# Patient Record
Sex: MALE | Race: WHITE | NOT HISPANIC OR LATINO | Employment: UNEMPLOYED | URBAN - METROPOLITAN AREA
[De-identification: names, ages, dates, MRNs, and addresses within clinical notes are randomized per-mention and may not be internally consistent; named-entity substitution may affect disease eponyms.]

---

## 2020-08-05 ENCOUNTER — ANESTHESIA EVENT (OUTPATIENT)
Dept: SURGERY | Facility: HOSPITAL | Age: 45
DRG: 453 | End: 2020-08-05
Payer: MEDICAID

## 2020-08-05 ENCOUNTER — HOSPITAL ENCOUNTER (EMERGENCY)
Facility: HOSPITAL | Age: 45
Discharge: ANOTHER HOSPITAL | End: 2020-08-05
Attending: EMERGENCY MEDICINE
Payer: MEDICAID

## 2020-08-05 ENCOUNTER — HOSPITAL ENCOUNTER (INPATIENT)
Facility: HOSPITAL | Age: 45
LOS: 16 days | Discharge: NURSING, REHAB OR LONG-TERM CARE | DRG: 453 | End: 2020-08-21
Attending: EMERGENCY MEDICINE | Admitting: NEUROLOGICAL SURGERY
Payer: MEDICAID

## 2020-08-05 VITALS
TEMPERATURE: 98 F | DIASTOLIC BLOOD PRESSURE: 79 MMHG | RESPIRATION RATE: 19 BRPM | WEIGHT: 190 LBS | HEART RATE: 84 BPM | OXYGEN SATURATION: 98 % | SYSTOLIC BLOOD PRESSURE: 117 MMHG

## 2020-08-05 DIAGNOSIS — S32.010A TRAUMATIC COMPRESSION FRACTURE OF L1 LUMBAR VERTEBRA, CLOSED, INITIAL ENCOUNTER: ICD-10-CM
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
## 2020-08-05 DIAGNOSIS — M43.27 FUSION OF SPINE, LUMBOSACRAL REGION: ICD-10-CM

## 2020-08-05 DIAGNOSIS — G89.11 ACUTE LOW BACK PAIN DUE TO TRAUMA: ICD-10-CM

## 2020-08-05 DIAGNOSIS — S32.010A: ICD-10-CM

## 2020-08-05 DIAGNOSIS — S32.008A CLOSED FRACTURE OF LUMBAR VERTEBRA WITH SPINAL CORD INJURY, INITIAL ENCOUNTER: Primary | ICD-10-CM

## 2020-08-05 DIAGNOSIS — S34.109A CLOSED FRACTURE OF LUMBAR VERTEBRA WITH SPINAL CORD INJURY, INITIAL ENCOUNTER: Primary | ICD-10-CM

## 2020-08-05 DIAGNOSIS — S32.009A CLOSED FRACTURE OF TRANSVERSE PROCESS OF LUMBAR VERTEBRA, INITIAL ENCOUNTER: ICD-10-CM

## 2020-08-05 DIAGNOSIS — M48.062 SPINAL STENOSIS OF LUMBAR REGION WITH NEUROGENIC CLAUDICATION: ICD-10-CM

## 2020-08-05 DIAGNOSIS — R29.898 BILATERAL LEG WEAKNESS: ICD-10-CM

## 2020-08-05 DIAGNOSIS — M48.061 SPINAL STENOSIS OF LUMBAR REGION, UNSPECIFIED WHETHER NEUROGENIC CLAUDICATION PRESENT: ICD-10-CM
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
## 2020-08-05 DIAGNOSIS — R33.8 ACUTE URINARY RETENTION: ICD-10-CM

## 2020-08-05 DIAGNOSIS — K56.7 ILEUS: ICD-10-CM

## 2020-08-05 DIAGNOSIS — M54.50 ACUTE LOW BACK PAIN DUE TO TRAUMA: ICD-10-CM

## 2020-08-05 DIAGNOSIS — S32.010A CLOSED WEDGE COMPRESSION FRACTURE OF FIRST LUMBAR VERTEBRA, INITIAL ENCOUNTER: ICD-10-CM

## 2020-08-05 DIAGNOSIS — R33.9 URINARY RETENTION: ICD-10-CM

## 2020-08-05 LAB
ABO + RH BLD: NORMAL
ALBUMIN SERPL BCP-MCNC: 4.1 G/DL (ref 3.5–5.2)
ALP SERPL-CCNC: 59 U/L (ref 55–135)
ALT SERPL W/O P-5'-P-CCNC: 15 U/L (ref 10–44)
ANION GAP SERPL CALC-SCNC: 8 MMOL/L (ref 8–16)
APTT BLDCRRT: 23.5 SEC (ref 21–32)
AST SERPL-CCNC: 27 U/L (ref 10–40)
BILIRUB SERPL-MCNC: 0.4 MG/DL (ref 0.1–1)
BLD GP AB SCN CELLS X3 SERPL QL: NORMAL
BUN SERPL-MCNC: 15 MG/DL (ref 6–20)
CALCIUM SERPL-MCNC: 8.9 MG/DL (ref 8.7–10.5)
CHLORIDE SERPL-SCNC: 106 MMOL/L (ref 95–110)
CO2 SERPL-SCNC: 26 MMOL/L (ref 23–29)
CREAT SERPL-MCNC: 0.7 MG/DL (ref 0.5–1.4)
CREAT SERPL-MCNC: 0.9 MG/DL (ref 0.5–1.4)
DELSYS: NORMAL
ERYTHROCYTE [DISTWIDTH] IN BLOOD BY AUTOMATED COUNT: 11.8 % (ref 11.5–14.5)
EST. GFR  (AFRICAN AMERICAN): >60 ML/MIN/1.73 M^2
EST. GFR  (NON AFRICAN AMERICAN): >60 ML/MIN/1.73 M^2
GLUCOSE SERPL-MCNC: 143 MG/DL (ref 70–110)
HCT VFR BLD AUTO: 39.5 % (ref 40–54)
HGB BLD-MCNC: 13.7 G/DL (ref 14–18)
INR PPP: 0.9 (ref 0.8–1.2)
LIPASE SERPL-CCNC: 32 U/L (ref 4–60)
MCH RBC QN AUTO: 31.7 PG (ref 27–31)
MCHC RBC AUTO-ENTMCNC: 34.7 G/DL (ref 32–36)
MCV RBC AUTO: 91 FL (ref 82–98)
PLATELET # BLD AUTO: 260 K/UL (ref 150–350)
PMV BLD AUTO: 9.1 FL (ref 9.2–12.9)
POTASSIUM SERPL-SCNC: 3.6 MMOL/L (ref 3.5–5.1)
PROT SERPL-MCNC: 7.2 G/DL (ref 6–8.4)
PROTHROMBIN TIME: 10.4 SEC (ref 9–12.5)
RBC # BLD AUTO: 4.32 M/UL (ref 4.6–6.2)
SAMPLE: NORMAL
SARS-COV-2 RDRP RESP QL NAA+PROBE: NEGATIVE
SODIUM SERPL-SCNC: 140 MMOL/L (ref 136–145)
WBC # BLD AUTO: 6.17 K/UL (ref 3.9–12.7)

## 2020-08-05 PROCEDURE — 86850 RBC ANTIBODY SCREEN: CPT

## 2020-08-05 PROCEDURE — 11000001 HC ACUTE MED/SURG PRIVATE ROOM

## 2020-08-05 PROCEDURE — U0002 COVID-19 LAB TEST NON-CDC: HCPCS

## 2020-08-05 PROCEDURE — 83690 ASSAY OF LIPASE: CPT

## 2020-08-05 PROCEDURE — 99285 EMERGENCY DEPT VISIT HI MDM: CPT | Mod: ,,, | Performed by: EMERGENCY MEDICINE

## 2020-08-05 PROCEDURE — 99285 PR EMERGENCY DEPT VISIT,LEVEL V: ICD-10-PCS | Mod: ,,, | Performed by: EMERGENCY MEDICINE

## 2020-08-05 PROCEDURE — 96374 THER/PROPH/DIAG INJ IV PUSH: CPT | Mod: 59

## 2020-08-05 PROCEDURE — 25500020 PHARM REV CODE 255: Performed by: EMERGENCY MEDICINE

## 2020-08-05 PROCEDURE — 99234 PR OBSERV/HOSP SAME DATE,LEVL III: ICD-10-PCS | Mod: ,,, | Performed by: PHYSICIAN ASSISTANT

## 2020-08-05 PROCEDURE — 99900035 HC TECH TIME PER 15 MIN (STAT)

## 2020-08-05 PROCEDURE — 63600175 PHARM REV CODE 636 W HCPCS: Performed by: EMERGENCY MEDICINE

## 2020-08-05 PROCEDURE — 80053 COMPREHEN METABOLIC PANEL: CPT

## 2020-08-05 PROCEDURE — 99285 EMERGENCY DEPT VISIT HI MDM: CPT | Mod: 25,27

## 2020-08-05 PROCEDURE — 99285 EMERGENCY DEPT VISIT HI MDM: CPT | Mod: 25

## 2020-08-05 PROCEDURE — 96375 TX/PRO/DX INJ NEW DRUG ADDON: CPT | Mod: 59

## 2020-08-05 PROCEDURE — 86920 COMPATIBILITY TEST SPIN: CPT

## 2020-08-05 PROCEDURE — 96376 TX/PRO/DX INJ SAME DRUG ADON: CPT | Mod: 59

## 2020-08-05 PROCEDURE — 82565 ASSAY OF CREATININE: CPT | Mod: 91

## 2020-08-05 PROCEDURE — 25000003 PHARM REV CODE 250: Performed by: PHYSICIAN ASSISTANT

## 2020-08-05 PROCEDURE — 85610 PROTHROMBIN TIME: CPT

## 2020-08-05 PROCEDURE — 63600175 PHARM REV CODE 636 W HCPCS: Performed by: PHYSICIAN ASSISTANT

## 2020-08-05 PROCEDURE — 25000003 PHARM REV CODE 250: Performed by: EMERGENCY MEDICINE

## 2020-08-05 PROCEDURE — 99234 HOSP IP/OBS SM DT SF/LOW 45: CPT | Mod: ,,, | Performed by: PHYSICIAN ASSISTANT

## 2020-08-05 PROCEDURE — 85027 COMPLETE CBC AUTOMATED: CPT

## 2020-08-05 PROCEDURE — 96374 THER/PROPH/DIAG INJ IV PUSH: CPT

## 2020-08-05 PROCEDURE — 85730 THROMBOPLASTIN TIME PARTIAL: CPT

## 2020-08-05 PROCEDURE — 96375 TX/PRO/DX INJ NEW DRUG ADDON: CPT

## 2020-08-05 RX ORDER — IBUPROFEN 200 MG
24 TABLET ORAL
Status: DISCONTINUED | OUTPATIENT
Start: 2020-08-05 | End: 2020-08-11

## 2020-08-05 RX ORDER — AMOXICILLIN 250 MG
2 CAPSULE ORAL DAILY
Status: DISCONTINUED | OUTPATIENT
Start: 2020-08-05 | End: 2020-08-08

## 2020-08-05 RX ORDER — FENTANYL CITRATE 50 UG/ML
25 INJECTION, SOLUTION INTRAMUSCULAR; INTRAVENOUS
Status: DISCONTINUED | OUTPATIENT
Start: 2020-08-05 | End: 2020-08-07

## 2020-08-05 RX ORDER — SODIUM CHLORIDE 9 MG/ML
INJECTION, SOLUTION INTRAVENOUS CONTINUOUS
Status: DISCONTINUED | OUTPATIENT
Start: 2020-08-05 | End: 2020-08-05

## 2020-08-05 RX ORDER — SODIUM CHLORIDE 9 MG/ML
INJECTION, SOLUTION INTRAVENOUS CONTINUOUS
Status: DISCONTINUED | OUTPATIENT
Start: 2020-08-06 | End: 2020-08-20

## 2020-08-05 RX ORDER — GLUCAGON 1 MG
1 KIT INJECTION
Status: DISCONTINUED | OUTPATIENT
Start: 2020-08-05 | End: 2020-08-21 | Stop reason: HOSPADM

## 2020-08-05 RX ORDER — IBUPROFEN 200 MG
16 TABLET ORAL
Status: DISCONTINUED | OUTPATIENT
Start: 2020-08-05 | End: 2020-08-11

## 2020-08-05 RX ORDER — DIAZEPAM 5 MG/1
5 TABLET ORAL EVERY 6 HOURS PRN
Status: DISCONTINUED | OUTPATIENT
Start: 2020-08-05 | End: 2020-08-09

## 2020-08-05 RX ORDER — FENTANYL CITRATE 50 UG/ML
100 INJECTION, SOLUTION INTRAMUSCULAR; INTRAVENOUS
Status: COMPLETED | OUTPATIENT
Start: 2020-08-05 | End: 2020-08-05

## 2020-08-05 RX ORDER — HYDROMORPHONE HYDROCHLORIDE 1 MG/ML
1 INJECTION, SOLUTION INTRAMUSCULAR; INTRAVENOUS; SUBCUTANEOUS
Status: COMPLETED | OUTPATIENT
Start: 2020-08-05 | End: 2020-08-05

## 2020-08-05 RX ORDER — POLYETHYLENE GLYCOL 3350 17 G/17G
17 POWDER, FOR SOLUTION ORAL DAILY
Status: DISCONTINUED | OUTPATIENT
Start: 2020-08-05 | End: 2020-08-08

## 2020-08-05 RX ORDER — ONDANSETRON 2 MG/ML
4 INJECTION INTRAMUSCULAR; INTRAVENOUS EVERY 6 HOURS PRN
Status: DISCONTINUED | OUTPATIENT
Start: 2020-08-05 | End: 2020-08-07

## 2020-08-05 RX ORDER — SODIUM CHLORIDE 9 MG/ML
1000 INJECTION, SOLUTION INTRAVENOUS
Status: COMPLETED | OUTPATIENT
Start: 2020-08-05 | End: 2020-08-05

## 2020-08-05 RX ORDER — HYDROMORPHONE HYDROCHLORIDE 1 MG/ML
1 INJECTION, SOLUTION INTRAMUSCULAR; INTRAVENOUS; SUBCUTANEOUS
Status: DISCONTINUED | OUTPATIENT
Start: 2020-08-05 | End: 2020-08-05

## 2020-08-05 RX ORDER — MORPHINE SULFATE 2 MG/ML
6 INJECTION, SOLUTION INTRAMUSCULAR; INTRAVENOUS
Status: COMPLETED | OUTPATIENT
Start: 2020-08-05 | End: 2020-08-05

## 2020-08-05 RX ORDER — OXYCODONE AND ACETAMINOPHEN 5; 325 MG/1; MG/1
2 TABLET ORAL EVERY 4 HOURS PRN
Status: DISCONTINUED | OUTPATIENT
Start: 2020-08-05 | End: 2020-08-07

## 2020-08-05 RX ORDER — ACETAMINOPHEN 325 MG/1
650 TABLET ORAL EVERY 4 HOURS PRN
Status: DISCONTINUED | OUTPATIENT
Start: 2020-08-05 | End: 2020-08-09

## 2020-08-05 RX ADMIN — SODIUM CHLORIDE: 0.9 INJECTION, SOLUTION INTRAVENOUS at 03:08

## 2020-08-05 RX ADMIN — MORPHINE SULFATE 6 MG: 2 INJECTION, SOLUTION INTRAMUSCULAR; INTRAVENOUS at 11:08

## 2020-08-05 RX ADMIN — OXYCODONE HYDROCHLORIDE AND ACETAMINOPHEN 2 TABLET: 5; 325 TABLET ORAL at 09:08

## 2020-08-05 RX ADMIN — HYDROMORPHONE HYDROCHLORIDE 1 MG: 1 INJECTION, SOLUTION INTRAMUSCULAR; INTRAVENOUS; SUBCUTANEOUS at 01:08

## 2020-08-05 RX ADMIN — IOHEXOL 100 ML: 350 INJECTION, SOLUTION INTRAVENOUS at 10:08

## 2020-08-05 RX ADMIN — FENTANYL CITRATE 25 MCG: 50 INJECTION INTRAMUSCULAR; INTRAVENOUS at 07:08

## 2020-08-05 RX ADMIN — DIAZEPAM 5 MG: 5 TABLET ORAL at 03:08

## 2020-08-05 RX ADMIN — FENTANYL CITRATE 100 MCG: 50 INJECTION INTRAMUSCULAR; INTRAVENOUS at 11:08

## 2020-08-05 RX ADMIN — SODIUM CHLORIDE 1000 ML: 0.9 INJECTION, SOLUTION INTRAVENOUS at 10:08

## 2020-08-05 RX ADMIN — OXYCODONE HYDROCHLORIDE AND ACETAMINOPHEN 2 TABLET: 5; 325 TABLET ORAL at 03:08

## 2020-08-05 RX ADMIN — FENTANYL CITRATE 100 MCG: 50 INJECTION INTRAMUSCULAR; INTRAVENOUS at 10:08

## 2020-08-05 RX ADMIN — FENTANYL CITRATE 100 MCG: 50 INJECTION INTRAMUSCULAR; INTRAVENOUS at 01:08

## 2020-08-05 RX ADMIN — FENTANYL CITRATE 25 MCG: 50 INJECTION INTRAMUSCULAR; INTRAVENOUS at 06:08

## 2020-08-05 NOTE — ED NOTES
Physician at bedside. Pt. Removed from spine board. c-collar remain in place. Pt. Is crying in pain.

## 2020-08-05 NOTE — ED TRIAGE NOTES
Pt arrived from [place] after falling off roof about 10-12 ft high today. Helping cousin work on house. Fell off roof and fell directly on tailbone. Rates pain 10/10. Pt reports having severe back pain and sharp spasms to bilateral legs. Urinary retention-catheter in place before transfer. Abd tenderness.

## 2020-08-05 NOTE — ASSESSMENT & PLAN NOTE
46 y/o male with no significant PMH with L1 burst fracture with retropulsion resulting in severe canal stenosis s/p fall from 10ft building     -admit to neurosurgery  -close neurological monitoring, notify neurosurgery with any acute changes  -STAT MRI Lumbar spine pending  -NPO  -Preop labs (coags, type and screen)  -Bedrest/Log roll  -Pain control  -Maintain Aspen collar in acute setting  -Patient will likely need surgical stabilization, will await MRI results and update with plan    Discussed with Dr. Vizcarra

## 2020-08-05 NOTE — ED NOTES
Pt spoke with family on cell phone. Pt has 2 bags of belongings at bedside; pt states he wants his belongings to go with him to [facility].

## 2020-08-05 NOTE — ED NOTES
"Pt states " that last medicine didn't work as well as the first one." Dilaudid is not as effective as Fentanyl for pain per paitent.   "

## 2020-08-05 NOTE — ED TRIAGE NOTES
Pt presents to ED via EMS following fall approximately 10 feet off of a roof. Pt states he landed on his back and is stating he has severe back pain. Pt in c-collar.

## 2020-08-05 NOTE — ASSESSMENT & PLAN NOTE
44 y/o male with no significant PMH with L1 burst fracture with retropulsion resulting in severe canal stenosis s/p fall from 10ft building     -admit to neurosurgery  -close neurological monitoring, notify neurosurgery with any acute changes  -MRI Lsp: L1-L3 EDH. Consistent severe canal stenosis and foraminal narrowing at L1 and adjacent.   -NPO  -Preop labs (coags, type and screen)  -Bedrest/Log roll  -Pain control; given Fent 75mcg bolus and started po neurontin and robaxin. Ok with small sip of water.   -OR today for L1 corpectomy with cage insertion (L approach), T12-L1 interbody fusion and posterior instrumentation. Neuromonitoring.     D/w Dr. Snider.

## 2020-08-05 NOTE — ANESTHESIA PREPROCEDURE EVALUATION
[facility]  Anesthesia Pre-Operative Evaluation         Patient Name: Oscar Bills  YOB: 1975  MRN: [id number]    SUBJECTIVE:     Pre-operative evaluation for Procedure(s) (LRB):  CORPECTOMY, SPINE, LUMBAR (Left)  FUSION, SPINE, LUMBAR, TLIF, POSTERIOR APPROACH, USING PEDICLE SCREW (N/A)     08/05/2020    Oscar Bills is a 45 y.o. male w/ no significant PMH who was transferred from  after falling from 10ft roof at work. CT CAP significant for L1 burst fracture with retropulsion into the spinal canal resulting in severe spinal stenosis. Patient states he could feel he had to urinate but could not start a stream, kraus placed and he reports he felt the kraus inserted and felt his bladder release once the kraus was placed. He reports he felt the OSMIN and per report from ED physician, patient had full rectal tone. Denies saddle anesthesia. Endorses any movement of lower extremities causes excruciating pain in the sacram and lower back. numbness in left calf. Denies any prior medical conditions or daily medication.       Pt endorses asthma for which he takes albuterol rescue inhaler. Endorses an asthma attack last week. Has been hospitalized as a child for asthma. Never intubated.       Patient now presents for the above procedure(s).      LDA: None documented.       Peripheral IV - Single Lumen 08/05/20 18 G Left Forearm (Active)   Site Assessment Clean;Dry;Intact;No redness;No swelling 08/05/20 1012   Line Status Saline locked 08/05/20 1012   Dressing Status Clean;Dry;Intact 08/05/20 1012   Number of days: 0            Peripheral IV - Single Lumen 08/05/20 1056 18 G Left Upper Arm (Active)   Site Assessment Clean;Dry;Intact 08/05/20 1056   Line Status Blood return noted;Flushed 08/05/20 1056   Dressing Status Clean;Dry;Intact 08/05/20 1056   Dressing Intervention First dressing 08/05/20 1056   Number of days: 0            Urethral Catheter 08/05/20 1133 Straight-tip 14 Fr. (Active)  "  Site Assessment Clean;Intact;Dry 08/05/20 1517   Collection Container Urimeter 08/05/20 1517   Securement Method secured to top of thigh w/ adhesive device 08/05/20 1517   Reason for Continuing Urinary Catheterization Urinary retention 08/05/20 1517   CAUTI Prevention Bundle StatLock in place w 1" slack;Intact seal between catheter & drainage tubing;Drainage bag/urimeter off the floor;No dependent loops or kinks;Green sheeting clip in use;Drainage bag/urimeter not overfilled (<2/3 full);Drainage bag/urimeter below bladder 08/05/20 1517   Output (mL) 350 mL 08/05/20 1517   Number of days: 0       Prev airway: None documented.    Drips: None documented.   [START ON 8/6/2020] sodium chloride 0.9% 75 mL/hr at 08/05/20 1630       Patient Active Problem List   Diagnosis    Traumatic compression fracture of L1 lumbar vertebra    Spinal stenosis of lumbar region    Fall from building    Acute urinary retention       Review of patient's allergies indicates:  No Known Allergies    Current Inpatient Medications:   polyethylene glycol  17 g Oral Daily    senna-docusate 8.6-50 mg  2 tablet Oral Daily       No current facility-administered medications on file prior to encounter.      No current outpatient medications on file prior to encounter.       History reviewed. No pertinent surgical history.    Social History     Socioeconomic History    Marital status: Single     Spouse name: Not on file    Number of children: Not on file    Years of education: Not on file    Highest education level: Not on file   Occupational History    Not on file   Social Needs    Financial resource strain: Not on file    Food insecurity     Worry: Not on file     Inability: Not on file    Transportation needs     Medical: Not on file     Non-medical: Not on file   Tobacco Use    Smoking status: Not on file   Substance and Sexual Activity    Alcohol use: Yes    Drug use: Not on file    Sexual activity: Not on file   Lifestyle    " Physical activity     Days per week: Not on file     Minutes per session: Not on file    Stress: Not on file   Relationships    Social connections     Talks on phone: Not on file     Gets together: Not on file     Attends [employer]ism service: Not on file     Active member of club or organization: Not on file     Attends meetings of clubs or organizations: Not on file     Relationship status: Not on file   Other Topics Concern    Not on file   Social History Narrative    Not on file       OBJECTIVE:     Vital Signs Range (Last 24H):  Temp:  [36.4 °C (97.6 °F)-36.7 °C (98 °F)]   Pulse:  [77-94]   Resp:  [16-25]   BP: (117-140)/()   SpO2:  [96 %-99 %]       Significant Labs:  Lab Results   Component Value Date    WBC 6.17 08/05/2020    HGB 13.7 (L) 08/05/2020    HCT 39.5 (L) 08/05/2020     08/05/2020    ALT 15 08/05/2020    AST 27 08/05/2020     08/05/2020    K 3.6 08/05/2020     08/05/2020    CREATININE 0.9 08/05/2020    BUN 15 08/05/2020    CO2 26 08/05/2020    INR 0.9 08/05/2020       Diagnostic Studies: No relevant studies.    EKG:   No results found for this or any previous visit.    2D ECHO:  TTE:  No results found for this or any previous visit.    LOBITO:  No results found for this or any previous visit.    ASSESSMENT/PLAN:                                                                                                                  08/05/2020  Oscar Bills is a 45 y.o., male.    Anesthesia Evaluation    I have reviewed the Patient Summary Reports.    I have reviewed the Nursing Notes. I have reviewed the NPO Status.   I have reviewed the Medications.     Review of Systems  Cardiovascular:  Cardiovascular Normal     Pulmonary:   Asthma    Renal/:  Renal/ Normal     Hepatic/GI:  Hepatic/GI Normal    Endocrine:  Endocrine Normal        Physical Exam  General:  Well nourished    Airway/Jaw/Neck:  Airway Findings: Mouth Opening: Normal Tongue: Normal  General Airway Assessment: Adult,  Good  Mallampati: II  TM Distance: Normal, at least 6 cm  Jaw/Neck Findings:  Neck ROM: Normal ROM       Chest/Lungs:  Chest/Lungs Findings: Clear to auscultation     Heart/Vascular:  Heart Findings: Rate: Normal  Rhythm: Regular Rhythm        Mental Status:  Mental Status Findings:  Alert and Oriented, Cooperative         Anesthesia Plan  Type of Anesthesia, risks & benefits discussed:  Anesthesia Type:  general  Patient's Preference:   Intra-op Monitoring Plan: standard ASA monitors and arterial line  Intra-op Monitoring Plan Comments:   Post Op Pain Control Plan: multimodal analgesia, per primary service following discharge from PACU and IV/PO Opioids PRN  Post Op Pain Control Plan Comments:   Induction:   IV  Beta Blocker:  Patient is not currently on a Beta-Blocker (No further documentation required).       Informed Consent: Patient understands risks and agrees with Anesthesia plan.  Questions answered. Anesthesia consent signed with patient.  ASA Score: 2     Day of Surgery Review of History & Physical:    H&P update referred to the surgeon.         Ready For Surgery From Anesthesia Perspective.

## 2020-08-05 NOTE — H&P
[facility]  Neuorsurgery  History and Physical     Patient Name: Oscar Bills  MRN: [id number]  Admission Date: 8/5/2020  Attending Physician: Dhaval Vizcarra MD  Primary Care Physician: No primary care provider on file.    Patient information was obtained from patient and ER records.     Subjective:     Chief Complaint/Reason for Admission: L1 burst fracture s/p fall      HPI:  Patient is a 46 y/o male with no significant PMH who was transferred from  after falling from 10ft roof at work. Patient denies LOC, complained of excrutiating low back/sacral pain exacerbated with any movement of legs. CT CAP significant for L1 burst fracture with retropulsion into the spinal canal resulting in severe spinal stenosis. Patient states he could feel he had to urinate but could not start a stream, kraus placed and he reports he felt the kraus inserted and felt his bladder release once the kraus was placed. He reports he felt the OSMIN and per report from ED physician, patient had full rectal tone. The patient declined repeat rectal exam. Denies saddle anesthesia. Endorses any movement of lower extremities causes excruciating pain in the sacram and lower back. Denies radicular pain into the legs. Reports slight numbness in left calf. Denies any prior medical conditions or daily medication.     (Not in a hospital admission)      Review of patient's allergies indicates:  No Known Allergies    History reviewed. No pertinent past medical history.  History reviewed. No pertinent surgical history.  Family History     None        Tobacco Use    Smoking status: Not on file   Substance and Sexual Activity    Alcohol use: Yes    Drug use: Not on file    Sexual activity: Not on file     Review of Systems   Constitutional: Positive for diaphoresis. Negative for appetite change, chills and fever.   HENT: Negative for facial swelling and sore throat.    Eyes: Negative for visual disturbance.   Respiratory: Negative for cough  and shortness of breath.    Cardiovascular: Negative for chest pain and palpitations.   Gastrointestinal: Positive for abdominal pain. Negative for abdominal distention, constipation, diarrhea, nausea and vomiting.   Genitourinary: Positive for difficulty urinating. Negative for testicular pain.   Musculoskeletal: Positive for back pain (low back into sacrum). Negative for neck pain and neck stiffness.   Skin: Negative for wound.   Neurological: Positive for weakness and numbness. Negative for seizures, syncope, facial asymmetry, speech difficulty, light-headedness and headaches.   Psychiatric/Behavioral: Negative for confusion. The patient is not nervous/anxious.      Objective:     Weight: 90.7 kg (200 lb)  Body mass index is 28.7 kg/m².  Vital Signs (Most Recent):  Temp: 97.7 °F (36.5 °C) (08/05/20 1249)  Pulse: 80 (08/05/20 1305)  Resp: 16 (08/05/20 1349)  BP: 120/75 (08/05/20 1304)  SpO2: 98 % (08/05/20 1305) Vital Signs (24h Range):  Temp:  [97.6 °F (36.4 °C)-98 °F (36.7 °C)] 97.7 °F (36.5 °C)  Pulse:  [77-88] 80  Resp:  [16-25] 16  SpO2:  [98 %-99 %] 98 %  BP: (117-140)/() 120/75                          Urethral Catheter 08/05/20 1133 Straight-tip 14 Fr. (Active)       Neurosurgery Physical Exam  General: well developed, well nourished, in pain, diaphoretic.   Head: normocephalic, atraumatic  Neurologic: Alert and oriented. Thought content appropriate.  GCS: Motor: 6/Verbal: 5/Eyes: 4 GCS Total: 15  Mental Status: Awake, Alert, Oriented x 4  Language: No aphasia  Speech: No dysarthria  Cranial nerves: face symmetric, tongue midline, CN II-XII grossly intact.   Eyes: pupils equal, round, reactive to light with accommodation, EOMI.   Pulmonary: normal respirations, no signs of respiratory distress  Abdomen: soft, non-distended, mildly tender to palpation in RLQ and LLQ  Skin: Skin is warm, dry and intact.  Sensory: decreased sensation to left lateral calve compared to right. Patient declined repeat OSMIN,  per ED physician rectal tone intact and patient reported sensation, perineal sensation intact    Motor Strength: Lower extremity exam limited 2/2 pain. No abnormal movements seen.     Strength  Deltoids Triceps Biceps Wrist Extension Wrist Flexion Hand    Upper: R 5/5 5/5 5/5 5/5 5/5 5/5    L 5/5 5/5 5/5 5/5 5/5 5/5     Iliopsoas Quadriceps Knee  Flexion Tibialis  anterior Gastro- cnemius EHL   Lower: R 4/5 3/5 5/5 4/5 4/5 4/5    L 4/5 3/5 5/5 3/5 3/5 3/5     Reflexes:   DTR: 2+ symmetrically throughout.  Grove's: Negative.  Babinski's: Negative.  Clonus: Negative.     Cerebellar:   Finger-to-nose: intact bilaterally   Pronator drift: absent bilaterally     Cervical:   ROM: full active and passive ROM  Midline TTP: Negative.     Thoracic:  Midline TTP: Negative.     Lumbar: TTP across the lumbar spine into paraspinal muscles bilaterally   SI joint b/l TTP        Significant Labs:  Recent Labs   Lab 08/05/20  1021   *      K 3.6      CO2 26   BUN 15   CREATININE 0.9   CALCIUM 8.9     Recent Labs   Lab 08/05/20  1021   WBC 6.17   HGB 13.7*   HCT 39.5*        Recent Labs   Lab 08/05/20  1345   INR 0.9     Microbiology Results (last 7 days)     ** No results found for the last 168 hours. **        All pertinent labs from the last 24 hours have been reviewed.    Significant Diagnostics:  I independently reviewed the imaging.     CT: Ct Head Without Contrast    Result Date: 8/5/2020  No acute intracranial abnormality. Electronically signed by: Luigi Rivera MD Date:    08/05/2020 Time:    10:43  Narrative & Impression     EXAMINATION:  CT CHEST ABDOMEN PELVIS WITH CONTRAST (XPD)     CLINICAL HISTORY:  Chest-abdomen-pelvis trauma, blunt;     TECHNIQUE:  Low dose axial images, sagittal and coronal reformations were obtained from the thoracic inlet to the pubic symphysis following the IV administration of 100 mL of Omnipaque 350 .  Oral contrast was not given. Venous phase images obtained  through the chest, abdomen, and pelvis.  Additional delayed phase images obtained through the abdomen and pelvis alone.     COMPARISON:  Chest radiograph, 08/05/2020.     FINDINGS:  Chest:     Soft tissues at the base of the neck are unremarkable.     Heart size is normal.  No pericardial effusion.  No significant coronary artery calcifications.  Thoracic aorta is normal in course and caliber without significant calcific atherosclerosis.  No evidence of dissection.  No mediastinal hematoma.     Lungs demonstrate minor dependent subsegmental atelectasis.  No focal consolidation, pleural effusion, or pneumothorax.  Small micro nodules in the right upper lobe measure up to 5 mm in size (axial series 4, image 182).     No bulky mediastinal, axillary, or hilar lymphadenopathy.     Abdomen:     Evaluation is limited by extensive streak artifact due to the patient's arms overlying the field of view.     Liver is normal in size and contour.  No focal hepatic mass.  Subcentimeter hypodensity in the inferior aspect of the right hepatic lobe, most likely a cyst.  Gallbladder is mildly distended and contains small calcified gallstones.  No pericholecystic inflammatory changes identified.  No intrahepatic biliary ductal dilatation.     Spleen, adrenals, and pancreas are unremarkable.     The kidneys are symmetric.  No hydronephrosis. No asymmetric perinephric fat stranding.  Small hypodensity in the mid right kidney is too small to definitively characterize, but likely represents a cyst.  Incidentally noted retroaortic accessory left renal vein.     No small bowel obstruction.  No inflammatory changes identified involving the gastrointestinal tract.     No pneumoperitoneum or organized fluid collection.  No hemoperitoneum.     No bulky lymphadenopathy.     Abdominal aorta is normal in caliber with mild calcific atherosclerosis.     Portal, splenic, and superior mesenteric veins are patent.     Pelvis:     Urinary bladder is  unremarkable.  No evidence of urinary bladder injury.  Prostate is at the upper limits of normal in size but otherwise unremarkable.  Rectum appears within normal limits.  No free fluid in the pelvis.  No pelvic lymphadenopathy.     Bones and soft tissues:     Acute compression deformity of the L1 vertebral body with approximately 50% height loss.  Significant retropulsion of fracture fragments extending approximately 1.2 cm into the central canal at the level of T12-L1.  Severe central canal stenosis and presumed cord compression at this level.  Associated nondisplaced fractures involving the right transverse process, right inferior articular process, and spinous process of the L1 vertebral body.  Remote appearing unilateral pars defect on the left at L5 without significant spondylolisthesis.  No additional acute fracture identified.  Extraperitoneal soft tissues are unremarkable.     Impression:     Acute compression deformity involving the L1 vertebral body with significant retropulsion of fracture fragments into the central canal resulting in severe central canal stenosis and presumed cord injury at this level.  Associated nondisplaced fractures involving the right transverse process, right inferior articular process, and spinous process at L1.  Recommend spinal surgery evaluation and consider lumbar spine MRI to further characterize.     No acute solid organ injury identified in the chest, abdomen, or pelvis.     Cholelithiasis.     Few pulmonary micro nodules in the right upper lobe measuring up to 5 mm in size.  For multiple solid nodules all <6 mm, Fleischner Society 2017 guidelines recommend no routine follow up for a low risk patient, or follow up with non-contrast chest CT at 12 months after discovery in a high risk patient.     Incidental findings discussed in the body of the report.     Critical finding of L1 fracture with presumed cord compression called to Dr. Serna in the ER at 11:15 on  08/05/2020.     This report was flagged in Epic as abnormal.        Electronically signed by: Praneeth Swan MD  Date:                                            08/05/2020  Time:                                           11:19     Narrative & Impression     EXAMINATION:  CT CERVICAL SPINE WITHOUT CONTRAST     CLINICAL HISTORY:  Polytrauma, critical, head/C-spine injury suspected;     TECHNIQUE:  Low dose axial images, sagittal and coronal reformations were performed though the cervical spine.  Contrast was not administered.     COMPARISON:  None.     FINDINGS:     Normal curvature and alignment.  Vertebral body heights are well maintained.  Mild multilevel degenerative changes throughout the cervical spine.  No severe central canal stenosis or neural foraminal narrowing.  No acute fracture identified.  Prevertebral soft tissues are normal.  Lung apices are clear.     Impression:     No acute cervical fracture.        Electronically signed by: Praneeth Swan MD  Date:                                            08/05/2020  Time:                                           10:50     Narrative & Impression     EXAMINATION:  XR PELVIS ROUTINE AP     CLINICAL HISTORY:  trauma, fall from roof;     TECHNIQUE:  AP view of the pelvis was performed.     COMPARISON:  None.     FINDINGS:  No acute fracture or dislocation.  Pubic symphysis and sacroiliac joints are symmetric.  No unexpected radiopaque foreign body.     Impression:     No acute bony abnormality in the pelvis.        Electronically signed by: Praneeth Swan MD  Date:                                            08/05/2020  Time:                                           10:46     Narrative & Impression     EXAMINATION:  XR CHEST AP PORTABLE     CLINICAL HISTORY:  trauma, fall from roof;     TECHNIQUE:  Single frontal view of the chest was performed.     COMPARISON:  None     FINDINGS:  Cardiomediastinal silhouette and pulmonary vascularity are within normal range.   There is no evidence of significant focal consolidation, large effusion or pneumothorax.  There is tortuosity of the thoracic aorta.  Bones show no acute abnormalities.     Impression:     No acute radiographic findings on single view of the chest.        Electronically signed by: Luigi Rivera MD  Date:                                            08/05/2020  Time:                                           10:41         Assessment and Plan:     Traumatic compression fracture of L1 lumbar vertebra  44 y/o male with no significant PMH with L1 burst fracture with retropulsion resulting in severe canal stenosis s/p fall from 10ft building     -admit to neurosurgery  -close neurological monitoring, notify neurosurgery with any acute changes  -STAT MRI Lumbar spine pending  -NPO  -Preop labs (coags, type and screen)  -Bedrest/Log roll  -Pain control  -Patient will likely need surgical stabilization, will await MRI results and update with plan    Discussed with Dr. Zackery Jimenez, PA-C  Neurosurgery  [facility]

## 2020-08-05 NOTE — SUBJECTIVE & OBJECTIVE
(Not in a hospital admission)      Review of patient's allergies indicates:  No Known Allergies    History reviewed. No pertinent past medical history.  History reviewed. No pertinent surgical history.  Family History     None        Tobacco Use    Smoking status: Not on file   Substance and Sexual Activity    Alcohol use: Yes    Drug use: Not on file    Sexual activity: Not on file     Review of Systems   Constitutional: Positive for diaphoresis. Negative for appetite change, chills and fever.   HENT: Negative for facial swelling and sore throat.    Eyes: Negative for visual disturbance.   Respiratory: Negative for cough and shortness of breath.    Cardiovascular: Negative for chest pain and palpitations.   Gastrointestinal: Positive for abdominal pain. Negative for abdominal distention, constipation, diarrhea, nausea and vomiting.   Genitourinary: Positive for difficulty urinating. Negative for testicular pain.   Musculoskeletal: Positive for back pain (low back into sacrum). Negative for neck pain and neck stiffness.   Skin: Negative for wound.   Neurological: Positive for weakness and numbness. Negative for seizures, syncope, facial asymmetry, speech difficulty, light-headedness and headaches.   Psychiatric/Behavioral: Negative for confusion. The patient is not nervous/anxious.      Objective:     Weight: 90.7 kg (200 lb)  Body mass index is 28.7 kg/m².  Vital Signs (Most Recent):  Temp: 97.7 °F (36.5 °C) (08/05/20 1249)  Pulse: 80 (08/05/20 1305)  Resp: 16 (08/05/20 1349)  BP: 120/75 (08/05/20 1304)  SpO2: 98 % (08/05/20 1305) Vital Signs (24h Range):  Temp:  [97.6 °F (36.4 °C)-98 °F (36.7 °C)] 97.7 °F (36.5 °C)  Pulse:  [77-88] 80  Resp:  [16-25] 16  SpO2:  [98 %-99 %] 98 %  BP: (117-140)/() 120/75                          Urethral Catheter 08/05/20 1133 Straight-tip 14 Fr. (Active)       Neurosurgery Physical Exam  General: well developed, well nourished, in pain, diaphoretic.   Head: normocephalic,  atraumatic  Neurologic: Alert and oriented. Thought content appropriate.  GCS: Motor: 6/Verbal: 5/Eyes: 4 GCS Total: 15  Mental Status: Awake, Alert, Oriented x 4  Language: No aphasia  Speech: No dysarthria  Cranial nerves: face symmetric, tongue midline, CN II-XII grossly intact.   Eyes: pupils equal, round, reactive to light with accommodation, EOMI.   Pulmonary: normal respirations, no signs of respiratory distress  Abdomen: soft, non-distended, mildly tender to palpation in RLQ and LLQ  Skin: Skin is warm, dry and intact.  Sensory: decreased sensation to left lateral calve compared to right. Patient declined repeat OSMIN, per ED physician rectal tone intact and patient reported sensation, perineal sensation intact    Motor Strength: Lower extremity exam limited 2/2 pain. No abnormal movements seen.     Strength  Deltoids Triceps Biceps Wrist Extension Wrist Flexion Hand    Upper: R 5/5 5/5 5/5 5/5 5/5 5/5    L 5/5 5/5 5/5 5/5 5/5 5/5     Iliopsoas Quadriceps Knee  Flexion Tibialis  anterior Gastro- cnemius EHL   Lower: R 4/5 3/5 5/5 4/5 4/5 4/5    L 4/5 3/5 5/5 3/5 3/5 3/5     Reflexes:   DTR: 2+ symmetrically throughout.  Grove's: Negative.  Babinski's: Negative.  Clonus: Negative.     Cerebellar:   Finger-to-nose: intact bilaterally   Pronator drift: absent bilaterally     Cervical:   ROM: Aspen collar in place, well-fitted  Midline TTP: Negative.     Thoracic:  Midline TTP: Negative.     Lumbar: TTP across the lumbar spine into paraspinal muscles bilaterally   SI joint b/l TTP        Significant Labs:  Recent Labs   Lab 08/05/20  1021   *      K 3.6      CO2 26   BUN 15   CREATININE 0.9   CALCIUM 8.9     Recent Labs   Lab 08/05/20  1021   WBC 6.17   HGB 13.7*   HCT 39.5*        Recent Labs   Lab 08/05/20  1345   INR 0.9     Microbiology Results (last 7 days)     ** No results found for the last 168 hours. **        All pertinent labs from the last 24 hours have been  reviewed.    Significant Diagnostics:  I independently reviewed the imaging.     CT: Ct Head Without Contrast    Result Date: 8/5/2020  No acute intracranial abnormality. Electronically signed by: Luigi Rivera MD Date:    08/05/2020 Time:    10:43  Narrative & Impression     EXAMINATION:  CT CHEST ABDOMEN PELVIS WITH CONTRAST (XPD)     CLINICAL HISTORY:  Chest-abdomen-pelvis trauma, blunt;     TECHNIQUE:  Low dose axial images, sagittal and coronal reformations were obtained from the thoracic inlet to the pubic symphysis following the IV administration of 100 mL of Omnipaque 350 .  Oral contrast was not given. Venous phase images obtained through the chest, abdomen, and pelvis.  Additional delayed phase images obtained through the abdomen and pelvis alone.     COMPARISON:  Chest radiograph, 08/05/2020.     FINDINGS:  Chest:     Soft tissues at the base of the neck are unremarkable.     Heart size is normal.  No pericardial effusion.  No significant coronary artery calcifications.  Thoracic aorta is normal in course and caliber without significant calcific atherosclerosis.  No evidence of dissection.  No mediastinal hematoma.     Lungs demonstrate minor dependent subsegmental atelectasis.  No focal consolidation, pleural effusion, or pneumothorax.  Small micro nodules in the right upper lobe measure up to 5 mm in size (axial series 4, image 182).     No bulky mediastinal, axillary, or hilar lymphadenopathy.     Abdomen:     Evaluation is limited by extensive streak artifact due to the patient's arms overlying the field of view.     Liver is normal in size and contour.  No focal hepatic mass.  Subcentimeter hypodensity in the inferior aspect of the right hepatic lobe, most likely a cyst.  Gallbladder is mildly distended and contains small calcified gallstones.  No pericholecystic inflammatory changes identified.  No intrahepatic biliary ductal dilatation.     Spleen, adrenals, and pancreas are unremarkable.     The  kidneys are symmetric.  No hydronephrosis. No asymmetric perinephric fat stranding.  Small hypodensity in the mid right kidney is too small to definitively characterize, but likely represents a cyst.  Incidentally noted retroaortic accessory left renal vein.     No small bowel obstruction.  No inflammatory changes identified involving the gastrointestinal tract.     No pneumoperitoneum or organized fluid collection.  No hemoperitoneum.     No bulky lymphadenopathy.     Abdominal aorta is normal in caliber with mild calcific atherosclerosis.     Portal, splenic, and superior mesenteric veins are patent.     Pelvis:     Urinary bladder is unremarkable.  No evidence of urinary bladder injury.  Prostate is at the upper limits of normal in size but otherwise unremarkable.  Rectum appears within normal limits.  No free fluid in the pelvis.  No pelvic lymphadenopathy.     Bones and soft tissues:     Acute compression deformity of the L1 vertebral body with approximately 50% height loss.  Significant retropulsion of fracture fragments extending approximately 1.2 cm into the central canal at the level of T12-L1.  Severe central canal stenosis and presumed cord compression at this level.  Associated nondisplaced fractures involving the right transverse process, right inferior articular process, and spinous process of the L1 vertebral body.  Remote appearing unilateral pars defect on the left at L5 without significant spondylolisthesis.  No additional acute fracture identified.  Extraperitoneal soft tissues are unremarkable.     Impression:     Acute compression deformity involving the L1 vertebral body with significant retropulsion of fracture fragments into the central canal resulting in severe central canal stenosis and presumed cord injury at this level.  Associated nondisplaced fractures involving the right transverse process, right inferior articular process, and spinous process at L1.  Recommend spinal surgery  evaluation and consider lumbar spine MRI to further characterize.     No acute solid organ injury identified in the chest, abdomen, or pelvis.     Cholelithiasis.     Few pulmonary micro nodules in the right upper lobe measuring up to 5 mm in size.  For multiple solid nodules all <6 mm, Fleischner Society 2017 guidelines recommend no routine follow up for a low risk patient, or follow up with non-contrast chest CT at 12 months after discovery in a high risk patient.     Incidental findings discussed in the body of the report.     Critical finding of L1 fracture with presumed cord compression called to Dr. Serna in the ER at 11:15 on 08/05/2020.     This report was flagged in Epic as abnormal.        Electronically signed by: Praneeth Swan MD  Date:                                            08/05/2020  Time:                                           11:19     Narrative & Impression     EXAMINATION:  CT CERVICAL SPINE WITHOUT CONTRAST     CLINICAL HISTORY:  Polytrauma, critical, head/C-spine injury suspected;     TECHNIQUE:  Low dose axial images, sagittal and coronal reformations were performed though the cervical spine.  Contrast was not administered.     COMPARISON:  None.     FINDINGS:     Normal curvature and alignment.  Vertebral body heights are well maintained.  Mild multilevel degenerative changes throughout the cervical spine.  No severe central canal stenosis or neural foraminal narrowing.  No acute fracture identified.  Prevertebral soft tissues are normal.  Lung apices are clear.     Impression:     No acute cervical fracture.        Electronically signed by: Praneeth Swan MD  Date:                                            08/05/2020  Time:                                           10:50     Narrative & Impression     EXAMINATION:  XR PELVIS ROUTINE AP     CLINICAL HISTORY:  trauma, fall from roof;     TECHNIQUE:  AP view of the pelvis was performed.     COMPARISON:  None.     FINDINGS:  No acute  fracture or dislocation.  Pubic symphysis and sacroiliac joints are symmetric.  No unexpected radiopaque foreign body.     Impression:     No acute bony abnormality in the pelvis.        Electronically signed by: Praneeth Swan MD  Date:                                            08/05/2020  Time:                                           10:46     Narrative & Impression     EXAMINATION:  XR CHEST AP PORTABLE     CLINICAL HISTORY:  trauma, fall from roof;     TECHNIQUE:  Single frontal view of the chest was performed.     COMPARISON:  None     FINDINGS:  Cardiomediastinal silhouette and pulmonary vascularity are within normal range.  There is no evidence of significant focal consolidation, large effusion or pneumothorax.  There is tortuosity of the thoracic aorta.  Bones show no acute abnormalities.     Impression:     No acute radiographic findings on single view of the chest.        Electronically signed by: Luigi Rivera MD  Date:                                            08/05/2020  Time:                                           10:41

## 2020-08-05 NOTE — ED NOTES
Bed: [facility]  Expected date:   Expected time:   Means of arrival:   Comments:

## 2020-08-05 NOTE — HPI
Patient is a 44 y/o male with no significant PMH who was transferred from  after falling from 10ft roof at work. Patient denies LOC, complained of excrutiating low back/sacral pain exacerbated with any movement of legs. CT CAP significant for L1 burst fracture with retropulsion into the spinal canal resulting in severe spinal stenosis. Patient states he could feel he had to urinate but could not start a stream, kraus placed and he reports he felt the kraus inserted and felt his bladder release once the kraus was placed. He reports he felt the OSMIN and per report from ED physician, patient had full rectal tone. The patient declined repeat rectal exam. Denies saddle anesthesia. Endorses any movement of lower extremities causes excruciating pain in the sacram and lower back. Denies radicular pain into the legs. Reports slight numbness in left calf. Denies any prior medical conditions or daily medication.

## 2020-08-05 NOTE — HOSPITAL COURSE
8/5: admitted from ED, L1 burst fracture on OSH CT CAP. STAT MRI L-spine pending.  8/6: NAEON. AFVSS. Pt complains of severe pain ON inadequately treated with PRN fentanyl. Plan for OR today for L1 corpectomy with cage, T12-L2 interbody fusion and posterior fusion.   8/7: POD 1 s/p L1 corpectomy with cage insertion and lateral fusion. S/p 2u pRBC. NAEON. AFVSS.   8/8: POD 2. NAEON. AFVSS. H&H stable. Continues to complain of back pain. On levo, though MAPS when off in 80s.   8/9: POD 3. AFVSS. Required NG placement for postop ileus. Hgb stable. Still c/o back pain. MAPs adequate without support. Planning for OR Tuesday.   8/15: POD 1 from T12-L2 PSIF, NAEON, AFVSS, Exam stable  8/16: NAEON AFVSS, Exam stable, plan to TTF once sedation/pain medication drips off  8/17: NAEON. AFVSS. Exam stable. TTF once off pain mgmt drips.   8/18: NAEON. AFVSS. Exam intact. Needs to be titrated off gtt for TTF.   8/19: NAEON. AFVSS. Neuro intact. TTF once off ketamine. BM yesterday.

## 2020-08-05 NOTE — ED NOTES
Pt attempting to urinate; pt unable urinate. MD notified. Pt bladder scanned (251ml noted on scan) and urthral catheter placed per MD order.

## 2020-08-05 NOTE — PLAN OF CARE
15:45-    Pt seen and examined at bedside.     General: lying supine, in moderate distress  Mental Status: Alert  CN: grossly intact   Motor:  Lower Extremity:                                      HF        KE        KF        DF        PF        EHL           R       2/5        2/5        2/5        5/5        5/5        5/5           L       2/5        2/5        2/5        5/5        5/5        5/5    Rectal tone: Present and normal   Sensory: intact to penis, perineum, and anus; c/o paraesthesias in b/l buttocks     Victor in place with normal appearing urine.     Per Itzel Jimenez, pts DF/PF/EHL is improved compared with earlier. He does complain of poorly controlled pain, though he does mention that fentanyl helped more than Dilaudid. Pt was advised that he is scheduled for surgery tomorrow at 2:00pm with Dr. Snider. Consent will be obtained later today or tomorrow. Pt expressed concerns about pain control and thirst tonight for surgery tomorrow. Diet now ordered with NPO after midnight and analgesic preference noted for fentanyl.     Camilo Levy MD  Neurosurgery  [facility]

## 2020-08-05 NOTE — NURSING TRANSFER
Nursing Transfer Note      8/5/2020     Transfer from ED to NPU    Transfer via stretcher    Transfer with belongings    Transported by transport     Medicines sent: no    Chart send with patient: yes    Notified:Charge/MD    Patient reassessed at: 8/5 @ 18:00    Upon arrival to floor: PT reassessed,oriented to the room,vitals

## 2020-08-05 NOTE — ED PROVIDER NOTES
Encounter Date: 8/5/2020       History     Chief Complaint   Patient presents with    Fall     pt. was working on a roof and fell approx. 10 feet to the ground. pt. fell onto buttocks. c/o back pain and coccyx pain. no head injury or loc. c-collar and spine board in place.      Oscar Bills is a 45 y.o. male who  has no past medical history on file.    The patient presents to the ED due to fall.  Patient presents via EMS from his job site.  He states he fell backward off of a 10 foot roof and landed on his back.  He endorses severe pain to his entire back, and states he cannot move without excruciating pain.  He denies any head impact or loss of consciousness.  He reports a history of asthma but takes no medications on a regular basis.  Denies alcohol or drug use.  Denies allergies.        Review of patient's allergies indicates:  No Known Allergies  No past medical history on file.  No past surgical history on file.  No family history on file.  Social History     Tobacco Use    Smoking status: Not on file   Substance Use Topics    Alcohol use: Not on file    Drug use: Not on file     Review of Systems   Unable to perform ROS: Acuity of condition       Physical Exam     Initial Vitals [08/05/20 1014]   BP Pulse Resp Temp SpO2   (!) 140/101 78 (!) 22 97.6 °F (36.4 °C) 99 %      MAP       --         Physical Exam    Nursing note and vitals reviewed.  Constitutional: He appears well-developed and well-nourished. He is diaphoretic. He appears distressed. Cervical collar and backboard in place.   Significantly distressed, diaphoretic.  In C-collar on spine board.   HENT:   Head: Normocephalic and atraumatic. Head is without abrasion and without contusion.   Mouth/Throat: Oropharynx is clear and moist.   Oropharynx clear, airway intact.   Eyes: Conjunctivae, EOM and lids are normal. Pupils are equal, round, and reactive to light. Right eye exhibits normal extraocular motion. Left eye exhibits normal extraocular  motion.   Neck: No tracheal deviation present.   Cardiovascular: Normal rate, regular rhythm, normal heart sounds and intact distal pulses.   Pulmonary/Chest: Effort normal and breath sounds normal. No stridor. No respiratory distress.   Abdominal: Soft. He exhibits no distension and no mass. There is no abdominal tenderness.   Musculoskeletal: Normal range of motion. No edema.      Cervical back: He exhibits tenderness and bony tenderness.      Thoracic back: He exhibits tenderness and bony tenderness.      Lumbar back: He exhibits tenderness and bony tenderness.        Back:       Comments: Diffuse tenderness down entire back.  No obvious step-off or deformity.  No bruising or swelling.   Neurological: He is alert and oriented to person, place, and time. No cranial nerve deficit or sensory deficit. He exhibits abnormal muscle tone. GCS eye subscore is 4. GCS verbal subscore is 5. GCS motor subscore is 6.   Moves both upper extremities symmetrically.  Weak dorsiflexion bilaterally.  Intact rectal tone.   Skin: Skin is warm. Capillary refill takes less than 2 seconds. No rash noted.   Clothing covered in grass and organic debris.   Psychiatric: He has a normal mood and affect. His behavior is normal. Thought content normal.         ED Course   Procedures  Labs Reviewed   CBC WITHOUT DIFFERENTIAL - Abnormal; Notable for the following components:       Result Value    RBC 4.32 (*)     Hemoglobin 13.7 (*)     Hematocrit 39.5 (*)     Mean Corpuscular Hemoglobin 31.7 (*)     MPV 9.1 (*)     All other components within normal limits   COMPREHENSIVE METABOLIC PANEL - Abnormal; Notable for the following components:    Glucose 143 (*)     All other components within normal limits   LIPASE   ISTAT CREATININE          Imaging Results           CT Chest Abdomen Pelvis With Contrast (Final result)  Result time 08/05/20 11:19:47    Final result by Praneeth Swan MD (08/05/20 11:19:47)                 Impression:      Acute  compression deformity involving the L1 vertebral body with significant retropulsion of fracture fragments into the central canal resulting in severe central canal stenosis and presumed cord injury at this level.  Associated nondisplaced fractures involving the right transverse process, right inferior articular process, and spinous process at L1.  Recommend spinal surgery evaluation and consider lumbar spine MRI to further characterize.    No acute solid organ injury identified in the chest, abdomen, or pelvis.    Cholelithiasis.    Few pulmonary micro nodules in the right upper lobe measuring up to 5 mm in size.  For multiple solid nodules all <6 mm, Fleischner Society 2017 guidelines recommend no routine follow up for a low risk patient, or follow up with non-contrast chest CT at 12 months after discovery in a high risk patient.    Incidental findings discussed in the body of the report.    Critical finding of L1 fracture with presumed cord compression called to Dr. Serna in the ER at 11:15 on 08/05/2020.    This report was flagged in Epic as abnormal.      Electronically signed by: Praneeth Swan MD  Date:    08/05/2020  Time:    11:19             Narrative:    EXAMINATION:  CT CHEST ABDOMEN PELVIS WITH CONTRAST (XPD)    CLINICAL HISTORY:  Chest-abdomen-pelvis trauma, blunt;    TECHNIQUE:  Low dose axial images, sagittal and coronal reformations were obtained from the thoracic inlet to the pubic symphysis following the IV administration of 100 mL of Omnipaque 350 .  Oral contrast was not given. Venous phase images obtained through the chest, abdomen, and pelvis.  Additional delayed phase images obtained through the abdomen and pelvis alone.    COMPARISON:  Chest radiograph, 08/05/2020.    FINDINGS:  Chest:    Soft tissues at the base of the neck are unremarkable.    Heart size is normal.  No pericardial effusion.  No significant coronary artery calcifications.  Thoracic aorta is normal in course and caliber  without significant calcific atherosclerosis.  No evidence of dissection.  No mediastinal hematoma.    Lungs demonstrate minor dependent subsegmental atelectasis.  No focal consolidation, pleural effusion, or pneumothorax.  Small micro nodules in the right upper lobe measure up to 5 mm in size (axial series 4, image 182).    No bulky mediastinal, axillary, or hilar lymphadenopathy.    Abdomen:    Evaluation is limited by extensive streak artifact due to the patient's arms overlying the field of view.    Liver is normal in size and contour.  No focal hepatic mass.  Subcentimeter hypodensity in the inferior aspect of the right hepatic lobe, most likely a cyst.  Gallbladder is mildly distended and contains small calcified gallstones.  No pericholecystic inflammatory changes identified.  No intrahepatic biliary ductal dilatation.    Spleen, adrenals, and pancreas are unremarkable.    The kidneys are symmetric.  No hydronephrosis. No asymmetric perinephric fat stranding.  Small hypodensity in the mid right kidney is too small to definitively characterize, but likely represents a cyst.  Incidentally noted retroaortic accessory left renal vein.    No small bowel obstruction.  No inflammatory changes identified involving the gastrointestinal tract.    No pneumoperitoneum or organized fluid collection.  No hemoperitoneum.    No bulky lymphadenopathy.    Abdominal aorta is normal in caliber with mild calcific atherosclerosis.    Portal, splenic, and superior mesenteric veins are patent.    Pelvis:    Urinary bladder is unremarkable.  No evidence of urinary bladder injury.  Prostate is at the upper limits of normal in size but otherwise unremarkable.  Rectum appears within normal limits.  No free fluid in the pelvis.  No pelvic lymphadenopathy.    Bones and soft tissues:    Acute compression deformity of the L1 vertebral body with approximately 50% height loss.  Significant retropulsion of fracture fragments extending  approximately 1.2 cm into the central canal at the level of T12-L1.  Severe central canal stenosis and presumed cord compression at this level.  Associated nondisplaced fractures involving the right transverse process, right inferior articular process, and spinous process of the L1 vertebral body.  Remote appearing unilateral pars defect on the left at L5 without significant spondylolisthesis.  No additional acute fracture identified.  Extraperitoneal soft tissues are unremarkable.                               CT Cervical Spine Without Contrast (Final result)  Result time 08/05/20 10:50:12    Final result by Praneeth Swan MD (08/05/20 10:50:12)                 Impression:      No acute cervical fracture.      Electronically signed by: Praneeth Swan MD  Date:    08/05/2020  Time:    10:50             Narrative:    EXAMINATION:  CT CERVICAL SPINE WITHOUT CONTRAST    CLINICAL HISTORY:  Polytrauma, critical, head/C-spine injury suspected;    TECHNIQUE:  Low dose axial images, sagittal and coronal reformations were performed though the cervical spine.  Contrast was not administered.    COMPARISON:  None.    FINDINGS:    Normal curvature and alignment.  Vertebral body heights are well maintained.  Mild multilevel degenerative changes throughout the cervical spine.  No severe central canal stenosis or neural foraminal narrowing.  No acute fracture identified.  Prevertebral soft tissues are normal.  Lung apices are clear.                               CT Head Without Contrast (Final result)  Result time 08/05/20 10:43:45    Final result by Luigi Rivera MD (08/05/20 10:43:45)                 Impression:      No acute intracranial abnormality.      Electronically signed by: Luigi Rivera MD  Date:    08/05/2020  Time:    10:43             Narrative:    EXAMINATION:  CT HEAD WITHOUT CONTRAST    CLINICAL HISTORY:  Polytrauma, critical, head/C-spine injury suspected;    TECHNIQUE:  Low dose axial CT images obtained  throughout the head without intravenous contrast. Sagittal and coronal reconstructions were performed.    COMPARISON:  None.    FINDINGS:  Intracranial compartment:    Ventricles and sulci are normal in size for age without evidence of hydrocephalus. No extra-axial blood or fluid collections.    The brain parenchyma appears normal. No parenchymal mass, hemorrhage, edema or major vascular distribution infarct.    Skull/extracranial contents (limited evaluation): No fracture. There is mucosal membrane thickening in the paranasal sinuses which is mild.  Mastoid air cells are clear.                               X-Ray Pelvis Routine AP (Final result)  Result time 08/05/20 10:46:29    Final result by Praneeth Swan MD (08/05/20 10:46:29)                 Impression:      No acute bony abnormality in the pelvis.      Electronically signed by: Praneeth Swan MD  Date:    08/05/2020  Time:    10:46             Narrative:    EXAMINATION:  XR PELVIS ROUTINE AP    CLINICAL HISTORY:  trauma, fall from roof;    TECHNIQUE:  AP view of the pelvis was performed.    COMPARISON:  None.    FINDINGS:  No acute fracture or dislocation.  Pubic symphysis and sacroiliac joints are symmetric.  No unexpected radiopaque foreign body.                               X-Ray Chest AP Portable (Final result)  Result time 08/05/20 10:41:13    Final result by Luigi Rivera MD (08/05/20 10:41:13)                 Impression:      No acute radiographic findings on single view of the chest.      Electronically signed by: Luigi Rivera MD  Date:    08/05/2020  Time:    10:41             Narrative:    EXAMINATION:  XR CHEST AP PORTABLE    CLINICAL HISTORY:  trauma, fall from roof;    TECHNIQUE:  Single frontal view of the chest was performed.    COMPARISON:  None    FINDINGS:  Cardiomediastinal silhouette and pulmonary vascularity are within normal range.  There is no evidence of significant focal consolidation, large effusion or pneumothorax.  There  is tortuosity of the thoracic aorta.  Bones show no acute abnormalities.                                 Medical Decision Making:   History:   Old Medical Records: I decided to obtain old medical records.  Old Records Summarized: other records.  Initial Assessment:   45-year-old male presents to ED via EMS after fall from roof.  Reports he landed on his back.  No loss of consciousness.  Exam with diffuse back tenderness.  Given concerning mechanism of injury as well as severe pain on exam, will obtain CT to rule out acute traumatic injury.    Differential Diagnosis:   Differential Diagnosis includes, but is not limited to:  Polytrauma, fall/syncope, traumatic SAH/intracranial bleed, skull/c-spine/facial fracture, concussion, neck injury, chest trauma, intraabdominal bleed, solid organ injury, pelvic fracture, long bone fracture/dislocation, nerve injury/palsy, vascular injury, hemarthrosis, septic joint, osteoarthritis, compartment syndrome, rhabdomyolysis, soft tissue contusion, muscle strain, ligament tear/sprain, foreign body, laceration, abrasion.    Clinical Tests:   Lab Tests: Ordered and Reviewed  Radiological Study: Ordered and Reviewed  ED Management:  After initial evaluation, stat chest and pelvis x-rays were obtained.  No acute process evident on chest and pelvis films.  I-STAT creatinine was obtained which resulted WNL prior to CT chest/abdomen/pelvis with contrast per trauma protocol.    CT reveals acute compression deformity of L1 with bony retropulsion and canal stenosis.     Patient with urinary retention, Victor placed in ER.  Persistent BLE weakness, concerning for cord compression/spinal cord injury.    Discussed with referral center for neurosurgical consultation/evaluation, patient was accepted for ED to ED transfer to [facility] for further management.    On re-evaluation, the patient's status has remained critical.  At this time, I believe the patient should be transferred to [facility]  for further evaluation and management of lumbar compression fracture with neurologic deficits.  NSGY service was contacted via [facility], and the case was discussed. The consulting physician/team agrees with plan and will evaluate upon patient's arrival.    The patient and family were updated with test results, overall impression, and further plan of care. All questions were answered. The patient expressed understanding and agrees with the current plan.                Attending Attestation:         Attending Critical Care:   Critical Care Times:   Direct Patient Care (initial evaluation, reassessments, and time considering the case)................................................................25 minutes.   Additional History from reviewing old medical records or taking additional history from the family, EMS, PCP, etc.......................5 minutes.   Ordering, Reviewing, and Interpreting Diagnostic Studies...............................................................................................................15 minutes.   Documentation..................................................................................................................................................................................15 minutes.   Consultation with other Physicians. .................................................................................................................................................10 minutes.   Consultation with the patient's family directly relating to the patient's condition, care, and DNR status (when patient unable)......5 minutes.   ==============================================================  · Total Critical Care Time - exclusive of procedural time: 75 minutes.  ==============================================================  Critical care was necessary to treat or prevent imminent or life-threatening deterioration of the following conditions: trauma.    Critical care was time spent personally by me on the following activities: obtaining history from patient or relative, examination of patient, review of old charts, ordering and performing treatments and interventions, discussion with consultants and re-evaluation of patient's conition.   Critical Care Condition: critical                             Clinical Impression:       ICD-10-CM ICD-9-CM   1. Closed fracture of lumbar vertebra with spinal cord injury, initial encounter  S34.109A 806.4    S32.008A    2. Fall from roof, initial encounter  W13.2XXA E882   3. Acute low back pain due to trauma  M54.5 724.2    G89.11 338.11   4. Closed wedge compression fracture of first lumbar vertebra, initial encounter  S32.010A 805.4   5. Closed fracture of transverse process of lumbar vertebra, initial encounter  S32.009A 805.4   6. Urinary retention  R33.9 788.20   7. Bilateral leg weakness  R29.898 729.89             ED Disposition Condition    Transfer to Another Facility Stable                          Lowell Serna MD  08/05/20 [id number]

## 2020-08-06 ENCOUNTER — ANESTHESIA (OUTPATIENT)
Dept: SURGERY | Facility: HOSPITAL | Age: 45
DRG: 453 | End: 2020-08-06
Payer: MEDICAID

## 2020-08-06 LAB
ANION GAP SERPL CALC-SCNC: 10 MMOL/L (ref 8–16)
APTT BLDCRRT: 25.7 SEC (ref 21–32)
BASOPHILS # BLD AUTO: 0.01 K/UL (ref 0–0.2)
BASOPHILS NFR BLD: 0.1 % (ref 0–1.9)
BLD PROD TYP BPU: NORMAL
BLD PROD TYP BPU: NORMAL
BLOOD UNIT EXPIRATION DATE: NORMAL
BLOOD UNIT EXPIRATION DATE: NORMAL
BLOOD UNIT TYPE CODE: 600
BLOOD UNIT TYPE CODE: 600
BLOOD UNIT TYPE: NORMAL
BLOOD UNIT TYPE: NORMAL
BUN SERPL-MCNC: 10 MG/DL (ref 6–20)
CALCIUM SERPL-MCNC: 8.2 MG/DL (ref 8.7–10.5)
CHLORIDE SERPL-SCNC: 106 MMOL/L (ref 95–110)
CO2 SERPL-SCNC: 23 MMOL/L (ref 23–29)
CODING SYSTEM: NORMAL
CODING SYSTEM: NORMAL
CREAT SERPL-MCNC: 0.7 MG/DL (ref 0.5–1.4)
DIFFERENTIAL METHOD: ABNORMAL
DISPENSE STATUS: NORMAL
DISPENSE STATUS: NORMAL
EOSINOPHIL # BLD AUTO: 0 K/UL (ref 0–0.5)
EOSINOPHIL NFR BLD: 0.4 % (ref 0–8)
ERYTHROCYTE [DISTWIDTH] IN BLOOD BY AUTOMATED COUNT: 12.5 % (ref 11.5–14.5)
EST. GFR  (AFRICAN AMERICAN): >60 ML/MIN/1.73 M^2
EST. GFR  (NON AFRICAN AMERICAN): >60 ML/MIN/1.73 M^2
GLUCOSE SERPL-MCNC: 127 MG/DL (ref 70–110)
HCT VFR BLD AUTO: 38.2 % (ref 40–54)
HGB BLD-MCNC: 12.4 G/DL (ref 14–18)
IMM GRANULOCYTES # BLD AUTO: 0.05 K/UL (ref 0–0.04)
IMM GRANULOCYTES NFR BLD AUTO: 0.5 % (ref 0–0.5)
INR PPP: 0.9 (ref 0.8–1.2)
LYMPHOCYTES # BLD AUTO: 1.1 K/UL (ref 1–4.8)
LYMPHOCYTES NFR BLD: 10.7 % (ref 18–48)
MCH RBC QN AUTO: 31.2 PG (ref 27–31)
MCHC RBC AUTO-ENTMCNC: 32.5 G/DL (ref 32–36)
MCV RBC AUTO: 96 FL (ref 82–98)
MONOCYTES # BLD AUTO: 0.6 K/UL (ref 0.3–1)
MONOCYTES NFR BLD: 6.4 % (ref 4–15)
NEUTROPHILS # BLD AUTO: 8 K/UL (ref 1.8–7.7)
NEUTROPHILS NFR BLD: 81.9 % (ref 38–73)
NRBC BLD-RTO: 0 /100 WBC
PLATELET # BLD AUTO: 219 K/UL (ref 150–350)
PMV BLD AUTO: 9.5 FL (ref 9.2–12.9)
POTASSIUM SERPL-SCNC: 3.7 MMOL/L (ref 3.5–5.1)
PROTHROMBIN TIME: 10.2 SEC (ref 9–12.5)
RBC # BLD AUTO: 3.97 M/UL (ref 4.6–6.2)
SODIUM SERPL-SCNC: 139 MMOL/L (ref 136–145)
TRANS ERYTHROCYTES VOL PATIENT: NORMAL ML
TRANS ERYTHROCYTES VOL PATIENT: NORMAL ML
WBC # BLD AUTO: 9.78 K/UL (ref 3.9–12.7)

## 2020-08-06 PROCEDURE — 22585 PR ARTHRODESIS ANT INTERBODY MIN DISCECTOMY,EA ADDL: ICD-10-PCS | Mod: ,,, | Performed by: NEUROLOGICAL SURGERY

## 2020-08-06 PROCEDURE — D9220A PRA ANESTHESIA: ICD-10-PCS | Mod: ANES,,, | Performed by: ANESTHESIOLOGY

## 2020-08-06 PROCEDURE — C1713 ANCHOR/SCREW BN/BN,TIS/BN: HCPCS | Performed by: NEUROLOGICAL SURGERY

## 2020-08-06 PROCEDURE — 37000009 HC ANESTHESIA EA ADD 15 MINS: Performed by: NEUROLOGICAL SURGERY

## 2020-08-06 PROCEDURE — 99233 SBSQ HOSP IP/OBS HIGH 50: CPT | Mod: ,,, | Performed by: NEUROLOGICAL SURGERY

## 2020-08-06 PROCEDURE — 80048 BASIC METABOLIC PNL TOTAL CA: CPT

## 2020-08-06 PROCEDURE — 25000003 PHARM REV CODE 250: Performed by: PHYSICIAN ASSISTANT

## 2020-08-06 PROCEDURE — 20936 SP BONE AGRFT LOCAL ADD-ON: CPT | Mod: ,,, | Performed by: NEUROLOGICAL SURGERY

## 2020-08-06 PROCEDURE — 36620 INSERTION CATHETER ARTERY: CPT | Mod: 59,,, | Performed by: ANESTHESIOLOGY

## 2020-08-06 PROCEDURE — 25000003 PHARM REV CODE 250: Performed by: NEUROLOGICAL SURGERY

## 2020-08-06 PROCEDURE — 85025 COMPLETE CBC W/AUTO DIFF WBC: CPT

## 2020-08-06 PROCEDURE — D9220A PRA ANESTHESIA: Mod: CRNA,,, | Performed by: NURSE ANESTHETIST, CERTIFIED REGISTERED

## 2020-08-06 PROCEDURE — 36620 PR INSERT CATH,ART,PERCUT,SHORTTERM: ICD-10-PCS | Mod: 59,,, | Performed by: ANESTHESIOLOGY

## 2020-08-06 PROCEDURE — 27800903 OPTIME MED/SURG SUP & DEVICES OTHER IMPLANTS: Performed by: NEUROLOGICAL SURGERY

## 2020-08-06 PROCEDURE — P9021 RED BLOOD CELLS UNIT: HCPCS

## 2020-08-06 PROCEDURE — 22845 INSERT SPINE FIXATION DEVICE: CPT | Mod: 59,,, | Performed by: NEUROLOGICAL SURGERY

## 2020-08-06 PROCEDURE — 36000710: Performed by: NEUROLOGICAL SURGERY

## 2020-08-06 PROCEDURE — 22585 ARTHRD ANT NTRBD MIN DSC EA: CPT | Mod: ,,, | Performed by: NEUROLOGICAL SURGERY

## 2020-08-06 PROCEDURE — 36415 COLL VENOUS BLD VENIPUNCTURE: CPT

## 2020-08-06 PROCEDURE — 11000001 HC ACUTE MED/SURG PRIVATE ROOM

## 2020-08-06 PROCEDURE — 22854 PR INS BIOMECH DEV, VERT CORPECTOMY W/INTRBODY ARTHRODESIS EA INTERSPC: ICD-10-PCS | Mod: ,,, | Performed by: NEUROLOGICAL SURGERY

## 2020-08-06 PROCEDURE — 25000003 PHARM REV CODE 250: Performed by: STUDENT IN AN ORGANIZED HEALTH CARE EDUCATION/TRAINING PROGRAM

## 2020-08-06 PROCEDURE — 63090 REMOVE VERT BODY DCMPRN LMBR: CPT | Mod: ,,, | Performed by: NEUROLOGICAL SURGERY

## 2020-08-06 PROCEDURE — 63600175 PHARM REV CODE 636 W HCPCS: Performed by: NURSE ANESTHETIST, CERTIFIED REGISTERED

## 2020-08-06 PROCEDURE — 20930 PR ALLOGRAFT FOR SPINE SURGERY ONLY MORSELIZED: ICD-10-PCS | Mod: ,,, | Performed by: NEUROLOGICAL SURGERY

## 2020-08-06 PROCEDURE — 22845 PR ANTERIOR INSTRUMENTATION 2-3 VERTEBRAL SEGMENTS: ICD-10-PCS | Mod: 59,,, | Performed by: NEUROLOGICAL SURGERY

## 2020-08-06 PROCEDURE — 85610 PROTHROMBIN TIME: CPT

## 2020-08-06 PROCEDURE — 25000003 PHARM REV CODE 250: Performed by: NURSE ANESTHETIST, CERTIFIED REGISTERED

## 2020-08-06 PROCEDURE — 63090 PR REMV VERT BODY,LOWER,RETROPER,ONE: ICD-10-PCS | Mod: ,,, | Performed by: NEUROLOGICAL SURGERY

## 2020-08-06 PROCEDURE — 22556 PR ARTHRODESIS ANT INTERBODY MIN DISCECTOMY,THORACIC: ICD-10-PCS | Mod: 51,,, | Performed by: NEUROLOGICAL SURGERY

## 2020-08-06 PROCEDURE — 63600175 PHARM REV CODE 636 W HCPCS: Performed by: NEUROLOGICAL SURGERY

## 2020-08-06 PROCEDURE — 20936 PR AUTOGRAFT SPINE SURGERY LOCAL FROM SAME INCISION: ICD-10-PCS | Mod: ,,, | Performed by: NEUROLOGICAL SURGERY

## 2020-08-06 PROCEDURE — P9045 ALBUMIN (HUMAN), 5%, 250 ML: HCPCS | Mod: JG | Performed by: NURSE ANESTHETIST, CERTIFIED REGISTERED

## 2020-08-06 PROCEDURE — C1751 CATH, INF, PER/CENT/MIDLINE: HCPCS | Performed by: ANESTHESIOLOGY

## 2020-08-06 PROCEDURE — D9220A PRA ANESTHESIA: Mod: ANES,,, | Performed by: ANESTHESIOLOGY

## 2020-08-06 PROCEDURE — 22556 ARTHRD ANT NTRBD MIN DSC THC: CPT | Mod: 51,,, | Performed by: NEUROLOGICAL SURGERY

## 2020-08-06 PROCEDURE — C1729 CATH, DRAINAGE: HCPCS | Performed by: NEUROLOGICAL SURGERY

## 2020-08-06 PROCEDURE — 20930 SP BONE ALGRFT MORSEL ADD-ON: CPT | Mod: ,,, | Performed by: NEUROLOGICAL SURGERY

## 2020-08-06 PROCEDURE — 27100021 HC MULTIPORT INFUSION MANIFOLD: Performed by: ANESTHESIOLOGY

## 2020-08-06 PROCEDURE — 37000008 HC ANESTHESIA 1ST 15 MINUTES: Performed by: NEUROLOGICAL SURGERY

## 2020-08-06 PROCEDURE — 85730 THROMBOPLASTIN TIME PARTIAL: CPT

## 2020-08-06 PROCEDURE — 36000711: Performed by: NEUROLOGICAL SURGERY

## 2020-08-06 PROCEDURE — 94761 N-INVAS EAR/PLS OXIMETRY MLT: CPT

## 2020-08-06 PROCEDURE — 27201423 OPTIME MED/SURG SUP & DEVICES STERILE SUPPLY: Performed by: NEUROLOGICAL SURGERY

## 2020-08-06 PROCEDURE — 27100025 HC TUBING, SET FLUID WARMER: Performed by: ANESTHESIOLOGY

## 2020-08-06 PROCEDURE — 63600175 PHARM REV CODE 636 W HCPCS: Performed by: STUDENT IN AN ORGANIZED HEALTH CARE EDUCATION/TRAINING PROGRAM

## 2020-08-06 PROCEDURE — 63600175 PHARM REV CODE 636 W HCPCS: Performed by: PHYSICIAN ASSISTANT

## 2020-08-06 PROCEDURE — D9220A PRA ANESTHESIA: ICD-10-PCS | Mod: CRNA,,, | Performed by: NURSE ANESTHETIST, CERTIFIED REGISTERED

## 2020-08-06 PROCEDURE — 99233 PR SUBSEQUENT HOSPITAL CARE,LEVL III: ICD-10-PCS | Mod: ,,, | Performed by: NEUROLOGICAL SURGERY

## 2020-08-06 PROCEDURE — 22854 INSJ BIOMECHANICAL DEVICE: CPT | Mod: ,,, | Performed by: NEUROLOGICAL SURGERY

## 2020-08-06 DEVICE — CHIPS CANCELLOUS 30CC: Type: IMPLANTABLE DEVICE | Site: BACK | Status: FUNCTIONAL

## 2020-08-06 DEVICE — KIT BONE GRFT BMP SM: Type: IMPLANTABLE DEVICE | Site: BACK | Status: FUNCTIONAL

## 2020-08-06 DEVICE — PUTTY DBX 10CC: Type: IMPLANTABLE DEVICE | Site: BACK | Status: FUNCTIONAL

## 2020-08-06 RX ORDER — ONDANSETRON 2 MG/ML
INJECTION INTRAMUSCULAR; INTRAVENOUS
Status: DISCONTINUED | OUTPATIENT
Start: 2020-08-06 | End: 2020-08-07

## 2020-08-06 RX ORDER — VANCOMYCIN HYDROCHLORIDE 1 G/20ML
INJECTION, POWDER, LYOPHILIZED, FOR SOLUTION INTRAVENOUS
Status: DISCONTINUED | OUTPATIENT
Start: 2020-08-06 | End: 2020-08-07 | Stop reason: HOSPADM

## 2020-08-06 RX ORDER — PROPOFOL 10 MG/ML
VIAL (ML) INTRAVENOUS CONTINUOUS PRN
Status: DISCONTINUED | OUTPATIENT
Start: 2020-08-06 | End: 2020-08-07

## 2020-08-06 RX ORDER — CEFAZOLIN SODIUM 1 G/3ML
INJECTION, POWDER, FOR SOLUTION INTRAMUSCULAR; INTRAVENOUS
Status: DISCONTINUED | OUTPATIENT
Start: 2020-08-06 | End: 2020-08-07

## 2020-08-06 RX ORDER — PROPOFOL 10 MG/ML
VIAL (ML) INTRAVENOUS
Status: DISCONTINUED | OUTPATIENT
Start: 2020-08-06 | End: 2020-08-07

## 2020-08-06 RX ORDER — DEXAMETHASONE SODIUM PHOSPHATE 4 MG/ML
INJECTION, SOLUTION INTRA-ARTICULAR; INTRALESIONAL; INTRAMUSCULAR; INTRAVENOUS; SOFT TISSUE
Status: DISCONTINUED | OUTPATIENT
Start: 2020-08-06 | End: 2020-08-07

## 2020-08-06 RX ORDER — SUCCINYLCHOLINE CHLORIDE 20 MG/ML
INJECTION INTRAMUSCULAR; INTRAVENOUS
Status: DISCONTINUED | OUTPATIENT
Start: 2020-08-06 | End: 2020-08-07

## 2020-08-06 RX ORDER — PHENYLEPHRINE HYDROCHLORIDE 10 MG/ML
INJECTION INTRAVENOUS
Status: DISCONTINUED | OUTPATIENT
Start: 2020-08-06 | End: 2020-08-07

## 2020-08-06 RX ORDER — FENTANYL CITRATE 50 UG/ML
INJECTION, SOLUTION INTRAMUSCULAR; INTRAVENOUS
Status: DISCONTINUED | OUTPATIENT
Start: 2020-08-06 | End: 2020-08-07

## 2020-08-06 RX ORDER — KETAMINE HCL IN 0.9 % NACL 50 MG/5 ML
SYRINGE (ML) INTRAVENOUS
Status: DISCONTINUED | OUTPATIENT
Start: 2020-08-06 | End: 2020-08-07

## 2020-08-06 RX ORDER — ALBUMIN HUMAN 50 G/1000ML
SOLUTION INTRAVENOUS CONTINUOUS PRN
Status: DISCONTINUED | OUTPATIENT
Start: 2020-08-06 | End: 2020-08-07

## 2020-08-06 RX ORDER — LIDOCAINE HYDROCHLORIDE AND EPINEPHRINE 10; 10 MG/ML; UG/ML
INJECTION, SOLUTION INFILTRATION; PERINEURAL
Status: DISCONTINUED | OUTPATIENT
Start: 2020-08-06 | End: 2020-08-07 | Stop reason: HOSPADM

## 2020-08-06 RX ORDER — METHOCARBAMOL 500 MG/1
500 TABLET, FILM COATED ORAL 4 TIMES DAILY
Status: COMPLETED | OUTPATIENT
Start: 2020-08-06 | End: 2020-08-06

## 2020-08-06 RX ORDER — BACITRACIN 50000 [IU]/1
INJECTION, POWDER, FOR SOLUTION INTRAMUSCULAR
Status: DISCONTINUED | OUTPATIENT
Start: 2020-08-06 | End: 2020-08-07 | Stop reason: HOSPADM

## 2020-08-06 RX ORDER — MIDAZOLAM HYDROCHLORIDE 1 MG/ML
INJECTION, SOLUTION INTRAMUSCULAR; INTRAVENOUS
Status: DISCONTINUED | OUTPATIENT
Start: 2020-08-06 | End: 2020-08-07

## 2020-08-06 RX ORDER — SODIUM CHLORIDE 0.9 % (FLUSH) 0.9 %
10 SYRINGE (ML) INJECTION
Status: CANCELLED | OUTPATIENT
Start: 2020-08-06

## 2020-08-06 RX ORDER — FENTANYL CITRATE 50 UG/ML
75 INJECTION, SOLUTION INTRAMUSCULAR; INTRAVENOUS ONCE
Status: COMPLETED | OUTPATIENT
Start: 2020-08-06 | End: 2020-08-06

## 2020-08-06 RX ORDER — LIDOCAINE HYDROCHLORIDE 20 MG/ML
INJECTION INTRAVENOUS
Status: DISCONTINUED | OUTPATIENT
Start: 2020-08-06 | End: 2020-08-07

## 2020-08-06 RX ORDER — ROCURONIUM BROMIDE 10 MG/ML
INJECTION, SOLUTION INTRAVENOUS
Status: DISCONTINUED | OUTPATIENT
Start: 2020-08-06 | End: 2020-08-07

## 2020-08-06 RX ORDER — FENTANYL CITRATE 50 UG/ML
25 INJECTION, SOLUTION INTRAMUSCULAR; INTRAVENOUS EVERY 5 MIN PRN
Status: CANCELLED | OUTPATIENT
Start: 2020-08-06

## 2020-08-06 RX ORDER — GABAPENTIN 300 MG/1
300 CAPSULE ORAL 3 TIMES DAILY
Status: DISCONTINUED | OUTPATIENT
Start: 2020-08-06 | End: 2020-08-07

## 2020-08-06 RX ORDER — ONDANSETRON 2 MG/ML
4 INJECTION INTRAMUSCULAR; INTRAVENOUS DAILY PRN
Status: CANCELLED | OUTPATIENT
Start: 2020-08-06

## 2020-08-06 RX ADMIN — FENTANYL CITRATE 25 MCG: 50 INJECTION INTRAMUSCULAR; INTRAVENOUS at 03:08

## 2020-08-06 RX ADMIN — MIDAZOLAM HYDROCHLORIDE 2 MG: 1 INJECTION, SOLUTION INTRAMUSCULAR; INTRAVENOUS at 07:08

## 2020-08-06 RX ADMIN — PHENYLEPHRINE HYDROCHLORIDE 100 MCG: 10 INJECTION INTRAVENOUS at 09:08

## 2020-08-06 RX ADMIN — PHENYLEPHRINE HYDROCHLORIDE 100 MCG: 10 INJECTION INTRAVENOUS at 11:08

## 2020-08-06 RX ADMIN — METHOCARBAMOL TABLETS 500 MG: 500 TABLET, COATED ORAL at 08:08

## 2020-08-06 RX ADMIN — DEXAMETHASONE SODIUM PHOSPHATE 8 MG: 4 INJECTION, SOLUTION INTRAMUSCULAR; INTRAVENOUS at 08:08

## 2020-08-06 RX ADMIN — SODIUM CHLORIDE 0.3 MCG/KG/MIN: 9 INJECTION, SOLUTION INTRAVENOUS at 08:08

## 2020-08-06 RX ADMIN — PHENYLEPHRINE HYDROCHLORIDE 100 MCG: 10 INJECTION INTRAVENOUS at 08:08

## 2020-08-06 RX ADMIN — FENTANYL CITRATE 25 MCG: 50 INJECTION INTRAMUSCULAR; INTRAVENOUS at 02:08

## 2020-08-06 RX ADMIN — SODIUM CHLORIDE, SODIUM GLUCONATE, SODIUM ACETATE, POTASSIUM CHLORIDE, MAGNESIUM CHLORIDE, SODIUM PHOSPHATE, DIBASIC, AND POTASSIUM PHOSPHATE: .53; .5; .37; .037; .03; .012; .00082 INJECTION, SOLUTION INTRAVENOUS at 09:08

## 2020-08-06 RX ADMIN — CALCIUM CHLORIDE 500 MG: 100 INJECTION, SOLUTION INTRAVENOUS at 11:08

## 2020-08-06 RX ADMIN — PHENYLEPHRINE HYDROCHLORIDE 200 MCG: 10 INJECTION INTRAVENOUS at 11:08

## 2020-08-06 RX ADMIN — PHENYLEPHRINE HYDROCHLORIDE 100 MCG: 10 INJECTION INTRAVENOUS at 10:08

## 2020-08-06 RX ADMIN — Medication 10 MG: at 11:08

## 2020-08-06 RX ADMIN — METHOCARBAMOL TABLETS 500 MG: 500 TABLET, COATED ORAL at 12:08

## 2020-08-06 RX ADMIN — FENTANYL CITRATE 25 MCG: 50 INJECTION INTRAMUSCULAR; INTRAVENOUS at 05:08

## 2020-08-06 RX ADMIN — Medication 30 MG: at 08:08

## 2020-08-06 RX ADMIN — CEFAZOLIN 2 G: 330 INJECTION, POWDER, FOR SOLUTION INTRAMUSCULAR; INTRAVENOUS at 08:08

## 2020-08-06 RX ADMIN — CALCIUM CHLORIDE 500 MG: 100 INJECTION, SOLUTION INTRAVENOUS at 10:08

## 2020-08-06 RX ADMIN — SODIUM CHLORIDE: 0.9 INJECTION, SOLUTION INTRAVENOUS at 12:08

## 2020-08-06 RX ADMIN — SODIUM CHLORIDE, SODIUM GLUCONATE, SODIUM ACETATE, POTASSIUM CHLORIDE, MAGNESIUM CHLORIDE, SODIUM PHOSPHATE, DIBASIC, AND POTASSIUM PHOSPHATE: .53; .5; .37; .037; .03; .012; .00082 INJECTION, SOLUTION INTRAVENOUS at 08:08

## 2020-08-06 RX ADMIN — LIDOCAINE HYDROCHLORIDE 100 MG: 20 INJECTION, SOLUTION INTRAVENOUS at 08:08

## 2020-08-06 RX ADMIN — OXYCODONE HYDROCHLORIDE AND ACETAMINOPHEN 2 TABLET: 5; 325 TABLET ORAL at 05:08

## 2020-08-06 RX ADMIN — PROPOFOL: 10 INJECTION, EMULSION INTRAVENOUS at 09:08

## 2020-08-06 RX ADMIN — REMIFENTANIL HYDROCHLORIDE 0.2 MCG/KG/MIN: 1 INJECTION, POWDER, LYOPHILIZED, FOR SOLUTION INTRAVENOUS at 08:08

## 2020-08-06 RX ADMIN — FENTANYL CITRATE 25 MCG: 50 INJECTION INTRAMUSCULAR; INTRAVENOUS at 12:08

## 2020-08-06 RX ADMIN — PROPOFOL 200 MG: 10 INJECTION, EMULSION INTRAVENOUS at 08:08

## 2020-08-06 RX ADMIN — OXYCODONE HYDROCHLORIDE AND ACETAMINOPHEN 2 TABLET: 5; 325 TABLET ORAL at 12:08

## 2020-08-06 RX ADMIN — ALBUMIN (HUMAN): 12.5 SOLUTION INTRAVENOUS at 10:08

## 2020-08-06 RX ADMIN — FENTANYL CITRATE 75 MCG: 50 INJECTION INTRAMUSCULAR; INTRAVENOUS at 08:08

## 2020-08-06 RX ADMIN — DIAZEPAM 5 MG: 5 TABLET ORAL at 12:08

## 2020-08-06 RX ADMIN — FENTANYL CITRATE 25 MCG: 50 INJECTION INTRAMUSCULAR; INTRAVENOUS at 07:08

## 2020-08-06 RX ADMIN — PROPOFOL 150 MCG/KG/MIN: 10 INJECTION, EMULSION INTRAVENOUS at 08:08

## 2020-08-06 RX ADMIN — SUCCINYLCHOLINE CHLORIDE 160 MG: 20 INJECTION, SOLUTION INTRAMUSCULAR; INTRAVENOUS at 08:08

## 2020-08-06 RX ADMIN — Medication 10 MG: at 10:08

## 2020-08-06 RX ADMIN — SODIUM CHLORIDE, SODIUM GLUCONATE, SODIUM ACETATE, POTASSIUM CHLORIDE, MAGNESIUM CHLORIDE, SODIUM PHOSPHATE, DIBASIC, AND POTASSIUM PHOSPHATE: .53; .5; .37; .037; .03; .012; .00082 INJECTION, SOLUTION INTRAVENOUS at 11:08

## 2020-08-06 RX ADMIN — FENTANYL CITRATE 50 MCG: 50 INJECTION, SOLUTION INTRAMUSCULAR; INTRAVENOUS at 07:08

## 2020-08-06 RX ADMIN — GABAPENTIN 300 MG: 100 CAPSULE ORAL at 03:08

## 2020-08-06 RX ADMIN — ROCURONIUM BROMIDE 5 MG: 10 INJECTION, SOLUTION INTRAVENOUS at 08:08

## 2020-08-06 RX ADMIN — GABAPENTIN 300 MG: 100 CAPSULE ORAL at 08:08

## 2020-08-06 NOTE — PLAN OF CARE
Problem: Adult Inpatient Plan of Care  Goal: Plan of Care Review  Outcome: Ongoing, Progressing     Plan of care reviewed with pt. Pt voiced understanding. Pt AAOx4. Pt c/o excruciating back pain and tingling in sacrum area during the shift. PRN medication given qh to q2h. SEE MAR. Pt on bed rest. No apparent distress noted. Fall precautions maintained. Bed in lowest position, and locked. Call light within reach and advised to call for assistance. Side rails x 2 and slip resistant socks on at this time. TM.

## 2020-08-06 NOTE — PLAN OF CARE
Cm met with patient to obtain discharge planning assessment.  Patient admitted with spinal stenosis of lumbar area due to a fall from a roof.  Patient to go to surgery today.  Planned discharge is home - Plan (A) or Rehab - Plan (B).    PCP:  No primary care provider on file.     Payor:  Payor: MEDICAID / Plan: [place] / Product Type: Managed Medicaid /      Ph[facility]:    [facility] [place] [place] [facility] [address]  [address]  [place] [address]  Phone: [phone] Fax: [phone]       08/06/20 1234   Discharge Assessment   Assessment Type Discharge Planning Assessment   Confirmed/corrected address and phone number on facesheet? Yes   Assessment information obtained from? Patient   Expected Length of Stay (days) 5   Communicated expected length of stay with patient/caregiver yes   Prior to hospitilization cognitive status: Alert/Oriented   Prior to hospitalization functional status: Independent   Current cognitive status: Alert/Oriented   Current Functional Status: Independent   Facility Arrived From: [facility]   Lives With alone   Able to Return to Prior Arrangements   (tbd)   Is patient able to care for self after discharge? Unable to determine at this time (comments)   Who are your caregiver(s) and their phone number(s)? Shanta Marcus - relative [phone] or Ara Bills - sister [phone]   Patient's perception of discharge disposition home or selfcare   Readmission Within the Last 30 Days no previous admission in last 30 days   Patient currently being followed by outpatient case management? No   Patient currently receives any other outside agency services? No   Equipment Currently Used at Home none   Do you have any problems affording any of your prescribed medications? No   Is the patient taking medications as prescribed? yes   Does the patient have transportation home? Yes   Transportation Anticipated family or friend will provide   Does the patient receive services at  the [facility]? No   Discharge Plan A Home   Discharge Plan B Rehab   DME Needed Upon Discharge  other (see comments)  (tbd)   Patient/Family in Agreement with Plan yes

## 2020-08-06 NOTE — PROGRESS NOTES
[facility]  Neurosurgery  Progress Note    Subjective:     History of Present Illness: Patient is a 46 y/o male with no significant PMH who was transferred from  after falling from 10ft roof at work. Patient denies LOC, complained of excrutiating low back/sacral pain exacerbated with any movement of legs. CT CAP significant for L1 burst fracture with retropulsion into the spinal canal resulting in severe spinal stenosis. Patient states he could feel he had to urinate but could not start a stream, kraus placed and he reports he felt the kraus inserted and felt his bladder release once the kraus was placed. He reports he felt the OSMIN and per report from ED physician, patient had full rectal tone. The patient declined repeat rectal exam. Denies saddle anesthesia. Endorses any movement of lower extremities causes excruciating pain in the sacram and lower back. Denies radicular pain into the legs. Reports slight numbness in left calf. Denies any prior medical conditions or daily medication.     Post-Op Info:  Procedure(s) (LRB):  CORPECTOMY, SPINE, LUMBAR (Left)  FUSION, SPINE, LUMBAR, TLIF, POSTERIOR APPROACH, USING PEDICLE SCREW (N/A)         Interval History: 8/6: SAIMA. HUMBLE. Pt complains of severe pain ON inadequately treated with PRN fentanyl. Plan for OR today for L1 corpectomy with cage, T12-L2 interbody fusion and posterior fusion.     Medications:  Continuous Infusions:   sodium chloride 0.9% 75 mL/hr at 08/06/20 0003     Scheduled Meds:   fentaNYL  75 mcg Intravenous Once    gabapentin  300 mg Oral TID    methocarbamoL  500 mg Oral QID    polyethylene glycol  17 g Oral Daily    senna-docusate 8.6-50 mg  2 tablet Oral Daily     PRN Meds:acetaminophen, dextrose 50%, dextrose 50%, diazePAM, fentaNYL, glucagon (human recombinant), glucose, glucose, glucose, ondansetron, oxyCODONE-acetaminophen     Review of Systems  Objective:     Weight: 90.7 kg (200 lb)  Body mass index is 28.7  "kg/m².  Vital Signs (Most Recent):  Temp: 98.3 °F (36.8 °C) (08/06/20 0700)  Pulse: 85 (08/06/20 0700)  Resp: 16 (08/06/20 0700)  BP: 123/79 (08/06/20 0700)  SpO2: 96 % (08/06/20 0700) Vital Signs (24h Range):  Temp:  [97.6 °F (36.4 °C)-99.2 °F (37.3 °C)] 98.3 °F (36.8 °C)  Pulse:  [77-94] 85  Resp:  [16-25] 16  SpO2:  [93 %-99 %] 96 %  BP: (117-140)/() 123/79                          Urethral Catheter 08/05/20 1133 Straight-tip 14 Fr. (Active)   Site Assessment Clean;Intact 08/05/20 2140   Collection Container Urimeter 08/05/20 2140   Securement Method secured to top of thigh w/ adhesive device 08/05/20 2140   Catheter Care Performed yes 08/05/20 2140   Reason for Continuing Urinary Catheterization Urinary retention 08/05/20 2140   CAUTI Prevention Bundle StatLock in place w 1" slack 08/05/20 2140   Output (mL) 500 mL 08/06/20 0600       Neurosurgery Physical Exam     GCS 15  AAO x 3  CN II-XII grossly intact  Motor:   RUE-4/5, pain limited   LUE-4/5, pain limited  BLE- nonparticipatory 2/2 pain, wd to stim   SILT, c/o increasing parasthesias in buttocks, denies saddle anesthesia     Urinary krasu in place       Significant Labs:  Recent Labs   Lab 08/05/20  1021 08/06/20 0519   * 127*    139   K 3.6 3.7    106   CO2 26 23   BUN 15 10   CREATININE 0.9 0.7   CALCIUM 8.9 8.2*     Recent Labs   Lab 08/05/20  1021 08/06/20 0519   WBC 6.17 9.78   HGB 13.7* 12.4*   HCT 39.5* 38.2*    219     Recent Labs   Lab 08/05/20  1345 08/06/20  0159 08/06/20 0519   INR 0.9  --  0.9   APTT 23.5 25.7  --      Microbiology Results (last 7 days)     ** No results found for the last 168 hours. **        All pertinent labs from the last 24 hours have been reviewed.    Significant Diagnostics:  I have reviewed all pertinent imaging results/findings within the past 24 hours.   Ct Head Without Contrast    Result Date: 8/5/2020  No acute intracranial abnormality. Electronically signed by: Luigi Rivera MD " Date:    08/05/2020 Time:    10:43    Ct Cervical Spine Without Contrast    Result Date: 8/5/2020  No acute cervical fracture. Electronically signed by: Praneeth Swan MD Date:    08/05/2020 Time:    10:50    Mri Lumbar Spine Without Contrast    Result Date: 8/5/2020  1. Severe compression fracture deformity of L1 with retropulsion of the superior endplate of L1 causing severe canal stenosis.  Nondisplaced fracture at the right transverse process and spinous process of L1, better visualized on prior CT. 2. Anterior epidural hematoma   extending from L1-L3, resulting in moderate to severe spinal canal stenosis at the level of L1-L2 and L2-L3, as above. This report was flagged in Epic as abnormal. COMMUNICATION This critical result was discovered/received at 16:15.  The critical information above was relayed directly by radiology resident, Dr. Emanuel telephone to GINNA Nelson on 08/05/2020 at 16:25. Electronically signed by resident: Ai Figueroa Date:    08/05/2020 Time:    15:11 Electronically signed by: Carolyn Cunningham MD Date:    08/05/2020 Time:    16:40    X-ray Chest Ap Portable    Result Date: 8/5/2020  No acute radiographic findings on single view of the chest. Electronically signed by: Luigi Rivera MD Date:    08/05/2020 Time:    10:41    X-ray Pelvis Routine Ap    Result Date: 8/5/2020  No acute bony abnormality in the pelvis. Electronically signed by: Praneeth Swan MD Date:    08/05/2020 Time:    10:46    Ct Chest Abdomen Pelvis With Contrast    Result Date: 8/5/2020  Acute compression deformity involving the L1 vertebral body with significant retropulsion of fracture fragments into the central canal resulting in severe central canal stenosis and presumed cord injury at this level.  Associated nondisplaced fractures involving the right transverse process, right inferior articular process, and spinous process at L1.  Recommend spinal surgery evaluation and consider lumbar spine MRI  to further characterize. No acute solid organ injury identified in the chest, abdomen, or pelvis. Cholelithiasis. Few pulmonary micro nodules in the right upper lobe measuring up to 5 mm in size.  For multiple solid nodules all <6 mm, Fleischner Society 2017 guidelines recommend no routine follow up for a low risk patient, or follow up with non-contrast chest CT at 12 months after discovery in a high risk patient. Incidental findings discussed in the body of the report. Critical finding of L1 fracture with presumed cord compression called to Dr. Serna in the ER at 11:15 on 08/05/2020. This report was flagged in Epic as abnormal. Electronically signed by: Praneeth Swan MD Date:    08/05/2020 Time:    11:19      Assessment/Plan:     Traumatic compression fracture of L1 lumbar vertebra  46 y/o male with no significant PMH with L1 burst fracture with retropulsion resulting in severe canal stenosis s/p fall from 10ft building     -admit to neurosurgery  -close neurological monitoring, notify neurosurgery with any acute changes  -MRI Lsp: L1-L3 EDH. Consistent severe canal stenosis and foraminal narrowing at L1 and adjacent.   -NPO  -Preop labs (coags, type and screen)  -Bedrest/Log roll  -Pain control; given Fent 75mcg bolus and started po neurontin and robaxin. Ok with small sip of water.   -OR today for L1 corpectomy with cage insertion (L approach), T12-L1 interbody fusion and posterior instrumentation. Neuromonitoring.     D/w Dr. Snider.         Camilo Levy MD  Neurosurgery  [facility]

## 2020-08-06 NOTE — ED PROVIDER NOTES
Encounter Date: 8/5/2020       History     Chief Complaint   Patient presents with    [place] [facility]     lumbar fracture/urinary retention/bilateral leg weakness     45-year-old male no significant past medical history presenting as transfer from [facility] after fall found to have a level compression fracture.  As per patient was at work and fell off a roof she 10th high.  Landing on backside/lower back.  Patient denies any LOC.  Now having extreme lower back pain with weakness in lower extremities.  Denies hitting head.  Denies any further oppose spinal pain.  No further upper extremity numbness tingling or weakness.  As per charting patient unable to urinate.        Review of patient's allergies indicates:  No Known Allergies  History reviewed. No pertinent past medical history.  History reviewed. No pertinent surgical history.  History reviewed. No pertinent family history.  Social History     Tobacco Use    Smoking status: Not on file   Substance Use Topics    Alcohol use: Yes    Drug use: Not on file     Review of Systems   Constitutional: Negative for fever.   HENT: Negative for sore throat.    Respiratory: Negative for shortness of breath.    Cardiovascular: Negative for chest pain.   Gastrointestinal: Negative for nausea.   Genitourinary: Negative for dysuria.   Musculoskeletal: Positive for back pain.   Skin: Negative for rash.   Neurological: Positive for weakness.   Hematological: Does not bruise/bleed easily.       Physical Exam     Initial Vitals [08/05/20 1249]   BP Pulse Resp Temp SpO2   (!) 134/98 88 20 97.7 °F (36.5 °C) 98 %      MAP       --         Physical Exam    Nursing note and vitals reviewed.  Constitutional: He appears well-developed and well-nourished. He is not diaphoretic. He appears distressed.   HENT:   Head: Normocephalic and atraumatic.   Nose: Nose normal.   Eyes: Conjunctivae and EOM are normal. Pupils are equal, round, and reactive to light. No scleral icterus.   Neck:  Normal range of motion. Neck supple.   Cardiovascular: Normal rate and regular rhythm. Exam reveals no gallop and no friction rub.    No murmur heard.  Pulmonary/Chest: Breath sounds normal. No respiratory distress. He has no wheezes. He has no rhonchi. He has no rales.   Abdominal: Soft. Bowel sounds are normal. There is no abdominal tenderness. There is no rebound and no guarding.   Musculoskeletal: Normal range of motion. Tenderness present. No edema.      Comments: L spine back, limited due to pain   Neurological: He is alert and oriented to person, place, and time. He has normal strength. No sensory deficit. GCS score is 15. GCS eye subscore is 4. GCS verbal subscore is 5. GCS motor subscore is 6.   5/5 strength RLE  3/5 on left    5/5 in bilateral UE    Sensation grossly intact throughout   Skin: Skin is warm and dry. No rash noted. No erythema. No pallor.   Psychiatric: He has a normal mood and affect. His behavior is normal. Judgment and thought content normal.         ED Course   Procedures  Labs Reviewed   PROTIME-INR   SARS-COV-2 RNA AMPLIFICATION, QUAL   APTT   APTT    Narrative:     ADD-ON APTT #[id number] PER GAMALIEL ELDER JR., MD 14:51  08/05/2020    TYPE & SCREEN          Imaging Results           MRI Lumbar Spine Without Contrast (Final result)  Result time 08/05/20 16:40:28    Final result by Carolyn Cunningham MD (08/05/20 16:40:28)                 Impression:      1. Severe compression fracture deformity of L1 with retropulsion of the superior endplate of L1 causing severe canal stenosis.  Nondisplaced fracture at the right transverse process and spinous process of L1, better visualized on prior CT.  2. Anterior epidural hematoma   extending from L1-L3, resulting in moderate to severe spinal canal stenosis at the level of L1-L2 and L2-L3, as above.  This report was flagged in Epic as abnormal.    COMMUNICATION  This critical result was discovered/received at 16:15.  The critical information  above was relayed directly by radiology resident, Dr. Emanuel telephone to GINNA Nelson on 08/05/2020 at 16:25.    Electronically signed by resident: Ai Figueroa  Date:    08/05/2020  Time:    15:11    Electronically signed by: Carolyn Cunningham MD  Date:    08/05/2020  Time:    16:40             Narrative:    EXAMINATION:  MRI LUMBAR SPINE WITHOUT CONTRAST    CLINICAL HISTORY:  L1 burst fracture;    TECHNIQUE:  Multiplanar, multisequence MR images were acquired from the thoracolumbar junction to the sacrum without the administration of contrast.    COMPARISON:  CT chest abdomen pelvis: 08/05/2020.    FINDINGS:  Alignment: Normal.    Vertebrae: Severe vertebral body fracture of the L1 with retropulsion of the superior endplate of L1 into the spinal canal resulting in severe spinal canal stenosis.    Discs: Normal height and signal.    Cord: Normal.  Conus terminates at L1.    Epidural hematoma along the ventral aspect of the thecal sac, extending from L1-L3, measures 7.5 x 2.3 x 1.2 cm (cc x TV x AP) resulting in moderate to severe spinal canal stenosis, as below.  No paravertebral hematoma.    Degenerative findings:    T12-L1: Diffuse disc bulging with retropulsion of osseous fragments along the superior endplate of L1 secondary to vertebral body fragmented fracture resulting in severe spinal canal stenosis.  Nondisplaced fracture is noted at the right transverse process and spinous process of L1, better visualized on prior CT.    L1-L2: Severe spinal canal stenosis secondary to epidural hematoma.  No neural foraminal narrowing.    L2-L3: Moderate spinal canal stenosis secondary to epidural hematoma.  Mild bilateral facet joint degenerative changes with no neural foraminal narrowing.    L3-L4: Mild bilateral facet joint degenerative changes, right more than left.  No spinal canal stenosis or neural foraminal narrowing.    L4-L5: Mild bilateral facet joint degenerative changes.  No spinal canal  stenosis or neural foraminal narrowing.    L5-S1: Moderate bilateral facet joint degenerative changes.  No spinal canal stenosis or neural foraminal narrowing.    Upper sacrum and bilateral sacroiliac joints are unremarkable.    Paraspinal muscles & soft tissues: Unremarkable.                                 Medical Decision Making:   History:   Old Medical Records: I decided to obtain old medical records.  Initial Assessment:   45-year-old presenting with L-spine compression fracture lower extremity weakness from [facility] hospital.  Vital signs stable within normal limits PE noted for L-spine pain with left lower extremity weakness  Differential Diagnosis:   DX compression fracture, spinal stenosis, spinal cord compression, conus medullaris, cauda equina  Clinical Tests:   Lab Tests: Ordered and Reviewed  Radiological Study: Ordered and Reviewed  ED Management:  Plan obtain MRI labs Neurosurgery consult analgesia and reassess.                   ED Course as of Aug 06 1239   Wed Aug 05, 2020   1326 Neurosurgery at bedside , NPO MRI     [DC]   1702 Patient admitted by Neurosurgery no plan for acute or intervention at moment plan for surgery tomorrow.    [DC]      ED Course User Index  [DC] Lisandro Falk Jr., MD                Clinical Impression:       ICD-10-CM ICD-9-CM   1. Traumatic compression fracture of L1 lumbar vertebra  S32.010A 805.4   2. Acute urinary retention  R33.8 788.29   3. Fall from building, initial encounter  W13.9XXA E882   4. Spinal stenosis of lumbar region with neurogenic claudication  M48.062 724.03   5. Traumatic compression fracture of L1 lumbar vertebra, closed, initial encounter  S32.010A 805.4             ED Disposition Condition    Admit                           Lisandro Falk Jr., MD  08/06/20 5671

## 2020-08-06 NOTE — SUBJECTIVE & OBJECTIVE
"Interval History: 8/6: NAOMI KATE. Pt complains of severe pain ON inadequately treated with PRN fentanyl. Plan for OR today for L1 corpectomy with cage, T12-L2 interbody fusion and posterior fusion.     Medications:  Continuous Infusions:   sodium chloride 0.9% 75 mL/hr at 08/06/20 0003     Scheduled Meds:   fentaNYL  75 mcg Intravenous Once    gabapentin  300 mg Oral TID    methocarbamoL  500 mg Oral QID    polyethylene glycol  17 g Oral Daily    senna-docusate 8.6-50 mg  2 tablet Oral Daily     PRN Meds:acetaminophen, dextrose 50%, dextrose 50%, diazePAM, fentaNYL, glucagon (human recombinant), glucose, glucose, glucose, ondansetron, oxyCODONE-acetaminophen     Review of Systems  Objective:     Weight: 90.7 kg (200 lb)  Body mass index is 28.7 kg/m².  Vital Signs (Most Recent):  Temp: 98.3 °F (36.8 °C) (08/06/20 0700)  Pulse: 85 (08/06/20 0700)  Resp: 16 (08/06/20 0700)  BP: 123/79 (08/06/20 0700)  SpO2: 96 % (08/06/20 0700) Vital Signs (24h Range):  Temp:  [97.6 °F (36.4 °C)-99.2 °F (37.3 °C)] 98.3 °F (36.8 °C)  Pulse:  [77-94] 85  Resp:  [16-25] 16  SpO2:  [93 %-99 %] 96 %  BP: (117-140)/() 123/79                          Urethral Catheter 08/05/20 1133 Straight-tip 14 Fr. (Active)   Site Assessment Clean;Intact 08/05/20 2140   Collection Container Urimeter 08/05/20 2140   Securement Method secured to top of thigh w/ adhesive device 08/05/20 2140   Catheter Care Performed yes 08/05/20 2140   Reason for Continuing Urinary Catheterization Urinary retention 08/05/20 2140   CAUTI Prevention Bundle StatLock in place w 1" slack 08/05/20 2140   Output (mL) 500 mL 08/06/20 0600       Neurosurgery Physical Exam     GCS 15  AAO x 3  CN II-XII grossly intact  Motor:   RUE-4/5, pain limited   LUE-4/5, pain limited  BLE- nonparticipatory 2/2 pain, wd to stim   SILT, c/o increasing parasthesias in buttocks, denies saddle anesthesia     Urinary kraus in place       Significant Labs:  Recent Labs   Lab " 08/05/20  1021 08/06/20 0519   * 127*    139   K 3.6 3.7    106   CO2 26 23   BUN 15 10   CREATININE 0.9 0.7   CALCIUM 8.9 8.2*     Recent Labs   Lab 08/05/20  1021 08/06/20 0519   WBC 6.17 9.78   HGB 13.7* 12.4*   HCT 39.5* 38.2*    219     Recent Labs   Lab 08/05/20  1345 08/06/20  0159 08/06/20 0519   INR 0.9  --  0.9   APTT 23.5 25.7  --      Microbiology Results (last 7 days)     ** No results found for the last 168 hours. **        All pertinent labs from the last 24 hours have been reviewed.    Significant Diagnostics:  I have reviewed all pertinent imaging results/findings within the past 24 hours.   Ct Head Without Contrast    Result Date: 8/5/2020  No acute intracranial abnormality. Electronically signed by: Luigi Rivera MD Date:    08/05/2020 Time:    10:43    Ct Cervical Spine Without Contrast    Result Date: 8/5/2020  No acute cervical fracture. Electronically signed by: Praneeth Swan MD Date:    08/05/2020 Time:    10:50    Mri Lumbar Spine Without Contrast    Result Date: 8/5/2020  1. Severe compression fracture deformity of L1 with retropulsion of the superior endplate of L1 causing severe canal stenosis.  Nondisplaced fracture at the right transverse process and spinous process of L1, better visualized on prior CT. 2. Anterior epidural hematoma   extending from L1-L3, resulting in moderate to severe spinal canal stenosis at the level of L1-L2 and L2-L3, as above. This report was flagged in Epic as abnormal. COMMUNICATION This critical result was discovered/received at 16:15.  The critical information above was relayed directly by radiology resident, Dr. Emanuel telephone to GINNA Nelson on 08/05/2020 at 16:25. Electronically signed by resident: Ai Figueroa Date:    08/05/2020 Time:    15:11 Electronically signed by: Carolyn Cunningham MD Date:    08/05/2020 Time:    16:40    X-ray Chest Ap Portable    Result Date: 8/5/2020  No acute radiographic  findings on single view of the chest. Electronically signed by: Luigi Rivera MD Date:    08/05/2020 Time:    10:41    X-ray Pelvis Routine Ap    Result Date: 8/5/2020  No acute bony abnormality in the pelvis. Electronically signed by: Praneeth Swan MD Date:    08/05/2020 Time:    10:46    Ct Chest Abdomen Pelvis With Contrast    Result Date: 8/5/2020  Acute compression deformity involving the L1 vertebral body with significant retropulsion of fracture fragments into the central canal resulting in severe central canal stenosis and presumed cord injury at this level.  Associated nondisplaced fractures involving the right transverse process, right inferior articular process, and spinous process at L1.  Recommend spinal surgery evaluation and consider lumbar spine MRI to further characterize. No acute solid organ injury identified in the chest, abdomen, or pelvis. Cholelithiasis. Few pulmonary micro nodules in the right upper lobe measuring up to 5 mm in size.  For multiple solid nodules all <6 mm, Fleischner Society 2017 guidelines recommend no routine follow up for a low risk patient, or follow up with non-contrast chest CT at 12 months after discovery in a high risk patient. Incidental findings discussed in the body of the report. Critical finding of L1 fracture with presumed cord compression called to Dr. Serna in the ER at 11:15 on 08/05/2020. This report was flagged in Epic as abnormal. Electronically signed by: Praneeth Swan MD Date:    08/05/2020 Time:    11:19

## 2020-08-06 NOTE — PLAN OF CARE
POC reviewed with patient; verbalizes understanding. AAOx4. Compliant with medication administration regimen. Pain management maintained with medication timing. Safety precautions in place; call light within reach, bed in low position, wheels locked, side rails up x2. Will continue to monitor.

## 2020-08-07 LAB
ALBUMIN SERPL BCP-MCNC: 2.8 G/DL (ref 3.5–5.2)
ALP SERPL-CCNC: 39 U/L (ref 55–135)
ALT SERPL W/O P-5'-P-CCNC: 8 U/L (ref 10–44)
ANION GAP SERPL CALC-SCNC: 6 MMOL/L (ref 8–16)
ANISOCYTOSIS BLD QL SMEAR: SLIGHT
APTT BLDCRRT: 27.3 SEC (ref 21–32)
AST SERPL-CCNC: 18 U/L (ref 10–40)
BASO STIPL BLD QL SMEAR: ABNORMAL
BASOPHILS # BLD AUTO: 0.02 K/UL (ref 0–0.2)
BASOPHILS NFR BLD: 0.1 % (ref 0–1.9)
BILIRUB SERPL-MCNC: 1.8 MG/DL (ref 0.1–1)
BUN SERPL-MCNC: 9 MG/DL (ref 6–20)
CALCIUM SERPL-MCNC: 8.8 MG/DL (ref 8.7–10.5)
CHLORIDE SERPL-SCNC: 108 MMOL/L (ref 95–110)
CO2 SERPL-SCNC: 25 MMOL/L (ref 23–29)
CREAT SERPL-MCNC: 0.7 MG/DL (ref 0.5–1.4)
DIFFERENTIAL METHOD: ABNORMAL
EOSINOPHIL # BLD AUTO: 0 K/UL (ref 0–0.5)
EOSINOPHIL NFR BLD: 0.1 % (ref 0–8)
ERYTHROCYTE [DISTWIDTH] IN BLOOD BY AUTOMATED COUNT: 12.4 % (ref 11.5–14.5)
EST. GFR  (AFRICAN AMERICAN): >60 ML/MIN/1.73 M^2
EST. GFR  (NON AFRICAN AMERICAN): >60 ML/MIN/1.73 M^2
GLUCOSE SERPL-MCNC: 118 MG/DL (ref 70–110)
GLUCOSE SERPL-MCNC: 126 MG/DL (ref 70–110)
GLUCOSE SERPL-MCNC: 135 MG/DL (ref 70–110)
GLUCOSE SERPL-MCNC: 98 MG/DL (ref 70–110)
HCO3 UR-SCNC: 24.3 MMOL/L (ref 24–28)
HCO3 UR-SCNC: 24.8 MMOL/L (ref 24–28)
HCO3 UR-SCNC: 25.8 MMOL/L (ref 24–28)
HCT VFR BLD AUTO: 31.5 % (ref 40–54)
HCT VFR BLD CALC: 31 %PCV (ref 36–54)
HCT VFR BLD CALC: 31 %PCV (ref 36–54)
HCT VFR BLD CALC: 33 %PCV (ref 36–54)
HGB BLD-MCNC: 10.3 G/DL (ref 14–18)
IMM GRANULOCYTES # BLD AUTO: 0.17 K/UL (ref 0–0.04)
IMM GRANULOCYTES NFR BLD AUTO: 0.9 % (ref 0–0.5)
INR PPP: 1 (ref 0.8–1.2)
LYMPHOCYTES # BLD AUTO: 0.7 K/UL (ref 1–4.8)
LYMPHOCYTES NFR BLD: 3.9 % (ref 18–48)
MAGNESIUM SERPL-MCNC: 1.7 MG/DL (ref 1.6–2.6)
MCH RBC QN AUTO: 31.3 PG (ref 27–31)
MCHC RBC AUTO-ENTMCNC: 32.7 G/DL (ref 32–36)
MCV RBC AUTO: 96 FL (ref 82–98)
MONOCYTES # BLD AUTO: 0.7 K/UL (ref 0.3–1)
MONOCYTES NFR BLD: 3.5 % (ref 4–15)
NEUTROPHILS # BLD AUTO: 17.3 K/UL (ref 1.8–7.7)
NEUTROPHILS NFR BLD: 91.5 % (ref 38–73)
NRBC BLD-RTO: 0 /100 WBC
PCO2 BLDA: 33.8 MMHG (ref 35–45)
PCO2 BLDA: 36.3 MMHG (ref 35–45)
PCO2 BLDA: 42.1 MMHG (ref 35–45)
PH SMN: 7.39 [PH] (ref 7.35–7.45)
PH SMN: 7.44 [PH] (ref 7.35–7.45)
PH SMN: 7.46 [PH] (ref 7.35–7.45)
PHOSPHATE SERPL-MCNC: 4.1 MG/DL (ref 2.7–4.5)
PLATELET # BLD AUTO: 162 K/UL (ref 150–350)
PLATELET BLD QL SMEAR: ABNORMAL
PMV BLD AUTO: 9.4 FL (ref 9.2–12.9)
PO2 BLDA: 318 MMHG (ref 80–100)
PO2 BLDA: 386 MMHG (ref 80–100)
PO2 BLDA: 91 MMHG (ref 80–100)
POC BE: 1 MMOL/L
POC IONIZED CALCIUM: 1.06 MMOL/L (ref 1.06–1.42)
POC IONIZED CALCIUM: 1.14 MMOL/L (ref 1.06–1.42)
POC IONIZED CALCIUM: 1.57 MMOL/L (ref 1.06–1.42)
POC SATURATED O2: 100 % (ref 95–100)
POC SATURATED O2: 100 % (ref 95–100)
POC SATURATED O2: 98 % (ref 95–100)
POC TCO2: 25 MMOL/L (ref 23–27)
POC TCO2: 26 MMOL/L (ref 23–27)
POC TCO2: 27 MMOL/L (ref 23–27)
POCT GLUCOSE: 123 MG/DL (ref 70–110)
POTASSIUM BLD-SCNC: 3.5 MMOL/L (ref 3.5–5.1)
POTASSIUM BLD-SCNC: 3.6 MMOL/L (ref 3.5–5.1)
POTASSIUM BLD-SCNC: 4.1 MMOL/L (ref 3.5–5.1)
POTASSIUM SERPL-SCNC: 4.4 MMOL/L (ref 3.5–5.1)
PROT SERPL-MCNC: 5.1 G/DL (ref 6–8.4)
PROTHROMBIN TIME: 10.9 SEC (ref 9–12.5)
RBC # BLD AUTO: 3.29 M/UL (ref 4.6–6.2)
SAMPLE: ABNORMAL
SODIUM BLD-SCNC: 136 MMOL/L (ref 136–145)
SODIUM BLD-SCNC: 138 MMOL/L (ref 136–145)
SODIUM BLD-SCNC: 138 MMOL/L (ref 136–145)
SODIUM SERPL-SCNC: 139 MMOL/L (ref 136–145)
WBC # BLD AUTO: 18.92 K/UL (ref 3.9–12.7)

## 2020-08-07 PROCEDURE — 85610 PROTHROMBIN TIME: CPT

## 2020-08-07 PROCEDURE — 80053 COMPREHEN METABOLIC PANEL: CPT

## 2020-08-07 PROCEDURE — 63600175 PHARM REV CODE 636 W HCPCS: Performed by: STUDENT IN AN ORGANIZED HEALTH CARE EDUCATION/TRAINING PROGRAM

## 2020-08-07 PROCEDURE — 25000003 PHARM REV CODE 250: Performed by: STUDENT IN AN ORGANIZED HEALTH CARE EDUCATION/TRAINING PROGRAM

## 2020-08-07 PROCEDURE — 20000000 HC ICU ROOM

## 2020-08-07 PROCEDURE — 85730 THROMBOPLASTIN TIME PARTIAL: CPT

## 2020-08-07 PROCEDURE — 25000003 PHARM REV CODE 250: Performed by: PHYSICIAN ASSISTANT

## 2020-08-07 PROCEDURE — 85025 COMPLETE CBC W/AUTO DIFF WBC: CPT

## 2020-08-07 PROCEDURE — 63600175 PHARM REV CODE 636 W HCPCS: Performed by: PHYSICIAN ASSISTANT

## 2020-08-07 PROCEDURE — 83735 ASSAY OF MAGNESIUM: CPT

## 2020-08-07 PROCEDURE — 92610 EVALUATE SWALLOWING FUNCTION: CPT

## 2020-08-07 PROCEDURE — 97802 MEDICAL NUTRITION INDIV IN: CPT

## 2020-08-07 PROCEDURE — 63600175 PHARM REV CODE 636 W HCPCS: Performed by: NURSE ANESTHETIST, CERTIFIED REGISTERED

## 2020-08-07 PROCEDURE — 99291 PR CRITICAL CARE, E/M 30-74 MINUTES: ICD-10-PCS | Mod: ,,, | Performed by: PSYCHIATRY & NEUROLOGY

## 2020-08-07 PROCEDURE — 25000003 PHARM REV CODE 250: Performed by: NURSE PRACTITIONER

## 2020-08-07 PROCEDURE — 63600175 PHARM REV CODE 636 W HCPCS: Performed by: NURSE PRACTITIONER

## 2020-08-07 PROCEDURE — 99291 CRITICAL CARE FIRST HOUR: CPT | Mod: ,,, | Performed by: PSYCHIATRY & NEUROLOGY

## 2020-08-07 PROCEDURE — 94761 N-INVAS EAR/PLS OXIMETRY MLT: CPT

## 2020-08-07 PROCEDURE — 84100 ASSAY OF PHOSPHORUS: CPT

## 2020-08-07 PROCEDURE — 63600175 PHARM REV CODE 636 W HCPCS: Performed by: NEUROLOGICAL SURGERY

## 2020-08-07 PROCEDURE — 25000003 PHARM REV CODE 250: Performed by: NEUROLOGICAL SURGERY

## 2020-08-07 RX ORDER — ONDANSETRON 2 MG/ML
4 INJECTION INTRAMUSCULAR; INTRAVENOUS EVERY 8 HOURS PRN
Status: DISCONTINUED | OUTPATIENT
Start: 2020-08-07 | End: 2020-08-08

## 2020-08-07 RX ORDER — BACITRACIN ZINC 500 UNIT/G
OINTMENT (GRAM) TOPICAL
Status: DISCONTINUED | OUTPATIENT
Start: 2020-08-07 | End: 2020-08-07 | Stop reason: HOSPADM

## 2020-08-07 RX ORDER — ACETAMINOPHEN 325 MG/1
650 TABLET ORAL EVERY 6 HOURS
Status: DISCONTINUED | OUTPATIENT
Start: 2020-08-07 | End: 2020-08-08

## 2020-08-07 RX ORDER — HYDROMORPHONE HYDROCHLORIDE 1 MG/ML
1 INJECTION, SOLUTION INTRAMUSCULAR; INTRAVENOUS; SUBCUTANEOUS
Status: DISCONTINUED | OUTPATIENT
Start: 2020-08-07 | End: 2020-08-07

## 2020-08-07 RX ORDER — GABAPENTIN 300 MG/1
600 CAPSULE ORAL 3 TIMES DAILY
Status: DISCONTINUED | OUTPATIENT
Start: 2020-08-07 | End: 2020-08-08

## 2020-08-07 RX ORDER — HYDROMORPHONE HYDROCHLORIDE 2 MG/ML
2 INJECTION, SOLUTION INTRAMUSCULAR; INTRAVENOUS; SUBCUTANEOUS
Status: DISCONTINUED | OUTPATIENT
Start: 2020-08-07 | End: 2020-08-09

## 2020-08-07 RX ORDER — NICARDIPINE HYDROCHLORIDE 0.2 MG/ML
INJECTION INTRAVENOUS
Status: COMPLETED
Start: 2020-08-07 | End: 2020-08-07

## 2020-08-07 RX ORDER — CEFAZOLIN SODIUM 1 G/3ML
1 INJECTION, POWDER, FOR SOLUTION INTRAMUSCULAR; INTRAVENOUS
Status: DISCONTINUED | OUTPATIENT
Start: 2020-08-07 | End: 2020-08-11

## 2020-08-07 RX ORDER — HYDROMORPHONE HYDROCHLORIDE 2 MG/ML
2 INJECTION, SOLUTION INTRAMUSCULAR; INTRAVENOUS; SUBCUTANEOUS
Status: DISCONTINUED | OUTPATIENT
Start: 2020-08-07 | End: 2020-08-07

## 2020-08-07 RX ORDER — METHOCARBAMOL 750 MG/1
750 TABLET, FILM COATED ORAL 3 TIMES DAILY
Status: DISCONTINUED | OUTPATIENT
Start: 2020-08-07 | End: 2020-08-08

## 2020-08-07 RX ORDER — DEXAMETHASONE SODIUM PHOSPHATE 4 MG/ML
4 INJECTION, SOLUTION INTRA-ARTICULAR; INTRALESIONAL; INTRAMUSCULAR; INTRAVENOUS; SOFT TISSUE EVERY 6 HOURS
Status: DISCONTINUED | OUTPATIENT
Start: 2020-08-07 | End: 2020-08-07

## 2020-08-07 RX ORDER — NOREPINEPHRINE BITARTRATE/D5W 4MG/250ML
0.02 PLASTIC BAG, INJECTION (ML) INTRAVENOUS CONTINUOUS
Status: DISCONTINUED | OUTPATIENT
Start: 2020-08-07 | End: 2020-08-08

## 2020-08-07 RX ORDER — SODIUM CHLORIDE 0.9 % (FLUSH) 0.9 %
10 SYRINGE (ML) INJECTION
Status: DISCONTINUED | OUTPATIENT
Start: 2020-08-07 | End: 2020-08-21 | Stop reason: HOSPADM

## 2020-08-07 RX ORDER — TRAMADOL HYDROCHLORIDE 50 MG/1
100 TABLET ORAL EVERY 6 HOURS
Status: DISCONTINUED | OUTPATIENT
Start: 2020-08-07 | End: 2020-08-08

## 2020-08-07 RX ADMIN — ACETAMINOPHEN 650 MG: 325 TABLET ORAL at 05:08

## 2020-08-07 RX ADMIN — FENTANYL CITRATE 25 MCG: 50 INJECTION INTRAMUSCULAR; INTRAVENOUS at 05:08

## 2020-08-07 RX ADMIN — GABAPENTIN 300 MG: 100 CAPSULE ORAL at 09:08

## 2020-08-07 RX ADMIN — HYDROMORPHONE HYDROCHLORIDE 2 MG: 2 INJECTION INTRAMUSCULAR; INTRAVENOUS; SUBCUTANEOUS at 11:08

## 2020-08-07 RX ADMIN — METHOCARBAMOL TABLETS 750 MG: 750 TABLET, COATED ORAL at 08:08

## 2020-08-07 RX ADMIN — DOCUSATE SODIUM 50MG AND SENNOSIDES 8.6MG 2 TABLET: 8.6; 5 TABLET, FILM COATED ORAL at 09:08

## 2020-08-07 RX ADMIN — GABAPENTIN 600 MG: 300 CAPSULE ORAL at 02:08

## 2020-08-07 RX ADMIN — DEXAMETHASONE SODIUM PHOSPHATE 4 MG: 4 INJECTION, SOLUTION INTRA-ARTICULAR; INTRALESIONAL; INTRAMUSCULAR; INTRAVENOUS; SOFT TISSUE at 05:08

## 2020-08-07 RX ADMIN — METHOCARBAMOL TABLETS 750 MG: 750 TABLET, COATED ORAL at 09:08

## 2020-08-07 RX ADMIN — OXYCODONE HYDROCHLORIDE AND ACETAMINOPHEN 2 TABLET: 5; 325 TABLET ORAL at 05:08

## 2020-08-07 RX ADMIN — FENTANYL CITRATE 25 MCG: 50 INJECTION INTRAMUSCULAR; INTRAVENOUS at 03:08

## 2020-08-07 RX ADMIN — Medication 0.04 MCG/KG/MIN: at 03:08

## 2020-08-07 RX ADMIN — TRAMADOL HYDROCHLORIDE 100 MG: 50 TABLET, FILM COATED ORAL at 06:08

## 2020-08-07 RX ADMIN — CEFAZOLIN 1 G: 1 INJECTION, POWDER, FOR SOLUTION INTRAMUSCULAR; INTRAVENOUS at 01:08

## 2020-08-07 RX ADMIN — GABAPENTIN 600 MG: 300 CAPSULE ORAL at 08:08

## 2020-08-07 RX ADMIN — METHOCARBAMOL TABLETS 750 MG: 750 TABLET, COATED ORAL at 02:08

## 2020-08-07 RX ADMIN — CEFAZOLIN 1 G: 1 INJECTION, POWDER, FOR SOLUTION INTRAMUSCULAR; INTRAVENOUS at 09:08

## 2020-08-07 RX ADMIN — DEXAMETHASONE SODIUM PHOSPHATE 4 MG: 4 INJECTION, SOLUTION INTRA-ARTICULAR; INTRALESIONAL; INTRAMUSCULAR; INTRAVENOUS; SOFT TISSUE at 11:08

## 2020-08-07 RX ADMIN — POLYETHYLENE GLYCOL 3350 17 G: 17 POWDER, FOR SOLUTION ORAL at 09:08

## 2020-08-07 RX ADMIN — FENTANYL CITRATE 50 MCG: 50 INJECTION, SOLUTION INTRAMUSCULAR; INTRAVENOUS at 12:08

## 2020-08-07 RX ADMIN — HYDROMORPHONE HYDROCHLORIDE 2 MG: 2 INJECTION INTRAMUSCULAR; INTRAVENOUS; SUBCUTANEOUS at 02:08

## 2020-08-07 RX ADMIN — ACETAMINOPHEN 650 MG: 325 TABLET ORAL at 11:08

## 2020-08-07 RX ADMIN — SODIUM CHLORIDE 1000 ML: 0.9 INJECTION, SOLUTION INTRAVENOUS at 01:08

## 2020-08-07 RX ADMIN — SODIUM CHLORIDE: 0.9 INJECTION, SOLUTION INTRAVENOUS at 02:08

## 2020-08-07 RX ADMIN — TRAMADOL HYDROCHLORIDE 100 MG: 50 TABLET, FILM COATED ORAL at 11:08

## 2020-08-07 RX ADMIN — HYDROMORPHONE HYDROCHLORIDE 2 MG: 2 INJECTION INTRAMUSCULAR; INTRAVENOUS; SUBCUTANEOUS at 08:08

## 2020-08-07 RX ADMIN — Medication 0.02 MCG/KG/MIN: at 01:08

## 2020-08-07 RX ADMIN — DIAZEPAM 5 MG: 5 TABLET ORAL at 07:08

## 2020-08-07 RX ADMIN — CEFAZOLIN 1 G: 1 INJECTION, POWDER, FOR SOLUTION INTRAMUSCULAR; INTRAVENOUS at 05:08

## 2020-08-07 RX ADMIN — ONDANSETRON 4 MG: 2 INJECTION INTRAMUSCULAR; INTRAVENOUS at 05:08

## 2020-08-07 RX ADMIN — FENTANYL CITRATE 25 MCG: 50 INJECTION INTRAMUSCULAR; INTRAVENOUS at 04:08

## 2020-08-07 RX ADMIN — HYDROMORPHONE HYDROCHLORIDE 2 MG: 2 INJECTION INTRAMUSCULAR; INTRAVENOUS; SUBCUTANEOUS at 05:08

## 2020-08-07 RX ADMIN — DIAZEPAM 5 MG: 5 TABLET ORAL at 01:08

## 2020-08-07 NOTE — PROGRESS NOTES
Patient arrived to [facility] from OR >> [facility]     Type of stroke/diagnosis:  Spinal fusion surgery for vertebral fracture and spinal compression with severe stenosis     TPA start and end time: n/a     Thrombectomy start and end time; n/a    Current symptoms: weakness     Skin assessment done: Y  Wounds noted: none   NEFTALI Armband Applied: yes  Patient Belongings on Admit: cell phone     NCC notified: GORDON Vernon

## 2020-08-07 NOTE — PROGRESS NOTES
[facility]  Neurosurgery  Progress Note    Subjective:     History of Present Illness: Patient is a 44 y/o male with no significant PMH who was transferred from  after falling from 10ft roof at work. Patient denies LOC, complained of excrutiating low back/sacral pain exacerbated with any movement of legs. CT CAP significant for L1 burst fracture with retropulsion into the spinal canal resulting in severe spinal stenosis. Patient states he could feel he had to urinate but could not start a stream, kraus placed and he reports he felt the kraus inserted and felt his bladder release once the kraus was placed. He reports he felt the OSMIN and per report from ED physician, patient had full rectal tone. The patient declined repeat rectal exam. Denies saddle anesthesia. Endorses any movement of lower extremities causes excruciating pain in the sacram and lower back. Denies radicular pain into the legs. Reports slight numbness in left calf. Denies any prior medical conditions or daily medication.     Post-Op Info:  Procedure(s) (LRB):  L1 CORPECTOMY, SPINE, LUMBAR  (Left)  FUSION, SPINE, LUMBAR, TLIF T12-L2 Interbody Fusion with Instrumentation - Lateral (Left)   1 Day Post-Op     Interval History: 8/7: POD 1 s/p L1 corpectomy with cage insertion and lateral fusion. S/p 2u pRBC. NAEON. AFVSS.     Medications:  Continuous Infusions:   sodium chloride 0.9% 75 mL/hr at 08/07/20 1000    norepinephrine bitartrate-D5W 0.02 mcg/kg/min (08/07/20 1000)     Scheduled Meds:   acetaminophen  650 mg Oral Q6H    ceFAZolin (ANCEF) IVPB  1 g Intravenous Q8H    dexamethasone  4 mg Intravenous Q6H    gabapentin  600 mg Oral TID    methocarbamoL  750 mg Oral TID    polyethylene glycol  17 g Oral Daily    senna-docusate 8.6-50 mg  2 tablet Oral Daily    traMADoL  100 mg Oral Q6H     PRN Meds:acetaminophen, dextrose 50%, dextrose 50%, diazePAM, glucagon (human recombinant), glucose, glucose, glucose, HYDROmorphone,  "ondansetron, sodium chloride 0.9%     Review of Systems  Objective:     Weight: 90.7 kg (200 lb)  Body mass index is 28.7 kg/m².  Vital Signs (Most Recent):  Temp: 98.7 °F (37.1 °C) (08/07/20 0705)  Pulse: 93 (08/07/20 1000)  Resp: 12 (08/07/20 1000)  BP: 111/77 (08/07/20 1000)  SpO2: 98 % (08/07/20 1000) Vital Signs (24h Range):  Temp:  [97.1 °F (36.2 °C)-99.5 °F (37.5 °C)] 98.7 °F (37.1 °C)  Pulse:  [] 93  Resp:  [12-27] 12  SpO2:  [95 %-100 %] 98 %  BP: ()/(51-80) 111/77  Arterial Line BP: (104-129)/(67-75) 115/71     Date 08/07/20 0700 - 08/08/20 0659   Shift 5386-3464 2257-6561 5661-7985 24 Hour Total   INTAKE   P.O. 100   100   I.V.(mL/kg) 327.2(3.6)   327.2(3.6)   Shift Total(mL/kg) 427.2(4.7)   427.2(4.7)   OUTPUT   Urine(mL/kg/hr) 140   140   Drains 40   40   Shift Total(mL/kg) 180(2)   180(2)   Weight (kg) 90.7 90.7 90.7 90.7                        Closed/Suction Drain 08/06/20 2337 Left Other (Comment) Bulb 10 Fr. (Active)   Site Description Unable to view 08/07/20 0300   Dressing Type Transparent (Tegaderm) 08/07/20 0300   Dressing Status Clean;Dry;Intact 08/07/20 0300   Dressing Intervention Integrity maintained 08/07/20 0300   Output (mL) 40 mL 08/07/20 0900            Urethral Catheter 08/05/20 1133 Straight-tip 14 Fr. (Active)   Site Assessment Clean;Intact 08/07/20 0300   Collection Container Urimeter 08/07/20 0300   Securement Method secured to top of thigh w/ adhesive device 08/07/20 0300   Catheter Care Performed yes 08/07/20 0300   Reason for Continuing Urinary Catheterization Urinary retention 08/07/20 0300   CAUTI Prevention Bundle StatLock in place w 1" slack;Intact seal between catheter & drainage tubing;Drainage bag/urimeter off the floor;Green sheeting clip in use;No dependent loops or kinks 08/07/20 0300   Output (mL) 35 mL 08/07/20 0900       Neurosurgery Physical Exam     GCS 15  AAO x 3, appears uncomfortable   CN grossly intact  PERRL  Upper Extremity:                      "             Deltoids        Triceps        Biceps        WE        WF                Interosseous           R        3/5              3/5              3/5            3/5        3 /5         3/5                3/5           L        3/5              3/5              3/5            3/5         3/5         5/5                3/5  Lower Extremity:                                      HF        KE        KF        DF        PF        EHL           R       3/5        3/5        3/5        3/5        3/5       3/5           L       3/5        3/5        3/5        3/5        3/5        3/5            Significant Labs:  Recent Labs   Lab 08/05/20  1021 08/06/20  0519 08/07/20  0047   * 127* 135*    139 139   K 3.6 3.7 4.4    106 108   CO2 26 23 25   BUN 15 10 9   CREATININE 0.9 0.7 0.7   CALCIUM 8.9 8.2* 8.8   MG  --   --  1.7     Recent Labs   Lab 08/05/20  1021 08/06/20  0519  08/06/20  2222 08/06/20  2349 08/07/20  0047   WBC 6.17 9.78  --   --   --  18.92*   HGB 13.7* 12.4*  --   --   --  10.3*   HCT 39.5* 38.2*   < > 31* 31* 31.5*    219  --   --   --  162    < > = values in this interval not displayed.     Recent Labs   Lab 08/05/20  1345 08/06/20  0159 08/06/20  0519 08/07/20  0047   INR 0.9  --  0.9 1.0   APTT 23.5 25.7  --  27.3     Microbiology Results (last 7 days)     ** No results found for the last 168 hours. **        All pertinent labs from the last 24 hours have been reviewed.    Significant Diagnostics:  I have reviewed all pertinent imaging results/findings within the past 24 hours.   Ct Lumbar Spine Without Contrast    Result Date: 8/7/2020  Postop change T1 corpectomy with interbody cage and fusion T12-L2.  There is approximately 7 mm retropulsion of a fracture fragment superiorly at the L1 level which does create at least moderate canal narrowing. sagittal fracture component extending into the L1 spinous process.  Nondisplaced fracture right transverse process of L1.  Surgical drain and scattered soft tissue air in the left paraspinal region extending laterally presumably postoperative. Spondylolysis left L5.  There is irregular linear lucency and hypertrophic new bone involving the right lamina of L5. Probable left nephrolithiasis. This report was flagged in Epic as abnormal. Electronically signed by: Leonid Clark MD Date:    08/07/2020 Time:    09:06    X-ray Chest Ap Portable    Result Date: 8/7/2020  Mild perihilar and basilar infiltrate/atelectasis, right greater than left as above. Electronically signed by: Yvon Day Date:    08/07/2020 Time:    04:15      Assessment/Plan:     * Traumatic compression fracture of L1 lumbar vertebra  44 y/o male with no significant PMH with L1 burst fracture with retropulsion resulting in severe canal stenosis s/p fall from 10ft building       S/p L1 corpectomy with cage insertion and T12-L2 lateral fusion. Posterior instrumentation deferred for 5 days due to increasing amounts of intraoperative bleeding.     --admit to icu  --All labs and diagnostics reviewed.  --MRI Lsp: L1-L3 EDH. Consistent severe canal stenosis and foraminal narrowing at L1 and adjacent.   --CT Lsp 8/7: postop, instrumentation appears in adequate position. There is a small amount of residual bone in the anterior canal.   --Bedrest/Log roll, HOB <30  --Pain control  --ADAT  --close neurological monitoring, notify neurosurgery with any acute changes  --Plan for OR on Tuesday, 8/11, for posterior instrumentation.   --Ok for SD to NSY.     Dispo: Ok for SD to NSY.         Camilo Levy MD  Neurosurgery  [facility]

## 2020-08-07 NOTE — PLAN OF CARE
Pt appearing safe for regular solids and thin liquids     Alma Rosa Lane MS, CCC-SLP  Speech Language Pathologist  Pager: [phone]  Date 8/7/2020

## 2020-08-07 NOTE — PLAN OF CARE
Recommendations     1. Continue current Regular diet.   2. RD to monitor & follow-up.     Goals: Meet % EEN, EPN by RD f/u date  Nutrition Goal Status: new  Communication of RD Recs: reviewed with RN

## 2020-08-07 NOTE — PLAN OF CARE
Per MD, patient in NSCCU S/p L1 corpectomy with cage insertion and T12-L2 lateral fusion. Posterior instrumentation deferred for 5 days due to increasing amounts of intraoperative bleeding.   Plan to return to surgery on Tuesday (08/11) per Neurosurgery.  Not medically ready for discharge.        08/07/20 1410   Discharge Reassessment   Assessment Type Discharge Planning Reassessment   Do you have any problems affording any of your prescribed medications? No   Discharge Plan A Rehab   Discharge Plan B Home Health   DME Needed Upon Discharge  other (see comments)  (tbd)   Anticipated Discharge Disposition Rehab   Can the patient/caregiver answer the patient profile reliably? Yes, cognitively intact   Describe the patient's ability to walk at the present time. Does not walk or unable to take any steps at all       Tere Yang RN, CCRN-K, [place]  Neuro-Critical Care   [phone]

## 2020-08-07 NOTE — PLAN OF CARE
POC reviewed with pt and pt's mother at 1000. Pt verbalized understanding. Questions and concerns addressed. No acute events today. CT lumbar spine completed this AM.  Pt's back pain controlled with scheduled and prn pain medications.  Progressed to regular diet, tolerating well.  Levophed gtt for MAP >80.  Pt progressing toward goals. Will continue to monitor. See flowsheets for full assessment and VS info.

## 2020-08-07 NOTE — ASSESSMENT & PLAN NOTE
45 y.o male with no significant PMHx was transferred from  to [facility] with CT CAP significant for L1 burst fracture with retropulsion into the spinal canal resulting in severe spinal stenosis s/p falling 10ft from roof. He will be admitted to the Neuro ICU s/p  L1 CORPECTOMY and TLIF T12-L2 Interbody Instrumentation.     -Admit to Neuro ICU  -Consult Neurosurgery s/p intervention  -Neuro checks q1hr   -Vital signs q1hr  -Daily labs  -Dilaudid 2 mg q3hrs prn  -Increased Gabapentin to 600 tid

## 2020-08-07 NOTE — PLAN OF CARE
No acute events throughout the night, VS and assessment per flow sheet, patient progressing towards goal as tolerated. Pt admitted around 0030 (see note). NS gtt at 75 ml/hr started. Levophed gtt titrated to maintain MAPs > 80.  Plan of care reviewed with Oscar Bills at 0400, all concerns addressed. Will continue to monitor.

## 2020-08-07 NOTE — PT/OT/SLP EVAL
Speech Language Pathology Evaluation  Bedside Swallow    Patient Name:  Oscar Bills   MRN:  [id number]  Admitting Diagnosis: Traumatic compression fracture of L1 lumbar vertebra    Recommendations:                 General Recommendations:  Cognitive-linguistic evaluation  Diet recommendations:  Regular, Thin   Aspiration Precautions: 1 bite/sip at a time and HOB to 90 degrees as tolerated   General Precautions: Standard,    Communication strategies:  none    History:     History reviewed. No pertinent past medical history.    History reviewed. No pertinent surgical history.    Prior Intubation HX:  briefly For surgical intervention     Modified Barium Swallow: none prior     Prior diet: regular solids and thin liquids       Subjective     Pt awake   Pt reporting 10/10 pain awaiting pain meds from RN    Pt mother at the bedside     Pain/Comfort:  · Pain Rating 1: 10/10  · Location - Orientation 1: generalized  · Location 1: back  · Pain Rating Post-Intervention 1: 10/10    Objective:     Oral Musculature Evaluation  · Oral Musculature: WFL  · Dentition: present and adequate  · Secretion Management: adequate  · Mucosal Quality: good  · Oral Labial Strength and Mobility: WFL  · Lingual Strength and Mobility: WFL  · Volitional Cough: strong  · Volitional Swallow: timely adequate hyolaryngeal rise to palpation  · Voice Prior to PO Intake: clear    Bedside Swallow Eval:   Consistencies Assessed:  · Thin liquids via straw x7 consecutive sips paired with large pills whole  · Solids deferred 2/2 overall back pain and limited interest in PO intake    · Pt only able to tolerate reverse trendelenburg positioning for PO trials     Oral Phase:   · WFL    Pharyngeal Phase:   · no overt clinical signs/symptoms of aspiration  · no overt clinical signs/symptoms of pharyngeal dysphagia    Compensatory Strategies  · None    Treatment:  Education provided to Pt re: SLP role in acute care setting, overall impressions and therapeutic goals.  Whiteboard updated.  SLP discussed will return another day to ensure diet tolerance with regular solids and further investigate and rule out cognitive-linguistic impairments given nature of pt injury and associated loss of consciousness. Pt and mother demonstrated understanding and agreement with plan.     Assessment:     Oscar Bills is a 45 y.o. male with an SLP diagnosis of intact oral feeding and swallow function to intiate diet. Speech to continue to follow for cognitive status.     Goals:   Multidisciplinary Problems     SLP Goals        Problem: SLP Goal    Goal Priority Disciplines Outcome   SLP Goal     SLP    Description: Speech Language Pathology Goals  Goals expected to be met by 8/14    1. Pt will tolerate diet of  thin liquids and solids] without overt clinical signs of aspiration   2. Pt will participate in ongoing assessment of speech language and cognitive linguistic skills to help rule out deficits and determine therapeutic plan of care                        Plan:     · Patient to be seen:  3 x/week   · Plan of Care expires:     · Plan of Care reviewed with:  patient   · SLP Follow-Up:  Yes       Discharge recommendations:      Barriers to Discharge:  None per ST     Time Tracking:     SLP Treatment Date:   08/07/20  Speech Start Time:  1101  Speech Stop Time:  1107     Speech Total Time (min):  6 min    Billable Minutes: Eval Swallow and Oral Function 6    Alma Rosa Lane CCC-SLP  08/07/2020

## 2020-08-07 NOTE — NURSING
Pt belongings dropped off by two nurses. Pt belongings consist of 2 pt belongings bags with clothes, belts, shoes, socks, undergarments, phone chargers and various electronics cords. A backpack was also dropped off that contains canned sodas, Gatorades, generic Prilosec, a sharpie pen and pepper spray on a key chain. Pt belongings currently stored in room closet and on countertop.

## 2020-08-07 NOTE — PLAN OF CARE
Endorsed patient's phone x1 and cardiac monitor (telemetry) to Gretchen Salgado RN (night shift nurse).

## 2020-08-07 NOTE — ANESTHESIA POSTPROCEDURE EVALUATION
Anesthesia Post Evaluation    Patient: Oscar Bills    Procedure(s) Performed: Procedure(s) (LRB):  L1 CORPECTOMY, SPINE, LUMBAR  (Left)  FUSION, SPINE, LUMBAR, TLIF T12-L2 Interbody Fusion with Instrumentation - Lateral (Left)    Final Anesthesia Type: general    Patient location during evaluation: ICU  Patient participation: Yes- Able to Participate  Level of consciousness: awake and alert  Post-procedure vital signs: reviewed and stable  Pain management: adequate  Airway patency: patent    PONV status at discharge: No PONV  Anesthetic complications: no      Cardiovascular status: hemodynamically stable  Respiratory status: spontaneous ventilation  Follow-up not needed.          Vitals Value Taken Time   /75 08/07/20 0402   Temp 37.2 °C (99 °F) 08/07/20 0300   Pulse 91 08/07/20 0443   Resp 15 08/07/20 0443   SpO2 97 % 08/07/20 0443   Vitals shown include unvalidated device data.      No case tracking events are documented in the log.      Pain/Dany Score: Pain Rating Prior to Med Admin: 8 (8/7/2020  4:15 AM)  Pain Rating Post Med Admin: 4 (8/7/2020  3:30 AM)

## 2020-08-07 NOTE — ASSESSMENT & PLAN NOTE
46 y/o male with no significant PMH with L1 burst fracture with retropulsion resulting in severe canal stenosis s/p fall from 10ft building       S/p L1 corpectomy with cage insertion and T12-L2 lateral fusion. Posterior instrumentation deferred for 5 days due to increasing amounts of intraoperative bleeding.     --admit to icu  --All labs and diagnostics reviewed.  --MAP>80 goal x 3 days (1/3)   --MRI Lsp: L1-L3 EDH. Consistent severe canal stenosis and foraminal narrowing at L1 and adjacent.   --CT Lsp 8/7: postop, instrumentation appears in adequate position. There is a small amount of residual bone in the anterior canal.   --Bedrest/Log roll, HOB <30  --Pain control  --ADAT  --close neurological monitoring, notify neurosurgery with any acute changes  --Plan for OR on Tuesday, 8/11, for posterior instrumentation.     Dispo: continued ICU care.

## 2020-08-07 NOTE — ADDENDUM NOTE
Addendum  created 08/07/20 0553 by Solo Bansal CRNA    Child order released for a procedure order, Clinical Note Signed, Intraprocedure Blocks edited, LDA created via procedure documentation

## 2020-08-07 NOTE — PLAN OF CARE
08/07/20 1149   Post-Acute Status   Post-Acute Authorization Placement   Post-Acute Placement Status Awaiting Internal Medical Clearance     Anticipated therapy eval over weekend.    Josefina Woo LCSW  Neurocritical Care   [facility]  [phone]

## 2020-08-07 NOTE — SUBJECTIVE & OBJECTIVE
History reviewed. No pertinent past medical history.  History reviewed. No pertinent surgical history.   No current facility-administered medications on file prior to encounter.      No current outpatient medications on file prior to encounter.      Allergies: Patient has no known allergies.    History reviewed. No pertinent family history.  Social History     Tobacco Use    Smoking status: Never Smoker    Smokeless tobacco: Never Used   Substance Use Topics    Alcohol use: Yes    Drug use: Never     Review of Systems   Unable to perform ROS: Other   s/p exam immediately after anaesthesia . Pt is still sedated.  Objective:     Vitals:    Temp: 99 °F (37.2 °C)  Pulse: 88  Rhythm: normal sinus rhythm  BP: 92/62  MAP (mmHg): 71  Resp: 16  SpO2: 97 %  O2 Device (Oxygen Therapy): room air    Temp  Min: 97.1 °F (36.2 °C)  Max: 99.5 °F (37.5 °C)  Pulse  Min: 84  Max: 109  BP  Min: 92/62  Max: 130/74  MAP (mmHg)  Min: 67  Max: 97  Resp  Min: 15  Max: 27  SpO2  Min: 95 %  Max: 100 %    08/06 0701 - 08/07 0700  In: 4353.5 [I.V.:2853.5]  Out: 3380 [Urine:1730; Drains:150]   Unmeasured Output  Stool Occurrence: 0       Physical Exam  Vitals signs and nursing note reviewed.   Constitutional:       Appearance: He is normal weight.   HENT:      Head: Normocephalic and atraumatic.      Right Ear: External ear normal.      Left Ear: External ear normal.      Nose: Nose normal.      Mouth/Throat:      Mouth: Mucous membranes are dry.   Eyes:      General: No scleral icterus.     Extraocular Movements: Extraocular movements intact.      Conjunctiva/sclera: Conjunctivae normal.      Pupils: Pupils are equal, round, and reactive to light.   Neck:      Musculoskeletal: Normal range of motion and neck supple.   Cardiovascular:      Rate and Rhythm: Tachycardia present.      Pulses: Normal pulses.      Heart sounds: Normal heart sounds.   Pulmonary:      Effort: Pulmonary effort is normal. No respiratory distress.      Breath sounds:  Normal breath sounds.   Abdominal:      General: Abdomen is flat. Bowel sounds are normal.      Palpations: Abdomen is soft.   Skin:     General: Skin is warm and dry.      Capillary Refill: Capillary refill takes less than 2 seconds.   Neurological:      Motor: Weakness present.      Comments: Pt is drowsy but able to wake up to voice  Oriented x4  Brain stem reflexes intact  PERRLA  EOMI  Cranial nerves examined and are grossly intact  RUE 3/5  RLE 3/5  LUE 5/5  LLE 5/5           Today I personally reviewed pertinent laboratory results, notably:post op H&H stable    CT lumbar spine 08/07/20  Impression:       Postop change T1 corpectomy with interbody cage and fusion T12-L2.  There is approximately 7 mm retropulsion of a fracture fragment superiorly at the L1 level which does create at least moderate canal narrowing.     sagittal fracture component extending into the L1 spinous process.  Nondisplaced fracture right transverse process of L1.     Surgical drain and scattered soft tissue air in the left paraspinal region extending laterally presumably postoperative.     Spondylolysis left L5.  There is irregular linear lucency and hypertrophic new bone involving the right lamina of L5.     Probable left nephrolithiasis.     This report was flagged in Epic as abn

## 2020-08-07 NOTE — H&P
[facility]  Neurocritical Care  History & Physical    Admit Date: 8/5/2020  Service Date: 08/07/2020  Length of Stay: 2    Subjective:     Chief Complaint: Traumatic compression fracture of L1 lumbar vertebra    History of Present Illness: Patient is a 46 y/o male with no significant PMH who was transferred from  after falling from 10ft roof at work. Patient denies LOC, complained of excrutiating low back/sacral pain exacerbated with any movement of legs. CT CAP significant for L1 burst fracture with retropulsion into the spinal canal resulting in severe spinal stenosis. Patient states he could feel he had to urinate but could not start a stream, kraus placed and he reports he felt the kraus inserted and felt his bladder release once the kraus was placed. He reports he felt the OSMIN and per report from ED physician, patient had full rectal tone. He is being admitted to Neuro ICU for a higher level of neurological care s/p L1 CORPECTOMY and TLIF T12-L2 Interbody Instrumentation.    History reviewed. No pertinent past medical history.  History reviewed. No pertinent surgical history.   No current facility-administered medications on file prior to encounter.      No current outpatient medications on file prior to encounter.      Allergies: Patient has no known allergies.    History reviewed. No pertinent family history.  Social History     Tobacco Use    Smoking status: Never Smoker    Smokeless tobacco: Never Used   Substance Use Topics    Alcohol use: Yes    Drug use: Never     Review of Systems   Unable to perform ROS: Other   s/p exam immediately after anaesthesia . Pt is still sedated.  Objective:     Vitals:    Temp: 99 °F (37.2 °C)  Pulse: 88  Rhythm: normal sinus rhythm  BP: 92/62  MAP (mmHg): 71  Resp: 16  SpO2: 97 %  O2 Device (Oxygen Therapy): room air    Temp  Min: 97.1 °F (36.2 °C)  Max: 99.5 °F (37.5 °C)  Pulse  Min: 84  Max: 109  BP  Min: 92/62  Max: 130/74  MAP (mmHg)  Min: 67   Max: 97  Resp  Min: 15  Max: 27  SpO2  Min: 95 %  Max: 100 %    08/06 0701 - 08/07 0700  In: 4353.5 [I.V.:2853.5]  Out: 3380 [Urine:1730; Drains:150]   Unmeasured Output  Stool Occurrence: 0       Physical Exam  Vitals signs and nursing note reviewed.   Constitutional:       Appearance: He is normal weight.   HENT:      Head: Normocephalic and atraumatic.      Right Ear: External ear normal.      Left Ear: External ear normal.      Nose: Nose normal.      Mouth/Throat:      Mouth: Mucous membranes are dry.   Eyes:      General: No scleral icterus.     Extraocular Movements: Extraocular movements intact.      Conjunctiva/sclera: Conjunctivae normal.      Pupils: Pupils are equal, round, and reactive to light.   Neck:      Musculoskeletal: Normal range of motion and neck supple.   Cardiovascular:      Rate and Rhythm: Tachycardia present.      Pulses: Normal pulses.      Heart sounds: Normal heart sounds.   Pulmonary:      Effort: Pulmonary effort is normal. No respiratory distress.      Breath sounds: Normal breath sounds.   Abdominal:      General: Abdomen is flat. Bowel sounds are normal.      Palpations: Abdomen is soft.   Skin:     General: Skin is warm and dry.      Capillary Refill: Capillary refill takes less than 2 seconds.   Neurological:      Motor: Weakness present.      Comments: Pt is drowsy but able to wake up to voice  Oriented x4  Brain stem reflexes intact  PERRLA  EOMI  Cranial nerves examined and are grossly intact  RUE 3/5  RLE 3/5  LUE 5/5  LLE 5/5           Today I personally reviewed pertinent laboratory results, notably:post op H&H stable    CT lumbar spine 08/07/20  Impression:       Postop change T1 corpectomy with interbody cage and fusion T12-L2.  There is approximately 7 mm retropulsion of a fracture fragment superiorly at the L1 level which does create at least moderate canal narrowing.     sagittal fracture component extending into the L1 spinous process.  Nondisplaced fracture right  transverse process of L1.     Surgical drain and scattered soft tissue air in the left paraspinal region extending laterally presumably postoperative.     Spondylolysis left L5.  There is irregular linear lucency and hypertrophic new bone involving the right lamina of L5.     Probable left nephrolithiasis.     This report was flagged in Epic as abn         Assessment/Plan:     Neuro  * Traumatic compression fracture of L1 lumbar vertebra  45 y.o male with no significant PMHx was [place] [facility] [place] for L1 burst fracture with retropulsion into the spinal canal resulting in severe spinal stenosis s/p falling 10ft from roof. He will be admitted to the Neuro ICU s/p  L1 CORPECTOMY and TLIF T12-L2 Interbody Instrumentation.     -Admit to Neuro ICU  -Consult Neurosurgery s/p intervention  -Neuro checks q1hr   -Vital signs q1hr  -Daily labs  -Dilaudid 2 mg q3hrs prn  -Increased Gabapentin to 600 tid      Spinal stenosis of lumbar region  S/P L1 CORPECTOMY, SPINE, LUMBAR  (Left)  FUSION, SPINE, LUMBAR, TLIF T12-L2 Interbody Fusion with Instrumentation - Lateral (Left)   1 Day Post-Op    Renal/  Acute urinary retention  Victor placed, continue to monitor strict I's and O's    Orthopedic  Fall from building  See above          The patient is being Prophylaxed for:  Venous Thromboembolism with: Mechanical  Stress Ulcer with: Not Applicable   Ventilator Pneumonia with: not applicable    Activity Orders          Diet Adult Regular (IDDSI Level 7): Regular starting at 08/07 1146    Commode at bedside starting at 08/07 0746        Full Code    Dewey Eddy MD  Neurocritical Care  [facility]

## 2020-08-07 NOTE — SUBJECTIVE & OBJECTIVE
Interval History: 8/7: POD 1 s/p L1 corpectomy with cage insertion and lateral fusion. S/p 2u pRBC. ARELYON. AFVSS.     Medications:  Continuous Infusions:   sodium chloride 0.9% 75 mL/hr at 08/07/20 1000    norepinephrine bitartrate-D5W 0.02 mcg/kg/min (08/07/20 1000)     Scheduled Meds:   acetaminophen  650 mg Oral Q6H    ceFAZolin (ANCEF) IVPB  1 g Intravenous Q8H    dexamethasone  4 mg Intravenous Q6H    gabapentin  600 mg Oral TID    methocarbamoL  750 mg Oral TID    polyethylene glycol  17 g Oral Daily    senna-docusate 8.6-50 mg  2 tablet Oral Daily    traMADoL  100 mg Oral Q6H     PRN Meds:acetaminophen, dextrose 50%, dextrose 50%, diazePAM, glucagon (human recombinant), glucose, glucose, glucose, HYDROmorphone, ondansetron, sodium chloride 0.9%     Review of Systems  Objective:     Weight: 90.7 kg (200 lb)  Body mass index is 28.7 kg/m².  Vital Signs (Most Recent):  Temp: 98.7 °F (37.1 °C) (08/07/20 0705)  Pulse: 93 (08/07/20 1000)  Resp: 12 (08/07/20 1000)  BP: 111/77 (08/07/20 1000)  SpO2: 98 % (08/07/20 1000) Vital Signs (24h Range):  Temp:  [97.1 °F (36.2 °C)-99.5 °F (37.5 °C)] 98.7 °F (37.1 °C)  Pulse:  [] 93  Resp:  [12-27] 12  SpO2:  [95 %-100 %] 98 %  BP: ()/(51-80) 111/77  Arterial Line BP: (104-129)/(67-75) 115/71     Date 08/07/20 0700 - 08/08/20 0659   Shift 7537-4034 7260-0046 4703-7099 24 Hour Total   INTAKE   P.O. 100   100   I.V.(mL/kg) 327.2(3.6)   327.2(3.6)   Shift Total(mL/kg) 427.2(4.7)   427.2(4.7)   OUTPUT   Urine(mL/kg/hr) 140   140   Drains 40   40   Shift Total(mL/kg) 180(2)   180(2)   Weight (kg) 90.7 90.7 90.7 90.7                        Closed/Suction Drain 08/06/20 2337 Left Other (Comment) Bulb 10 Fr. (Active)   Site Description Unable to view 08/07/20 0300   Dressing Type Transparent (Tegaderm) 08/07/20 0300   Dressing Status Clean;Dry;Intact 08/07/20 0300   Dressing Intervention Integrity maintained 08/07/20 0300   Output (mL) 40 mL 08/07/20 0900           "  Urethral Catheter 08/05/20 1133 Straight-tip 14 Fr. (Active)   Site Assessment Clean;Intact 08/07/20 0300   Collection Container Urimeter 08/07/20 0300   Securement Method secured to top of thigh w/ adhesive device 08/07/20 0300   Catheter Care Performed yes 08/07/20 0300   Reason for Continuing Urinary Catheterization Urinary retention 08/07/20 0300   CAUTI Prevention Bundle StatLock in place w 1" slack;Intact seal between catheter & drainage tubing;Drainage bag/urimeter off the floor;Green sheeting clip in use;No dependent loops or kinks 08/07/20 0300   Output (mL) 35 mL 08/07/20 0900       Neurosurgery Physical Exam     GCS 15  AAO x 3, appears uncomfortable   CN grossly intact  PERRL  Upper Extremity:                                  Deltoids        Triceps        Biceps        WE        WF                Interosseous           R        3/5              3/5              3/5            3/5        3 /5         3/5                3/5           L        3/5              3/5              3/5            3/5         3/5         5/5                3/5  Lower Extremity:                                      HF        KE        KF        DF        PF        EHL           R       3/5        3/5        3/5        3/5        3/5       3/5           L       3/5        3/5        3/5        3/5        3/5        3/5            Significant Labs:  Recent Labs   Lab 08/05/20  1021 08/06/20 0519 08/07/20  0047   * 127* 135*    139 139   K 3.6 3.7 4.4    106 108   CO2 26 23 25   BUN 15 10 9   CREATININE 0.9 0.7 0.7   CALCIUM 8.9 8.2* 8.8   MG  --   --  1.7     Recent Labs   Lab 08/05/20  1021 08/06/20  0519  08/06/20  2222 08/06/20  2349 08/07/20  0047   WBC 6.17 9.78  --   --   --  18.92*   HGB 13.7* 12.4*  --   --   --  10.3*   HCT 39.5* 38.2*   < > 31* 31* 31.5*    219  --   --   --  162    < > = values in this interval not displayed.     Recent Labs   Lab 08/05/20  1345 08/06/20  0159 " 08/06/20  0519 08/07/20  0047   INR 0.9  --  0.9 1.0   APTT 23.5 25.7  --  27.3     Microbiology Results (last 7 days)     ** No results found for the last 168 hours. **        All pertinent labs from the last 24 hours have been reviewed.    Significant Diagnostics:  I have reviewed all pertinent imaging results/findings within the past 24 hours.   Ct Lumbar Spine Without Contrast    Result Date: 8/7/2020  Postop change T1 corpectomy with interbody cage and fusion T12-L2.  There is approximately 7 mm retropulsion of a fracture fragment superiorly at the L1 level which does create at least moderate canal narrowing. sagittal fracture component extending into the L1 spinous process.  Nondisplaced fracture right transverse process of L1. Surgical drain and scattered soft tissue air in the left paraspinal region extending laterally presumably postoperative. Spondylolysis left L5.  There is irregular linear lucency and hypertrophic new bone involving the right lamina of L5. Probable left nephrolithiasis. This report was flagged in Epic as abnormal. Electronically signed by: Leonid Clark MD Date:    08/07/2020 Time:    09:06    X-ray Chest Ap Portable    Result Date: 8/7/2020  Mild perihilar and basilar infiltrate/atelectasis, right greater than left as above. Electronically signed by: Yvon Day Date:    08/07/2020 Time:    04:15

## 2020-08-07 NOTE — BRIEF OP NOTE
[facility]  Brief Operative Note    SUMMARY     Surgery Date: 8/6/2020     Surgeon(s) and Role:     * Leonid Snider MD - Primary     * Oscar Goddard MD - Resident - Assisting        Pre-op Diagnosis:  Traumatic compression fracture of L1 lumbar vertebra [S32.010A]    Post-op Diagnosis:  Post-Op Diagnosis Codes:     * Traumatic compression fracture of L1 lumbar vertebra [S32.010A]    Procedure(s) (LRB):  L1 CORPECTOMY, SPINE, LUMBAR  (Left)  FUSION, SPINE, LUMBAR, TLIF T12-L2 Interbody Instrumentation - Lateral (Left)    Anesthesia: General    Description of Procedure: L1 corpectomy with lateral T12-L2 plate, interbody fusion    Description of the findings of the procedure: see full op report    Estimated Blood Loss: 1500 mL         Specimens:   Specimen (12h ago, onward)    None

## 2020-08-07 NOTE — TRANSFER OF CARE
"Anesthesia Transfer of Care Note    Patient: Oscar Bills    Procedure(s) Performed: Procedure(s) (LRB):  CORPECTOMY, SPINE, LUMBAR (Left)  FUSION, SPINE, LUMBAR, TLIF, POSTERIOR APPROACH, USING PEDICLE SCREW (N/A)    Patient location: ICU    Anesthesia Type: general    Transport from OR: Transported from OR on 6-10 L/min O2 by face mask with adequate spontaneous ventilation. Continuous ECG monitoring in transport. Continuous SpO2 monitoring in transport. Continuos invasive BP monitoring in transport    Post pain: adequate analgesia    Post assessment: no apparent anesthetic complications and tolerated procedure well    Post vital signs: stable    Level of consciousness: awake    Nausea/Vomiting: no nausea/vomiting    Complications: none    Transfer of care protocol was followed      Last vitals:   Visit Vitals  /67 (BP Location: Right arm, Patient Position: Lying)   Pulse 108   Temp 36.2 °C (97.1 °F) (Oral)   Resp 17   Ht 5' 10" (1.778 m)   Wt 90.7 kg (200 lb)   SpO2 100%   BMI 28.70 kg/m²     "

## 2020-08-07 NOTE — HOSPITAL COURSE
08/07/2020: Admit to Neuro ICU.  08/08/2020: Added long acting pain medications. Pain better controlled. Stable for stepdown.   08/09/2020: Acute pain management consulted. Ketamine gtt. Sindhu, BRITTNEET. Suppository.    08/10/2020: Appreciate pain management reccs. Plans for OR tomorrow.  08/11/2020 ABG, KUB/CXR, Mag citrate, cont ketamine  08/12/2020  Surgery rescheduled 8/181L bolus continue IVF ketamine /PCA. Follow Na dc suction, start clear liquid  08/13/2020 wean ketamine gtt, heparin  08/14/2020 for surgery today; continue pain mgmt gtt  08/15/2020 continued post-op pain, APS following, increased ketamine infusion. Advancing diet, advancing bowel regimen, trial of methylnaltrexone.   8/16/2020: add regular diet  8/17/2020: tolerating PO, decreased ketamine gtt, stopped PCA today.  8/18/2020: f/u neurosurgery canceled, stool improved to more solid  8/19/2020: abdominal pain improved, NGT pulled  8/20/2020: pain controlled with PO meds, tolerating PO diet with abd pain improved, no more diarrhea

## 2020-08-07 NOTE — ANESTHESIA PROCEDURE NOTES
Arterial    Diagnosis: Traumatic compression fracture of L1 lumbar vertebra (S32.010A)    Patient location during procedure: done in OR  Procedure start time: 8/6/2020 8:26 PM  Timeout: 8/6/2020 8:25 PM  Procedure end time: 8/6/2020 8:30 PM    Staffing  Authorizing Provider: Karla Nagy MD  Performing Provider: Karla Nagy MD    Anesthesiologist was present at the time of the procedure.    Preanesthetic Checklist  Completed: patient identified, site marked, surgical consent, pre-op evaluation, timeout performed, IV checked, risks and benefits discussed, monitors and equipment checked and anesthesia consent givenArterial  Skin Prep: chlorhexidine gluconate  Local Infiltration: none  Orientation: left  Location: radial  Catheter Size: 20 G  Catheter placement by Ultrasound guidance. Heme positive aspiration all ports.  Vessel Caliber: medium, patent, compressibility normal  Needle advanced into vessel with real time Ultrasound guidance.  Guidewire confirmed in vessel.  Sterile sheath used.Insertion Attempts: 3  Assessment  Dressing: secured with tape and tegaderm  Patient: Tolerated well

## 2020-08-07 NOTE — CONSULTS
Inpatient consult to Physical Medicine Rehab  Consult performed by: Almaz Abdullahi NP  Consult ordered by: Janeen Gan NP  Reason for consult: assess rehab needs        Reviewed patient history and current admission.  Rehab team following.  Full consult to follow.    MEENA Rodriguez, FNP-C  Physical Medicine & Rehabilitation   08/07/2020

## 2020-08-07 NOTE — HPI
Patient is a 44 y/o male with no significant PMH who was transferred from  after falling from 10ft roof at work. Patient denies LOC, complained of excrutiating low back/sacral pain exacerbated with any movement of legs. CT CAP significant for L1 burst fracture with retropulsion into the spinal canal resulting in severe spinal stenosis. Patient states he could feel he had to urinate but could not start a stream, kraus placed and he reports he felt the kraus inserted and felt his bladder release once the kraus was placed. He reports he felt the OSMIN and per report from ED physician, patient had full rectal tone. He is being admitted to Neuro ICU for a higher level of neurological care s/p L1 CORPECTOMY and TLIF T12-L2 Interbody Instrumentation.

## 2020-08-07 NOTE — ASSESSMENT & PLAN NOTE
S/P L1 CORPECTOMY, SPINE, LUMBAR  (Left)  FUSION, SPINE, LUMBAR, TLIF T12-L2 Interbody Fusion with Instrumentation - Lateral (Left)   1 Day Post-Op

## 2020-08-07 NOTE — CONSULTS
"  [facility]  Adult Nutrition  Consult Note    SUMMARY     Recommendations    1. Continue current Regular diet.   2. RD to monitor & follow-up.    Goals: Meet % EEN, EPN by RD f/u date  Nutrition Goal Status: new  Communication of RD Recs: reviewed with RN    Reason for Assessment    Reason For Assessment: consult  Diagnosis: other (see comments)(Compression fracture of L1 lumbar vertebra)  Interdisciplinary Rounds: did not attend    General Information Comments: Diet advanced per SLP recommendations. Pt tolerating diet, consumed 50% of lunch. Pt reports good appetite & stable wt PTA (UBW: 190#). S/p L1 CORPECTOMY and TLIF T12-L2 interbody instrumentation. NFPE not warranted, pt w/ no indicators of malnutrition.   Nutrition Discharge Planning: Adequate PO intake    Nutrition/Diet History    Patient Reported Diet/Restrictions/Preferences: general  Factors Affecting Nutritional Intake: None identified at this time    Anthropometrics    Temp: 97 °F (36.1 °C)  Height Method: Stated  Height: 5' 10" (177.8 cm)  Height (inches): 70 in  Weight Method: Bed Scale  Weight: 90.7 kg (199 lb 15.3 oz)  Weight (lb): 199.96 lb  Ideal Body Weight (IBW), Male: 166 lb  % Ideal Body Weight, Male (lb): 120.46 %  BMI (Calculated): 28.7  BMI Grade: 25 - 29.9 - overweight    Lab/Procedures/Meds    Pertinent Labs Reviewed: reviewed  Pertinent Medications Reviewed: reviewed  Pertinent Medications Comments: IVF, Levophed    Estimated/Assessed Needs    Weight Used For Calorie Calculations: 90.7 kg (199 lb 15.3 oz)     Energy Calorie Requirements (kcal): 2260 kcal/d  Energy Need Method: [place]-St Jeor(1.25 PAL)     Protein Requirements:  g/d (1-1.2 g/kg)  Weight Used For Protein Calculations: 90.7 kg (199 lb 15.3 oz)     Estimated Fluid Requirement Method: other (see comments)(Per MD or 1 mL/kcal)    Nutrition Prescription Ordered    Current Diet Order: Regular    Evaluation of Received Nutrient/Fluid " Intake    Comments: LBM: 8/4    Tolerance: tolerating    Nutrition Risk    Level of Risk/Frequency of Follow-up: (1x/week)     Assessment and Plan    No nutritional dx at this time.      Monitor and Evaluation    Food and Nutrient Intake: energy intake, food and beverage intake  Food and Nutrient Adminstration: diet order  Physical Activity and Function: nutrition-related ADLs and IADLs  Anthropometric Measurements: weight, weight change  Biochemical Data, Medical Tests and Procedures: lipid profile, inflammatory profile, glucose/endocrine profile, gastrointestinal profile, electrolyte and renal panel  Nutrition-Focused Physical Findings: overall appearance     Nutrition Follow-Up    RD Follow-up?: Yes

## 2020-08-07 NOTE — PROGRESS NOTES
Brief post op note    Patient transferred to neuro ICU post op, with 1.5L intraop blood loss and received 2 units of blood in the OR     Drowsy from anesthesia but able to move legs antigravity, similar to pre op    Plan:  -Chest x ray to make sure no pneumothorax  -ELEANOR drain to full suction  -HOB <30 at all times  -Bed rest until otherwise specified, ok to PT/OT activity in bed  -Dex 4q6 for 48 hours  -CBC and BMP on arrival    Oscar ENGLISH PGY5

## 2020-08-07 NOTE — ANESTHESIA PROCEDURE NOTES
Intubation  Performed by: Solo Bansal CRNA  Authorized by: Karla Nagy MD     Intubation:     Induction:  Intravenous    Intubated:  Postinduction    Mask Ventilation:  Easy mask    Attempted By:  CRNA    Method of Intubation:  Direct    Blade:  Hollis 2    Laryngeal View Grade: Grade I - full view of chords      Difficult Airway Encountered?: No      Complications:  None    Airway Device:  Oral endotracheal tube    Airway Device Size:  7.5    Style/Cuff Inflation:  Cuffed    Inflation Amount (mL):  5    Tube secured:  22    Secured at:  The lips    Placement Verified By:  Capnometry    Complicating Factors:  None    Findings Post-Intubation:  BS equal bilateral and atraumatic/condition of teeth unchanged

## 2020-08-07 NOTE — OP NOTE
DATE OF PROCEDURE: 08/06/2020     PREOPERATIVE DIAGNOSES:  1.   L1 burst fracture with retropulsion of the posterior wall and spinal cord compression     POSTOPERATIVE DIAGNOSES:  1.   L1 burst fracture with retropulsion of the posterior wall and spinal cord compression     PROCEDURES:  1. Left minimally invasive lateral retroperitoneal approach to the thoracolumbar spine  2. Partial resection of the 10th rib  3. Complete L1 anterior corpectomy for spinal cord decompression (95% of vertebral body removed) with T12-L1 and L1-2 full discectomy   4.  T12-L1 and L1-2 anterior interbody fusion with with placement of an expandable Globus corpectomy Fortify cage filled with autograft harvested from the same incision, morselized bone chips and DBX with BMP  5.  Anterior spinal instrumentation from T12-L2 using a separate Globus Truss plate  6. Intraoperative neuromonitoring  7. Fluoroscopy      PRIMARY SURGEON: Leonid Snider M.D.     ASSISTANT SURGEON: Oscar Goddard MD (RES)     INDICATIONS: Patient is a 44 y/o male with no significant PMH who was transferred from  after falling from 10ft roof at work. Patient denies LOC, complained of excrutiating low back/sacral pain exacerbated with any movement of legs. CT CAP significant for L1 burst fracture with retropulsion into the spinal canal resulting in severe spinal stenosis. Patient states he could feel he had to urinate but could not start a stream, kraus placed and he reports he felt the kraus inserted and felt his bladder release once the kraus was placed. He reports he felt the OSMIN and per report from ED physician, patient had full rectal tone. The patient declined repeat rectal exam. Denies saddle anesthesia. Endorses any movement of lower extremities causes excruciating pain in the sacram and lower back. Denies radicular pain into the legs. Reports slight numbness in left calf. Denies any prior medical conditions or daily medication.       DESCRIPTION OF THE  PROCEDURE: The patient was intubated under general    anesthesia. He was placed in lateral decubitus left side up on the slider table. All the pressure points were carefully padded. Fluoroscopic localization was then utilized to identify the T12-L2 level. An oblique incision measuring 5 cm  cm was then planned using fluoroscopy over the left 10th rib. The patient was prepped and draped in the typical sterile fashion. Incision was made with a #15 blade. The subcutaneous tissue was then dissected bluntly. Hemostasis was carried out with  the bipolar. The 6th rib was exposed and the muscle attachments on the ribs were dissected subperiosteally. About 3 cm of the 10 th rib was resected with Kerrisons and the bone was kept as autograft. We then dissected bluntly the retropleural space but this was difficult to dissect so we went transthoracic. Using fluoroscopy, we then localized the T12 through L1 and L1-2 disc spaces using a dilator system. We installed our tubular retractor system.  there was a significant phlegmon at covering the spine.  Using the Bovie we started to dissect the phlegmon and removed in a piecemeal fashion.  The L1 segmental artery was then bleeding and had to be cauterized.  We found the T12-L1 and L1-2 disc spaces.   We then incised the T12 through L1 and L1-2 disc anulus and performed diskectomy at those levels.  Then using osteotome we removed the L1 vertebral body.  95% of the L1 vertebral body was removed.  The T12 and L2 endplates were scraped off the remaining disc.  With the high-speed drill we achieved further decompression posteriorly until the fragment that was compressing the dural sac were removed.          We were able to insert between T12 and L2 a 16 mm core FORTIFY expandable cage with 22 by 45mm lateral endcaps under fluoroscopy. The cage was pre-filled with  the DBX  allograft and autograft and we placed the autograft with DBX around it and on the right and left side of the  corpectomy gap to complete the arthrodesis. We then thoroughly irrigated with saline and we completed the hemostasis.  We then placed a separate stab anterior plate fixated with 2 screws at the  T12 and 2 screws at L2.  The locking mechanism was engaged.  We left a Zia-Cisneros drain in the retroperitoneal space.       The drains were tunnelized posteriorly trough the skin and fixed to the skin with a 2.0 nylon suture. The retropleural space was closed by reaproximating the intercostal muscle and fascia with 0 Vicryl. The subcutaneous layer was closed with 2-0 Vicryl inverted suture. The skin was closed with staples and the wounds was dressed.      The patient was then transferred in the PACU under the care of the anesthesiologist.   The blood loss was about 1000 mL. The final count was completed and nothing was missing. There was no complication.

## 2020-08-08 LAB
ALBUMIN SERPL BCP-MCNC: 2.7 G/DL (ref 3.5–5.2)
ALLENS TEST: ABNORMAL
ALP SERPL-CCNC: 37 U/L (ref 55–135)
ALT SERPL W/O P-5'-P-CCNC: 9 U/L (ref 10–44)
ANION GAP SERPL CALC-SCNC: 6 MMOL/L (ref 8–16)
ANION GAP SERPL CALC-SCNC: 6 MMOL/L (ref 8–16)
APTT BLDCRRT: 26.2 SEC (ref 21–32)
AST SERPL-CCNC: 18 U/L (ref 10–40)
BASOPHILS # BLD AUTO: 0.01 K/UL (ref 0–0.2)
BASOPHILS # BLD AUTO: 0.01 K/UL (ref 0–0.2)
BASOPHILS NFR BLD: 0.1 % (ref 0–1.9)
BASOPHILS NFR BLD: 0.1 % (ref 0–1.9)
BILIRUB SERPL-MCNC: 0.5 MG/DL (ref 0.1–1)
BUN SERPL-MCNC: 14 MG/DL (ref 6–20)
BUN SERPL-MCNC: 14 MG/DL (ref 6–20)
CALCIUM SERPL-MCNC: 7.7 MG/DL (ref 8.7–10.5)
CALCIUM SERPL-MCNC: 7.7 MG/DL (ref 8.7–10.5)
CHLORIDE SERPL-SCNC: 103 MMOL/L (ref 95–110)
CHLORIDE SERPL-SCNC: 103 MMOL/L (ref 95–110)
CO2 SERPL-SCNC: 29 MMOL/L (ref 23–29)
CO2 SERPL-SCNC: 29 MMOL/L (ref 23–29)
CREAT SERPL-MCNC: 0.7 MG/DL (ref 0.5–1.4)
CREAT SERPL-MCNC: 0.7 MG/DL (ref 0.5–1.4)
DELSYS: ABNORMAL
DIFFERENTIAL METHOD: ABNORMAL
DIFFERENTIAL METHOD: ABNORMAL
EOSINOPHIL # BLD AUTO: 0 K/UL (ref 0–0.5)
EOSINOPHIL # BLD AUTO: 0 K/UL (ref 0–0.5)
EOSINOPHIL NFR BLD: 0.2 % (ref 0–8)
EOSINOPHIL NFR BLD: 0.2 % (ref 0–8)
ERYTHROCYTE [DISTWIDTH] IN BLOOD BY AUTOMATED COUNT: 12.8 % (ref 11.5–14.5)
ERYTHROCYTE [DISTWIDTH] IN BLOOD BY AUTOMATED COUNT: 12.8 % (ref 11.5–14.5)
EST. GFR  (AFRICAN AMERICAN): >60 ML/MIN/1.73 M^2
EST. GFR  (AFRICAN AMERICAN): >60 ML/MIN/1.73 M^2
EST. GFR  (NON AFRICAN AMERICAN): >60 ML/MIN/1.73 M^2
EST. GFR  (NON AFRICAN AMERICAN): >60 ML/MIN/1.73 M^2
GLUCOSE SERPL-MCNC: 130 MG/DL (ref 70–110)
GLUCOSE SERPL-MCNC: 130 MG/DL (ref 70–110)
HCO3 UR-SCNC: 29.6 MMOL/L (ref 24–28)
HCO3 UR-SCNC: 29.9 MMOL/L (ref 24–28)
HCO3 UR-SCNC: 30.6 MMOL/L (ref 24–28)
HCT VFR BLD AUTO: 25.2 % (ref 40–54)
HCT VFR BLD AUTO: 25.2 % (ref 40–54)
HGB BLD-MCNC: 8.3 G/DL (ref 14–18)
HGB BLD-MCNC: 8.3 G/DL (ref 14–18)
IMM GRANULOCYTES # BLD AUTO: 0.06 K/UL (ref 0–0.04)
IMM GRANULOCYTES # BLD AUTO: 0.06 K/UL (ref 0–0.04)
IMM GRANULOCYTES NFR BLD AUTO: 0.5 % (ref 0–0.5)
IMM GRANULOCYTES NFR BLD AUTO: 0.5 % (ref 0–0.5)
INR PPP: 0.9 (ref 0.8–1.2)
LYMPHOCYTES # BLD AUTO: 1.2 K/UL (ref 1–4.8)
LYMPHOCYTES # BLD AUTO: 1.2 K/UL (ref 1–4.8)
LYMPHOCYTES NFR BLD: 10.7 % (ref 18–48)
LYMPHOCYTES NFR BLD: 10.7 % (ref 18–48)
MAGNESIUM SERPL-MCNC: 1.5 MG/DL (ref 1.6–2.6)
MCH RBC QN AUTO: 30.9 PG (ref 27–31)
MCH RBC QN AUTO: 30.9 PG (ref 27–31)
MCHC RBC AUTO-ENTMCNC: 32.9 G/DL (ref 32–36)
MCHC RBC AUTO-ENTMCNC: 32.9 G/DL (ref 32–36)
MCV RBC AUTO: 94 FL (ref 82–98)
MCV RBC AUTO: 94 FL (ref 82–98)
MONOCYTES # BLD AUTO: 0.7 K/UL (ref 0.3–1)
MONOCYTES # BLD AUTO: 0.7 K/UL (ref 0.3–1)
MONOCYTES NFR BLD: 6.1 % (ref 4–15)
MONOCYTES NFR BLD: 6.1 % (ref 4–15)
NEUTROPHILS # BLD AUTO: 9.6 K/UL (ref 1.8–7.7)
NEUTROPHILS # BLD AUTO: 9.6 K/UL (ref 1.8–7.7)
NEUTROPHILS NFR BLD: 82.4 % (ref 38–73)
NEUTROPHILS NFR BLD: 82.4 % (ref 38–73)
NRBC BLD-RTO: 0 /100 WBC
NRBC BLD-RTO: 0 /100 WBC
PCO2 BLDA: 45.9 MMHG (ref 35–45)
PCO2 BLDA: 51 MMHG (ref 35–45)
PCO2 BLDA: 59.4 MMHG (ref 35–45)
PH SMN: 7.31 [PH] (ref 7.35–7.45)
PH SMN: 7.39 [PH] (ref 7.35–7.45)
PH SMN: 7.42 [PH] (ref 7.35–7.45)
PHOSPHATE SERPL-MCNC: 3.6 MG/DL (ref 2.7–4.5)
PLATELET # BLD AUTO: 171 K/UL (ref 150–350)
PLATELET # BLD AUTO: 171 K/UL (ref 150–350)
PMV BLD AUTO: 9.6 FL (ref 9.2–12.9)
PMV BLD AUTO: 9.6 FL (ref 9.2–12.9)
PO2 BLDA: 119 MMHG (ref 80–100)
PO2 BLDA: 225 MMHG (ref 80–100)
PO2 BLDA: 95 MMHG (ref 80–100)
POC BE: 3 MMOL/L
POC BE: 5 MMOL/L
POC BE: 6 MMOL/L
POC SATURATED O2: 100 % (ref 95–100)
POC SATURATED O2: 97 % (ref 95–100)
POC SATURATED O2: 99 % (ref 95–100)
POC TCO2: 31 MMOL/L (ref 23–27)
POC TCO2: 31 MMOL/L (ref 23–27)
POC TCO2: 32 MMOL/L (ref 23–27)
POTASSIUM SERPL-SCNC: 3.6 MMOL/L (ref 3.5–5.1)
POTASSIUM SERPL-SCNC: 3.6 MMOL/L (ref 3.5–5.1)
PROT SERPL-MCNC: 5.3 G/DL (ref 6–8.4)
PROTHROMBIN TIME: 10.2 SEC (ref 9–12.5)
RBC # BLD AUTO: 2.69 M/UL (ref 4.6–6.2)
RBC # BLD AUTO: 2.69 M/UL (ref 4.6–6.2)
SAMPLE: ABNORMAL
SITE: ABNORMAL
SODIUM SERPL-SCNC: 138 MMOL/L (ref 136–145)
SODIUM SERPL-SCNC: 138 MMOL/L (ref 136–145)
WBC # BLD AUTO: 11.62 K/UL (ref 3.9–12.7)
WBC # BLD AUTO: 11.62 K/UL (ref 3.9–12.7)

## 2020-08-08 PROCEDURE — 63600175 PHARM REV CODE 636 W HCPCS: Performed by: NURSE PRACTITIONER

## 2020-08-08 PROCEDURE — 20000000 HC ICU ROOM

## 2020-08-08 PROCEDURE — 99900035 HC TECH TIME PER 15 MIN (STAT)

## 2020-08-08 PROCEDURE — 25000003 PHARM REV CODE 250: Performed by: NEUROLOGICAL SURGERY

## 2020-08-08 PROCEDURE — 63600175 PHARM REV CODE 636 W HCPCS: Performed by: STUDENT IN AN ORGANIZED HEALTH CARE EDUCATION/TRAINING PROGRAM

## 2020-08-08 PROCEDURE — 83735 ASSAY OF MAGNESIUM: CPT

## 2020-08-08 PROCEDURE — 36600 WITHDRAWAL OF ARTERIAL BLOOD: CPT

## 2020-08-08 PROCEDURE — 84100 ASSAY OF PHOSPHORUS: CPT

## 2020-08-08 PROCEDURE — 99233 PR SUBSEQUENT HOSPITAL CARE,LEVL III: ICD-10-PCS | Mod: ,,, | Performed by: NURSE PRACTITIONER

## 2020-08-08 PROCEDURE — 25000003 PHARM REV CODE 250

## 2020-08-08 PROCEDURE — 94761 N-INVAS EAR/PLS OXIMETRY MLT: CPT

## 2020-08-08 PROCEDURE — 25000003 PHARM REV CODE 250: Performed by: PHYSICIAN ASSISTANT

## 2020-08-08 PROCEDURE — 82803 BLOOD GASES ANY COMBINATION: CPT

## 2020-08-08 PROCEDURE — 85025 COMPLETE CBC W/AUTO DIFF WBC: CPT

## 2020-08-08 PROCEDURE — 25000003 PHARM REV CODE 250: Performed by: STUDENT IN AN ORGANIZED HEALTH CARE EDUCATION/TRAINING PROGRAM

## 2020-08-08 PROCEDURE — 25000003 PHARM REV CODE 250: Performed by: PSYCHIATRY & NEUROLOGY

## 2020-08-08 PROCEDURE — 25000003 PHARM REV CODE 250: Performed by: NURSE PRACTITIONER

## 2020-08-08 PROCEDURE — 63600175 PHARM REV CODE 636 W HCPCS

## 2020-08-08 PROCEDURE — 99233 SBSQ HOSP IP/OBS HIGH 50: CPT | Mod: ,,, | Performed by: NURSE PRACTITIONER

## 2020-08-08 PROCEDURE — 85730 THROMBOPLASTIN TIME PARTIAL: CPT

## 2020-08-08 PROCEDURE — 85610 PROTHROMBIN TIME: CPT

## 2020-08-08 PROCEDURE — 80053 COMPREHEN METABOLIC PANEL: CPT

## 2020-08-08 RX ORDER — METOPROLOL TARTRATE 1 MG/ML
5 INJECTION, SOLUTION INTRAVENOUS ONCE
Status: COMPLETED | OUTPATIENT
Start: 2020-08-08 | End: 2020-08-08

## 2020-08-08 RX ORDER — ONDANSETRON 2 MG/ML
8 INJECTION INTRAMUSCULAR; INTRAVENOUS EVERY 8 HOURS PRN
Status: DISCONTINUED | OUTPATIENT
Start: 2020-08-08 | End: 2020-08-21 | Stop reason: HOSPADM

## 2020-08-08 RX ORDER — GABAPENTIN 250 MG/5ML
800 SOLUTION ORAL EVERY 8 HOURS
Status: DISCONTINUED | OUTPATIENT
Start: 2020-08-08 | End: 2020-08-09

## 2020-08-08 RX ORDER — NALOXONE HCL 0.4 MG/ML
VIAL (ML) INJECTION
Status: COMPLETED
Start: 2020-08-08 | End: 2020-08-08

## 2020-08-08 RX ORDER — GABAPENTIN 400 MG/1
800 CAPSULE ORAL 3 TIMES DAILY
Status: DISCONTINUED | OUTPATIENT
Start: 2020-08-08 | End: 2020-08-08

## 2020-08-08 RX ORDER — METOPROLOL TARTRATE 1 MG/ML
INJECTION, SOLUTION INTRAVENOUS
Status: COMPLETED
Start: 2020-08-08 | End: 2020-08-08

## 2020-08-08 RX ORDER — METOPROLOL TARTRATE 1 MG/ML
INJECTION, SOLUTION INTRAVENOUS
Status: DISPENSED
Start: 2020-08-08 | End: 2020-08-09

## 2020-08-08 RX ORDER — NALOXONE HCL 0.4 MG/ML
0.4 VIAL (ML) INJECTION ONCE
Status: COMPLETED | OUTPATIENT
Start: 2020-08-08 | End: 2020-08-08

## 2020-08-08 RX ORDER — ENOXAPARIN SODIUM 100 MG/ML
40 INJECTION SUBCUTANEOUS EVERY 24 HOURS
Status: DISCONTINUED | OUTPATIENT
Start: 2020-08-08 | End: 2020-08-11

## 2020-08-08 RX ORDER — METHOCARBAMOL 750 MG/1
750 TABLET, FILM COATED ORAL 3 TIMES DAILY
Status: DISCONTINUED | OUTPATIENT
Start: 2020-08-08 | End: 2020-08-09

## 2020-08-08 RX ORDER — POLYETHYLENE GLYCOL 3350 17 G/17G
17 POWDER, FOR SOLUTION ORAL DAILY
Status: DISCONTINUED | OUTPATIENT
Start: 2020-08-09 | End: 2020-08-12

## 2020-08-08 RX ORDER — TRAMADOL HYDROCHLORIDE 50 MG/1
100 TABLET ORAL EVERY 6 HOURS
Status: DISCONTINUED | OUTPATIENT
Start: 2020-08-08 | End: 2020-08-09

## 2020-08-08 RX ORDER — ACETAMINOPHEN 325 MG/1
650 TABLET ORAL EVERY 6 HOURS
Status: DISCONTINUED | OUTPATIENT
Start: 2020-08-08 | End: 2020-08-09

## 2020-08-08 RX ORDER — OXYCODONE HCL 10 MG/1
10 TABLET, FILM COATED, EXTENDED RELEASE ORAL EVERY 12 HOURS
Status: DISCONTINUED | OUTPATIENT
Start: 2020-08-08 | End: 2020-08-09

## 2020-08-08 RX ORDER — AMOXICILLIN 250 MG
2 CAPSULE ORAL DAILY
Status: DISCONTINUED | OUTPATIENT
Start: 2020-08-09 | End: 2020-08-21 | Stop reason: HOSPADM

## 2020-08-08 RX ADMIN — ACETAMINOPHEN 650 MG: 325 TABLET ORAL at 11:08

## 2020-08-08 RX ADMIN — CEFAZOLIN 1 G: 1 INJECTION, POWDER, FOR SOLUTION INTRAMUSCULAR; INTRAVENOUS at 05:08

## 2020-08-08 RX ADMIN — HYDROMORPHONE HYDROCHLORIDE 2 MG: 2 INJECTION INTRAMUSCULAR; INTRAVENOUS; SUBCUTANEOUS at 11:08

## 2020-08-08 RX ADMIN — PROMETHAZINE HYDROCHLORIDE 12.5 MG: 25 INJECTION INTRAMUSCULAR; INTRAVENOUS at 11:08

## 2020-08-08 RX ADMIN — METHOCARBAMOL TABLETS 750 MG: 750 TABLET, COATED ORAL at 08:08

## 2020-08-08 RX ADMIN — ACETAMINOPHEN 650 MG: 325 TABLET ORAL at 06:08

## 2020-08-08 RX ADMIN — DOCUSATE SODIUM 50MG AND SENNOSIDES 8.6MG 2 TABLET: 8.6; 5 TABLET, FILM COATED ORAL at 08:08

## 2020-08-08 RX ADMIN — NALOXONE HYDROCHLORIDE 0.4 MG: 0.4 INJECTION, SOLUTION INTRAMUSCULAR; INTRAVENOUS; SUBCUTANEOUS at 01:08

## 2020-08-08 RX ADMIN — ENOXAPARIN SODIUM 40 MG: 100 INJECTION SUBCUTANEOUS at 05:08

## 2020-08-08 RX ADMIN — HYDROMORPHONE HYDROCHLORIDE 2 MG: 2 INJECTION INTRAMUSCULAR; INTRAVENOUS; SUBCUTANEOUS at 02:08

## 2020-08-08 RX ADMIN — TRAMADOL HYDROCHLORIDE 100 MG: 50 TABLET, FILM COATED ORAL at 05:08

## 2020-08-08 RX ADMIN — SODIUM CHLORIDE 500 ML: 0.9 INJECTION, SOLUTION INTRAVENOUS at 12:08

## 2020-08-08 RX ADMIN — METOPROLOL TARTRATE 5 MG: 1 INJECTION, SOLUTION INTRAVENOUS at 12:08

## 2020-08-08 RX ADMIN — ACETAMINOPHEN 650 MG: 325 TABLET ORAL at 05:08

## 2020-08-08 RX ADMIN — GABAPENTIN 600 MG: 300 CAPSULE ORAL at 08:08

## 2020-08-08 RX ADMIN — SODIUM CHLORIDE 500 ML: 0.9 INJECTION, SOLUTION INTRAVENOUS at 11:08

## 2020-08-08 RX ADMIN — GABAPENTIN 800 MG: 250 SOLUTION ORAL at 09:08

## 2020-08-08 RX ADMIN — HYDROMORPHONE HYDROCHLORIDE 2 MG: 2 INJECTION INTRAMUSCULAR; INTRAVENOUS; SUBCUTANEOUS at 05:08

## 2020-08-08 RX ADMIN — ONDANSETRON 8 MG: 2 INJECTION INTRAMUSCULAR; INTRAVENOUS at 11:08

## 2020-08-08 RX ADMIN — CEFAZOLIN 1 G: 1 INJECTION, POWDER, FOR SOLUTION INTRAMUSCULAR; INTRAVENOUS at 01:08

## 2020-08-08 RX ADMIN — Medication 0.4 MG: at 01:08

## 2020-08-08 RX ADMIN — ONDANSETRON 4 MG: 2 INJECTION INTRAMUSCULAR; INTRAVENOUS at 09:08

## 2020-08-08 RX ADMIN — HYDROMORPHONE HYDROCHLORIDE 2 MG: 2 INJECTION INTRAMUSCULAR; INTRAVENOUS; SUBCUTANEOUS at 08:08

## 2020-08-08 RX ADMIN — POLYETHYLENE GLYCOL 3350 17 G: 17 POWDER, FOR SOLUTION ORAL at 08:08

## 2020-08-08 RX ADMIN — DIAZEPAM 5 MG: 5 TABLET ORAL at 07:08

## 2020-08-08 RX ADMIN — SODIUM CHLORIDE: 0.9 INJECTION, SOLUTION INTRAVENOUS at 04:08

## 2020-08-08 RX ADMIN — CEFAZOLIN 1 G: 1 INJECTION, POWDER, FOR SOLUTION INTRAMUSCULAR; INTRAVENOUS at 08:08

## 2020-08-08 RX ADMIN — OXYCODONE HYDROCHLORIDE 10 MG: 10 TABLET, FILM COATED, EXTENDED RELEASE ORAL at 10:08

## 2020-08-08 RX ADMIN — METOPROLOL TARTRATE 5 MG: 5 INJECTION INTRAVENOUS at 12:08

## 2020-08-08 NOTE — PT/OT/SLP PROGRESS
Physical Therapy      Patient Name:  Oscar Bills   MRN:  [id number]    Patient not seen today secondary to Other (Comment)(clarified with Neurosurgery Dr. Christina, patient on bedrest, unstable spinal fracture, patient to return to OR 8/11 for fusion). Per MD, discharge PT/OT orders at this time. Please re-consult therapy when patient is appropriate for out of bed mobility.     Leonie Quach, PT

## 2020-08-08 NOTE — PROGRESS NOTES
Janeen Gan, NP notified that pt's HR in 120's-130's with a sinus rhythm, BP and o2sat unchanged and WNL.  Pt reports feeling very nauseous despite receiving zofran 8 mg at 1103.  Verbal order for 500 cc bolus of NS now.  Awaiting prn phenergan IV from pharmacy.    1225--Pt reports relief of nausea, but HR remains in 130's.  Verbal order for second 500 cc bolus NS now and metoprolol 5 mg IV now.    1230--Pt's HR low 100's

## 2020-08-08 NOTE — ASSESSMENT & PLAN NOTE
44 y/o male with no significant PMH with L1 burst fracture with retropulsion resulting in severe canal stenosis s/p fall from [employer] building     S/p L1 corpectomy with cage insertion and T12-L2 lateral fusion. Posterior instrumentation deferred for 5 days due to increasing amounts of intraoperative bleeding.     --admit to icu  --All labs and diagnostics reviewed.  --Continue bed rest  --Continue drain w/abx while in place  --d/c levo, dex d/liborio yesterday.   --MRI Lsp: L1-L3 EDH. Consistent severe canal stenosis and foraminal narrowing at L1 and adjacent.   --CT Lsp 8/7: postop, instrumentation appears in adequate position. There is a small amount of residual bone in the anterior canal.   --Bedrest/Log roll, HOB <30  --Pain control  --ADAT  --close neurological monitoring, notify neurosurgery with any acute changes  --Plan for OR on Tuesday, 8/11, for posterior instrumentation.     Dispo: ok for TTF to NSGY

## 2020-08-08 NOTE — PROGRESS NOTES
[facility]  Neurosurgery  Progress Note    Subjective:     History of Present Illness: Patient is a 46 y/o male with no significant PMH who was transferred from  after falling from 10ft roof at work. Patient denies LOC, complained of excrutiating low back/sacral pain exacerbated with any movement of legs. CT CAP significant for L1 burst fracture with retropulsion into the spinal canal resulting in severe spinal stenosis. Patient states he could feel he had to urinate but could not start a stream, kraus placed and he reports he felt the kraus inserted and felt his bladder release once the kraus was placed. He reports he felt the OSMIN and per report from ED physician, patient had full rectal tone. The patient declined repeat rectal exam. Denies saddle anesthesia. Endorses any movement of lower extremities causes excruciating pain in the sacram and lower back. Denies radicular pain into the legs. Reports slight numbness in left calf. Denies any prior medical conditions or daily medication.     Post-Op Info:  Procedure(s) (LRB):  L1 CORPECTOMY, SPINE, LUMBAR  (Left)  FUSION, SPINE, LUMBAR, TLIF T12-L2 Interbody Fusion with Instrumentation - Lateral (Left)   2 Days Post-Op     Interval History: 8/8: POD 2. NAEON. AFVSS. H&H stable. Continues to complain of back pain. On levo, though MAPS when off in 80s.     Medications:  Continuous Infusions:   sodium chloride 0.9% 75 mL/hr at 08/08/20 0405    norepinephrine bitartrate-D5W 0.02 mcg/kg/min (08/08/20 0205)     Scheduled Meds:   acetaminophen  650 mg Oral Q6H    ceFAZolin (ANCEF) IVPB  1 g Intravenous Q8H    gabapentin  600 mg Oral TID    methocarbamoL  750 mg Oral TID    polyethylene glycol  17 g Oral Daily    senna-docusate 8.6-50 mg  2 tablet Oral Daily    traMADoL  100 mg Oral Q6H     PRN Meds:acetaminophen, dextrose 50%, dextrose 50%, diazePAM, glucagon (human recombinant), glucose, glucose, glucose, HYDROmorphone, ondansetron, sodium  "chloride 0.9%     Review of Systems  Objective:     Weight: 90.7 kg (199 lb 15.3 oz)  Body mass index is 28.69 kg/m².  Vital Signs (Most Recent):  Temp: 97.7 °F (36.5 °C) (08/08/20 0305)  Pulse: 102 (08/08/20 0305)  Resp: 20 (08/08/20 0519)  BP: 124/77 (08/08/20 0305)  SpO2: 100 % (08/08/20 0305) Vital Signs (24h Range):  Temp:  [97 °F (36.1 °C)-98.7 °F (37.1 °C)] 97.7 °F (36.5 °C)  Pulse:  [] 102  Resp:  [10-24] 20  SpO2:  [93 %-100 %] 100 %  BP: ()/(60-86) 124/77  Arterial Line BP: (104-131)/(66-80) 117/75                Resp Rate Total:  [10 br/min-18 br/min] 18 br/min         Closed/Suction Drain 08/06/20 2337 Left Other (Comment) Bulb 10 Fr. (Active)   Site Description Healing 08/08/20 0305   Dressing Type Transparent (Tegaderm) 08/08/20 0305   Dressing Status Old drainage;Clean;Dry;Intact 08/08/20 0305   Dressing Intervention Integrity maintained 08/08/20 0305   Drainage Serosanguineous 08/08/20 0305   Status To bulb suction 08/08/20 0305   Output (mL) 30 mL 08/07/20 1800            Urethral Catheter 08/05/20 1133 Straight-tip 14 Fr. (Active)   Site Assessment Clean;Intact 08/08/20 0305   Collection Container Urimeter 08/08/20 0305   Securement Method secured to top of thigh w/ adhesive device 08/08/20 0305   Catheter Care Performed yes 08/08/20 0305   Reason for Continuing Urinary Catheterization Post operative 08/08/20 0305   CAUTI Prevention Bundle StatLock in place w 1" slack;Intact seal between catheter & drainage tubing;Drainage bag/urimeter off the floor;Green sheeting clip in use;No dependent loops or kinks;Drainage bag/urimeter not overfilled (<2/3 full);Drainage bag/urimeter below bladder 08/08/20 0305   Output (mL) 66 mL 08/08/20 0305       Neurosurgery Physical Exam    GCS 15  AAO x 3  CN grossly intact  PERRL  Upper Extremity:                                  Deltoids        Triceps        Biceps        WE        WF                Interosseous           R        3/5              " 3/5              3/5            3/5        3 /5         3/5                3/5           L        3/5              3/5              3/5            3/5         3/5         5/5                3/5  Lower Extremity:                                      HF        KE        KF        DF        PF        EHL           R       3/5        3/5        3/5        3/5        3/5       3/5           L       3/5        3/5        3/5        3/5        3/5        3/5    Weakness most likely pain limited.        Significant Labs:  Recent Labs   Lab 08/07/20 0047 08/08/20  0125   * 130*  130*    138  138   K 4.4 3.6  3.6    103  103   CO2 25 29  29   BUN 9 14  14   CREATININE 0.7 0.7  0.7   CALCIUM 8.8 7.7*  7.7*   MG 1.7 1.5*     Recent Labs   Lab 08/06/20  2349 08/07/20 0047 08/08/20  0125   WBC  --  18.92* 11.62  11.62   HGB  --  10.3* 8.3*  8.3*   HCT 31* 31.5* 25.2*  25.2*   PLT  --  162 171  171     Recent Labs   Lab 08/07/20 0047 08/08/20  0125   INR 1.0 0.9   APTT 27.3 26.2       Significant Diagnostics:  Ct Lumbar Spine Without Contrast    Result Date: 8/7/2020  Postop change T1 corpectomy with interbody cage and fusion T12-L2.  There is approximately 7 mm retropulsion of a fracture fragment superiorly at the L1 level which does create at least moderate canal narrowing. sagittal fracture component extending into the L1 spinous process.  Nondisplaced fracture right transverse process of L1. Surgical drain and scattered soft tissue air in the left paraspinal region extending laterally presumably postoperative. Spondylolysis left L5.  There is irregular linear lucency and hypertrophic new bone involving the right lamina of L5. Probable left nephrolithiasis. This report was flagged in Epic as abnormal. Electronically signed by: Leonid Clark MD Date:    08/07/2020 Time:    09:06      Assessment/Plan:     * Traumatic compression fracture of L1 lumbar vertebra  46 y/o male with no significant PMH  with L1 burst fracture with retropulsion resulting in severe canal stenosis s/p fall from 10 building     S/p L1 corpectomy with cage insertion and T12-L2 lateral fusion. Posterior instrumentation deferred for 5 days due to increasing amounts of intraoperative bleeding.     --admit to icu  --All labs and diagnostics reviewed.  --Continue bed rest  --Continue drain w/abx while in place  --d/c levo, dex d/liborio yesterday.   --MRI Lsp: L1-L3 EDH. Consistent severe canal stenosis and foraminal narrowing at L1 and adjacent.   --CT Lsp 8/7: postop, instrumentation appears in adequate position. There is a small amount of residual bone in the anterior canal.   --Bedrest/Log roll, HOB <30  --Pain control  --ADAT  --close neurological monitoring, notify neurosurgery with any acute changes  --Plan for OR on Tuesday, 8/11, for posterior instrumentation.     Dispo: ok for TTF to AMADOR Levy MD  Neurosurgery  [facility]

## 2020-08-08 NOTE — PROGRESS NOTES
[facility]  Neurocritical Care  Progress Note    Admit Date: 8/5/2020  Service Date: 08/08/2020  Length of Stay: 3    Subjective:     Chief Complaint: Traumatic compression fracture of L1 lumbar vertebra    History of Present Illness: Patient is a 46 y/o male with no significant PMH who was transferred from  after falling from 10ft roof at work. Patient denies LOC, complained of excrutiating low back/sacral pain exacerbated with any movement of legs. CT CAP significant for L1 burst fracture with retropulsion into the spinal canal resulting in severe spinal stenosis. Patient states he could feel he had to urinate but could not start a stream, kraus placed and he reports he felt the kraus inserted and felt his bladder release once the kraus was placed. He reports he felt the OSMIN and per report from ED physician, patient had full rectal tone. He is being admitted to Neuro ICU for a higher level of neurological care s/p L1 CORPECTOMY and TLIF T12-L2 Interbody Instrumentation.    Hospital Course: 08/07/2020: Admit to Neuro ICU.  08/08/2020: Added long acting pain medications. Pain better controlled. Stable for stepdown.       Interval History: Pain control issues. Added long acting narcotics for better pain control. Increased antiemetic.  Decreased output in ELEANOR drain. Started dvt prophylaxis today. Pt stable for stepdown.     Review of Systems   Constitutional: Negative for fever.   HENT: Negative for trouble swallowing and voice change.    Eyes: Negative for visual disturbance.   Respiratory: Negative for shortness of breath and wheezing.    Cardiovascular: Negative for palpitations.   Gastrointestinal: Negative for abdominal distention.   Endocrine: Negative for polyuria.   Genitourinary: Negative for difficulty urinating.   Musculoskeletal: Positive for back pain.   Neurological: Positive for weakness. Negative for dizziness and headaches.   Psychiatric/Behavioral: Negative for agitation.      Objective:     Vitals:  Temp: 97.8 °F (36.6 °C)  Pulse: (!) 118  Rhythm: sinus tachycardia  BP: (!) 132/91  MAP (mmHg): 106  Resp: 11  SpO2: 100 %  O2 Device (Oxygen Therapy): nasal cannula    Temp  Min: 97 °F (36.1 °C)  Max: 98.3 °F (36.8 °C)  Pulse  Min: 82  Max: 118  BP  Min: 98/69  Max: 140/79  MAP (mmHg)  Min: 79  Max: 106  Resp  Min: 10  Max: 24  SpO2  Min: 93 %  Max: 100 %    08/07 0701 - 08/08 0700  In: 2273.7 [P.O.:300; I.V.:1973.7]  Out: 1741 [Urine:1561; Drains:180]   Unmeasured Output  Stool Occurrence: 0       Physical Exam  Vitals signs and nursing note reviewed.   Constitutional:       Appearance: Normal appearance. He is normal weight.   HENT:      Head: Normocephalic and atraumatic.      Right Ear: External ear normal.      Left Ear: External ear normal.      Nose: Nose normal. No congestion.   Eyes:      General: No scleral icterus.     Extraocular Movements: Extraocular movements intact.      Conjunctiva/sclera: Conjunctivae normal.      Pupils: Pupils are equal, round, and reactive to light.   Neck:      Musculoskeletal: No neck rigidity.   Cardiovascular:      Rate and Rhythm: Normal rate and regular rhythm.      Pulses: Normal pulses.      Heart sounds: Normal heart sounds.   Pulmonary:      Effort: Pulmonary effort is normal. No respiratory distress.      Breath sounds: Normal breath sounds.   Abdominal:      General: Abdomen is flat.   Musculoskeletal: Normal range of motion.   Skin:     General: Skin is warm.      Capillary Refill: Capillary refill takes less than 2 seconds.   Neurological:      Mental Status: He is alert and oriented to person, place, and time.      GCS: GCS eye subscore is 4. GCS verbal subscore is 5. GCS motor subscore is 6.      Motor: Weakness present.      Comments: Awake, alert, and oriented to x4  PERRLA  EOMI  JONES spontaneously   Weakness in RLE  Cranial nerves examined and are grossly intact             Medications:  Continuoussodium chloride 0.9%, Last Rate:  75 mL/hr at 08/08/20 1000  norepinephrine bitartrate-D5W, Last Rate: Stopped (08/08/20 0615)    Scheduledacetaminophen, 650 mg, Q6H  ceFAZolin (ANCEF) IVPB, 1 g, Q8H  enoxaparin, 40 mg, Q24H  gabapentin, 800 mg, TID  methocarbamoL, 750 mg, TID  oxyCODONE, 10 mg, Q12H  polyethylene glycol, 17 g, Daily  senna-docusate 8.6-50 mg, 2 tablet, Daily  traMADoL, 100 mg, Q6H    PRNacetaminophen, 650 mg, Q4H PRN  dextrose 50%, 12.5 g, PRN  dextrose 50%, 25 g, PRN  diazePAM, 5 mg, Q6H PRN  glucagon (human recombinant), 1 mg, PRN  glucose, 16 g, PRN  glucose, 16 g, PRN  glucose, 24 g, PRN  HYDROmorphone, 2 mg, Q3H PRN  ondansetron, 4 mg, Q8H PRN  sodium chloride 0.9%, 10 mL, PRN          Diet  Diet Adult Regular (IDDSI Level 7)  Diet Adult Regular (IDDSI Level 7)        Assessment/Plan:     Neuro  * Traumatic compression fracture of L1 lumbar vertebra  45 y.o male with no significant PMHx was transferred from  to [facility] [place] CAP significant for L1 burst fracture with retropulsion into the spinal canal resulting in severe spinal stenosis s/p falling 10ft from roof. He will be admitted to the Neuro ICU s/p  L1 CORPECTOMY and TLIF T12-L2 Interbody Instrumentation.     -Admit to Neuro ICU  -Consult Neurosurgery s/p intervention  -Neuro checks q1hr   -Vital signs q1hr  -Daily labs  -Dilaudid 2 mg q3hrs prn  -Increased Gabapentin to 600 tid    8/8  -Added oxycodone  -Increased prn Zofran       Spinal stenosis of lumbar region  S/P L1 CORPECTOMY, SPINE, LUMBAR  (Left)  FUSION, SPINE, LUMBAR, TLIF T12-L2 Interbody Fusion with Instrumentation - Lateral (Left)   2 Day Post-Op    Renal/  Acute urinary retention  Victor placed, continue to monitor strict I's and O's    Orthopedic  Fall from building  See above          The patient is being Prophylaxed for:  Venous Thromboembolism with: Chemical  Stress Ulcer with: Not Applicable   Ventilator Pneumonia with: not applicable    Activity Orders          Diet Adult Regular (IDDSI Level 7):  Regular starting at 08/07 1146    Commode at bedside starting at 08/07 0746        Full Code  Level III  Janeen Gan NP  Neurocritical Care  [facility]

## 2020-08-08 NOTE — PLAN OF CARE
POC reviewed with pt and pt's friend, Charleen, at 1000. Pt verbalized understanding. Questions and concerns addressed. Pt given narcan x 1 today, see progress notes.  KUB revealed ileus, NG tube placed to low intermittent suction.  Pt to remain in ICU overnight.  Will continue to monitor. See flowsheets for full assessment and VS info.

## 2020-08-08 NOTE — SUBJECTIVE & OBJECTIVE
Interval History: 8/8: POD 2. SAIMA. AFVSS. H&H stable. Continues to complain of back pain. On levo, though MAPS when off in 80s.     Medications:  Continuous Infusions:   sodium chloride 0.9% 75 mL/hr at 08/08/20 0405    norepinephrine bitartrate-D5W 0.02 mcg/kg/min (08/08/20 0205)     Scheduled Meds:   acetaminophen  650 mg Oral Q6H    ceFAZolin (ANCEF) IVPB  1 g Intravenous Q8H    gabapentin  600 mg Oral TID    methocarbamoL  750 mg Oral TID    polyethylene glycol  17 g Oral Daily    senna-docusate 8.6-50 mg  2 tablet Oral Daily    traMADoL  100 mg Oral Q6H     PRN Meds:acetaminophen, dextrose 50%, dextrose 50%, diazePAM, glucagon (human recombinant), glucose, glucose, glucose, HYDROmorphone, ondansetron, sodium chloride 0.9%     Review of Systems  Objective:     Weight: 90.7 kg (199 lb 15.3 oz)  Body mass index is 28.69 kg/m².  Vital Signs (Most Recent):  Temp: 97.7 °F (36.5 °C) (08/08/20 0305)  Pulse: 102 (08/08/20 0305)  Resp: 20 (08/08/20 0519)  BP: 124/77 (08/08/20 0305)  SpO2: 100 % (08/08/20 0305) Vital Signs (24h Range):  Temp:  [97 °F (36.1 °C)-98.7 °F (37.1 °C)] 97.7 °F (36.5 °C)  Pulse:  [] 102  Resp:  [10-24] 20  SpO2:  [93 %-100 %] 100 %  BP: ()/(60-86) 124/77  Arterial Line BP: (104-131)/(66-80) 117/75                Resp Rate Total:  [10 br/min-18 br/min] 18 br/min         Closed/Suction Drain 08/06/20 2337 Left Other (Comment) Bulb 10 Fr. (Active)   Site Description Healing 08/08/20 0305   Dressing Type Transparent (Tegaderm) 08/08/20 0305   Dressing Status Old drainage;Clean;Dry;Intact 08/08/20 0305   Dressing Intervention Integrity maintained 08/08/20 0305   Drainage Serosanguineous 08/08/20 0305   Status To bulb suction 08/08/20 0305   Output (mL) 30 mL 08/07/20 1800            Urethral Catheter 08/05/20 1133 Straight-tip 14 Fr. (Active)   Site Assessment Clean;Intact 08/08/20 0305   Collection Container Urimeter 08/08/20 0305   Securement Method secured to top of thigh w/  "adhesive device 08/08/20 0305   Catheter Care Performed yes 08/08/20 0305   Reason for Continuing Urinary Catheterization Post operative 08/08/20 0305   CAUTI Prevention Bundle StatLock in place w 1" slack;Intact seal between catheter & drainage tubing;Drainage bag/urimeter off the floor;Green sheeting clip in use;No dependent loops or kinks;Drainage bag/urimeter not overfilled (<2/3 full);Drainage bag/urimeter below bladder 08/08/20 0305   Output (mL) 66 mL 08/08/20 0305       Neurosurgery Physical Exam    GCS 15  AAO x 3  CN grossly intact  PERRL  Upper Extremity:                                  Deltoids        Triceps        Biceps        WE        WF                Interosseous           R        3/5              3/5              3/5            3/5        3 /5         3/5                3/5           L        3/5              3/5              3/5            3/5         3/5 5/5                3/5  Lower Extremity:                                      HF        KE        KF        DF        PF        EHL           R       3/5        3/5        3/5        3/5        3/5       3/5           L       3/5        3/5        3/5        3/5        3/5        3/5    Weakness most likely pain limited.        Significant Labs:  Recent Labs   Lab 08/07/20 0047 08/08/20 0125   * 130*  130*    138  138   K 4.4 3.6  3.6    103  103   CO2 25 29  29   BUN 9 14  14   CREATININE 0.7 0.7  0.7   CALCIUM 8.8 7.7*  7.7*   MG 1.7 1.5*     Recent Labs   Lab 08/06/20  2349 08/07/20 0047 08/08/20 0125   WBC  --  18.92* 11.62  11.62   HGB  --  10.3* 8.3*  8.3*   HCT 31* 31.5* 25.2*  25.2*   PLT  --  162 171  171     Recent Labs   Lab 08/07/20 0047 08/08/20 0125   INR 1.0 0.9   APTT 27.3 26.2       Significant Diagnostics:  Ct Lumbar Spine Without Contrast    Result Date: 8/7/2020  Postop change T1 corpectomy with interbody cage and fusion T12-L2.  There is approximately 7 mm retropulsion of a " fracture fragment superiorly at the L1 level which does create at least moderate canal narrowing. sagittal fracture component extending into the L1 spinous process.  Nondisplaced fracture right transverse process of L1. Surgical drain and scattered soft tissue air in the left paraspinal region extending laterally presumably postoperative. Spondylolysis left L5.  There is irregular linear lucency and hypertrophic new bone involving the right lamina of L5. Probable left nephrolithiasis. This report was flagged in Epic as abnormal. Electronically signed by: Leonid Clark MD Date:    08/07/2020 Time:    09:06

## 2020-08-08 NOTE — PROGRESS NOTES
Janeen Gan, NP notified pt suddenly desat to 77% while on NC and has become very drowsy but though arousable, NRB applied.  Pt O2sat now 94% on NRB.  NP at bedside to assess, verbal orders for stat CXR and ABG.    1303--Verbal order for narcan 0.4 mg now.    1308--Pt now awake and alert, 5L NC maintaining O2sat %.  Will wean as tolerated.    Will continue to monitor very closely.

## 2020-08-08 NOTE — ASSESSMENT & PLAN NOTE
45 y.o male with no significant PMHx was transferred from  to [facility] [place] with CT CAP significant for L1 burst fracture with retropulsion into the spinal canal resulting in severe spinal stenosis s/p falling 10ft from roof. He will be admitted to the Neuro ICU s/p  L1 CORPECTOMY and TLIF T12-L2 Interbody Instrumentation.     -Admit to Neuro ICU  -Consult Neurosurgery s/p intervention  -Neuro checks q1hr   -Vital signs q1hr  -Daily labs  -Dilaudid 2 mg q3hrs prn  -Increased Gabapentin to 600 tid    8/8  -Added oxycodone  -Increased prn Zofran

## 2020-08-08 NOTE — PLAN OF CARE
POC reviewed with Oscar at 0600. Pt verbalized understanding. Questions and concerns addressed. No acute events overnight. Pt reported constant pain at lower back; PRN Hydromorphone given every 3' plus scheduled analgesics. Pt progressing toward goals. Will continue to monitor. See flowsheets for full assessment and VS info

## 2020-08-08 NOTE — SUBJECTIVE & OBJECTIVE
Interval History: Pain control issues. Added long acting narcotics for better pain control. Increased antiemetic.  Decreased output in ELEANOR drain. Started dvt prophylaxis today. Pt stable for stepdown.     Review of Systems   Constitutional: Negative for fever.   HENT: Negative for trouble swallowing and voice change.    Eyes: Negative for visual disturbance.   Respiratory: Negative for shortness of breath and wheezing.    Cardiovascular: Negative for palpitations.   Gastrointestinal: Negative for abdominal distention.   Endocrine: Negative for polyuria.   Genitourinary: Negative for difficulty urinating.   Musculoskeletal: Positive for back pain.   Neurological: Positive for weakness. Negative for dizziness and headaches.   Psychiatric/Behavioral: Negative for agitation.     Objective:     Vitals:  Temp: 97.8 °F (36.6 °C)  Pulse: (!) 118  Rhythm: sinus tachycardia  BP: (!) 132/91  MAP (mmHg): 106  Resp: 11  SpO2: 100 %  O2 Device (Oxygen Therapy): nasal cannula    Temp  Min: 97 °F (36.1 °C)  Max: 98.3 °F (36.8 °C)  Pulse  Min: 82  Max: 118  BP  Min: 98/69  Max: 140/79  MAP (mmHg)  Min: 79  Max: 106  Resp  Min: 10  Max: 24  SpO2  Min: 93 %  Max: 100 %    08/07 0701 - 08/08 0700  In: 2273.7 [P.O.:300; I.V.:1973.7]  Out: 1741 [Urine:1561; Drains:180]   Unmeasured Output  Stool Occurrence: 0       Physical Exam  Vitals signs and nursing note reviewed.   Constitutional:       Appearance: Normal appearance. He is normal weight.   HENT:      Head: Normocephalic and atraumatic.      Right Ear: External ear normal.      Left Ear: External ear normal.      Nose: Nose normal. No congestion.   Eyes:      General: No scleral icterus.     Extraocular Movements: Extraocular movements intact.      Conjunctiva/sclera: Conjunctivae normal.      Pupils: Pupils are equal, round, and reactive to light.   Neck:      Musculoskeletal: No neck rigidity.   Cardiovascular:      Rate and Rhythm: Normal rate and regular rhythm.      Pulses:  Normal pulses.      Heart sounds: Normal heart sounds.   Pulmonary:      Effort: Pulmonary effort is normal. No respiratory distress.      Breath sounds: Normal breath sounds.   Abdominal:      General: Abdomen is flat.   Musculoskeletal: Normal range of motion.   Skin:     General: Skin is warm.      Capillary Refill: Capillary refill takes less than 2 seconds.   Neurological:      Mental Status: He is alert and oriented to person, place, and time.      GCS: GCS eye subscore is 4. GCS verbal subscore is 5. GCS motor subscore is 6.      Motor: Weakness present.      Comments: Awake, alert, and oriented to x4  PERRLA  EOMI  JONES spontaneously   Weakness in RLE  Cranial nerves examined and are grossly intact             Medications:  Continuoussodium chloride 0.9%, Last Rate: 75 mL/hr at 08/08/20 1000  norepinephrine bitartrate-D5W, Last Rate: Stopped (08/08/20 0615)    Scheduledacetaminophen, 650 mg, Q6H  ceFAZolin (ANCEF) IVPB, 1 g, Q8H  enoxaparin, 40 mg, Q24H  gabapentin, 800 mg, TID  methocarbamoL, 750 mg, TID  oxyCODONE, 10 mg, Q12H  polyethylene glycol, 17 g, Daily  senna-docusate 8.6-50 mg, 2 tablet, Daily  traMADoL, 100 mg, Q6H    PRNacetaminophen, 650 mg, Q4H PRN  dextrose 50%, 12.5 g, PRN  dextrose 50%, 25 g, PRN  diazePAM, 5 mg, Q6H PRN  glucagon (human recombinant), 1 mg, PRN  glucose, 16 g, PRN  glucose, 16 g, PRN  glucose, 24 g, PRN  HYDROmorphone, 2 mg, Q3H PRN  ondansetron, 4 mg, Q8H PRN  sodium chloride 0.9%, 10 mL, PRN          Diet  Diet Adult Regular (IDDSI Level 7)  Diet Adult Regular (IDDSI Level 7)

## 2020-08-08 NOTE — ASSESSMENT & PLAN NOTE
S/P L1 CORPECTOMY, SPINE, LUMBAR  (Left)  FUSION, SPINE, LUMBAR, TLIF T12-L2 Interbody Fusion with Instrumentation - Lateral (Left)   2 Day Post-Op

## 2020-08-09 ENCOUNTER — ANESTHESIA EVENT (OUTPATIENT)
Dept: NEUROLOGY | Facility: HOSPITAL | Age: 45
DRG: 453 | End: 2020-08-09
Payer: MEDICAID

## 2020-08-09 ENCOUNTER — ANESTHESIA (OUTPATIENT)
Dept: NEUROLOGY | Facility: HOSPITAL | Age: 45
DRG: 453 | End: 2020-08-09
Payer: MEDICAID

## 2020-08-09 LAB
ALBUMIN SERPL BCP-MCNC: 2.5 G/DL (ref 3.5–5.2)
ALP SERPL-CCNC: 40 U/L (ref 55–135)
ALT SERPL W/O P-5'-P-CCNC: 9 U/L (ref 10–44)
ANION GAP SERPL CALC-SCNC: 9 MMOL/L (ref 8–16)
APTT BLDCRRT: 25.5 SEC (ref 21–32)
AST SERPL-CCNC: 23 U/L (ref 10–40)
BASOPHILS # BLD AUTO: 0.01 K/UL (ref 0–0.2)
BASOPHILS NFR BLD: 0.1 % (ref 0–1.9)
BILIRUB SERPL-MCNC: 0.5 MG/DL (ref 0.1–1)
BUN SERPL-MCNC: 13 MG/DL (ref 6–20)
CALCIUM SERPL-MCNC: 7.5 MG/DL (ref 8.7–10.5)
CHLORIDE SERPL-SCNC: 102 MMOL/L (ref 95–110)
CO2 SERPL-SCNC: 27 MMOL/L (ref 23–29)
CREAT SERPL-MCNC: 0.7 MG/DL (ref 0.5–1.4)
DIFFERENTIAL METHOD: ABNORMAL
EOSINOPHIL # BLD AUTO: 0.1 K/UL (ref 0–0.5)
EOSINOPHIL NFR BLD: 0.6 % (ref 0–8)
ERYTHROCYTE [DISTWIDTH] IN BLOOD BY AUTOMATED COUNT: 12.7 % (ref 11.5–14.5)
EST. GFR  (AFRICAN AMERICAN): >60 ML/MIN/1.73 M^2
EST. GFR  (NON AFRICAN AMERICAN): >60 ML/MIN/1.73 M^2
GLUCOSE SERPL-MCNC: 102 MG/DL (ref 70–110)
HCT VFR BLD AUTO: 23.8 % (ref 40–54)
HGB BLD-MCNC: 7.9 G/DL (ref 14–18)
IMM GRANULOCYTES # BLD AUTO: 0.05 K/UL (ref 0–0.04)
IMM GRANULOCYTES NFR BLD AUTO: 0.6 % (ref 0–0.5)
INR PPP: 0.9 (ref 0.8–1.2)
LYMPHOCYTES # BLD AUTO: 0.9 K/UL (ref 1–4.8)
LYMPHOCYTES NFR BLD: 11 % (ref 18–48)
MAGNESIUM SERPL-MCNC: 1.5 MG/DL (ref 1.6–2.6)
MCH RBC QN AUTO: 31.7 PG (ref 27–31)
MCHC RBC AUTO-ENTMCNC: 33.2 G/DL (ref 32–36)
MCV RBC AUTO: 96 FL (ref 82–98)
MONOCYTES # BLD AUTO: 0.6 K/UL (ref 0.3–1)
MONOCYTES NFR BLD: 7.3 % (ref 4–15)
NEUTROPHILS # BLD AUTO: 6.6 K/UL (ref 1.8–7.7)
NEUTROPHILS NFR BLD: 80.4 % (ref 38–73)
NRBC BLD-RTO: 0 /100 WBC
PHOSPHATE SERPL-MCNC: 3.1 MG/DL (ref 2.7–4.5)
PLATELET # BLD AUTO: 168 K/UL (ref 150–350)
PMV BLD AUTO: 9.4 FL (ref 9.2–12.9)
POTASSIUM SERPL-SCNC: 3.4 MMOL/L (ref 3.5–5.1)
POTASSIUM SERPL-SCNC: 3.7 MMOL/L (ref 3.5–5.1)
PROT SERPL-MCNC: 5.2 G/DL (ref 6–8.4)
PROTHROMBIN TIME: 10.3 SEC (ref 9–12.5)
RBC # BLD AUTO: 2.49 M/UL (ref 4.6–6.2)
SODIUM SERPL-SCNC: 138 MMOL/L (ref 136–145)
WBC # BLD AUTO: 8.24 K/UL (ref 3.9–12.7)

## 2020-08-09 PROCEDURE — 63600175 PHARM REV CODE 636 W HCPCS: Performed by: STUDENT IN AN ORGANIZED HEALTH CARE EDUCATION/TRAINING PROGRAM

## 2020-08-09 PROCEDURE — 94761 N-INVAS EAR/PLS OXIMETRY MLT: CPT

## 2020-08-09 PROCEDURE — 25000003 PHARM REV CODE 250: Performed by: STUDENT IN AN ORGANIZED HEALTH CARE EDUCATION/TRAINING PROGRAM

## 2020-08-09 PROCEDURE — 63600175 PHARM REV CODE 636 W HCPCS: Performed by: PSYCHIATRY & NEUROLOGY

## 2020-08-09 PROCEDURE — 85730 THROMBOPLASTIN TIME PARTIAL: CPT

## 2020-08-09 PROCEDURE — 63600175 PHARM REV CODE 636 W HCPCS: Performed by: NURSE PRACTITIONER

## 2020-08-09 PROCEDURE — 84100 ASSAY OF PHOSPHORUS: CPT

## 2020-08-09 PROCEDURE — 83735 ASSAY OF MAGNESIUM: CPT

## 2020-08-09 PROCEDURE — 20000000 HC ICU ROOM

## 2020-08-09 PROCEDURE — 84132 ASSAY OF SERUM POTASSIUM: CPT

## 2020-08-09 PROCEDURE — 25000003 PHARM REV CODE 250: Performed by: UROLOGY

## 2020-08-09 PROCEDURE — 63600175 PHARM REV CODE 636 W HCPCS: Performed by: UROLOGY

## 2020-08-09 PROCEDURE — 99291 PR CRITICAL CARE, E/M 30-74 MINUTES: ICD-10-PCS | Mod: ,,, | Performed by: NURSE PRACTITIONER

## 2020-08-09 PROCEDURE — 99291 CRITICAL CARE FIRST HOUR: CPT | Mod: ,,, | Performed by: NURSE PRACTITIONER

## 2020-08-09 PROCEDURE — 25000003 PHARM REV CODE 250: Performed by: PSYCHIATRY & NEUROLOGY

## 2020-08-09 PROCEDURE — 63600175 PHARM REV CODE 636 W HCPCS: Performed by: ANESTHESIOLOGY

## 2020-08-09 PROCEDURE — 25000003 PHARM REV CODE 250: Performed by: PHYSICIAN ASSISTANT

## 2020-08-09 PROCEDURE — 85610 PROTHROMBIN TIME: CPT

## 2020-08-09 PROCEDURE — 80053 COMPREHEN METABOLIC PANEL: CPT

## 2020-08-09 PROCEDURE — 85025 COMPLETE CBC W/AUTO DIFF WBC: CPT

## 2020-08-09 RX ORDER — HYDROMORPHONE HCL IN 0.9% NACL 6 MG/30 ML
PATIENT CONTROLLED ANALGESIA SYRINGE INTRAVENOUS CONTINUOUS
Status: DISCONTINUED | OUTPATIENT
Start: 2020-08-09 | End: 2020-08-16

## 2020-08-09 RX ORDER — ACETAMINOPHEN 10 MG/ML
1000 INJECTION, SOLUTION INTRAVENOUS EVERY 6 HOURS
Status: COMPLETED | OUTPATIENT
Start: 2020-08-09 | End: 2020-08-10

## 2020-08-09 RX ORDER — MAGNESIUM SULFATE HEPTAHYDRATE 40 MG/ML
2 INJECTION, SOLUTION INTRAVENOUS
Status: DISCONTINUED | OUTPATIENT
Start: 2020-08-09 | End: 2020-08-20

## 2020-08-09 RX ORDER — POTASSIUM CHLORIDE 7.45 MG/ML
40 INJECTION INTRAVENOUS
Status: DISCONTINUED | OUTPATIENT
Start: 2020-08-09 | End: 2020-08-20

## 2020-08-09 RX ORDER — POTASSIUM CHLORIDE 7.45 MG/ML
60 INJECTION INTRAVENOUS
Status: DISCONTINUED | OUTPATIENT
Start: 2020-08-09 | End: 2020-08-20

## 2020-08-09 RX ORDER — DIAZEPAM 10 MG/2ML
5 INJECTION INTRAMUSCULAR EVERY 6 HOURS
Status: DISCONTINUED | OUTPATIENT
Start: 2020-08-09 | End: 2020-08-10

## 2020-08-09 RX ORDER — ACETAMINOPHEN 650 MG/1
650 SUPPOSITORY RECTAL EVERY 6 HOURS
Status: DISCONTINUED | OUTPATIENT
Start: 2020-08-09 | End: 2020-08-09

## 2020-08-09 RX ORDER — BISACODYL 10 MG
10 SUPPOSITORY, RECTAL RECTAL DAILY
Status: DISCONTINUED | OUTPATIENT
Start: 2020-08-10 | End: 2020-08-12

## 2020-08-09 RX ORDER — HYDROMORPHONE HYDROCHLORIDE 2 MG/ML
2 INJECTION, SOLUTION INTRAMUSCULAR; INTRAVENOUS; SUBCUTANEOUS EVERY 4 HOURS PRN
Status: DISCONTINUED | OUTPATIENT
Start: 2020-08-09 | End: 2020-08-09

## 2020-08-09 RX ORDER — POTASSIUM CHLORIDE 7.45 MG/ML
80 INJECTION INTRAVENOUS
Status: DISCONTINUED | OUTPATIENT
Start: 2020-08-09 | End: 2020-08-20

## 2020-08-09 RX ORDER — PREGABALIN 75 MG/1
75 CAPSULE ORAL 2 TIMES DAILY
Status: DISCONTINUED | OUTPATIENT
Start: 2020-08-09 | End: 2020-08-09

## 2020-08-09 RX ORDER — MAGNESIUM SULFATE HEPTAHYDRATE 40 MG/ML
4 INJECTION, SOLUTION INTRAVENOUS
Status: DISCONTINUED | OUTPATIENT
Start: 2020-08-09 | End: 2020-08-20

## 2020-08-09 RX ORDER — METHOCARBAMOL 750 MG/1
750 TABLET, FILM COATED ORAL 4 TIMES DAILY
Status: DISCONTINUED | OUTPATIENT
Start: 2020-08-09 | End: 2020-08-09

## 2020-08-09 RX ORDER — ACETAMINOPHEN 650 MG/1
650 SUPPOSITORY RECTAL EVERY 6 HOURS PRN
Status: DISCONTINUED | OUTPATIENT
Start: 2020-08-09 | End: 2020-08-09

## 2020-08-09 RX ORDER — BISACODYL 10 MG
10 SUPPOSITORY, RECTAL RECTAL ONCE
Status: COMPLETED | OUTPATIENT
Start: 2020-08-09 | End: 2020-08-09

## 2020-08-09 RX ORDER — DIAZEPAM 5 MG/ML
5 INJECTION, SOLUTION INTRAMUSCULAR; INTRAVENOUS EVERY 6 HOURS
Status: DISCONTINUED | OUTPATIENT
Start: 2020-08-09 | End: 2020-08-09

## 2020-08-09 RX ORDER — NALOXONE HCL 0.4 MG/ML
0.02 VIAL (ML) INJECTION
Status: DISCONTINUED | OUTPATIENT
Start: 2020-08-09 | End: 2020-08-21 | Stop reason: HOSPADM

## 2020-08-09 RX ADMIN — ENOXAPARIN SODIUM 40 MG: 100 INJECTION SUBCUTANEOUS at 05:08

## 2020-08-09 RX ADMIN — ONDANSETRON 8 MG: 2 INJECTION INTRAMUSCULAR; INTRAVENOUS at 01:08

## 2020-08-09 RX ADMIN — MAGNESIUM SULFATE IN WATER 2 G: 40 INJECTION, SOLUTION INTRAVENOUS at 04:08

## 2020-08-09 RX ADMIN — POTASSIUM CHLORIDE 60 MEQ: 7.46 INJECTION, SOLUTION INTRAVENOUS at 04:08

## 2020-08-09 RX ADMIN — METHOCARBAMOL 1000 MG: 100 INJECTION INTRAMUSCULAR; INTRAVENOUS at 05:08

## 2020-08-09 RX ADMIN — DIAZEPAM 5 MG: 5 INJECTION, SOLUTION INTRAMUSCULAR; INTRAVENOUS at 11:08

## 2020-08-09 RX ADMIN — DIAZEPAM 5 MG: 5 INJECTION, SOLUTION INTRAMUSCULAR; INTRAVENOUS at 02:08

## 2020-08-09 RX ADMIN — KETAMINE HYDROCHLORIDE 1.5 MCG/KG/MIN: 100 INJECTION INTRAMUSCULAR; INTRAVENOUS at 12:08

## 2020-08-09 RX ADMIN — POTASSIUM CHLORIDE 40 MEQ: 7.46 INJECTION, SOLUTION INTRAVENOUS at 05:08

## 2020-08-09 RX ADMIN — SODIUM CHLORIDE: 0.9 INJECTION, SOLUTION INTRAVENOUS at 09:08

## 2020-08-09 RX ADMIN — ACETAMINOPHEN 1000 MG: 10 INJECTION, SOLUTION INTRAVENOUS at 06:08

## 2020-08-09 RX ADMIN — HYDROMORPHONE HYDROCHLORIDE 2 MG: 2 INJECTION INTRAMUSCULAR; INTRAVENOUS; SUBCUTANEOUS at 03:08

## 2020-08-09 RX ADMIN — DOCUSATE SODIUM 50MG AND SENNOSIDES 8.6MG 2 TABLET: 8.6; 5 TABLET, FILM COATED ORAL at 08:08

## 2020-08-09 RX ADMIN — CEFAZOLIN 1 G: 1 INJECTION, POWDER, FOR SOLUTION INTRAMUSCULAR; INTRAVENOUS at 01:08

## 2020-08-09 RX ADMIN — POLYETHYLENE GLYCOL 3350 17 G: 17 POWDER, FOR SOLUTION ORAL at 08:08

## 2020-08-09 RX ADMIN — METHOCARBAMOL TABLETS 750 MG: 750 TABLET, COATED ORAL at 07:08

## 2020-08-09 RX ADMIN — CEFAZOLIN 1 G: 1 INJECTION, POWDER, FOR SOLUTION INTRAMUSCULAR; INTRAVENOUS at 08:08

## 2020-08-09 RX ADMIN — ACETAMINOPHEN 1000 MG: 10 INJECTION, SOLUTION INTRAVENOUS at 11:08

## 2020-08-09 RX ADMIN — DIAZEPAM 5 MG: 5 INJECTION, SOLUTION INTRAMUSCULAR; INTRAVENOUS at 05:08

## 2020-08-09 RX ADMIN — GABAPENTIN 800 MG: 250 SOLUTION ORAL at 05:08

## 2020-08-09 RX ADMIN — METHOCARBAMOL 1000 MG: 100 INJECTION INTRAMUSCULAR; INTRAVENOUS at 12:08

## 2020-08-09 RX ADMIN — CEFAZOLIN 1 G: 1 INJECTION, POWDER, FOR SOLUTION INTRAMUSCULAR; INTRAVENOUS at 05:08

## 2020-08-09 RX ADMIN — HYDROMORPHONE HYDROCHLORIDE 2 MG: 2 INJECTION INTRAMUSCULAR; INTRAVENOUS; SUBCUTANEOUS at 06:08

## 2020-08-09 RX ADMIN — BISACODYL 10 MG: 10 SUPPOSITORY RECTAL at 07:08

## 2020-08-09 RX ADMIN — Medication: at 10:08

## 2020-08-09 NOTE — PT/OT/SLP DISCHARGE
Occupational Therapy Discharge Summary    Oscar Bills  MRN: [id number]   Principal Problem: Traumatic compression fracture of L1 lumbar vertebra      Patient Discharged from acute Occupational Therapy 8/9.   Per PT convo with Neurosurgery Dr. Christina:   patient on bedrest, unstable spinal fracture, patient to return to OR 8/11 for fusion.   Per MD, discharge PT/OT orders at this time.     Please re-consult therapy when patient is appropriate for out of bed mobility.     Nuzhat Katz, OT  8/9/2020

## 2020-08-09 NOTE — ANESTHESIA POST-OP PAIN MANAGEMENT
Acute Pain Service Progress Note    Oscar Bills is a 45 y.o., male, [id number]. With no PMHx. Admitted for L1 burst fracture s/p mechanical fall from roof. OR 8/7 for L1 corpectomy w/cage insertion, T12-L2 lateral fusion, deferred posterior instrumentation at the time due to intraop bleeding, plan for take back 8/11.     Post op course complicated by inadequate analgesia and ileus. Profound respiratory depression with hypercarbia 8/8, required narcan. APS consulted for possible thoracic epidural.     Surgery:  S/p L1 corpectomy with cage insertion and T12-L2 lateral fusion on 8/7    Post Op Day #: 2    Problem List:    There are no hospital problems to display for this patient.      Subjective:     General appearance of alert, oriented, no complaints   Pain with rest: 6    Numbers   Pain with movement: 9    Numbers     Objective:        Vitals   There were no vitals filed for this visit.     Labs    No results displayed because visit has over 200 results.           Meds   No current facility-administered medications for this visit.      No current outpatient medications on file.     Facility-Administered Medications Ordered in Other Visits   Medication Dose Route Frequency Provider Last Rate Last Dose    0.9%  NaCl infusion   Intravenous Continuous Itzel Jimenez PA-C 75 mL/hr at 08/09/20 0900      acetaminophen suppository 1,002.5 mg  1,002.5 mg Rectal Q6H Wesly Orozco MD        [START ON 8/10/2020] bisacodyL suppository 10 mg  10 mg Rectal Daily Mir Hou MD        calcium gluconate 1g in dextrose 5% 100mL (ready to mix system)  1 g Intravenous PRN Jai Douglas MD        calcium gluconate 2 g in dextrose 5 % 100 mL IVPB  2 g Intravenous PRN Jai Douglas MD        calcium gluconate 3 g in dextrose 5 % 100 mL IVPB  3 g Intravenous PRN Jai Douglas MD        ceFAZolin injection 1 g  1 g Intravenous Q8H Oscar Goddard MD   1 g  at 08/09/20 0814    dextrose 50% injection 12.5 g  12.5 g Intravenous PRN Itzel Jimenez PA-C        dextrose 50% injection 25 g  25 g Intravenous PRN Itzel Jimenez PA-C        enoxaparin injection 40 mg  40 mg Subcutaneous Q24H Janeen Gan, NP   40 mg at 08/08/20 1713    gabapentin 250 mg/5 mL (5 mL) solution 800 mg  800 mg Per NG tube Q8H Mir Hou MD   800 mg at 08/09/20 0538    glucagon (human recombinant) injection 1 mg  1 mg Intramuscular PRN Itzel Jimenez PA-C        glucose chewable tablet 16 g  16 g Oral PRN Itzel Jimenez PA-C        glucose chewable tablet 16 g  16 g Oral PRN Itzel Jimenez PA-C        glucose chewable tablet 24 g  24 g Oral PRN Itzel Jimenez PA-C        hydromorphone (PF) injection 2 mg  2 mg Subcutaneous Q4H PRN Camilo Levy MD        magnesium sulfate 2g in water 50mL IVPB (premix)  2 g Intravenous PRN Jai Douglas MD   2 g at 08/09/20 0400    magnesium sulfate 2g in water 50mL IVPB (premix)  4 g Intravenous PRN Jai Douglas MD        methocarbamoL (ROBAXIN) 500 mg in dextrose 5 % 100 mL IVPB  500 mg Intravenous Q8H Camilo Levy MD        ondansetron injection 8 mg  8 mg Intravenous Q8H PRN Janeen Gan, NP   8 mg at 08/09/20 0120    polyethylene glycol packet 17 g  17 g Per NG tube Daily Mir Hou MD   17 g at 08/09/20 0814    potassium chloride 10 mEq in 100 mL IVPB  40 mEq Intravenous PRN Jai Douglas MD        And    potassium chloride 10 mEq in 100 mL IVPB  60 mEq Intravenous PRN Jai Douglas  mL/hr at 08/09/20 0400 60 mEq at 08/09/20 0400    And    potassium chloride 10 mEq in 100 mL IVPB  80 mEq Intravenous PRN Jai Douglas MD        senna-docusate 8.6-50 mg per tablet 2 tablet  2 tablet Per NG tube Daily Mir Hou MD   2 tablet at 08/09/20 0814    sodium chloride 0.9% flush 10 mL  10 mL  Intravenous PRN Janeen Gan, NP        sodium phosphate 15 mmol in dextrose 5 % 250 mL IVPB  15 mmol Intravenous PRN Jai Douglas MD        sodium phosphate 20.01 mmol in dextrose 5 % 250 mL IVPB  20.01 mmol Intravenous PRN Jai Douglas MD        sodium phosphate 30 mmol in dextrose 5 % 250 mL IVPB  30 mmol Intravenous PRN Jai Douglas MD            Anticoagulant dose 40mg lovenox at 1700 8/8    Assessment:     Pain control inadequate. Suspect pain largely 2/2 ileus and abdominal distension.     Plan:   -Acetaminophen 1000mg q6h, will order IV due to post-op ileus   -Robaxin 1000mg IV q6h    -Dilaudid PCA    -Ketamine infusion 1.5mcg/kg/min    -Valium 5mg IV q6h scheduled    -D/c gabapentin   -Given recent spine surgery, not recommending any neuraxial or perineural block options.     Plan discussed with NCC team at bedside       Evaluator Wesly Orozco

## 2020-08-09 NOTE — PROGRESS NOTES
[facility]  Neurocritical Care  Progress Note    Admit Date: 8/5/2020  Service Date: 08/09/2020  Length of Stay: 4    Subjective:     Chief Complaint: Traumatic compression fracture of L1 lumbar vertebra    History of Present Illness: Patient is a 44 y/o male with no significant PMH who was transferred from  after falling from 10ft roof at work. Patient denies LOC, complained of excrutiating low back/sacral pain exacerbated with any movement of legs. CT CAP significant for L1 burst fracture with retropulsion into the spinal canal resulting in severe spinal stenosis. Patient states he could feel he had to urinate but could not start a stream, kraus placed and he reports he felt the kraus inserted and felt his bladder release once the kraus was placed. He reports he felt the OSMIN and per report from ED physician, patient had full rectal tone. He is being admitted to Neuro ICU for a higher level of neurological care s/p L1 CORPECTOMY and TLIF T12-L2 Interbody Instrumentation.    Hospital Course: 08/07/2020: Admit to Neuro ICU.  08/08/2020: Added long acting pain medications. Pain better controlled. Stable for stepdown.   08/09/2020: Acute pain management consulted. Ketamine gtt. Ilues, NGT. Suppository.        Interval History:  Pain control issues overnight. Acute pain management consulted today. Ketamine and IV tylenol. Significant ileus noted. NGT to low intermittent suction. Daily suppository ordered.     Review of Systems   Constitutional: Negative for fever.   HENT: Negative for trouble swallowing and voice change.    Eyes: Negative for visual disturbance.   Respiratory: Negative for shortness of breath and wheezing.    Cardiovascular: Negative for palpitations.   Gastrointestinal: Negative for abdominal distention.   Endocrine: Negative for polyuria.   Genitourinary: Negative for difficulty urinating.   Musculoskeletal: Positive for back pain.   Neurological: Positive for weakness. Negative  for dizziness and headaches.   Psychiatric/Behavioral: Negative for agitation.     Objective:     Vitals:  Temp: 98.5 °F (36.9 °C)  Pulse: 101  Rhythm: normal sinus rhythm  BP: 119/79  MAP (mmHg): 95  Resp: 19  ETCO2 (mmHg): 32 mmHg  SpO2: 100 %  O2 Device (Oxygen Therapy): nasal cannula     Temp  Min: 97.2 °F (36.2 °C)  Max: 100.6 °F (38.1 °C)  Pulse  Min: 90  Max: 115  BP  Min: 114/92  Max: 143/87  MAP (mmHg)  Min: 90  Max: 110  Resp  Min: 10  Max: 21  ETCO2 (mmHg)  Min: 32 mmHg  Max: 34 mmHg  SpO2  Min: 98 %  Max: 100 %    08/08 0701 - 08/09 0700  In: 3752.5 [P.O.:250; I.V.:1852.5]  Out: 2255 [Urine:1395; Drains:860]   Unmeasured Output  Stool Occurrence: 0       Physical Exam  Vitals signs and nursing note reviewed.   Constitutional:       Appearance: Normal appearance. He is normal weight.   HENT:      Head: Normocephalic and atraumatic.      Right Ear: External ear normal.      Left Ear: External ear normal.      Nose: Nose normal. No congestion.   Eyes:      General: No scleral icterus.     Extraocular Movements: Extraocular movements intact.      Conjunctiva/sclera: Conjunctivae normal.      Pupils: Pupils are equal, round, and reactive to light.   Neck:      Musculoskeletal: No neck rigidity.   Cardiovascular:      Rate and Rhythm: Normal rate and regular rhythm.      Pulses: Normal pulses.      Heart sounds: Normal heart sounds.   Pulmonary:      Effort: Pulmonary effort is normal. No respiratory distress.      Breath sounds: Normal breath sounds.   Abdominal:      General: Bowel sounds are increased. There is distension.   Musculoskeletal: Normal range of motion.   Skin:     General: Skin is warm.      Capillary Refill: Capillary refill takes less than 2 seconds.   Neurological:      Mental Status: He is alert and oriented to person, place, and time.      GCS: GCS eye subscore is 4. GCS verbal subscore is 5. GCS motor subscore is 6.      Motor: Weakness present.      Comments: Awake, alert, and oriented  to x4  PERRLA  EOMI  JONES spontaneously   Weakness in RLE  Cranial nerves examined and are grossly intact         Medications:  Continuoussodium chloride 0.9%, Last Rate: 75 mL/hr at 08/09/20 1300  hydromorphone in 0.9 % NaCl 6 mg/30 ml  ketamine (KETALAR) infusion (titrating), Last Rate: 1.5 mcg/kg/min (08/09/20 1300)    Scheduledacetaminophen, 1,000 mg, Q6H  [START ON 8/10/2020] bisacodyL, 10 mg, Daily  ceFAZolin (ANCEF) IVPB, 1 g, Q8H  diazePAM, 5 mg, Q6H  enoxaparin, 40 mg, Q24H  methocarbamol (ROBAXIN) IVPB, 1,000 mg, Q6H  polyethylene glycol, 17 g, Daily  senna-docusate 8.6-50 mg, 2 tablet, Daily    PRNcalcium gluconate IVPB, 1 g, PRN  calcium gluconate IVPB, 2 g, PRN  calcium gluconate IVPB, 3 g, PRN  dextrose 50%, 12.5 g, PRN  dextrose 50%, 25 g, PRN  glucagon (human recombinant), 1 mg, PRN  glucose, 16 g, PRN  glucose, 16 g, PRN  glucose, 24 g, PRN  magnesium sulfate IVPB, 2 g, PRN  magnesium sulfate IVPB, 4 g, PRN  naloxone, 0.02 mg, PRN  ondansetron, 8 mg, Q8H PRN  potassium chloride in water, 40 mEq, PRN    And  potassium chloride in water, 60 mEq, PRN    And  potassium chloride in water, 80 mEq, PRN  sodium chloride 0.9%, 10 mL, PRN  sodium phosphate IVPB, 15 mmol, PRN  sodium phosphate IVPB, 20.01 mmol, PRN  sodium phosphate IVPB, 30 mmol, PRN      Today I personally reviewed pertinent imaging, notably:Ileus noted on KUB     Diet  Diet NPO  Diet NPO          Assessment/Plan:     Neuro  * Traumatic compression fracture of L1 lumbar vertebra  45 y.o male with no significant PMHx was transferred from  to [facility] [place] with CT CAP significant for L1 burst fracture with retropulsion into the spinal canal resulting in severe spinal stenosis s/p falling 10ft from roof. He will be admitted to the Neuro ICU s/p  L1 CORPECTOMY and TLIF T12-L2 Interbody Instrumentation.     -Admit to Neuro ICU  -Consult Neurosurgery s/p intervention  -Neuro checks q1hr   -Vital signs q1hr  -Daily labs  -Dilaudid 2 mg q3hrs prn  -Increased  Gabapentin to 600 tid    8/8  -Added oxycodone  -Increased prn Zofran     8/9  Consulted acute pain management   Ketamine gtt/ IV tylenol for 24 hours   Appreciate pain management reccs          Spinal stenosis of lumbar region  S/P L1 CORPECTOMY, SPINE, LUMBAR  (Left)  FUSION, SPINE, LUMBAR, TLIF T12-L2 Interbody Fusion with Instrumentation - Lateral (Left)   3 Day Post-Op    -Plans for OR on Tuesday     Renal/  Acute urinary retention  Victor placed, continue to monitor strict I's and O's    Orthopedic  Fall from building  See above          The patient is being Prophylaxed for:  Venous Thromboembolism with: Chemical  Stress Ulcer with: Not Applicable   Ventilator Pneumonia with: not applicable    Activity Orders          Diet NPO: NPO starting at 08/08 1443    Commode at bedside starting at 08/07 0746        Full Code  Critical Care Time: 35 mins   Janeen Gan NP  Neurocritical Care  [facility]

## 2020-08-09 NOTE — NURSING
"RN informed NCC team pt reported abdominal pain 5/10 and feeling "bloated"; NGT hooked to intermittent suction overnight; abdomen appears much rounder and bigger then previous hours; hypoactive bowel sounds noted upon auscultation; CHRISTIANO Douglas MD instructed RN to continue intermittent suction; no new orders given; RN will continue to monitor  "

## 2020-08-09 NOTE — ASSESSMENT & PLAN NOTE
44 y/o male with no significant PMH with L1 burst fracture with retropulsion resulting in severe canal stenosis s/p fall from [employer] building     S/p L1 corpectomy with cage insertion and T12-L2 lateral fusion on 8/7. Posterior instrumentation deferred for 5 days due to increasing amounts of intraoperative bleeding.     --Admitted to ICU  --All labs and diagnostics reviewed.  --Continue bed rest  --Continue drain w/abx while in place  --MAPs stable off pressors, no goals  --MRI Lsp: L1-L3 EDH. Consistent severe canal stenosis and foraminal narrowing at L1 and adjacent.   --CT Lsp 8/7: postop, instrumentation appears in adequate position. There is a small amount of residual bone in the anterior canal.   --Bedrest/Log roll, HOB <30  --Pain control  --NPO with NG placed for postop ileus. Advanced BR. Monitor for increasing distention or abdominal pain.   --close neurological monitoring, notify neurosurgery with any acute changes  --no ICU requirements per NSY, ok for TTF.   --Plan for OR on Tuesday, 8/11, for posterior instrumentation.     Dispo: ok for TTF to NSGY

## 2020-08-09 NOTE — PROGRESS NOTES
[facility]  Neurosurgery  Progress Note    Subjective:     History of Present Illness: Patient is a 46 y/o male with no significant PMH who was transferred from  after falling from 10ft roof at work. Patient denies LOC, complained of excrutiating low back/sacral pain exacerbated with any movement of legs. CT CAP significant for L1 burst fracture with retropulsion into the spinal canal resulting in severe spinal stenosis. Patient states he could feel he had to urinate but could not start a stream, kraus placed and he reports he felt the kraus inserted and felt his bladder release once the kraus was placed. He reports he felt the OSMIN and per report from ED physician, patient had full rectal tone. The patient declined repeat rectal exam. Denies saddle anesthesia. Endorses any movement of lower extremities causes excruciating pain in the sacram and lower back. Denies radicular pain into the legs. Reports slight numbness in left calf. Denies any prior medical conditions or daily medication.     Post-Op Info:  Procedure(s) (LRB):  L1 CORPECTOMY, SPINE, LUMBAR  (Left)  FUSION, SPINE, LUMBAR, TLIF T12-L2 Interbody Fusion with Instrumentation - Lateral (Left)   3 Days Post-Op     Interval History: 8/9: POD 3. AFVSS. Required NG placement for postop ileus. Hgb stable. Still c/o back pain. MAPs adequate without support. Planning for OR Tuesday.     Medications:  Continuous Infusions:   sodium chloride 0.9% 75 mL/hr at 08/09/20 0705     Scheduled Meds:   acetaminophen  650 mg Per NG tube Q6H    ceFAZolin (ANCEF) IVPB  1 g Intravenous Q8H    enoxaparin  40 mg Subcutaneous Q24H    gabapentin  800 mg Per NG tube Q8H    methocarbamoL  750 mg Per NG tube TID    oxyCODONE  10 mg Oral Q12H    polyethylene glycol  17 g Per NG tube Daily    senna-docusate 8.6-50 mg  2 tablet Per NG tube Daily    traMADoL  100 mg Per NG tube Q6H     PRN Meds:acetaminophen, calcium gluconate IVPB, calcium gluconate IVPB,  calcium gluconate IVPB, dextrose 50%, dextrose 50%, diazePAM, glucagon (human recombinant), glucose, glucose, glucose, HYDROmorphone, magnesium sulfate IVPB, magnesium sulfate IVPB, ondansetron, potassium chloride in water **AND** potassium chloride in water **AND** potassium chloride in water, sodium chloride 0.9%, sodium phosphate IVPB, sodium phosphate IVPB, sodium phosphate IVPB     Review of Systems  Objective:     Weight: 90.7 kg (199 lb 15.3 oz)  Body mass index is 28.69 kg/m².  Vital Signs (Most Recent):  Temp: 97.2 °F (36.2 °C) (08/09/20 0705)  Pulse: 98 (08/09/20 0705)  Resp: 10 (08/09/20 0705)  BP: 127/84 (08/09/20 0705)  SpO2: 100 % (08/09/20 0705) Vital Signs (24h Range):  Temp:  [97.2 °F (36.2 °C)-100.6 °F (38.1 °C)] 97.2 °F (36.2 °C)  Pulse:  [] 98  Resp:  [10-20] 10  SpO2:  [96 %-100 %] 100 %  BP: (110-148)/(69-95) 127/84  Arterial Line BP: (106-138)/(73-83) 137/78     Date 08/09/20 0700 - 08/10/20 0659   Shift 3338-1675 5562-6400 2107-6382 24 Hour Total   INTAKE   I.V.(mL/kg) 75(0.8)   75(0.8)   Shift Total(mL/kg) 75(0.8)   75(0.8)   OUTPUT   Urine(mL/kg/hr) 100   100   Shift Total(mL/kg) 100(1.1)   100(1.1)   Weight (kg) 90.7 90.7 90.7 90.7              Resp Rate Total:  [10 br/min-17 br/min] 13 br/min         Closed/Suction Drain 08/06/20 2337 Left Other (Comment) Bulb 10 Fr. (Active)   Site Description Healing 08/09/20 0305   Dressing Type Transparent (Tegaderm) 08/09/20 0305   Dressing Status Old drainage;Clean;Dry;Intact 08/09/20 0305   Dressing Intervention Integrity maintained 08/09/20 0305   Drainage Serosanguineous 08/09/20 0305   Status To bulb suction 08/09/20 0305   Output (mL) 20 mL 08/09/20 0305            NG/OG Tube 08/08/20 1415 nasogastric Right nostril (Active)   Placement Check placement verified by aspirate characteristics;placement verified by distal tube length measurement;placement verified by x-ray 08/09/20 0305   Advancement advanced manually 08/09/20 0305   Tolerance  "signs/symptoms of discomfort 08/09/20 0305   Securement secured to commercial device 08/09/20 0305   Clamp Status/Tolerance unclamped 08/09/20 0305   Suction Setting/Drainage Method suction at;intermittent setting 08/09/20 0305   Insertion Site Appearance no redness, warmth, tenderness, skin breakdown, drainage 08/09/20 0305   Drainage Coffee ground;Brown;Green 08/09/20 0305   Feeding Action feeding held 08/09/20 0305   Tube Output(mL)(Include Discarded Residual) 350 mL 08/09/20 0605            Urethral Catheter 08/05/20 1133 Straight-tip 14 Fr. (Active)   Site Assessment Clean;Intact 08/09/20 0305   Collection Container Urimeter 08/09/20 0305   Securement Method secured to top of thigh w/ adhesive device 08/09/20 0305   Catheter Care Performed yes 08/09/20 0305   Reason for Continuing Urinary Catheterization Post operative 08/09/20 0305   CAUTI Prevention Bundle StatLock in place w 1" slack;Intact seal between catheter & drainage tubing;Drainage bag/urimeter off the floor;Green sheeting clip in use;No dependent loops or kinks;Drainage bag/urimeter not overfilled (<2/3 full);Drainage bag/urimeter below bladder 08/09/20 0305   Output (mL) 100 mL 08/09/20 0705       Neurosurgery Physical Exam     GCS 15, E4V5M6  AAOx3  CN II-XII grossly intact   PERRL, EOMi  FS x 4 , fc  SILT    NG to wall suction    ELEANOR with serosanguinous output   Dressing c/d/i    Significant Labs:  Recent Labs   Lab 08/08/20 0125 08/09/20 0130   *  130* 102     138 138   K 3.6  3.6 3.4*     103 102   CO2 29  29 27   BUN 14  14 13   CREATININE 0.7  0.7 0.7   CALCIUM 7.7*  7.7* 7.5*   MG 1.5* 1.5*     Recent Labs   Lab 08/08/20 0125 08/09/20 0130   WBC 11.62  11.62 8.24   HGB 8.3*  8.3* 7.9*   HCT 25.2*  25.2* 23.8*     171 168     Recent Labs   Lab 08/08/20  0125 08/09/20  0130   INR 0.9 0.9   APTT 26.2 25.5     Microbiology Results (last 7 days)     ** No results found for the last 168 hours. **        All " pertinent labs from the last 24 hours have been reviewed.    Significant Diagnostics:  I have reviewed all pertinent imaging results/findings within the past 24 hours.   X-ray Chest 1 View    Result Date: 8/8/2020  Low lung volumes on current exam.  Unchanged consolidation in lung bases. Electronically signed by: Hector Ying MD Date:    08/08/2020 Time:    13:48    X-ray Abdomen Ap 1 View    Result Date: 8/8/2020  Nasogastric tube overlies the distal stomach. Persistent moderate gaseous distension of bowel throughout the upper abdomen in an ileus type pattern. Electronically signed by: Praneeth Swan MD Date:    08/08/2020 Time:    14:36    X-ray Kub    Result Date: 8/8/2020  No definite free air identified.  Large volume of bowel gas consistent with ileus.  Surgical changes at L spine. Electronically signed by: Hector Ying MD Date:    08/08/2020 Time:    13:58      Assessment/Plan:     * Traumatic compression fracture of L1 lumbar vertebra  46 y/o male with no significant PMH with L1 burst fracture with retropulsion resulting in severe canal stenosis s/p fall from [employer] building     S/p L1 corpectomy with cage insertion and T12-L2 lateral fusion on 8/7. Posterior instrumentation deferred for 5 days due to increasing amounts of intraoperative bleeding.     --Admitted to ICU  --All labs and diagnostics reviewed.  --Continue bed rest  --Continue drain w/abx while in place  --MAPs stable off pressors, no goals  --MRI Lsp: L1-L3 EDH. Consistent severe canal stenosis and foraminal narrowing at L1 and adjacent.   --CT Lsp 8/7: postop, instrumentation appears in adequate position. There is a small amount of residual bone in the anterior canal.   --Bedrest/Log roll, HOB <30  --Pain control  --NPO with NG placed for postop ileus. Advanced BR. Monitor for increasing distention or abdominal pain.   --close neurological monitoring, notify neurosurgery with any acute changes  --no ICU requirements per NSY, ok for  TTF.   --Plan for OR on Tuesday, 8/11, for posterior instrumentation.     Dispo: ok for TTF to AMADOR Levy MD  Neurosurgery  [facility]

## 2020-08-09 NOTE — ASSESSMENT & PLAN NOTE
S/P L1 CORPECTOMY, SPINE, LUMBAR  (Left)  FUSION, SPINE, LUMBAR, TLIF T12-L2 Interbody Fusion with Instrumentation - Lateral (Left)   3 Day Post-Op    -Plans for OR on Tuesday

## 2020-08-09 NOTE — PT/OT/SLP PROGRESS
Physical Therapy Discharge     Oscar Bills  MRN: [id number]   Principal Problem: Traumatic compression fracture of L1 lumbar vertebra       Patient Discharged from acute Physical Therapy 8/9.   Per PT convo with Neurosurgery Dr. Christina: patient on bedrest, unstable spinal fracture, patient to return to OR 8/11 for fusion.   Per MD, discharge PT/OT orders at this time.      Please re-consult therapy when patient is appropriate for out of bed mobility.    Ella Seay, PT, DPT  8/9/2020

## 2020-08-09 NOTE — SUBJECTIVE & OBJECTIVE
Interval History: 8/9: POD 3. AFVSS. Required NG placement for postop ileus. Hgb stable. Still c/o back pain. MAPs adequate without support. Planning for OR Tuesday.     Medications:  Continuous Infusions:   sodium chloride 0.9% 75 mL/hr at 08/09/20 0705     Scheduled Meds:   acetaminophen  650 mg Per NG tube Q6H    ceFAZolin (ANCEF) IVPB  1 g Intravenous Q8H    enoxaparin  40 mg Subcutaneous Q24H    gabapentin  800 mg Per NG tube Q8H    methocarbamoL  750 mg Per NG tube TID    oxyCODONE  10 mg Oral Q12H    polyethylene glycol  17 g Per NG tube Daily    senna-docusate 8.6-50 mg  2 tablet Per NG tube Daily    traMADoL  100 mg Per NG tube Q6H     PRN Meds:acetaminophen, calcium gluconate IVPB, calcium gluconate IVPB, calcium gluconate IVPB, dextrose 50%, dextrose 50%, diazePAM, glucagon (human recombinant), glucose, glucose, glucose, HYDROmorphone, magnesium sulfate IVPB, magnesium sulfate IVPB, ondansetron, potassium chloride in water **AND** potassium chloride in water **AND** potassium chloride in water, sodium chloride 0.9%, sodium phosphate IVPB, sodium phosphate IVPB, sodium phosphate IVPB     Review of Systems  Objective:     Weight: 90.7 kg (199 lb 15.3 oz)  Body mass index is 28.69 kg/m².  Vital Signs (Most Recent):  Temp: 97.2 °F (36.2 °C) (08/09/20 0705)  Pulse: 98 (08/09/20 0705)  Resp: 10 (08/09/20 0705)  BP: 127/84 (08/09/20 0705)  SpO2: 100 % (08/09/20 0705) Vital Signs (24h Range):  Temp:  [97.2 °F (36.2 °C)-100.6 °F (38.1 °C)] 97.2 °F (36.2 °C)  Pulse:  [] 98  Resp:  [10-20] 10  SpO2:  [96 %-100 %] 100 %  BP: (110-148)/(69-95) 127/84  Arterial Line BP: (106-138)/(73-83) 137/78     Date 08/09/20 0700 - 08/10/20 0659   Shift [id number] 8042-6995 4240-0659 24 Hour Total   INTAKE   I.V.(mL/kg) 75(0.8)   75(0.8)   Shift Total(mL/kg) 75(0.8)   75(0.8)   OUTPUT   Urine(mL/kg/hr) 100   100   Shift Total(mL/kg) 100(1.1)   100(1.1)   Weight (kg) 90.7 90.7 90.7 90.7              Resp Rate Total:   "[10 br/min-17 br/min] 13 br/min         Closed/Suction Drain 08/06/20 2337 Left Other (Comment) Bulb 10 Fr. (Active)   Site Description Healing 08/09/20 0305   Dressing Type Transparent (Tegaderm) 08/09/20 0305   Dressing Status Old drainage;Clean;Dry;Intact 08/09/20 0305   Dressing Intervention Integrity maintained 08/09/20 0305   Drainage Serosanguineous 08/09/20 0305   Status To bulb suction 08/09/20 0305   Output (mL) 20 mL 08/09/20 0305            NG/OG Tube 08/08/20 1415 nasogastric Right nostril (Active)   Placement Check placement verified by aspirate characteristics;placement verified by distal tube length measurement;placement verified by x-ray 08/09/20 0305   Advancement advanced manually 08/09/20 0305   Tolerance signs/symptoms of discomfort 08/09/20 0305   Securement secured to commercial device 08/09/20 0305   Clamp Status/Tolerance unclamped 08/09/20 0305   Suction Setting/Drainage Method suction at;intermittent setting 08/09/20 0305   Insertion Site Appearance no redness, warmth, tenderness, skin breakdown, drainage 08/09/20 0305   Drainage Coffee ground;Brown;Green 08/09/20 0305   Feeding Action feeding held 08/09/20 0305   Tube Output(mL)(Include Discarded Residual) 350 mL 08/09/20 0605            Urethral Catheter 08/05/20 1133 Straight-tip 14 Fr. (Active)   Site Assessment Clean;Intact 08/09/20 0305   Collection Container Urimeter 08/09/20 0305   Securement Method secured to top of thigh w/ adhesive device 08/09/20 0305   Catheter Care Performed yes 08/09/20 0305   Reason for Continuing Urinary Catheterization Post operative 08/09/20 0305   CAUTI Prevention Bundle StatLock in place w 1" slack;Intact seal between catheter & drainage tubing;Drainage bag/urimeter off the floor;Green sheeting clip in use;No dependent loops or kinks;Drainage bag/urimeter not overfilled (<2/3 full);Drainage bag/urimeter below bladder 08/09/20 0305   Output (mL) 100 mL 08/09/20 0705       Neurosurgery Physical Exam "     GCS 15, E4V5M6  AAOx3  CN II-XII grossly intact   PERRL, EOMi  FS x 4 , fc  SILT    NG to wall suction    ELEANOR with serosanguinous output   Dressing c/d/i    Significant Labs:  Recent Labs   Lab 08/08/20 0125 08/09/20  0130   *  130* 102     138 138   K 3.6  3.6 3.4*     103 102   CO2 29  29 27   BUN 14  14 13   CREATININE 0.7  0.7 0.7   CALCIUM 7.7*  7.7* 7.5*   MG 1.5* 1.5*     Recent Labs   Lab 08/08/20 0125 08/09/20  0130   WBC 11.62  11.62 8.24   HGB 8.3*  8.3* 7.9*   HCT 25.2*  25.2* 23.8*     171 168     Recent Labs   Lab 08/08/20 0125 08/09/20  0130   INR 0.9 0.9   APTT 26.2 25.5     Microbiology Results (last 7 days)     ** No results found for the last 168 hours. **        All pertinent labs from the last 24 hours have been reviewed.    Significant Diagnostics:  I have reviewed all pertinent imaging results/findings within the past 24 hours.   X-ray Chest 1 View    Result Date: 8/8/2020  Low lung volumes on current exam.  Unchanged consolidation in lung bases. Electronically signed by: Hector Ying MD Date:    08/08/2020 Time:    13:48    X-ray Abdomen Ap 1 View    Result Date: 8/8/2020  Nasogastric tube overlies the distal stomach. Persistent moderate gaseous distension of bowel throughout the upper abdomen in an ileus type pattern. Electronically signed by: Praneeth Swan MD Date:    08/08/2020 Time:    14:36    X-ray Kub    Result Date: 8/8/2020  No definite free air identified.  Large volume of bowel gas consistent with ileus.  Surgical changes at L spine. Electronically signed by: Hector Ying MD Date:    08/08/2020 Time:    13:58

## 2020-08-09 NOTE — PLAN OF CARE
POC reviewed with pt at 1400. Pt verbalized understanding. Questions and concerns addressed. No acute events today. Anesthesia pain consult this AM, added Dilaudid PCA pump, IV Tylenol, and ketamine gtt, pt's pain control vastly improved.  Suppository daily and bowel rest for ileus.  Pt had 1 very loose bowel movement this afternoon.  Abdomen remains very distended.  Pt progressing toward goals. Will continue to monitor. See flowsheets for full assessment and VS info. Plan for OR Tuesday.

## 2020-08-09 NOTE — ASSESSMENT & PLAN NOTE
45 y.o male with no significant PMHx was [place] [facility] for L1 burst fracture with retropulsion into the spinal canal resulting in severe spinal stenosis s/p falling 10ft from roof. He will be admitted to the Neuro ICU s/p  L1 CORPECTOMY and TLIF T12-L2 Interbody Instrumentation.     -Admit to Neuro ICU  -Consult Neurosurgery s/p intervention  -Neuro checks q1hr   -Vital signs q1hr  -Daily labs  -Dilaudid 2 mg q3hrs prn  -Increased Gabapentin to 600 tid    8/8  -Added oxycodone  -Increased prn Zofran     8/9  Consulted acute pain management   Ketamine gtt/ IV tylenol for 24 hours   Appreciate pain management reccs

## 2020-08-09 NOTE — PLAN OF CARE
POC reviewed with Oscar at 0500. Pt verbalized understanding. Questions and concerns addressed. Pt reported constant pain at lower back; PRN Hydromorphone given every 3' for breakthrough pain. Pt's abdomen appears more distended and firm. Pt progressing toward goals. Will continue to monitor. See flowsheets for full assessment and VS info

## 2020-08-09 NOTE — SUBJECTIVE & OBJECTIVE
Interval History:  Pain control issues overnight. Acute pain management consulted today. Ketamine and IV tylenol. Significant ileus noted. NGT to low intermittent suction. Daily suppository ordered.     Review of Systems   Constitutional: Negative for fever.   HENT: Negative for trouble swallowing and voice change.    Eyes: Negative for visual disturbance.   Respiratory: Negative for shortness of breath and wheezing.    Cardiovascular: Negative for palpitations.   Gastrointestinal: Negative for abdominal distention.   Endocrine: Negative for polyuria.   Genitourinary: Negative for difficulty urinating.   Musculoskeletal: Positive for back pain.   Neurological: Positive for weakness. Negative for dizziness and headaches.   Psychiatric/Behavioral: Negative for agitation.     Objective:     Vitals:  Temp: 98.5 °F (36.9 °C)  Pulse: 101  Rhythm: normal sinus rhythm  BP: 119/79  MAP (mmHg): 95  Resp: 19  ETCO2 (mmHg): 32 mmHg  SpO2: 100 %  O2 Device (Oxygen Therapy): nasal cannula     Temp  Min: 97.2 °F (36.2 °C)  Max: 100.6 °F (38.1 °C)  Pulse  Min: 90  Max: 115  BP  Min: 114/92  Max: 143/87  MAP (mmHg)  Min: 90  Max: 110  Resp  Min: 10  Max: 21  ETCO2 (mmHg)  Min: 32 mmHg  Max: 34 mmHg  SpO2  Min: 98 %  Max: 100 %    08/08 0701 - 08/09 0700  In: 3752.5 [P.O.:250; I.V.:1852.5]  Out: 2255 [Urine:1395; Drains:860]   Unmeasured Output  Stool Occurrence: 0       Physical Exam  Vitals signs and nursing note reviewed.   Constitutional:       Appearance: Normal appearance. He is normal weight.   HENT:      Head: Normocephalic and atraumatic.      Right Ear: External ear normal.      Left Ear: External ear normal.      Nose: Nose normal. No congestion.   Eyes:      General: No scleral icterus.     Extraocular Movements: Extraocular movements intact.      Conjunctiva/sclera: Conjunctivae normal.      Pupils: Pupils are equal, round, and reactive to light.   Neck:      Musculoskeletal: No neck rigidity.   Cardiovascular:      Rate  and Rhythm: Normal rate and regular rhythm.      Pulses: Normal pulses.      Heart sounds: Normal heart sounds.   Pulmonary:      Effort: Pulmonary effort is normal. No respiratory distress.      Breath sounds: Normal breath sounds.   Abdominal:      General: Bowel sounds are increased. There is distension.   Musculoskeletal: Normal range of motion.   Skin:     General: Skin is warm.      Capillary Refill: Capillary refill takes less than 2 seconds.   Neurological:      Mental Status: He is alert and oriented to person, place, and time.      GCS: GCS eye subscore is 4. GCS verbal subscore is 5. GCS motor subscore is 6.      Motor: Weakness present.      Comments: Awake, alert, and oriented to x4  PERRLA  EOMI  JONES spontaneously   Weakness in RLE  Cranial nerves examined and are grossly intact         Medications:  Continuoussodium chloride 0.9%, Last Rate: 75 mL/hr at 08/09/20 1300  hydromorphone in 0.9 % NaCl 6 mg/30 ml  ketamine (KETALAR) infusion (titrating), Last Rate: 1.5 mcg/kg/min (08/09/20 1300)    Scheduledacetaminophen, 1,000 mg, Q6H  [START ON 8/10/2020] bisacodyL, 10 mg, Daily  ceFAZolin (ANCEF) IVPB, 1 g, Q8H  diazePAM, 5 mg, Q6H  enoxaparin, 40 mg, Q24H  methocarbamol (ROBAXIN) IVPB, 1,000 mg, Q6H  polyethylene glycol, 17 g, Daily  senna-docusate 8.6-50 mg, 2 tablet, Daily    PRNcalcium gluconate IVPB, 1 g, PRN  calcium gluconate IVPB, 2 g, PRN  calcium gluconate IVPB, 3 g, PRN  dextrose 50%, 12.5 g, PRN  dextrose 50%, 25 g, PRN  glucagon (human recombinant), 1 mg, PRN  glucose, 16 g, PRN  glucose, 16 g, PRN  glucose, 24 g, PRN  magnesium sulfate IVPB, 2 g, PRN  magnesium sulfate IVPB, 4 g, PRN  naloxone, 0.02 mg, PRN  ondansetron, 8 mg, Q8H PRN  potassium chloride in water, 40 mEq, PRN    And  potassium chloride in water, 60 mEq, PRN    And  potassium chloride in water, 80 mEq, PRN  sodium chloride 0.9%, 10 mL, PRN  sodium phosphate IVPB, 15 mmol, PRN  sodium phosphate IVPB, 20.01 mmol, PRN  sodium  phosphate IVPB, 30 mmol, PRN      Today I personally reviewed pertinent imaging, notably:Ileus noted on KUB     Diet  Diet NPO  Diet NPO

## 2020-08-10 PROBLEM — S32.001B: Status: ACTIVE | Noted: 2020-08-10

## 2020-08-10 PROBLEM — K56.7 ILEUS: Status: ACTIVE | Noted: 2020-08-10

## 2020-08-10 LAB
ABO + RH BLD: NORMAL
ALBUMIN SERPL BCP-MCNC: 2.2 G/DL (ref 3.5–5.2)
ALBUMIN SERPL BCP-MCNC: 2.2 G/DL (ref 3.5–5.2)
ALP SERPL-CCNC: 44 U/L (ref 55–135)
ALP SERPL-CCNC: 44 U/L (ref 55–135)
ALT SERPL W/O P-5'-P-CCNC: 10 U/L (ref 10–44)
ALT SERPL W/O P-5'-P-CCNC: 10 U/L (ref 10–44)
ANION GAP SERPL CALC-SCNC: 7 MMOL/L (ref 8–16)
ANION GAP SERPL CALC-SCNC: 7 MMOL/L (ref 8–16)
APTT BLDCRRT: 26.1 SEC (ref 21–32)
APTT BLDCRRT: 26.1 SEC (ref 21–32)
AST SERPL-CCNC: 21 U/L (ref 10–40)
AST SERPL-CCNC: 21 U/L (ref 10–40)
BASOPHILS # BLD AUTO: 0.02 K/UL (ref 0–0.2)
BASOPHILS NFR BLD: 0.3 % (ref 0–1.9)
BILIRUB SERPL-MCNC: 0.5 MG/DL (ref 0.1–1)
BILIRUB SERPL-MCNC: 0.5 MG/DL (ref 0.1–1)
BLD GP AB SCN CELLS X3 SERPL QL: NORMAL
BUN SERPL-MCNC: 12 MG/DL (ref 6–20)
BUN SERPL-MCNC: 12 MG/DL (ref 6–20)
CALCIUM SERPL-MCNC: 7.6 MG/DL (ref 8.7–10.5)
CALCIUM SERPL-MCNC: 7.6 MG/DL (ref 8.7–10.5)
CHLORIDE SERPL-SCNC: 101 MMOL/L (ref 95–110)
CHLORIDE SERPL-SCNC: 101 MMOL/L (ref 95–110)
CO2 SERPL-SCNC: 28 MMOL/L (ref 23–29)
CO2 SERPL-SCNC: 28 MMOL/L (ref 23–29)
CREAT SERPL-MCNC: 0.6 MG/DL (ref 0.5–1.4)
CREAT SERPL-MCNC: 0.6 MG/DL (ref 0.5–1.4)
DIFFERENTIAL METHOD: ABNORMAL
EOSINOPHIL # BLD AUTO: 0.2 K/UL (ref 0–0.5)
EOSINOPHIL NFR BLD: 2.9 % (ref 0–8)
ERYTHROCYTE [DISTWIDTH] IN BLOOD BY AUTOMATED COUNT: 12.3 % (ref 11.5–14.5)
EST. GFR  (AFRICAN AMERICAN): >60 ML/MIN/1.73 M^2
EST. GFR  (AFRICAN AMERICAN): >60 ML/MIN/1.73 M^2
EST. GFR  (NON AFRICAN AMERICAN): >60 ML/MIN/1.73 M^2
EST. GFR  (NON AFRICAN AMERICAN): >60 ML/MIN/1.73 M^2
GLUCOSE SERPL-MCNC: 83 MG/DL (ref 70–110)
GLUCOSE SERPL-MCNC: 83 MG/DL (ref 70–110)
HCT VFR BLD AUTO: 23.4 % (ref 40–54)
HGB BLD-MCNC: 7.7 G/DL (ref 14–18)
IMM GRANULOCYTES # BLD AUTO: 0.02 K/UL (ref 0–0.04)
IMM GRANULOCYTES NFR BLD AUTO: 0.3 % (ref 0–0.5)
INR PPP: 0.9 (ref 0.8–1.2)
INR PPP: 0.9 (ref 0.8–1.2)
LYMPHOCYTES # BLD AUTO: 0.7 K/UL (ref 1–4.8)
LYMPHOCYTES NFR BLD: 10.1 % (ref 18–48)
MAGNESIUM SERPL-MCNC: 1.7 MG/DL (ref 1.6–2.6)
MAGNESIUM SERPL-MCNC: 1.8 MG/DL (ref 1.6–2.6)
MAGNESIUM SERPL-MCNC: 1.8 MG/DL (ref 1.6–2.6)
MAGNESIUM SERPL-MCNC: 1.9 MG/DL (ref 1.6–2.6)
MCH RBC QN AUTO: 31.2 PG (ref 27–31)
MCHC RBC AUTO-ENTMCNC: 32.9 G/DL (ref 32–36)
MCV RBC AUTO: 95 FL (ref 82–98)
MONOCYTES # BLD AUTO: 0.6 K/UL (ref 0.3–1)
MONOCYTES NFR BLD: 8.9 % (ref 4–15)
NEUTROPHILS # BLD AUTO: 5.3 K/UL (ref 1.8–7.7)
NEUTROPHILS NFR BLD: 77.5 % (ref 38–73)
NRBC BLD-RTO: 0 /100 WBC
PHOSPHATE SERPL-MCNC: 3 MG/DL (ref 2.7–4.5)
PHOSPHATE SERPL-MCNC: 3 MG/DL (ref 2.7–4.5)
PLATELET # BLD AUTO: 205 K/UL (ref 150–350)
PMV BLD AUTO: 9.2 FL (ref 9.2–12.9)
POTASSIUM SERPL-SCNC: 3.5 MMOL/L (ref 3.5–5.1)
POTASSIUM SERPL-SCNC: 3.9 MMOL/L (ref 3.5–5.1)
POTASSIUM SERPL-SCNC: 3.9 MMOL/L (ref 3.5–5.1)
POTASSIUM SERPL-SCNC: 4.1 MMOL/L (ref 3.5–5.1)
PROT SERPL-MCNC: 5.1 G/DL (ref 6–8.4)
PROT SERPL-MCNC: 5.1 G/DL (ref 6–8.4)
PROTHROMBIN TIME: 10.5 SEC (ref 9–12.5)
PROTHROMBIN TIME: 10.5 SEC (ref 9–12.5)
RBC # BLD AUTO: 2.47 M/UL (ref 4.6–6.2)
SARS-COV-2 RDRP RESP QL NAA+PROBE: NEGATIVE
SODIUM SERPL-SCNC: 136 MMOL/L (ref 136–145)
SODIUM SERPL-SCNC: 136 MMOL/L (ref 136–145)
WBC # BLD AUTO: 6.86 K/UL (ref 3.9–12.7)

## 2020-08-10 PROCEDURE — 99231 PR SUBSEQUENT HOSPITAL CARE,LEVL I: ICD-10-PCS | Mod: ,,, | Performed by: ANESTHESIOLOGY

## 2020-08-10 PROCEDURE — 80053 COMPREHEN METABOLIC PANEL: CPT

## 2020-08-10 PROCEDURE — 63600175 PHARM REV CODE 636 W HCPCS: Performed by: STUDENT IN AN ORGANIZED HEALTH CARE EDUCATION/TRAINING PROGRAM

## 2020-08-10 PROCEDURE — 84100 ASSAY OF PHOSPHORUS: CPT

## 2020-08-10 PROCEDURE — 83735 ASSAY OF MAGNESIUM: CPT | Mod: 91

## 2020-08-10 PROCEDURE — 85610 PROTHROMBIN TIME: CPT

## 2020-08-10 PROCEDURE — 84132 ASSAY OF SERUM POTASSIUM: CPT

## 2020-08-10 PROCEDURE — 99231 SBSQ HOSP IP/OBS SF/LOW 25: CPT | Mod: ,,, | Performed by: ANESTHESIOLOGY

## 2020-08-10 PROCEDURE — 86920 COMPATIBILITY TEST SPIN: CPT

## 2020-08-10 PROCEDURE — 27000221 HC OXYGEN, UP TO 24 HOURS

## 2020-08-10 PROCEDURE — 94761 N-INVAS EAR/PLS OXIMETRY MLT: CPT

## 2020-08-10 PROCEDURE — 63600175 PHARM REV CODE 636 W HCPCS: Performed by: PSYCHIATRY & NEUROLOGY

## 2020-08-10 PROCEDURE — 85730 THROMBOPLASTIN TIME PARTIAL: CPT

## 2020-08-10 PROCEDURE — U0002 COVID-19 LAB TEST NON-CDC: HCPCS

## 2020-08-10 PROCEDURE — 85025 COMPLETE CBC W/AUTO DIFF WBC: CPT

## 2020-08-10 PROCEDURE — 63600175 PHARM REV CODE 636 W HCPCS: Performed by: ANESTHESIOLOGY

## 2020-08-10 PROCEDURE — 99233 SBSQ HOSP IP/OBS HIGH 50: CPT | Mod: ,,, | Performed by: NURSE PRACTITIONER

## 2020-08-10 PROCEDURE — 99233 PR SUBSEQUENT HOSPITAL CARE,LEVL III: ICD-10-PCS | Mod: ,,, | Performed by: NURSE PRACTITIONER

## 2020-08-10 PROCEDURE — 94770 HC EXHALED C02 TEST: CPT

## 2020-08-10 PROCEDURE — 20000000 HC ICU ROOM

## 2020-08-10 PROCEDURE — 83735 ASSAY OF MAGNESIUM: CPT

## 2020-08-10 PROCEDURE — 25000003 PHARM REV CODE 250: Performed by: PHYSICIAN ASSISTANT

## 2020-08-10 PROCEDURE — 63600175 PHARM REV CODE 636 W HCPCS: Performed by: UROLOGY

## 2020-08-10 PROCEDURE — 86850 RBC ANTIBODY SCREEN: CPT

## 2020-08-10 PROCEDURE — 25000003 PHARM REV CODE 250: Performed by: PSYCHIATRY & NEUROLOGY

## 2020-08-10 PROCEDURE — 99900035 HC TECH TIME PER 15 MIN (STAT)

## 2020-08-10 PROCEDURE — 63600175 PHARM REV CODE 636 W HCPCS: Performed by: NURSE PRACTITIONER

## 2020-08-10 RX ORDER — DIAZEPAM 10 MG/2ML
5 INJECTION INTRAMUSCULAR EVERY 12 HOURS
Status: DISCONTINUED | OUTPATIENT
Start: 2020-08-10 | End: 2020-08-11

## 2020-08-10 RX ORDER — ACETAMINOPHEN 10 MG/ML
1000 INJECTION, SOLUTION INTRAVENOUS EVERY 6 HOURS
Status: COMPLETED | OUTPATIENT
Start: 2020-08-10 | End: 2020-08-11

## 2020-08-10 RX ADMIN — CEFAZOLIN 1 G: 1 INJECTION, POWDER, FOR SOLUTION INTRAMUSCULAR; INTRAVENOUS at 05:08

## 2020-08-10 RX ADMIN — ACETAMINOPHEN 1000 MG: 10 INJECTION, SOLUTION INTRAVENOUS at 11:08

## 2020-08-10 RX ADMIN — DIAZEPAM 5 MG: 5 INJECTION, SOLUTION INTRAMUSCULAR; INTRAVENOUS at 05:08

## 2020-08-10 RX ADMIN — SODIUM CHLORIDE 75 ML/HR: 0.9 INJECTION, SOLUTION INTRAVENOUS at 01:08

## 2020-08-10 RX ADMIN — DOCUSATE SODIUM 50MG AND SENNOSIDES 8.6MG 2 TABLET: 8.6; 5 TABLET, FILM COATED ORAL at 11:08

## 2020-08-10 RX ADMIN — CEFAZOLIN 1 G: 1 INJECTION, POWDER, FOR SOLUTION INTRAMUSCULAR; INTRAVENOUS at 12:08

## 2020-08-10 RX ADMIN — POTASSIUM CHLORIDE 40 MEQ: 7.46 INJECTION, SOLUTION INTRAVENOUS at 02:08

## 2020-08-10 RX ADMIN — METHOCARBAMOL 1000 MG: 100 INJECTION INTRAMUSCULAR; INTRAVENOUS at 10:08

## 2020-08-10 RX ADMIN — POLYETHYLENE GLYCOL 3350 17 G: 17 POWDER, FOR SOLUTION ORAL at 11:08

## 2020-08-10 RX ADMIN — Medication: at 12:08

## 2020-08-10 RX ADMIN — ONDANSETRON 8 MG: 2 INJECTION INTRAMUSCULAR; INTRAVENOUS at 02:08

## 2020-08-10 RX ADMIN — ENOXAPARIN SODIUM 40 MG: 100 INJECTION SUBCUTANEOUS at 05:08

## 2020-08-10 RX ADMIN — MAGNESIUM SULFATE IN WATER 2 G: 40 INJECTION, SOLUTION INTRAVENOUS at 01:08

## 2020-08-10 RX ADMIN — POTASSIUM CHLORIDE 40 MEQ: 7.46 INJECTION, SOLUTION INTRAVENOUS at 01:08

## 2020-08-10 RX ADMIN — MAGNESIUM SULFATE IN WATER 2 G: 40 INJECTION, SOLUTION INTRAVENOUS at 02:08

## 2020-08-10 RX ADMIN — METHOCARBAMOL 1000 MG: 100 INJECTION INTRAMUSCULAR; INTRAVENOUS at 02:08

## 2020-08-10 RX ADMIN — ACETAMINOPHEN 1000 MG: 10 INJECTION, SOLUTION INTRAVENOUS at 05:08

## 2020-08-10 RX ADMIN — DIAZEPAM 5 MG: 5 INJECTION, SOLUTION INTRAMUSCULAR; INTRAVENOUS at 09:08

## 2020-08-10 RX ADMIN — CEFAZOLIN 1 G: 1 INJECTION, POWDER, FOR SOLUTION INTRAMUSCULAR; INTRAVENOUS at 09:08

## 2020-08-10 RX ADMIN — Medication: at 11:08

## 2020-08-10 RX ADMIN — BISACODYL 10 MG: 10 SUPPOSITORY RECTAL at 09:08

## 2020-08-10 RX ADMIN — ONDANSETRON 8 MG: 2 INJECTION INTRAMUSCULAR; INTRAVENOUS at 05:08

## 2020-08-10 NOTE — PLAN OF CARE
Per MD: --Plan for OR on Tuesday, 8/11, for posterior instrumentation.   Not medically ready for discharge.         08/10/20 3029   Discharge Reassessment   Assessment Type Discharge Planning Reassessment   Provided patient/caregiver education on the expected discharge date and the discharge plan Yes   Do you have any problems affording any of your prescribed medications? No   Discharge Plan A Rehab   Discharge Plan B Home Health   DME Needed Upon Discharge  other (see comments)  (tbd)   Patient choice form signed by patient/caregiver N/A   Anticipated Discharge Disposition Rehab   Can the patient/caregiver answer the patient profile reliably? Yes, cognitively intact   Describe the patient's ability to walk at the present time. Does not walk or unable to take any steps at all       Tere Yang RN, CCRN-K, [facility]  Neuro-Critical Care   [phone]

## 2020-08-10 NOTE — PLAN OF CARE
POC reviewed with pt and family at 1400. Pt verbalized understanding. Questions and concerns addressed. NO significant changes completed today. Pt remains on all pain medication at this time; hydromorphone PCA, ketamine gtt, IV acetaminophen scheduled and IV robaxin scheduled. No valium has been needed on my shift. Zofran given toward end of shift for increased abdominal pain and slight nausea due to abdomen feeling full. BS this morning sounded high pitch in all three quads and rushing in upper left. NGT taken off LIS at 10am. But pt remained NPO except for ice chips. This afternoon 1400 (4 hours after NGT suction had been off), good normal BS in all four quads.  Four hours later at 1800 during time of discomfort BS were back to very hypoactive in three quads with slight high pitch in the left upper. NGT was hooked back up to lowe intermittent suction with a total of 80mL our in 30 minutes. Pt stated feeling a bit better. Pt is aware that he is NPO post midnight for surgery tomorrow. Consents are signed and in pt paper chart. Pre op check list has been started.  Victor, and 4 PIVs remain in place at this time. Pt progressing toward goals. Will continue to monitor. See flowsheets for full assessment and VS info.        Problem: Fall Injury Risk  Goal: Absence of Fall and Fall-Related Injury  Outcome: Ongoing, Progressing     Problem: Adult Inpatient Plan of Care  Goal: Plan of Care Review  Outcome: Ongoing, Progressing  Goal: Patient-Specific Goal (Individualization)  Outcome: Ongoing, Progressing  Goal: Absence of Hospital-Acquired Illness or Injury  Outcome: Ongoing, Progressing  Goal: Optimal Comfort and Wellbeing  Outcome: Ongoing, Progressing     Problem: Infection  Goal: Infection Symptom Resolution  Outcome: Ongoing, Progressing

## 2020-08-10 NOTE — ASSESSMENT & PLAN NOTE
45 y.o male with no significant PMHx was transferred from  to [facility] [place] with [place] CAP significant for L1 burst fracture with retropulsion into the spinal canal resulting in severe spinal stenosis s/p falling 10ft from roof. He will be admitted to the Neuro ICU s/p  L1 CORPECTOMY and TLIF T12-L2 Interbody Instrumentation.       -Neurosurgery Following  -Neuro checks q1hr   -Vital signs q1hr  -Daily labs  -Dilaudid 2 mg q3hrs prn  8/8  -Added oxycodone  -Increased prn Zofran   pain management following  Ketamine gtt managed by Anesthesia pain mgmt

## 2020-08-10 NOTE — PROGRESS NOTES
[facility]  Neurocritical Care  Progress Note    Admit Date: 8/5/2020  Service Date: 08/10/2020  Length of Stay: 5    Subjective:     Chief Complaint: Traumatic compression fracture of L1 lumbar vertebra    History of Present Illness: Patient is a 46 y/o male with no significant PMH who was transferred from  after falling from 10ft roof at work. Patient denies LOC, complained of excrutiating low back/sacral pain exacerbated with any movement of legs. CT CAP significant for L1 burst fracture with retropulsion into the spinal canal resulting in severe spinal stenosis. Patient states he could feel he had to urinate but could not start a stream, kraus placed and he reports he felt the kraus inserted and felt his bladder release once the kraus was placed. He reports he felt the OSMIN and per report from ED physician, patient had full rectal tone. He is being admitted to Neuro ICU for a higher level of neurological care s/p L1 CORPECTOMY and TLIF T12-L2 Interbody Instrumentation.    Hospital Course: 08/07/2020: Admit to Neuro ICU.  08/08/2020: Added long acting pain medications. Pain better controlled. Stable for stepdown.   08/09/2020: Acute pain management consulted. Ketamine gtt. Ilues, NGT. Suppository.    08/10/2020: Appreciate pain management reccs. Plans for OR tomorrow.        No new subjective & objective note has been filed under this hospital service since the last note was generated.    Assessment/Plan:     Other  Burst fracture of lumbar vertebra, open, initial encounter  -see traumatic compression fracture of L1 lumbar fracture           The patient is being Prophylaxed for:  Venous Thromboembolism with: Chemical  Stress Ulcer with: Not Applicable   Ventilator Pneumonia with: not applicable    Activity Orders          Diet NPO: NPO starting at 08/11 0001    Diet NPO: NPO starting at 08/08 1443    Commode at bedside starting at 08/07 0746        Full Code  Level III  Janeen Gan,  NP  Neurocritical Care  [facility]

## 2020-08-10 NOTE — ANESTHESIA POST-OP PAIN MANAGEMENT
Acute Pain Service Progress Note    Oscar Bills is a 45 y.o., male, [id number]. With no PMHx. Admitted for L1 burst fracture s/p mechanical fall from roof. OR 8/7 for L1 corpectomy w/cage insertion, T12-L2 lateral fusion, deferred posterior instrumentation at the time due to intraop bleeding, plan for take back 8/11.     Post op course complicated by inadequate analgesia and ileus. Profound respiratory depression with hypercarbia 8/8, required narcan. APS consulted for possible thoracic epidural.     Surgery:  S/p L1 corpectomy with cage insertion and T12-L2 lateral fusion on 8/7    Post Op Day #: 3    Problem List:    Active Hospital Problems    Diagnosis  POA    *Traumatic compression fracture of L1 lumbar vertebra [S32.010A]  Yes    Spinal stenosis of lumbar region [M48.061]  Yes    Fall from building [W13.9XXA]  Yes    Acute urinary retention [R33.8]  Yes      Resolved Hospital Problems   No resolved problems to display.       Subjective:     General appearance of alert, oriented, no complaints   Pain with rest: 5    Numbers   Pain with movement: 5    Numbers     Objective:        Vitals   Vitals:    08/10/20 0900   BP: 139/81   Pulse: 105   Resp: 14   Temp:         Labs    No results displayed because visit has over 200 results.           Meds   Current Facility-Administered Medications   Medication Dose Route Frequency Provider Last Rate Last Dose    0.9%  NaCl infusion   Intravenous Continuous Itzel Jimenez PA-C 75 mL/hr at 08/10/20 0800      bisacodyL suppository 10 mg  10 mg Rectal Daily Mir Hou MD   10 mg at 08/10/20 0936    calcium gluconate 1g in dextrose 5% 100mL (ready to mix system)  1 g Intravenous PRN Jai Douglas MD        calcium gluconate 2 g in dextrose 5 % 100 mL IVPB  2 g Intravenous PRN Jai Douglas MD        calcium gluconate 3 g in dextrose 5 % 100 mL IVPB  3 g Intravenous PRN Jai Douglas MD        ceFAZolin  injection 1 g  1 g Intravenous Q8H Oscar Goddard MD   1 g at 08/10/20 0936    dextrose 50% injection 12.5 g  12.5 g Intravenous PRN Itzel Jimenez PA-C        dextrose 50% injection 25 g  25 g Intravenous PRN Itzel Jimenez PA-C        diazePAM injection 5 mg  5 mg Intravenous Q6H Mir Hou MD   5 mg at 08/10/20 0529    enoxaparin injection 40 mg  40 mg Subcutaneous Q24H Janeen Gan, GORDON   40 mg at 08/09/20 1721    glucagon (human recombinant) injection 1 mg  1 mg Intramuscular PRN Itzel Jimenez PA-C        glucose chewable tablet 16 g  16 g Oral PRN Itzel Jimenez PA-C        glucose chewable tablet 16 g  16 g Oral PRN Itzel Jimenez PA-C        glucose chewable tablet 24 g  24 g Oral PRN Itzel Jimenez PA-C        HYDROmorphone PCA syringe 6 mg/30 mL (0.2 mg/mL) NS   Intravenous Continuous Sandy Katz MD        ketamine 500 mg/250 mL (2 mg/mL) in sodium chloride 0.9% infusion  1.5 mcg/kg/min Intravenous Continuous Wesly Orozco MD 4.1 mL/hr at 08/10/20 0800 1.5 mcg/kg/min at 08/10/20 0800    magnesium sulfate 2g in water 50mL IVPB (premix)  2 g Intravenous PRN Jai Douglas MD   2 g at 08/10/20 0258    magnesium sulfate 2g in water 50mL IVPB (premix)  4 g Intravenous PRN Jai Douglas MD        naloxone 0.4 mg/mL injection 0.02 mg  0.02 mg Intravenous PRN Sandy Katz MD        ondansetron injection 8 mg  8 mg Intravenous Q8H PRN Janeen Gan, GORDON   8 mg at 08/10/20 0257    polyethylene glycol packet 17 g  17 g Per NG tube Daily Mir Hou MD   17 g at 08/09/20 0814    potassium chloride 10 mEq in 100 mL IVPB  40 mEq Intravenous PRN Jai Douglas  mL/hr at 08/10/20 0258 40 mEq at 08/10/20 0258    And    potassium chloride 10 mEq in 100 mL IVPB  60 mEq Intravenous PRN Jai Douglas  mL/hr at 08/09/20 0400 60 mEq at 08/09/20 0400     And    potassium chloride 10 mEq in 100 mL IVPB  80 mEq Intravenous PRN Jai Douglas MD        senna-docusate 8.6-50 mg per tablet 2 tablet  2 tablet Per NG tube Daily Mir Hou MD   2 tablet at 08/09/20 0814    sodium chloride 0.9% flush 10 mL  10 mL Intravenous PRN Janeen Gan, NP        sodium phosphate 15 mmol in dextrose 5 % 250 mL IVPB  15 mmol Intravenous PRN Jai Douglas MD        sodium phosphate 20.01 mmol in dextrose 5 % 250 mL IVPB  20.01 mmol Intravenous PRN Jai Douglas MD        sodium phosphate 30 mmol in dextrose 5 % 250 mL IVPB  30 mmol Intravenous PRN Jai Douglas MD            Anticoagulant dose 40mg lovenox at 1700 8/8    Assessment:     Pain control adequate.  Plan:   -Acetaminophen 1000mg q6h, will order IV due to post-op ileus   -Robaxin 1000mg IV q6hr changed to BID as patient appears sedated, if has hallucinations as is also on ketamine, then increase back to former dose.    -Dilaudid PCA (used 3.4mg overnight)   -Ketamine infusion 1.5mcg/kg/min    -Valium 5mg IV q6h scheduled    - Patient to go to OR 8/11 for posterior approach   -Given recent spine surgery, not recommending any neuraxial or perineural block options.     Plan discussed with NCC team at bedside       Evaluator Ramona Messer

## 2020-08-10 NOTE — ASSESSMENT & PLAN NOTE
44 y/o male with no significant PMH with L1 burst fracture with retropulsion resulting in severe canal stenosis s/p fall from [employer] building     S/p L1 corpectomy with cage insertion and T12-L2 lateral fusion on 8/7. Posterior instrumentation deferred for 5 days due to increasing amounts of intraoperative bleeding.     --Admitted to ICU  --All labs and diagnostics reviewed.  --Continue bed rest  --Continue drain w/abx while in place  --MAPs stable off pressors, no goals  --Bedrest/Log roll, HOB <30  --Pain control  --NPO with NG placed for postop ileus. Advanced BR. Monitor for increasing distention or abdominal pain.   --close neurological monitoring, notify neurosurgery with any acute changes  --no ICU requirements per NSY, ok for TTF.   --Plan for OR on Tuesday, 8/11, for posterior instrumentation.     Dispo: ok for TTF to NSGY

## 2020-08-10 NOTE — PROGRESS NOTES
[facility]  Neurosurgery  Progress Note    Subjective:     History of Present Illness: Patient is a 46 y/o male with no significant PMH who was transferred from  after falling from 10ft roof at work. Patient denies LOC, complained of excrutiating low back/sacral pain exacerbated with any movement of legs. CT CAP significant for L1 burst fracture with retropulsion into the spinal canal resulting in severe spinal stenosis. Patient states he could feel he had to urinate but could not start a stream, kraus placed and he reports he felt the kraus inserted and felt his bladder release once the kraus was placed. He reports he felt the OSMIN and per report from ED physician, patient had full rectal tone. The patient declined repeat rectal exam. Denies saddle anesthesia. Endorses any movement of lower extremities causes excruciating pain in the sacram and lower back. Denies radicular pain into the legs. Reports slight numbness in left calf. Denies any prior medical conditions or daily medication.     Post-Op Info:  Procedure(s) (LRB):  L1 CORPECTOMY, SPINE, LUMBAR  (Left)  FUSION, SPINE, LUMBAR, TLIF T12-L2 Interbody Fusion with Instrumentation - Lateral (Left)   4 Days Post-Op     Interval History: naeon    Medications:  Continuous Infusions:   sodium chloride 0.9% 75 mL/hr at 08/10/20 0605    hydromorphone in 0.9 % NaCl 6 mg/30 ml      ketamine (KETALAR) infusion (titrating) 1.5 mcg/kg/min (08/10/20 0605)     Scheduled Meds:   bisacodyL  10 mg Rectal Daily    ceFAZolin (ANCEF) IVPB  1 g Intravenous Q8H    diazePAM  5 mg Intravenous Q6H    enoxaparin  40 mg Subcutaneous Q24H    polyethylene glycol  17 g Per NG tube Daily    senna-docusate 8.6-50 mg  2 tablet Per NG tube Daily     PRN Meds:calcium gluconate IVPB, calcium gluconate IVPB, calcium gluconate IVPB, dextrose 50%, dextrose 50%, glucagon (human recombinant), glucose, glucose, glucose, magnesium sulfate IVPB, magnesium sulfate IVPB,  naloxone, ondansetron, potassium chloride in water **AND** potassium chloride in water **AND** potassium chloride in water, sodium chloride 0.9%, sodium phosphate IVPB, sodium phosphate IVPB, sodium phosphate IVPB     Review of Systems  Objective:     Weight: 90.7 kg (199 lb 15.3 oz)  Body mass index is 28.69 kg/m².  Vital Signs (Most Recent):  Temp: 98.7 °F (37.1 °C) (08/10/20 0305)  Pulse: 107 (08/10/20 0615)  Resp: 15 (08/10/20 0615)  BP: 136/88 (08/10/20 0615)  SpO2: 99 % (08/10/20 0615) Vital Signs (24h Range):  Temp:  [97.2 °F (36.2 °C)-98.9 °F (37.2 °C)] 98.7 °F (37.1 °C)  Pulse:  [] 107  Resp:  [10-22] 15  SpO2:  [96 %-100 %] 99 %  BP: ()/(61-93) 136/88                Resp Rate Total:  [11 br/min-20 br/min] 15 br/min         Closed/Suction Drain 08/06/20 2337 Left Other (Comment) Bulb 10 Fr. (Active)   Site Description Healing 08/10/20 0305   Dressing Type Transparent (Tegaderm) 08/10/20 0305   Dressing Status Old drainage;Clean;Dry;Intact 08/10/20 0305   Dressing Intervention Integrity maintained 08/10/20 0305   Drainage Serosanguineous 08/10/20 0305   Status To bulb suction 08/10/20 0305   Output (mL) 10 mL 08/10/20 0605            NG/OG Tube 08/08/20 1415 nasogastric Right nostril (Active)   Placement Check placement verified by aspirate characteristics;placement verified by distal tube length measurement;placement verified by x-ray 08/10/20 0305   Advancement advanced manually 08/10/20 0305   Tolerance signs/symptoms of discomfort 08/10/20 0305   Securement secured to commercial device 08/10/20 0305   Clamp Status/Tolerance unclamped 08/10/20 0305   Suction Setting/Drainage Method suction at;intermittent setting 08/10/20 0305   Insertion Site Appearance no redness, warmth, tenderness, skin breakdown, drainage 08/10/20 0305   Drainage Coffee ground;Brown;Green 08/10/20 0305   Flush/Irrigation flushed w/;water;no resistance met 08/10/20 0305   Feeding Action feeding held 08/10/20 0305   Intake  "(mL) 60 mL 08/09/20 0900   Tube Output(mL)(Include Discarded Residual) 400 mL 08/10/20 0605            Urethral Catheter 08/05/20 1133 Straight-tip 14 Fr. (Active)   Site Assessment Clean;Intact 08/10/20 0305   Collection Container Urimeter 08/10/20 0305   Securement Method secured to top of thigh w/ adhesive device 08/10/20 0305   Catheter Care Performed yes 08/10/20 0305   Reason for Continuing Urinary Catheterization Critically ill in ICU requiring intensive monitoring;Post operative 08/10/20 0305   CAUTI Prevention Bundle StatLock in place w 1" slack;Green sheeting clip in use;Drainage bag/urimeter off the floor;Intact seal between catheter & drainage tubing;No dependent loops or kinks;Drainage bag/urimeter not overfilled (<2/3 full);Drainage bag/urimeter below bladder 08/10/20 0305   Output (mL) 85 mL 08/10/20 0605       Neurosurgery Physical Exam   AOX3  PERRL  5/5 in BUE/BLE  SILT  Abdomen distended    Significant Labs:  Recent Labs   Lab 08/09/20  0130 08/09/20  1605 08/10/20  0130     --  83  83     --  136  136   K 3.4* 3.7 3.9  3.9     --  101  101   CO2 27  --  28  28   BUN 13  --  12  12   CREATININE 0.7  --  0.6  0.6   CALCIUM 7.5*  --  7.6*  7.6*   MG 1.5*  --  1.8  1.8     Recent Labs   Lab 08/09/20  0130 08/10/20  0130   WBC 8.24 6.86   HGB 7.9* 7.7*   HCT 23.8* 23.4*    205     Recent Labs   Lab 08/09/20  0130 08/10/20  0130   INR 0.9 0.9  0.9   APTT 25.5 26.1  26.1     Microbiology Results (last 7 days)     ** No results found for the last 168 hours. **            Significant Diagnostics:      Assessment/Plan:     * Traumatic compression fracture of L1 lumbar vertebra  46 y/o male with no significant PMH with L1 burst fracture with retropulsion resulting in severe canal stenosis s/p fall from 10ft building     S/p L1 corpectomy with cage insertion and T12-L2 lateral fusion on 8/7. Posterior instrumentation deferred for 5 days due to increasing amounts of " intraoperative bleeding.     --Admitted to ICU  --All labs and diagnostics reviewed.  --Continue bed rest  --Continue drain w/abx while in place  --MAPs stable off pressors, no goals  --Bedrest/Log roll, HOB <30  --Pain control  --NPO with NG placed for postop ileus. Advanced BR. Monitor for increasing distention or abdominal pain.   --close neurological monitoring, notify neurosurgery with any acute changes  --no ICU requirements per NSY, ok for TTF.   --Plan for OR on Tuesday, 8/11, for posterior instrumentation.     Dispo: ok for TTF to AMADOR Prieto,   Neurosurgery  [facility]

## 2020-08-10 NOTE — PLAN OF CARE
POC reviewed with Oscar at 0500. Pt verbalized understanding. Questions and concerns addressed. Pt is on Hydromorphone PCA Pump, Ketamine continuous infusion, and NS drip. NGT still connected to intermittent low suction. Pt progressing toward goals. Will continue to monitor. See flowsheets for full assessment and VS info

## 2020-08-10 NOTE — PHYSICIAN QUERY
PT Name: Oscar Bills  MR #: [id number]    HEMATOLOGY CLARIFICATION      CDS: Juhi Falk RN, CCDS       Contact information: [email or web address]    This form is a permanent document in the medical record.      Query Date: August 10, 2020    By submitting this query, we are merely seeking further clarification of documentation. Please utilize your independent clinical judgment when addressing the question(s) below.    The Medical Record contains the following:   Indicators  Supporting Clinical Findings Location in Medical Record    Anemia documented     X  X  X  X H&H Hgb=13.7, Hct=39.5  Hgb=10.3, Hct=31.5  Hgb=8.3, Hct=25.2  Hgb=7.7, Hct=23.4 8/5-Labs  8/7-Labs  8/8-Labs  8/10-Labs      BP                    HR      GI bleeding documented      Acute bleeding (Non GI site)     X Transfusion(s) Patient transferred to neuro ICU post op, with 1.5L intraop blood loss and received 2 units of blood in the OR.    8/7-NSGY PN   X Acute/Chronic illness L1 burst fracture with retropulsion of the posterior wall and spinal cord compression.  L1 corpectomy with lateral T12-L2 plate, interbody fusion.   8/6-Op Note    Treatments     X    X Other The blood loss was about 1000 mL    S/p L1 corpectomy with cage insertion and T12-L2 lateral fusion. Posterior instrumentation deferred for 5 days due to increasing amounts of intraoperative bleeding.    8/6-Op Note    8/7-NSGY PN     Provider, please specify diagnosis or diagnoses associated with above clinical findings.     [ X  ] Acute blood loss anemia expected post-operatively    [   ] Other Hematological Diagnosis (please specify): _________________   [   ] Clinically Undetermined            Please document in your progress notes daily for the duration of treatment, until resolved, and include in your discharge summary.

## 2020-08-10 NOTE — SUBJECTIVE & OBJECTIVE
Interval History:  Awake. Oriented. Follows commands. S/p L1 corpectomy with cage insertion and T12-L2 lateral fusion on 8/7. Posterior instrumentation deferred for 5 days due to increasing amounts of intraoperative bleeding. Plan for OR in am. Closed suction post-op drain remains intact. KUB with impression signifying ileus. Continue NPO. Initiate Mag citrate. Post-op pain managed with ketamine gtt via Pain Management Team. Continue gtt through surgery. Will reassess post for continuation.    Oxygen saturation maintained > 94% on 2L NC. MAP maintained > 65 without difficulty. The patient requires critical care stay due to high probability of life threatening deterioration in their condition.    Review of Systems   Constitutional: Negative for fever.   HENT: Negative for trouble swallowing and voice change.    Eyes: Negative for visual disturbance.   Respiratory: Negative for shortness of breath and wheezing.    Cardiovascular: Negative for palpitations.   Gastrointestinal: Positive for abdominal distention and abdominal pain.   Endocrine: Negative for polyuria.   Genitourinary: Negative for difficulty urinating.   Musculoskeletal: Positive for back pain.   Neurological: Positive for weakness. Negative for dizziness and headaches.   Psychiatric/Behavioral: Negative for agitation.       Objective:     Vitals:  Temp: 98.5 °F (36.9 °C)  Pulse: (!) 112  Rhythm: sinus tachycardia  BP: 130/75  MAP (mmHg): 97  Resp: 14  ETCO2 (mmHg): 28 mmHg  SpO2: 98 %  O2 Device (Oxygen Therapy): nasal cannula    Temp  Min: 97.5 °F (36.4 °C)  Max: 98.9 °F (37.2 °C)  Pulse  Min: 94  Max: 123  BP  Min: 98/79  Max: 140/77  MAP (mmHg)  Min: 74  Max: 105  Resp  Min: 11  Max: 25  ETCO2 (mmHg)  Min: 28 mmHg  Max: 36 mmHg  SpO2  Min: 96 %  Max: 100 %    08/09 0701 - 08/10 0700  In: 3462.6 [I.V.:2202.6]  Out: 2530 [Urine:1640; Drains:890]   Unmeasured Output  Stool Occurrence: 1       Physical Exam  Vitals signs and nursing note reviewed.    Constitutional:       Appearance: Normal appearance. He is normal weight.   HENT:      Head: Normocephalic and atraumatic.      Right Ear: External ear normal.      Left Ear: External ear normal.      Nose: Nose normal. No congestion.   Eyes:      General: No scleral icterus.     Extraocular Movements: Extraocular movements intact.      Conjunctiva/sclera: Conjunctivae normal.      Pupils: Pupils are equal, round, and reactive to light.   Neck:      Musculoskeletal: No neck rigidity.   Cardiovascular:      Rate and Rhythm: Normal rate and regular rhythm.      Pulses: Normal pulses.      Heart sounds: Normal heart sounds.   Pulmonary:      Effort: Pulmonary effort is normal. No respiratory distress.      Breath sounds: Normal breath sounds.   Abdominal:      General: Bowel sounds are increased. There is distension.      Comments: NGT to low intermittent suction   Musculoskeletal: Normal range of motion.   Skin:     General: Skin is warm.      Capillary Refill: Capillary refill takes less than 2 seconds.   Neurological:      Mental Status: He is alert and oriented to person, place, and time.      GCS: GCS eye subscore is 4. GCS verbal subscore is 5. GCS motor subscore is 6.      Motor: Weakness present.      Comments: Awake, oriented  PERRLA  EOMI  JONES spontaneously   Weakness in RLE             Medications:  Continuoussodium chloride 0.9%, Last Rate: 75 mL/hr (08/10/20 1319)  hydromorphone in 0.9 % NaCl 6 mg/30 ml  ketamine (KETALAR) infusion (titrating), Last Rate: 1.5 mcg/kg/min (08/10/20 1300)    Scheduledacetaminophen, 1,000 mg, Q6H  bisacodyL, 10 mg, Daily  ceFAZolin (ANCEF) IVPB, 1 g, Q8H  diazePAM, 5 mg, Q12H  enoxaparin, 40 mg, Q24H  methocarbamol (ROBAXIN) IVPB, 1,000 mg, Q8H  polyethylene glycol, 17 g, Daily  senna-docusate 8.6-50 mg, 2 tablet, Daily    PRNcalcium gluconate IVPB, 1 g, PRN  calcium gluconate IVPB, 2 g, PRN  calcium gluconate IVPB, 3 g, PRN  dextrose 50%, 12.5 g, PRN  dextrose 50%, 25 g,  PRN  glucagon (human recombinant), 1 mg, PRN  glucose, 16 g, PRN  glucose, 16 g, PRN  glucose, 24 g, PRN  magnesium sulfate IVPB, 2 g, PRN  magnesium sulfate IVPB, 4 g, PRN  naloxone, 0.02 mg, PRN  ondansetron, 8 mg, Q8H PRN  potassium chloride in water, 40 mEq, PRN    And  potassium chloride in water, 60 mEq, PRN    And  potassium chloride in water, 80 mEq, PRN  sodium chloride 0.9%, 10 mL, PRN  sodium phosphate IVPB, 15 mmol, PRN  sodium phosphate IVPB, 20.01 mmol, PRN  sodium phosphate IVPB, 30 mmol, PRN        Diet  Diet NPO  Diet NPO  Diet NPO  Diet NPO

## 2020-08-10 NOTE — SUBJECTIVE & OBJECTIVE
Interval History: naeon    Medications:  Continuous Infusions:   sodium chloride 0.9% 75 mL/hr at 08/10/20 0605    hydromorphone in 0.9 % NaCl 6 mg/30 ml      ketamine (KETALAR) infusion (titrating) 1.5 mcg/kg/min (08/10/20 0605)     Scheduled Meds:   bisacodyL  10 mg Rectal Daily    ceFAZolin (ANCEF) IVPB  1 g Intravenous Q8H    diazePAM  5 mg Intravenous Q6H    enoxaparin  40 mg Subcutaneous Q24H    polyethylene glycol  17 g Per NG tube Daily    senna-docusate 8.6-50 mg  2 tablet Per NG tube Daily     PRN Meds:calcium gluconate IVPB, calcium gluconate IVPB, calcium gluconate IVPB, dextrose 50%, dextrose 50%, glucagon (human recombinant), glucose, glucose, glucose, magnesium sulfate IVPB, magnesium sulfate IVPB, naloxone, ondansetron, potassium chloride in water **AND** potassium chloride in water **AND** potassium chloride in water, sodium chloride 0.9%, sodium phosphate IVPB, sodium phosphate IVPB, sodium phosphate IVPB     Review of Systems  Objective:     Weight: 90.7 kg (199 lb 15.3 oz)  Body mass index is 28.69 kg/m².  Vital Signs (Most Recent):  Temp: 98.7 °F (37.1 °C) (08/10/20 0305)  Pulse: 107 (08/10/20 0615)  Resp: 15 (08/10/20 0615)  BP: 136/88 (08/10/20 0615)  SpO2: 99 % (08/10/20 0615) Vital Signs (24h Range):  Temp:  [97.2 °F (36.2 °C)-98.9 °F (37.2 °C)] 98.7 °F (37.1 °C)  Pulse:  [] 107  Resp:  [10-22] 15  SpO2:  [96 %-100 %] 99 %  BP: ()/(61-93) 136/88                Resp Rate Total:  [11 br/min-20 br/min] 15 br/min         Closed/Suction Drain 08/06/20 7397 Left Other (Comment) Bulb 10 Fr. (Active)   Site Description Healing 08/10/20 0305   Dressing Type Transparent (Tegaderm) 08/10/20 0305   Dressing Status Old drainage;Clean;Dry;Intact 08/10/20 0305   Dressing Intervention Integrity maintained 08/10/20 0305   Drainage Serosanguineous 08/10/20 0305   Status To bulb suction 08/10/20 0305   Output (mL) 10 mL 08/10/20 0605            NG/OG Tube 08/08/20 1415 nasogastric Right  "nostril (Active)   Placement Check placement verified by aspirate characteristics;placement verified by distal tube length measurement;placement verified by x-ray 08/10/20 0305   Advancement advanced manually 08/10/20 0305   Tolerance signs/symptoms of discomfort 08/10/20 0305   Securement secured to commercial device 08/10/20 0305   Clamp Status/Tolerance unclamped 08/10/20 0305   Suction Setting/Drainage Method suction at;intermittent setting 08/10/20 0305   Insertion Site Appearance no redness, warmth, tenderness, skin breakdown, drainage 08/10/20 0305   Drainage Coffee ground;Brown;Green 08/10/20 0305   Flush/Irrigation flushed w/;water;no resistance met 08/10/20 0305   Feeding Action feeding held 08/10/20 0305   Intake (mL) 60 mL 08/09/20 0900   Tube Output(mL)(Include Discarded Residual) 400 mL 08/10/20 0605            Urethral Catheter 08/05/20 1133 Straight-tip 14 Fr. (Active)   Site Assessment Clean;Intact 08/10/20 0305   Collection Container Urimeter 08/10/20 0305   Securement Method secured to top of thigh w/ adhesive device 08/10/20 0305   Catheter Care Performed yes 08/10/20 0305   Reason for Continuing Urinary Catheterization Critically ill in ICU requiring intensive monitoring;Post operative 08/10/20 0305   CAUTI Prevention Bundle StatLock in place w 1" slack;Green sheeting clip in use;Drainage bag/urimeter off the floor;Intact seal between catheter & drainage tubing;No dependent loops or kinks;Drainage bag/urimeter not overfilled (<2/3 full);Drainage bag/urimeter below bladder 08/10/20 0305   Output (mL) 85 mL 08/10/20 0605       Neurosurgery Physical Exam   AOX3  PERRL  5/5 in BUE/BLE  SILT  Abdomen distended    Significant Labs:  Recent Labs   Lab 08/09/20  0130 08/09/20  1605 08/10/20  0130     --  83  83     --  136  136   K 3.4* 3.7 3.9  3.9     --  101  101   CO2 27  --  28  28   BUN 13  --  12  12   CREATININE 0.7  --  0.6  0.6   CALCIUM 7.5*  --  7.6*  7.6*   MG " 1.5*  --  1.8  1.8     Recent Labs   Lab 08/09/20  0130 08/10/20  0130   WBC 8.24 6.86   HGB 7.9* 7.7*   HCT 23.8* 23.4*    205     Recent Labs   Lab 08/09/20  0130 08/10/20  0130   INR 0.9 0.9  0.9   APTT 25.5 26.1  26.1     Microbiology Results (last 7 days)     ** No results found for the last 168 hours. **            Significant Diagnostics:

## 2020-08-10 NOTE — ASSESSMENT & PLAN NOTE
S/P L1 CORPECTOMY, SPINE, LUMBAR  (Left)  FUSION, SPINE, LUMBAR, TLIF T12-L2 Interbody Fusion with Instrumentation - Lateral (Left)     -Plans for OR on Tuesday

## 2020-08-10 NOTE — PROGRESS NOTES
[facility]  Neurocritical Care  Progress Note    Admit Date: 8/5/2020  Service Date: 08/10/2020  Length of Stay: 5    Subjective:     Chief Complaint: Traumatic compression fracture of L1 lumbar vertebra    History of Present Illness: Patient is a 44 y/o male with no significant PMH who was transferred from  after falling from 10ft roof at work. Patient denies LOC, complained of excrutiating low back/sacral pain exacerbated with any movement of legs. CT CAP significant for L1 burst fracture with retropulsion into the spinal canal resulting in severe spinal stenosis. Patient states he could feel he had to urinate but could not start a stream, kraus placed and he reports he felt the kraus inserted and felt his bladder release once the kraus was placed. He reports he felt the OSMIN and per report from ED physician, patient had full rectal tone. He is being admitted to Neuro ICU for a higher level of neurological care s/p L1 CORPECTOMY and TLIF T12-L2 Interbody Instrumentation.    Hospital Course: 08/07/2020: Admit to Neuro ICU.  08/08/2020: Added long acting pain medications. Pain better controlled. Stable for stepdown.   08/09/2020: Acute pain management consulted. Ketamine gtt. Ilues, NGT. Suppository.    08/10/2020: Appreciate pain management reccs. Plans for OR tomorrow.        Interval History:  NAEON. Appreciate pain management recommendations.     Review of Systems   Constitutional: Negative for fever.   HENT: Negative for trouble swallowing and voice change.    Eyes: Negative for visual disturbance.   Respiratory: Negative for shortness of breath and wheezing.    Cardiovascular: Negative for palpitations.   Gastrointestinal: Negative for abdominal distention.   Endocrine: Negative for polyuria.   Genitourinary: Negative for difficulty urinating.   Musculoskeletal: Positive for back pain.   Neurological: Positive for weakness. Negative for dizziness and headaches.   Psychiatric/Behavioral:  Negative for agitation.       Objective:     Vitals:  Temp: 98.5 °F (36.9 °C)  Pulse: (!) 112  Rhythm: sinus tachycardia  BP: 130/75  MAP (mmHg): 97  Resp: 14  ETCO2 (mmHg): 28 mmHg  SpO2: 98 %  O2 Device (Oxygen Therapy): nasal cannula    Temp  Min: 97.5 °F (36.4 °C)  Max: 98.9 °F (37.2 °C)  Pulse  Min: 94  Max: 123  BP  Min: 98/79  Max: 140/77  MAP (mmHg)  Min: 74  Max: 105  Resp  Min: 11  Max: 25  ETCO2 (mmHg)  Min: 28 mmHg  Max: 36 mmHg  SpO2  Min: 96 %  Max: 100 %    08/09 0701 - 08/10 0700  In: 3462.6 [I.V.:2202.6]  Out: 2530 [Urine:1640; Drains:890]   Unmeasured Output  Stool Occurrence: 1       Physical Exam  Vitals signs and nursing note reviewed.   Constitutional:       Appearance: Normal appearance. He is normal weight.   HENT:      Head: Normocephalic and atraumatic.      Right Ear: External ear normal.      Left Ear: External ear normal.      Nose: Nose normal. No congestion.   Eyes:      General: No scleral icterus.     Extraocular Movements: Extraocular movements intact.      Conjunctiva/sclera: Conjunctivae normal.      Pupils: Pupils are equal, round, and reactive to light.   Neck:      Musculoskeletal: No neck rigidity.   Cardiovascular:      Rate and Rhythm: Normal rate and regular rhythm.      Pulses: Normal pulses.      Heart sounds: Normal heart sounds.   Pulmonary:      Effort: Pulmonary effort is normal. No respiratory distress.      Breath sounds: Normal breath sounds.   Abdominal:      General: Bowel sounds are increased. There is distension.      Comments: NGT to low intermittent suction   Musculoskeletal: Normal range of motion.   Skin:     General: Skin is warm.      Capillary Refill: Capillary refill takes less than 2 seconds.   Neurological:      Mental Status: He is alert and oriented to person, place, and time.      GCS: GCS eye subscore is 4. GCS verbal subscore is 5. GCS motor subscore is 6.      Motor: Weakness present.      Comments: Awake, alert, and oriented to  x4  PERRLA  EOMI  JONES spontaneously   Weakness in RLE  Cranial nerves examined and are grossly intact           Medications:  Continuoussodium chloride 0.9%, Last Rate: 75 mL/hr (08/10/20 1319)  hydromorphone in 0.9 % NaCl 6 mg/30 ml  ketamine (KETALAR) infusion (titrating), Last Rate: 1.5 mcg/kg/min (08/10/20 1300)    Scheduledacetaminophen, 1,000 mg, Q6H  bisacodyL, 10 mg, Daily  ceFAZolin (ANCEF) IVPB, 1 g, Q8H  diazePAM, 5 mg, Q12H  enoxaparin, 40 mg, Q24H  methocarbamol (ROBAXIN) IVPB, 1,000 mg, Q8H  polyethylene glycol, 17 g, Daily  senna-docusate 8.6-50 mg, 2 tablet, Daily    PRNcalcium gluconate IVPB, 1 g, PRN  calcium gluconate IVPB, 2 g, PRN  calcium gluconate IVPB, 3 g, PRN  dextrose 50%, 12.5 g, PRN  dextrose 50%, 25 g, PRN  glucagon (human recombinant), 1 mg, PRN  glucose, 16 g, PRN  glucose, 16 g, PRN  glucose, 24 g, PRN  magnesium sulfate IVPB, 2 g, PRN  magnesium sulfate IVPB, 4 g, PRN  naloxone, 0.02 mg, PRN  ondansetron, 8 mg, Q8H PRN  potassium chloride in water, 40 mEq, PRN    And  potassium chloride in water, 60 mEq, PRN    And  potassium chloride in water, 80 mEq, PRN  sodium chloride 0.9%, 10 mL, PRN  sodium phosphate IVPB, 15 mmol, PRN  sodium phosphate IVPB, 20.01 mmol, PRN  sodium phosphate IVPB, 30 mmol, PRN        Diet  Diet NPO  Diet NPO  Diet NPO  Diet NPO        Assessment/Plan:     Neuro  * Traumatic compression fracture of L1 lumbar vertebra  45 y.o male with no significant PMHx was [place]  [place] [facility] [place] significant for L1 burst fracture with retropulsion into the spinal canal resulting in severe spinal stenosis s/p falling 10ft from roof. He will be admitted to the Neuro ICU s/p  L1 CORPECTOMY and TLIF T12-L2 Interbody Instrumentation.     -Admit to Neuro ICU  -Consult Neurosurgery s/p intervention  -Neuro checks q1hr   -Vital signs q1hr  -Daily labs  -Dilaudid 2 mg q3hrs prn  -Increased Gabapentin to 600 tid    8/8  -Added oxycodone  -Increased prn Zofran      8/9  Consulted acute pain management   Ketamine gtt/ IV tylenol for 24 hours   Appreciate pain management reccs    8/10  Appreciate pain management reccs  Continue ketamine gtt and iv tylenol        Spinal stenosis of lumbar region  S/P L1 CORPECTOMY, SPINE, LUMBAR  (Left)  FUSION, SPINE, LUMBAR, TLIF T12-L2 Interbody Fusion with Instrumentation - Lateral (Left)   3 Day Post-Op    -Plans for OR on Tuesday     Renal/  Acute urinary retention  Victor placed, continue to monitor strict I's and O's    GI  Ileus  -NGT placed, placed to low intermittent suction   -Bowel rest  -Suppositories daily until bm     Orthopedic  Fall from building  See above          The patient is being Prophylaxed for:  Venous Thromboembolism with: Chemical  Stress Ulcer with: Not Applicable   Ventilator Pneumonia with: not applicable    Activity Orders          Diet NPO: NPO starting at 08/11 0001    Diet NPO: NPO starting at 08/08 1443    Commode at bedside starting at 08/07 0746        Full Code  Level III  Janeen Gan NP  Neurocritical Care  [facility]

## 2020-08-10 NOTE — ASSESSMENT & PLAN NOTE
-NGT placed, placed to low intermittent suction   -Bowel rest, NPO  -Suppositories daily until bm   KUB/Mag Citrate

## 2020-08-11 LAB
ALBUMIN SERPL BCP-MCNC: 2.1 G/DL (ref 3.5–5.2)
ALLENS TEST: ABNORMAL
ALP SERPL-CCNC: 44 U/L (ref 55–135)
ALT SERPL W/O P-5'-P-CCNC: 8 U/L (ref 10–44)
ANION GAP SERPL CALC-SCNC: 10 MMOL/L (ref 8–16)
APTT BLDCRRT: 27.8 SEC (ref 21–32)
AST SERPL-CCNC: 20 U/L (ref 10–40)
BASOPHILS # BLD AUTO: 0.01 K/UL (ref 0–0.2)
BASOPHILS NFR BLD: 0.2 % (ref 0–1.9)
BILIRUB SERPL-MCNC: 0.4 MG/DL (ref 0.1–1)
BUN SERPL-MCNC: 9 MG/DL (ref 6–20)
CALCIUM SERPL-MCNC: 7.4 MG/DL (ref 8.7–10.5)
CHLORIDE SERPL-SCNC: 101 MMOL/L (ref 95–110)
CO2 SERPL-SCNC: 22 MMOL/L (ref 23–29)
CREAT SERPL-MCNC: 0.6 MG/DL (ref 0.5–1.4)
DELSYS: ABNORMAL
DIFFERENTIAL METHOD: ABNORMAL
EOSINOPHIL # BLD AUTO: 0.2 K/UL (ref 0–0.5)
EOSINOPHIL NFR BLD: 3.1 % (ref 0–8)
ERYTHROCYTE [DISTWIDTH] IN BLOOD BY AUTOMATED COUNT: 12.4 % (ref 11.5–14.5)
ERYTHROCYTE [SEDIMENTATION RATE] IN BLOOD BY WESTERGREN METHOD: 22 MM/H
EST. GFR  (AFRICAN AMERICAN): >60 ML/MIN/1.73 M^2
EST. GFR  (NON AFRICAN AMERICAN): >60 ML/MIN/1.73 M^2
FIO2: 28
FLOW: 2
GLUCOSE SERPL-MCNC: 77 MG/DL (ref 70–110)
HCO3 UR-SCNC: 22.9 MMOL/L (ref 24–28)
HCT VFR BLD AUTO: 22.6 % (ref 40–54)
HGB BLD-MCNC: 7.5 G/DL (ref 14–18)
IMM GRANULOCYTES # BLD AUTO: 0.03 K/UL (ref 0–0.04)
IMM GRANULOCYTES NFR BLD AUTO: 0.5 % (ref 0–0.5)
INR PPP: 1 (ref 0.8–1.2)
LYMPHOCYTES # BLD AUTO: 0.6 K/UL (ref 1–4.8)
LYMPHOCYTES NFR BLD: 8.9 % (ref 18–48)
MAGNESIUM SERPL-MCNC: 1.7 MG/DL (ref 1.6–2.6)
MCH RBC QN AUTO: 30.9 PG (ref 27–31)
MCHC RBC AUTO-ENTMCNC: 33.2 G/DL (ref 32–36)
MCV RBC AUTO: 93 FL (ref 82–98)
MODE: ABNORMAL
MONOCYTES # BLD AUTO: 0.5 K/UL (ref 0.3–1)
MONOCYTES NFR BLD: 8.6 % (ref 4–15)
NEUTROPHILS # BLD AUTO: 4.9 K/UL (ref 1.8–7.7)
NEUTROPHILS NFR BLD: 78.7 % (ref 38–73)
NRBC BLD-RTO: 0 /100 WBC
PCO2 BLDA: 33.7 MMHG (ref 35–45)
PH SMN: 7.44 [PH] (ref 7.35–7.45)
PHOSPHATE SERPL-MCNC: 2.9 MG/DL (ref 2.7–4.5)
PLATELET # BLD AUTO: 243 K/UL (ref 150–350)
PMV BLD AUTO: 9.4 FL (ref 9.2–12.9)
PO2 BLDA: 70 MMHG (ref 80–100)
POC BE: -1 MMOL/L
POC SATURATED O2: 95 % (ref 95–100)
POC TCO2: 24 MMOL/L (ref 23–27)
POTASSIUM SERPL-SCNC: 3.9 MMOL/L (ref 3.5–5.1)
PROT SERPL-MCNC: 5.1 G/DL (ref 6–8.4)
PROTHROMBIN TIME: 10.8 SEC (ref 9–12.5)
RBC # BLD AUTO: 2.43 M/UL (ref 4.6–6.2)
SAMPLE: ABNORMAL
SITE: ABNORMAL
SODIUM SERPL-SCNC: 132 MMOL/L (ref 136–145)
SODIUM SERPL-SCNC: 133 MMOL/L (ref 136–145)
SODIUM UR-SCNC: 170 MMOL/L (ref 20–250)
SP02: 100
WBC # BLD AUTO: 6.17 K/UL (ref 3.9–12.7)

## 2020-08-11 PROCEDURE — 63600175 PHARM REV CODE 636 W HCPCS: Performed by: STUDENT IN AN ORGANIZED HEALTH CARE EDUCATION/TRAINING PROGRAM

## 2020-08-11 PROCEDURE — 94761 N-INVAS EAR/PLS OXIMETRY MLT: CPT

## 2020-08-11 PROCEDURE — 99231 SBSQ HOSP IP/OBS SF/LOW 25: CPT | Mod: ,,, | Performed by: ANESTHESIOLOGY

## 2020-08-11 PROCEDURE — 63600175 PHARM REV CODE 636 W HCPCS: Performed by: UROLOGY

## 2020-08-11 PROCEDURE — 20000000 HC ICU ROOM

## 2020-08-11 PROCEDURE — 84300 ASSAY OF URINE SODIUM: CPT

## 2020-08-11 PROCEDURE — 99900035 HC TECH TIME PER 15 MIN (STAT)

## 2020-08-11 PROCEDURE — 94770 HC EXHALED C02 TEST: CPT

## 2020-08-11 PROCEDURE — 85730 THROMBOPLASTIN TIME PARTIAL: CPT

## 2020-08-11 PROCEDURE — 99291 CRITICAL CARE FIRST HOUR: CPT | Mod: ,,, | Performed by: NURSE PRACTITIONER

## 2020-08-11 PROCEDURE — 85025 COMPLETE CBC W/AUTO DIFF WBC: CPT

## 2020-08-11 PROCEDURE — 36600 WITHDRAWAL OF ARTERIAL BLOOD: CPT

## 2020-08-11 PROCEDURE — 25000003 PHARM REV CODE 250: Performed by: PHYSICIAN ASSISTANT

## 2020-08-11 PROCEDURE — 25000003 PHARM REV CODE 250: Performed by: PSYCHIATRY & NEUROLOGY

## 2020-08-11 PROCEDURE — 84100 ASSAY OF PHOSPHORUS: CPT

## 2020-08-11 PROCEDURE — 80053 COMPREHEN METABOLIC PANEL: CPT

## 2020-08-11 PROCEDURE — 92523 SPEECH SOUND LANG COMPREHEN: CPT

## 2020-08-11 PROCEDURE — 27000221 HC OXYGEN, UP TO 24 HOURS

## 2020-08-11 PROCEDURE — 63600175 PHARM REV CODE 636 W HCPCS: Performed by: ANESTHESIOLOGY

## 2020-08-11 PROCEDURE — 83735 ASSAY OF MAGNESIUM: CPT

## 2020-08-11 PROCEDURE — 99231 PR SUBSEQUENT HOSPITAL CARE,LEVL I: ICD-10-PCS | Mod: ,,, | Performed by: ANESTHESIOLOGY

## 2020-08-11 PROCEDURE — 84295 ASSAY OF SERUM SODIUM: CPT

## 2020-08-11 PROCEDURE — 85610 PROTHROMBIN TIME: CPT

## 2020-08-11 PROCEDURE — 99291 PR CRITICAL CARE, E/M 30-74 MINUTES: ICD-10-PCS | Mod: ,,, | Performed by: NURSE PRACTITIONER

## 2020-08-11 PROCEDURE — 25000003 PHARM REV CODE 250: Performed by: STUDENT IN AN ORGANIZED HEALTH CARE EDUCATION/TRAINING PROGRAM

## 2020-08-11 PROCEDURE — 25000003 PHARM REV CODE 250: Performed by: NURSE PRACTITIONER

## 2020-08-11 RX ORDER — SYRING-NEEDL,DISP,INSUL,0.3 ML 29 G X1/2"
296 SYRINGE, EMPTY DISPOSABLE MISCELLANEOUS ONCE
Status: COMPLETED | OUTPATIENT
Start: 2020-08-11 | End: 2020-08-11

## 2020-08-11 RX ORDER — HEPARIN SODIUM 5000 [USP'U]/ML
5000 INJECTION, SOLUTION INTRAVENOUS; SUBCUTANEOUS EVERY 8 HOURS
Status: COMPLETED | OUTPATIENT
Start: 2020-08-11 | End: 2020-08-11

## 2020-08-11 RX ORDER — DIAZEPAM 10 MG/2ML
5 INJECTION INTRAMUSCULAR 3 TIMES DAILY
Status: DISCONTINUED | OUTPATIENT
Start: 2020-08-11 | End: 2020-08-14

## 2020-08-11 RX ORDER — ACETAMINOPHEN 10 MG/ML
1000 INJECTION, SOLUTION INTRAVENOUS EVERY 6 HOURS
Status: COMPLETED | OUTPATIENT
Start: 2020-08-11 | End: 2020-08-12

## 2020-08-11 RX ADMIN — POLYETHYLENE GLYCOL 3350 17 G: 17 POWDER, FOR SOLUTION ORAL at 09:08

## 2020-08-11 RX ADMIN — DIAZEPAM 5 MG: 5 INJECTION, SOLUTION INTRAMUSCULAR; INTRAVENOUS at 09:08

## 2020-08-11 RX ADMIN — ACETAMINOPHEN 1000 MG: 10 INJECTION, SOLUTION INTRAVENOUS at 11:08

## 2020-08-11 RX ADMIN — Medication: at 01:08

## 2020-08-11 RX ADMIN — KETAMINE HYDROCHLORIDE 2 MCG/KG/MIN: 100 INJECTION INTRAMUSCULAR; INTRAVENOUS at 08:08

## 2020-08-11 RX ADMIN — METHOCARBAMOL 1000 MG: 100 INJECTION INTRAMUSCULAR; INTRAVENOUS at 01:08

## 2020-08-11 RX ADMIN — ACETAMINOPHEN 1000 MG: 10 INJECTION, SOLUTION INTRAVENOUS at 05:08

## 2020-08-11 RX ADMIN — MAGNESIUM CITRATE 296 ML: 1.75 LIQUID ORAL at 07:08

## 2020-08-11 RX ADMIN — POTASSIUM CHLORIDE 40 MEQ: 7.46 INJECTION, SOLUTION INTRAVENOUS at 03:08

## 2020-08-11 RX ADMIN — SODIUM CHLORIDE: 0.9 INJECTION, SOLUTION INTRAVENOUS at 08:08

## 2020-08-11 RX ADMIN — DOCUSATE SODIUM 50MG AND SENNOSIDES 8.6MG 2 TABLET: 8.6; 5 TABLET, FILM COATED ORAL at 09:08

## 2020-08-11 RX ADMIN — CEFAZOLIN 1 G: 1 INJECTION, POWDER, FOR SOLUTION INTRAMUSCULAR; INTRAVENOUS at 09:08

## 2020-08-11 RX ADMIN — MAGNESIUM SULFATE IN WATER 2 G: 40 INJECTION, SOLUTION INTRAVENOUS at 03:08

## 2020-08-11 RX ADMIN — DIAZEPAM 5 MG: 5 INJECTION, SOLUTION INTRAMUSCULAR; INTRAVENOUS at 02:08

## 2020-08-11 RX ADMIN — ACETAMINOPHEN 1000 MG: 10 INJECTION, SOLUTION INTRAVENOUS at 12:08

## 2020-08-11 RX ADMIN — BISACODYL 10 MG: 10 SUPPOSITORY RECTAL at 09:08

## 2020-08-11 RX ADMIN — CEFAZOLIN 1 G: 1 INJECTION, POWDER, FOR SOLUTION INTRAMUSCULAR; INTRAVENOUS at 01:08

## 2020-08-11 RX ADMIN — METHOCARBAMOL 1000 MG: 100 INJECTION INTRAMUSCULAR; INTRAVENOUS at 05:08

## 2020-08-11 RX ADMIN — METHOCARBAMOL 1000 MG: 100 INJECTION INTRAMUSCULAR; INTRAVENOUS at 09:08

## 2020-08-11 RX ADMIN — HEPARIN SODIUM 5000 UNITS: 5000 INJECTION INTRAVENOUS; SUBCUTANEOUS at 08:08

## 2020-08-11 RX ADMIN — DIAZEPAM 5 MG: 5 INJECTION, SOLUTION INTRAMUSCULAR; INTRAVENOUS at 08:08

## 2020-08-11 NOTE — PROGRESS NOTES
[facility]  Neurosurgery  Progress Note    Subjective:     History of Present Illness: Patient is a 44 y/o male with no significant PMH who was transferred from  after falling from 10ft roof at work. Patient denies LOC, complained of excrutiating low back/sacral pain exacerbated with any movement of legs. CT CAP significant for L1 burst fracture with retropulsion into the spinal canal resulting in severe spinal stenosis. Patient states he could feel he had to urinate but could not start a stream, kraus placed and he reports he felt the kraus inserted and felt his bladder release once the kraus was placed. He reports he felt the OSMIN and per report from ED physician, patient had full rectal tone. The patient declined repeat rectal exam. Denies saddle anesthesia. Endorses any movement of lower extremities causes excruciating pain in the sacram and lower back. Denies radicular pain into the legs. Reports slight numbness in left calf. Denies any prior medical conditions or daily medication.     Post-Op Info:  Procedure(s) (LRB):  L1 CORPECTOMY, SPINE, LUMBAR  (Left)  FUSION, SPINE, LUMBAR, TLIF T12-L2 Interbody Fusion with Instrumentation - Lateral (Left)   5 Days Post-Op     Interval History: naeon, abdomen still distended.  Not passing flatus.    Medications:  Continuous Infusions:   sodium chloride 0.9% 75 mL/hr at 08/11/20 0605    hydromorphone in 0.9 % NaCl 6 mg/30 ml      ketamine (KETALAR) infusion (titrating) 1.5 mcg/kg/min (08/11/20 0605)     Scheduled Meds:   acetaminophen  1,000 mg Intravenous Q6H    bisacodyL  10 mg Rectal Daily    ceFAZolin (ANCEF) IVPB  1 g Intravenous Q8H    diazePAM  5 mg Intravenous Q12H    enoxaparin  40 mg Subcutaneous Q24H    methocarbamol (ROBAXIN) IVPB  1,000 mg Intravenous Q8H    polyethylene glycol  17 g Per NG tube Daily    senna-docusate 8.6-50 mg  2 tablet Per NG tube Daily     PRN Meds:calcium gluconate IVPB, calcium gluconate IVPB, calcium  gluconate IVPB, dextrose 50%, dextrose 50%, glucagon (human recombinant), magnesium sulfate IVPB, magnesium sulfate IVPB, naloxone, ondansetron, potassium chloride in water **AND** potassium chloride in water **AND** potassium chloride in water, sodium chloride 0.9%, sodium phosphate IVPB, sodium phosphate IVPB, sodium phosphate IVPB     Review of Systems  Objective:     Weight: 90.7 kg (199 lb 15.3 oz)  Body mass index is 28.69 kg/m².  Vital Signs (Most Recent):  Temp: 99.1 °F (37.3 °C) (08/11/20 0305)  Pulse: 104 (08/11/20 0615)  Resp: 14 (08/11/20 0615)  BP: 124/88 (08/11/20 0605)  SpO2: 98 % (08/11/20 0615) Vital Signs (24h Range):  Temp:  [98.5 °F (36.9 °C)-99.9 °F (37.7 °C)] 99.1 °F (37.3 °C)  Pulse:  [] 104  Resp:  [13-30] 14  SpO2:  [95 %-100 %] 98 %  BP: (117-152)/(68-98) 124/88                Resp Rate Total:  [15 br/min-20 br/min] 18 br/min         Closed/Suction Drain 08/06/20 2337 Left Other (Comment) Bulb 10 Fr. (Active)   Site Description Healing 08/11/20 0305   Dressing Type Transparent (Tegaderm) 08/11/20 0305   Dressing Status Old drainage;Clean;Dry;Intact 08/11/20 0305   Dressing Intervention Integrity maintained 08/11/20 0305   Drainage Serosanguineous 08/11/20 0305   Status To bulb suction 08/11/20 0305   Output (mL) 5 mL 08/11/20 0605            NG/OG Tube 08/08/20 1415 nasogastric Right nostril (Active)   Placement Check placement verified by aspirate characteristics;placement verified by distal tube length measurement;placement verified by x-ray 08/11/20 0305   Advancement advanced manually 08/11/20 0305   Tolerance signs/symptoms of discomfort 08/11/20 0305   Securement secured to commercial device 08/11/20 0305   Clamp Status/Tolerance unclamped 08/11/20 0305   Suction Setting/Drainage Method suction at;intermittent setting 08/11/20 0305   Insertion Site Appearance no redness, warmth, tenderness, skin breakdown, drainage 08/11/20 0305   Drainage Coffee ground;Brown;Green 08/11/20 0305  "  Flush/Irrigation flushed w/;water;no resistance met 08/11/20 0305   Feeding Type other (see comments) 08/10/20 1500   Feeding Action feeding held 08/11/20 0305   Intake (mL) 0 mL 08/10/20 1800   Water Bolus (mL) 0 mL 08/10/20 1800   Tube Output(mL)(Include Discarded Residual) 530 mL 08/11/20 0605   Intake (mL) - Formula Tube Feeding 0 08/10/20 1800            Urethral Catheter 08/05/20 1133 Straight-tip 14 Fr. (Active)   Site Assessment Clean;Intact 08/11/20 0305   Collection Container Urimeter 08/11/20 0305   Securement Method secured to top of thigh w/ adhesive device 08/11/20 0305   Catheter Care Performed yes 08/11/20 0305   Reason for Continuing Urinary Catheterization Critically ill in ICU requiring intensive monitoring;Post operative 08/11/20 0305   CAUTI Prevention Bundle StatLock in place w 1" slack;Intact seal between catheter & drainage tubing;Green sheeting clip in use;No dependent loops or kinks;Drainage bag/urimeter off the floor;Drainage bag/urimeter not overfilled (<2/3 full);Drainage bag/urimeter below bladder 08/11/20 0305   Output (mL) 160 mL 08/11/20 0605       Neurosurgery Physical Exam   AOX3  PERRL  5/5 in BUE/BLE  SILT  Abdomen distended      Significant Labs:  Recent Labs   Lab 08/10/20  0130 08/10/20  1200 08/10/20  1959 08/11/20  0115   GLU 83  83  --   --  77     136  --   --  133*   K 3.9  3.9 3.5 4.1 3.9     101  --   --  101   CO2 28  28  --   --  22*   BUN 12  12  --   --  9   CREATININE 0.6  0.6  --   --  0.6   CALCIUM 7.6*  7.6*  --   --  7.4*   MG 1.8  1.8 1.7 1.9 1.7     Recent Labs   Lab 08/10/20  0130 08/11/20  0115   WBC 6.86 6.17   HGB 7.7* 7.5*   HCT 23.4* 22.6*    243     Recent Labs   Lab 08/10/20  0130 08/11/20  0115   INR 0.9  0.9 1.0   APTT 26.1  26.1 27.8     Microbiology Results (last 7 days)     ** No results found for the last 168 hours. **            Significant Diagnostics:      Assessment/Plan:     * Traumatic compression fracture " of L1 lumbar vertebra  46 y/o male with no significant PMH with L1 burst fracture with retropulsion resulting in severe canal stenosis s/p fall from 10ft building     S/p L1 corpectomy with cage insertion and T12-L2 lateral fusion on 8/7. Posterior instrumentation deferred for 5 days due to increasing amounts of intraoperative bleeding.     --Admitted to ICU  --All labs and diagnostics reviewed.  --Continue bed rest  --MAPs stable off pressors, no goals  --Bedrest/Log roll, HOB <30  --Pain control  --NPO with NG placed for postop ileus. Advanced BR. Monitor for increasing distention or abdominal pain.   --close neurological monitoring, notify neurosurgery with any acute changes  --no ICU requirements per NSY, ok for TTF.   --PSIF delayed for the interim.    Dispo:  TTF to NSGY when medically stable.        Jay Prieto,   Neurosurgery  [facility]

## 2020-08-11 NOTE — ANESTHESIA POST-OP PAIN MANAGEMENT
Acute Pain Service Progress Note    Oscar Bills is a 45 y.o., male, [id number]. With no PMHx. Admitted for L1 burst fracture s/p mechanical fall from roof. OR 8/7 for L1 corpectomy w/cage insertion, T12-L2 lateral fusion, deferred posterior instrumentation at the time due to intraop bleeding, plan for take back 8/11.     Post op course complicated by inadequate analgesia and ileus. Profound respiratory depression with hypercarbia 8/8, required narcan. APS consulted for possible thoracic epidural.       Interval Hx: Patient continues to have pain control with PCA, IV ketamine, and multimodals. Spoke with NSGY likely going to OR 8/12. On afternoon patient with some increase in discomfort, increased ketamine to 2mcg/kg/min and valium to 5mg TID.    Surgery:  S/p L1 corpectomy with cage insertion and T12-L2 lateral fusion on 8/7    Post Op Day #: 5    Problem List:    Active Hospital Problems    Diagnosis  POA    *Traumatic compression fracture of L1 lumbar vertebra [S32.010A]  Yes    Ileus [K56.7]  Unknown    Burst fracture of lumbar vertebra, open, initial encounter [S32.001B]  Yes    Spinal stenosis of lumbar region [M48.061]  Yes    Fall from building [W13.9XXA]  Yes    Acute urinary retention [R33.8]  Yes      Resolved Hospital Problems   No resolved problems to display.       Subjective:     General appearance of alert, oriented, no complaints   Pain with rest: 5    Numbers   Pain with movement: 5    Numbers     Objective:        Vitals   Vitals:    08/11/20 1005   BP: (!) 141/86   Pulse: (!) 115   Resp: (!) 35   Temp:         Labs    No results displayed because visit has over 200 results.           Meds   Current Facility-Administered Medications   Medication Dose Route Frequency Provider Last Rate Last Dose    0.9%  NaCl infusion   Intravenous Continuous Itzel Jimenez PA-C 75 mL/hr at 08/11/20 1005      acetaminophen 1,000 mg/100 mL (10 mg/mL) injection 1,000 mg  1,000 mg Intravenous Q6H Ramona FRANCE  MD Ayde        bisacodyL suppository 10 mg  10 mg Rectal Daily Mir Hou MD   10 mg at 08/11/20 0900    calcium gluconate 1g in dextrose 5% 100mL (ready to mix system)  1 g Intravenous PRN Jai Douglas MD        calcium gluconate 2 g in dextrose 5 % 100 mL IVPB  2 g Intravenous PRN Jai Douglas MD        calcium gluconate 3 g in dextrose 5 % 100 mL IVPB  3 g Intravenous PRN Jai Douglas MD        ceFAZolin injection 1 g  1 g Intravenous Q8H Oscar Goddard MD   1 g at 08/11/20 0900    dextrose 50% injection 12.5 g  12.5 g Intravenous PRN Itzel Jimenez PA-C        dextrose 50% injection 25 g  25 g Intravenous PRN Itzel Jimenez PA-C        diazePAM injection 5 mg  5 mg Intravenous Q12H Ramona Messer MD   5 mg at 08/11/20 0901    enoxaparin injection 40 mg  40 mg Subcutaneous Q24H Janeen Gan NP   40 mg at 08/10/20 1737    glucagon (human recombinant) injection 1 mg  1 mg Intramuscular PRN Itzel Jimenez PA-C        HYDROmorphone PCA syringe 6 mg/30 mL (0.2 mg/mL) NS   Intravenous Continuous Sandy Katz MD        ketamine 500 mg/250 mL (2 mg/mL) in sodium chloride 0.9% infusion  1.5 mcg/kg/min Intravenous Continuous Wesly Orozco MD 4.1 mL/hr at 08/11/20 1005 1.5 mcg/kg/min at 08/11/20 1005    magnesium sulfate 2g in water 50mL IVPB (premix)  2 g Intravenous PRN Jai Douglas MD   2 g at 08/11/20 0301    magnesium sulfate 2g in water 50mL IVPB (premix)  4 g Intravenous PRN Jai Douglas MD        methocarbamol 1000 mg in dextrose 5 % 100 mL IVPB (ready to mix system)  1,000 mg Intravenous Q8H Ramona Messer MD   1,000 mg at 08/11/20 0522    naloxone 0.4 mg/mL injection 0.02 mg  0.02 mg Intravenous PRN Sandy Katz MD        ondansetron injection 8 mg  8 mg Intravenous Q8H PRN Janeen Gan NP   8 mg at 08/10/20 173    polyethylene  glycol packet 17 g  17 g Per NG tube Daily Mir Hou MD   17 g at 08/11/20 0900    potassium chloride 10 mEq in 100 mL IVPB  40 mEq Intravenous PRN Jai Douglas  mL/hr at 08/11/20 0300 40 mEq at 08/11/20 0300    And    potassium chloride 10 mEq in 100 mL IVPB  60 mEq Intravenous PRN Jai Douglas  mL/hr at 08/09/20 0400 60 mEq at 08/09/20 0400    And    potassium chloride 10 mEq in 100 mL IVPB  80 mEq Intravenous PRN Jai Douglas MD        senna-docusate 8.6-50 mg per tablet 2 tablet  2 tablet Per NG tube Daily Mir Hou MD   2 tablet at 08/11/20 0900    sodium chloride 0.9% flush 10 mL  10 mL Intravenous PRN Janeen Gan, NP        sodium phosphate 15 mmol in dextrose 5 % 250 mL IVPB  15 mmol Intravenous PRN Jai Douglas MD        sodium phosphate 20.01 mmol in dextrose 5 % 250 mL IVPB  20.01 mmol Intravenous PRN Jai Douglas MD        sodium phosphate 30 mmol in dextrose 5 % 250 mL IVPB  30 mmol Intravenous PRN Jai Douglas MD            Anticoagulant dose 40mg lovenox at 1700 8/8    Assessment:     Pain control adequate.  Plan:   -Acetaminophen 1000mg q6h, will order IV due to post-op ileus   -Robaxin 1000mg IV q8hr     -Dilaudid PCA (used 4 mg overnight)   -Ketamine infusion 1.5mcg/kg/min increased to 2mcg/kg/min   -Valium 5mg IV q6h changed to BID 8/10 as patient appeared sedated, currently up to TID due some increase in discomfort of patient on afternoon rounds   - Patient to likely go to OR 8/12 for posterior approach per NSGY team   -Given recent spine surgery, not recommending any neuraxial or perineural block options.       Evaluator Ramona Messer   PGY-4, CA-3  Spectra [phone] or [phone]

## 2020-08-11 NOTE — PLAN OF CARE
POC reviewed with pt and family at 1400. Pt verbalized understanding. Pt rescheduled for surgery. Sister updated over the phone. PT on Dilaudid PCA pump. Ketamine increased to 2 mcg/kg/min per MD order.  ETCO2 between 28-30 NCC team notified. NGT on intermittent suctioning.  Questions and concerns addressed. No acute events today. Pt progressing toward goals. Will continue to monitor. See flowsheets for full assessment and VS info.

## 2020-08-11 NOTE — PLAN OF CARE
POC reviewed with Oscar at 0600. Pt verbalized understanding. Questions and concerns addressed. Pt is on Hydromorphone PCA Pump, Ketamine continuous infusion, and NS drip. Chlorhexidine bath given. NGT still connected to intermittent low suction. Surgery rescheduled; date to be determine. Pt progressing toward goals. Will continue to monitor. See flowsheets for full assessment and VS info

## 2020-08-11 NOTE — ASSESSMENT & PLAN NOTE
46 y/o male with no significant PMH with L1 burst fracture with retropulsion resulting in severe canal stenosis s/p fall from [employer] building     S/p L1 corpectomy with cage insertion and T12-L2 lateral fusion on 8/7. Posterior instrumentation deferred for 5 days due to increasing amounts of intraoperative bleeding.     --Admitted to ICU  --All labs and diagnostics reviewed.  --Continue bed rest  --MAPs stable off pressors, no goals  --Bedrest/Log roll, HOB <30  --Pain control  --NPO with NG placed for postop ileus. Advanced BR. Monitor for increasing distention or abdominal pain.   --close neurological monitoring, notify neurosurgery with any acute changes  --no ICU requirements per NSY, ok for TTF.   --PSIF delayed for the interim.    Dispo:  TTF to NSGY when medically stable.

## 2020-08-11 NOTE — PROGRESS NOTES
[facility]  Neurocritical Care  Progress Note    Admit Date: 8/5/2020  Service Date: 08/11/2020  Length of Stay: 6    Subjective:     Chief Complaint: Traumatic compression fracture of L1 lumbar vertebra    History of Present Illness: Patient is a 46 y/o male with no significant PMH who was transferred from  after falling from 10ft roof at work. Patient denies LOC, complained of excrutiating low back/sacral pain exacerbated with any movement of legs. CT CAP significant for L1 burst fracture with retropulsion into the spinal canal resulting in severe spinal stenosis. Patient states he could feel he had to urinate but could not start a stream, kraus placed and he reports he felt the kruas inserted and felt his bladder release once the kraus was placed. He reports he felt the OSMIN and per report from ED physician, patient had full rectal tone. He is being admitted to Neuro ICU for a higher level of neurological care s/p L1 CORPECTOMY and TLIF T12-L2 Interbody Instrumentation.    Hospital Course: 08/07/2020: Admit to Neuro ICU.  08/08/2020: Added long acting pain medications. Pain better controlled. Stable for stepdown.   08/09/2020: Acute pain management consulted. Ketamine gtt. Ilues, NGT. Suppository.    08/10/2020: Appreciate pain management reccs. Plans for OR tomorrow.  08/11/2020 ABG, KUB/CXR, Mag citrate, cont ketamine        Interval History:  Awake. Oriented. Follows commands. S/p L1 corpectomy with cage insertion and T12-L2 lateral fusion on 8/7. Posterior instrumentation deferred for 5 days due to increasing amounts of intraoperative bleeding. Plan for OR in am. Closed suction post-op drain remains intact. KUB with impression signifying ileus. Continue NPO. Initiate Mag citrate. Post-op pain managed with ketamine gtt via Pain Management Team. Continue gtt through surgery. Will reassess post for continuation.    Oxygen saturation maintained > 94% on 2L NC. MAP maintained > 65 without  difficulty. The patient requires critical care stay due to high probability of life threatening deterioration in their condition.    Review of Systems   Constitutional: Negative for fever.   HENT: Negative for trouble swallowing and voice change.    Eyes: Negative for visual disturbance.   Respiratory: Negative for shortness of breath and wheezing.    Cardiovascular: Negative for palpitations.   Gastrointestinal: Positive for abdominal distention and abdominal pain.   Endocrine: Negative for polyuria.   Genitourinary: Negative for difficulty urinating.   Musculoskeletal: Positive for back pain.   Neurological: Positive for weakness. Negative for dizziness and headaches.   Psychiatric/Behavioral: Negative for agitation.       Objective:     Vitals:  Temp: 98.5 °F (36.9 °C)  Pulse: (!) 112  Rhythm: sinus tachycardia  BP: 130/75  MAP (mmHg): 97  Resp: 14  ETCO2 (mmHg): 28 mmHg  SpO2: 98 %  O2 Device (Oxygen Therapy): nasal cannula    Temp  Min: 97.5 °F (36.4 °C)  Max: 98.9 °F (37.2 °C)  Pulse  Min: 94  Max: 123  BP  Min: 98/79  Max: 140/77  MAP (mmHg)  Min: 74  Max: 105  Resp  Min: 11  Max: 25  ETCO2 (mmHg)  Min: 28 mmHg  Max: 36 mmHg  SpO2  Min: 96 %  Max: 100 %    08/09 0701 - 08/10 0700  In: 3462.6 [I.V.:2202.6]  Out: 2530 [Urine:1640; Drains:890]   Unmeasured Output  Stool Occurrence: 1       Physical Exam  Vitals signs and nursing note reviewed.   Constitutional:       Appearance: Normal appearance. He is normal weight.   HENT:      Head: Normocephalic and atraumatic.      Right Ear: External ear normal.      Left Ear: External ear normal.      Nose: Nose normal. No congestion.   Eyes:      General: No scleral icterus.     Extraocular Movements: Extraocular movements intact.      Conjunctiva/sclera: Conjunctivae normal.      Pupils: Pupils are equal, round, and reactive to light.   Neck:      Musculoskeletal: No neck rigidity.   Cardiovascular:      Rate and Rhythm: Normal rate and regular rhythm.      Pulses:  Normal pulses.      Heart sounds: Normal heart sounds.   Pulmonary:      Effort: Pulmonary effort is normal. No respiratory distress.      Breath sounds: Normal breath sounds.   Abdominal:      General: Bowel sounds are increased. There is distension.      Comments: NGT to low intermittent suction   Musculoskeletal: Normal range of motion.   Skin:     General: Skin is warm.      Capillary Refill: Capillary refill takes less than 2 seconds.   Neurological:      Mental Status: He is alert and oriented to person, place, and time.      GCS: GCS eye subscore is 4. GCS verbal subscore is 5. GCS motor subscore is 6.      Motor: Weakness present.      Comments: Awake, oriented  PERRLA  EOMI  JONES spontaneously   Weakness in RLE             Medications:  Continuoussodium chloride 0.9%, Last Rate: 75 mL/hr (08/10/20 1319)  hydromorphone in 0.9 % NaCl 6 mg/30 ml  ketamine (KETALAR) infusion (titrating), Last Rate: 1.5 mcg/kg/min (08/10/20 1300)    Scheduledacetaminophen, 1,000 mg, Q6H  bisacodyL, 10 mg, Daily  ceFAZolin (ANCEF) IVPB, 1 g, Q8H  diazePAM, 5 mg, Q12H  enoxaparin, 40 mg, Q24H  methocarbamol (ROBAXIN) IVPB, 1,000 mg, Q8H  polyethylene glycol, 17 g, Daily  senna-docusate 8.6-50 mg, 2 tablet, Daily    PRNcalcium gluconate IVPB, 1 g, PRN  calcium gluconate IVPB, 2 g, PRN  calcium gluconate IVPB, 3 g, PRN  dextrose 50%, 12.5 g, PRN  dextrose 50%, 25 g, PRN  glucagon (human recombinant), 1 mg, PRN  glucose, 16 g, PRN  glucose, 16 g, PRN  glucose, 24 g, PRN  magnesium sulfate IVPB, 2 g, PRN  magnesium sulfate IVPB, 4 g, PRN  naloxone, 0.02 mg, PRN  ondansetron, 8 mg, Q8H PRN  potassium chloride in water, 40 mEq, PRN    And  potassium chloride in water, 60 mEq, PRN    And  potassium chloride in water, 80 mEq, PRN  sodium chloride 0.9%, 10 mL, PRN  sodium phosphate IVPB, 15 mmol, PRN  sodium phosphate IVPB, 20.01 mmol, PRN  sodium phosphate IVPB, 30 mmol, PRN        Diet  Diet NPO  Diet NPO  Diet NPO  Diet  NPO        Assessment/Plan:     Neuro  * Traumatic compression fracture of L1 lumbar vertebra  45 y.o male with no significant PMHx was transferred from  to [facility] [place] CAP significant for L1 burst fracture with retropulsion into the spinal canal resulting in severe spinal stenosis s/p falling 10ft from roof. He will be admitted to the Neuro ICU s/p  L1 CORPECTOMY and TLIF T12-L2 Interbody Instrumentation.       -Neurosurgery Following  -Neuro checks q1hr   -Vital signs q1hr  -Daily labs  -Dilaudid 2 mg q3hrs prn  8/8  -Added oxycodone  -Increased prn Zofran   pain management following  Ketamine gtt managed by Anesthesia pain mgmt          Spinal stenosis of lumbar region  S/P L1 CORPECTOMY, SPINE, LUMBAR  (Left)  FUSION, SPINE, LUMBAR, TLIF T12-L2 Interbody Fusion with Instrumentation - Lateral (Left)     -Plans for OR on Tuesday     Renal/  Acute urinary retention  Victor placed, continue to monitor strict I's and O's    GI  Ileus  -NGT placed, placed to low intermittent suction   -Bowel rest, NPO  -Suppositories daily until bm   KUB/Mag Citrate    Orthopedic  Fall from building  See above    Other  Burst fracture of lumbar vertebra, open, initial encounter  -see traumatic compression fracture of L1 lumbar fracture         The patient is being Prophylaxed for:  Venous Thromboembolism with: Chemical  Stress Ulcer with: None  Ventilator Pneumonia with: none    Activity Orders          Diet NPO: NPO starting at 08/12 0001    Commode at bedside starting at 08/07 0746        Full Code  Critical Care 35 minutes    Critical secondary to Patient has a condition that poses threat to life and bodily function:      Critical care was time spent personally by me on the following activities: development of treatment plan with patient or surrogate and bedside caregivers, discussions with consultants, evaluation of patient's response to treatment, examination of patient, ordering and performing treatments and interventions,  ordering and review of laboratory studies, ordering and review of radiographic studies, pulse oximetry, re-evaluation of patient's condition. This critical care time did not overlap with that of any other provider or involve time for any procedures.    Zenaida Young NP  Neurocritical Care  [facility]

## 2020-08-11 NOTE — PHYSICIAN QUERY
PT Name: Oscar Bills  MR #: [id number]    Relationship to Procedure Clarification     CDS: Juhi Falk RN, CCDS       Contact information: [email or web address]    This form is a permanent document in the medical record.     Query Date: August 11, 2020    Dear Provider,  By submitting this query, we are merely seeking further clarification of documentation. Please utilize your independent clinical judgment when addressing the question(s) below.    The medical record contains the following:  Supporting Clinical Findings Location in Medical Record     Significant gaseous distention of large and small bowel in an ileus type pattern    Large volume of bowel gas consistent with ileus    CXR negative, but profound ileus on abdominal imaging    Significant ileus noted. NGT to low intermittent suction.     Required NG placement for postop ileus.      8/8-X-ray Abdomen    8/8-KUB    8/8-NCC PN    8/9-NCC PN    8/9-NSGY PN       Please clarify if ___Post-op Ileus___ (as it relates to __L1 corpectomy with lateral T12-L2 plate, interbody fusion__is:      [  ] Complication of the procedure   [  ] Present, but not a complication of the procedure   [  ] Other (please specify): __________________   [ x ] Clinically Undetermined       Please document in your progress notes daily for the duration of treatment until resolved and include in your discharge summary.

## 2020-08-11 NOTE — PT/OT/SLP EVAL
Speech Language Pathology Evaluation  Cognitive Communication    Patient Name:  Oscar Bills   MRN:  [id number]  Admitting Diagnosis: Traumatic compression fracture of L1 lumbar vertebra    Recommendations:     Recommendations:                General Recommendations:  Cognitive-linguistic therapy  Diet recommendations:  Regular, Thin   Aspiration Precautions: Standard aspiration precautions   General Precautions: Standard,    Communication strategies:  none    History:     History reviewed. No pertinent past medical history.    Past Surgical History:   Procedure Laterality Date    SURGICAL REMOVAL OF VERTEBRAL BODY OF LUMBAR SPINE Left 8/6/2020    Procedure: L1 CORPECTOMY, SPINE, LUMBAR ;  Surgeon: Leonid Snider MD;  Location: [address];  Service: Neurosurgery;  Laterality: Left;  Globus, Please obtain neuromonitoring.     TRANSFORAMINAL LUMBAR INTERBODY FUSION Left 8/6/2020    Procedure: FUSION, SPINE, LUMBAR, TLIF T12-L2 Interbody Fusion with Instrumentation - Lateral;  Surgeon: Leonid Snider MD;  Location: [facility] OR [address] [place] [address];  Service: Neurosurgery;  Laterality: Left;       Social History: Patient lives with sister .    Prior diet: pt initially assessed for diet advancing to reg/thin however pt currently NPO with NG tube to suction     Subjective     Pt awake yet drowsy and complaining of pain     Pain/Comfort:  · Pain Rating 1: 10/10  · Location - Orientation 1: generalized  · Location 1: back  · Pain Rating Post-Intervention 1: 10/10    Objective:   Cognitive Status:    AAOx4, memory, reasoning skills,  problem solving skills, safety awareness, compare/contrast, sequincing skills , organization skill,  all WFL         Receptive Language:   Comprehension:      WFL   Ongoing assessment of money/math/time management warranted   Pt reports sister managed finances however given pt young age ongoing assessment warranted     Pragmatics:    flat affect    Expressive Language:  Verbal:    Verbal language skills  were wfl with no evidence of aphasia.  Pt. Expressed their thoughts coherently in conversation with no evidence of confusion or word finding deficits        Motor Speech:  WFL    Voice:   WFL    Visual-Spatial:  not yet assessed    Reading:   ongoing assessment warranted      Written Expression:   ongoing assessment warranted     Treatment:  Education provided to Pt re: SLP role in acute care setting, overall impressions and therapeutic goals. Whiteboard updated.      Assessment:   Oscar Bills is a 45 y.o. male with an SLP diagnosis of Cognitive-Linguistic Impairment. .    Goals:   Multidisciplinary Problems     SLP Goals        Problem: SLP Goal    Goal Priority Disciplines Outcome   SLP Goal     SLP Ongoing, Progressing   Description: Speech Language Pathology Goals  Goals expected to be met by 8/14    1. Pt will tolerate diet of  thin liquids and solids] without overt clinical signs of aspiration   2. Pt will participate in ongoing assessment of speech language and cognitive linguistic skills to help rule out deficits and determine therapeutic plan of care                        Plan:   · Patient to be seen:  2 x/week   · Plan of Care expires:     · Plan of Care reviewed with:  patient   · SLP Follow-Up:  Yes       Discharge recommendations:      Barriers to Discharge:  None per ST     Time Tracking:   SLP Treatment Date:   08/11/20  Speech Start Time:  1044  Speech Stop Time:  1052     Speech Total Time (min):  8 min    Billable Minutes: Ushaal 8     Alma Rosa Lane CCC-SLP  08/11/2020

## 2020-08-11 NOTE — SUBJECTIVE & OBJECTIVE
Interval History: naeon, abdomen still distended.  Not passing flatus.    Medications:  Continuous Infusions:   sodium chloride 0.9% 75 mL/hr at 08/11/20 0605    hydromorphone in 0.9 % NaCl 6 mg/30 ml      ketamine (KETALAR) infusion (titrating) 1.5 mcg/kg/min (08/11/20 0605)     Scheduled Meds:   acetaminophen  1,000 mg Intravenous Q6H    bisacodyL  10 mg Rectal Daily    ceFAZolin (ANCEF) IVPB  1 g Intravenous Q8H    diazePAM  5 mg Intravenous Q12H    enoxaparin  40 mg Subcutaneous Q24H    methocarbamol (ROBAXIN) IVPB  1,000 mg Intravenous Q8H    polyethylene glycol  17 g Per NG tube Daily    senna-docusate 8.6-50 mg  2 tablet Per NG tube Daily     PRN Meds:calcium gluconate IVPB, calcium gluconate IVPB, calcium gluconate IVPB, dextrose 50%, dextrose 50%, glucagon (human recombinant), magnesium sulfate IVPB, magnesium sulfate IVPB, naloxone, ondansetron, potassium chloride in water **AND** potassium chloride in water **AND** potassium chloride in water, sodium chloride 0.9%, sodium phosphate IVPB, sodium phosphate IVPB, sodium phosphate IVPB     Review of Systems  Objective:     Weight: 90.7 kg (199 lb 15.3 oz)  Body mass index is 28.69 kg/m².  Vital Signs (Most Recent):  Temp: 99.1 °F (37.3 °C) (08/11/20 0305)  Pulse: 104 (08/11/20 0615)  Resp: 14 (08/11/20 0615)  BP: 124/88 (08/11/20 0605)  SpO2: 98 % (08/11/20 0615) Vital Signs (24h Range):  Temp:  [98.5 °F (36.9 °C)-99.9 °F (37.7 °C)] 99.1 °F (37.3 °C)  Pulse:  [] 104  Resp:  [13-30] 14  SpO2:  [95 %-100 %] 98 %  BP: (117-152)/(68-98) 124/88                Resp Rate Total:  [15 br/min-20 br/min] 18 br/min         Closed/Suction Drain 08/06/20 2337 Left Other (Comment) Bulb 10 Fr. (Active)   Site Description Healing 08/11/20 0305   Dressing Type Transparent (Tegaderm) 08/11/20 0305   Dressing Status Old drainage;Clean;Dry;Intact 08/11/20 0305   Dressing Intervention Integrity maintained 08/11/20 0305   Drainage Serosanguineous 08/11/20 0305  "  Status To bulb suction 08/11/20 0305   Output (mL) 5 mL 08/11/20 0605            NG/OG Tube 08/08/20 1415 nasogastric Right nostril (Active)   Placement Check placement verified by aspirate characteristics;placement verified by distal tube length measurement;placement verified by x-ray 08/11/20 0305   Advancement advanced manually 08/11/20 0305   Tolerance signs/symptoms of discomfort 08/11/20 0305   Securement secured to commercial device 08/11/20 0305   Clamp Status/Tolerance unclamped 08/11/20 0305   Suction Setting/Drainage Method suction at;intermittent setting 08/11/20 0305   Insertion Site Appearance no redness, warmth, tenderness, skin breakdown, drainage 08/11/20 0305   Drainage Coffee ground;Brown;Green 08/11/20 0305   Flush/Irrigation flushed w/;water;no resistance met 08/11/20 0305   Feeding Type other (see comments) 08/10/20 1500   Feeding Action feeding held 08/11/20 0305   Intake (mL) 0 mL 08/10/20 1800   Water Bolus (mL) 0 mL 08/10/20 1800   Tube Output(mL)(Include Discarded Residual) 530 mL 08/11/20 0605   Intake (mL) - Formula Tube Feeding 0 08/10/20 1800            Urethral Catheter 08/05/20 1133 Straight-tip 14 Fr. (Active)   Site Assessment Clean;Intact 08/11/20 0305   Collection Container Urimeter 08/11/20 0305   Securement Method secured to top of thigh w/ adhesive device 08/11/20 0305   Catheter Care Performed yes 08/11/20 0305   Reason for Continuing Urinary Catheterization Critically ill in ICU requiring intensive monitoring;Post operative 08/11/20 0305   CAUTI Prevention Bundle StatLock in place w 1" slack;Intact seal between catheter & drainage tubing;Green sheeting clip in use;No dependent loops or kinks;Drainage bag/urimeter off the floor;Drainage bag/urimeter not overfilled (<2/3 full);Drainage bag/urimeter below bladder 08/11/20 0305   Output (mL) 160 mL 08/11/20 0605       Neurosurgery Physical Exam   AOX3  PERRL  5/5 in BUE/BLE  SILT  Abdomen distended      Significant " Labs:  Recent Labs   Lab 08/10/20  0130 08/10/20  1200 08/10/20  1959 08/11/20  0115   GLU 83  83  --   --  77     136  --   --  133*   K 3.9  3.9 3.5 4.1 3.9     101  --   --  101   CO2 28  28  --   --  22*   BUN 12  12  --   --  9   CREATININE 0.6  0.6  --   --  0.6   CALCIUM 7.6*  7.6*  --   --  7.4*   MG 1.8  1.8 1.7 1.9 1.7     Recent Labs   Lab 08/10/20  0130 08/11/20  0115   WBC 6.86 6.17   HGB 7.7* 7.5*   HCT 23.4* 22.6*    243     Recent Labs   Lab 08/10/20  0130 08/11/20  0115   INR 0.9  0.9 1.0   APTT 26.1  26.1 27.8     Microbiology Results (last 7 days)     ** No results found for the last 168 hours. **            Significant Diagnostics:

## 2020-08-11 NOTE — PLAN OF CARE
Pt with good progress towards goals     Alma Rosa Lane MS, CCC-SLP  Speech Language Pathologist  Pager: [phone]  Date 8/11/2020

## 2020-08-12 LAB
ALBUMIN SERPL BCP-MCNC: 2.4 G/DL (ref 3.5–5.2)
ALP SERPL-CCNC: 53 U/L (ref 55–135)
ALT SERPL W/O P-5'-P-CCNC: 10 U/L (ref 10–44)
ANION GAP SERPL CALC-SCNC: 11 MMOL/L (ref 8–16)
APTT BLDCRRT: 29.2 SEC (ref 21–32)
AST SERPL-CCNC: 24 U/L (ref 10–40)
BASOPHILS # BLD AUTO: 0.01 K/UL (ref 0–0.2)
BASOPHILS NFR BLD: 0.2 % (ref 0–1.9)
BILIRUB SERPL-MCNC: 0.4 MG/DL (ref 0.1–1)
BUN SERPL-MCNC: 11 MG/DL (ref 6–20)
CALCIUM SERPL-MCNC: 8.2 MG/DL (ref 8.7–10.5)
CHLORIDE SERPL-SCNC: 97 MMOL/L (ref 95–110)
CO2 SERPL-SCNC: 23 MMOL/L (ref 23–29)
CREAT SERPL-MCNC: 0.6 MG/DL (ref 0.5–1.4)
DIFFERENTIAL METHOD: ABNORMAL
EOSINOPHIL # BLD AUTO: 0.1 K/UL (ref 0–0.5)
EOSINOPHIL NFR BLD: 1.3 % (ref 0–8)
ERYTHROCYTE [DISTWIDTH] IN BLOOD BY AUTOMATED COUNT: 12.9 % (ref 11.5–14.5)
EST. GFR  (AFRICAN AMERICAN): >60 ML/MIN/1.73 M^2
EST. GFR  (NON AFRICAN AMERICAN): >60 ML/MIN/1.73 M^2
GLUCOSE SERPL-MCNC: 90 MG/DL (ref 70–110)
HCT VFR BLD AUTO: 24.3 % (ref 40–54)
HGB BLD-MCNC: 8 G/DL (ref 14–18)
IMM GRANULOCYTES # BLD AUTO: 0.07 K/UL (ref 0–0.04)
IMM GRANULOCYTES NFR BLD AUTO: 1.3 % (ref 0–0.5)
INR PPP: 1 (ref 0.8–1.2)
LYMPHOCYTES # BLD AUTO: 0.6 K/UL (ref 1–4.8)
LYMPHOCYTES NFR BLD: 10.9 % (ref 18–48)
MAGNESIUM SERPL-MCNC: 1.7 MG/DL (ref 1.6–2.6)
MCH RBC QN AUTO: 30.4 PG (ref 27–31)
MCHC RBC AUTO-ENTMCNC: 32.9 G/DL (ref 32–36)
MCV RBC AUTO: 92 FL (ref 82–98)
MONOCYTES # BLD AUTO: 0.6 K/UL (ref 0.3–1)
MONOCYTES NFR BLD: 11.3 % (ref 4–15)
NEUTROPHILS # BLD AUTO: 4.1 K/UL (ref 1.8–7.7)
NEUTROPHILS NFR BLD: 75 % (ref 38–73)
NRBC BLD-RTO: 0 /100 WBC
PHOSPHATE SERPL-MCNC: 3.8 MG/DL (ref 2.7–4.5)
PLATELET # BLD AUTO: 302 K/UL (ref 150–350)
PMV BLD AUTO: 9.5 FL (ref 9.2–12.9)
POTASSIUM SERPL-SCNC: 3.8 MMOL/L (ref 3.5–5.1)
PROT SERPL-MCNC: 5.9 G/DL (ref 6–8.4)
PROTHROMBIN TIME: 10.8 SEC (ref 9–12.5)
RBC # BLD AUTO: 2.63 M/UL (ref 4.6–6.2)
SODIUM SERPL-SCNC: 131 MMOL/L (ref 136–145)
SODIUM SERPL-SCNC: 134 MMOL/L (ref 136–145)
WBC # BLD AUTO: 5.4 K/UL (ref 3.9–12.7)

## 2020-08-12 PROCEDURE — 20000000 HC ICU ROOM

## 2020-08-12 PROCEDURE — 94770 HC EXHALED C02 TEST: CPT

## 2020-08-12 PROCEDURE — 94761 N-INVAS EAR/PLS OXIMETRY MLT: CPT

## 2020-08-12 PROCEDURE — 99900035 HC TECH TIME PER 15 MIN (STAT)

## 2020-08-12 PROCEDURE — 99231 PR SUBSEQUENT HOSPITAL CARE,LEVL I: ICD-10-PCS | Mod: ,,, | Performed by: ANESTHESIOLOGY

## 2020-08-12 PROCEDURE — 85025 COMPLETE CBC W/AUTO DIFF WBC: CPT

## 2020-08-12 PROCEDURE — 99231 SBSQ HOSP IP/OBS SF/LOW 25: CPT | Mod: ,,, | Performed by: ANESTHESIOLOGY

## 2020-08-12 PROCEDURE — 25000003 PHARM REV CODE 250: Performed by: PHYSICIAN ASSISTANT

## 2020-08-12 PROCEDURE — 63600175 PHARM REV CODE 636 W HCPCS: Performed by: STUDENT IN AN ORGANIZED HEALTH CARE EDUCATION/TRAINING PROGRAM

## 2020-08-12 PROCEDURE — 25000003 PHARM REV CODE 250: Performed by: PSYCHIATRY & NEUROLOGY

## 2020-08-12 PROCEDURE — 27000221 HC OXYGEN, UP TO 24 HOURS

## 2020-08-12 PROCEDURE — 99291 CRITICAL CARE FIRST HOUR: CPT | Mod: ,,, | Performed by: NURSE PRACTITIONER

## 2020-08-12 PROCEDURE — 85610 PROTHROMBIN TIME: CPT

## 2020-08-12 PROCEDURE — C9113 INJ PANTOPRAZOLE SODIUM, VIA: HCPCS | Performed by: STUDENT IN AN ORGANIZED HEALTH CARE EDUCATION/TRAINING PROGRAM

## 2020-08-12 PROCEDURE — 99291 PR CRITICAL CARE, E/M 30-74 MINUTES: ICD-10-PCS | Mod: ,,, | Performed by: NURSE PRACTITIONER

## 2020-08-12 PROCEDURE — 84100 ASSAY OF PHOSPHORUS: CPT

## 2020-08-12 PROCEDURE — 63600175 PHARM REV CODE 636 W HCPCS: Performed by: ANESTHESIOLOGY

## 2020-08-12 PROCEDURE — 84295 ASSAY OF SERUM SODIUM: CPT

## 2020-08-12 PROCEDURE — 83735 ASSAY OF MAGNESIUM: CPT

## 2020-08-12 PROCEDURE — 85730 THROMBOPLASTIN TIME PARTIAL: CPT

## 2020-08-12 PROCEDURE — 63600175 PHARM REV CODE 636 W HCPCS: Performed by: UROLOGY

## 2020-08-12 PROCEDURE — 80053 COMPREHEN METABOLIC PANEL: CPT

## 2020-08-12 RX ORDER — POLYETHYLENE GLYCOL 3350 17 G/17G
17 POWDER, FOR SOLUTION ORAL DAILY PRN
Status: DISCONTINUED | OUTPATIENT
Start: 2020-08-12 | End: 2020-08-21 | Stop reason: HOSPADM

## 2020-08-12 RX ORDER — CALCIUM CARBONATE 200(500)MG
500 TABLET,CHEWABLE ORAL DAILY PRN
Status: DISCONTINUED | OUTPATIENT
Start: 2020-08-12 | End: 2020-08-21 | Stop reason: HOSPADM

## 2020-08-12 RX ORDER — PANTOPRAZOLE SODIUM 40 MG/10ML
40 INJECTION, POWDER, LYOPHILIZED, FOR SOLUTION INTRAVENOUS DAILY
Status: DISCONTINUED | OUTPATIENT
Start: 2020-08-12 | End: 2020-08-20

## 2020-08-12 RX ORDER — ACETAMINOPHEN 10 MG/ML
1000 INJECTION, SOLUTION INTRAVENOUS EVERY 6 HOURS
Status: COMPLETED | OUTPATIENT
Start: 2020-08-12 | End: 2020-08-13

## 2020-08-12 RX ADMIN — METHOCARBAMOL 1000 MG: 100 INJECTION INTRAMUSCULAR; INTRAVENOUS at 03:08

## 2020-08-12 RX ADMIN — MAGNESIUM SULFATE IN WATER 2 G: 40 INJECTION, SOLUTION INTRAVENOUS at 02:08

## 2020-08-12 RX ADMIN — DIAZEPAM 5 MG: 5 INJECTION, SOLUTION INTRAMUSCULAR; INTRAVENOUS at 03:08

## 2020-08-12 RX ADMIN — Medication: at 03:08

## 2020-08-12 RX ADMIN — PANTOPRAZOLE SODIUM 40 MG: 40 INJECTION, POWDER, LYOPHILIZED, FOR SOLUTION INTRAVENOUS at 06:08

## 2020-08-12 RX ADMIN — SODIUM CHLORIDE: 0.9 INJECTION, SOLUTION INTRAVENOUS at 06:08

## 2020-08-12 RX ADMIN — SODIUM CHLORIDE 1000 ML: 0.9 INJECTION, SOLUTION INTRAVENOUS at 08:08

## 2020-08-12 RX ADMIN — ACETAMINOPHEN 1000 MG: 10 INJECTION, SOLUTION INTRAVENOUS at 05:08

## 2020-08-12 RX ADMIN — METHOCARBAMOL 1000 MG: 100 INJECTION INTRAMUSCULAR; INTRAVENOUS at 09:08

## 2020-08-12 RX ADMIN — DIAZEPAM 5 MG: 5 INJECTION, SOLUTION INTRAMUSCULAR; INTRAVENOUS at 09:08

## 2020-08-12 RX ADMIN — POTASSIUM CHLORIDE 40 MEQ: 7.46 INJECTION, SOLUTION INTRAVENOUS at 02:08

## 2020-08-12 RX ADMIN — METHOCARBAMOL 1000 MG: 100 INJECTION INTRAMUSCULAR; INTRAVENOUS at 07:08

## 2020-08-12 RX ADMIN — DOCUSATE SODIUM 50MG AND SENNOSIDES 8.6MG 2 TABLET: 8.6; 5 TABLET, FILM COATED ORAL at 09:08

## 2020-08-12 RX ADMIN — CALCIUM CARBONATE (ANTACID) CHEW TAB 500 MG 500 MG: 500 CHEW TAB at 09:08

## 2020-08-12 RX ADMIN — ACETAMINOPHEN 1000 MG: 10 INJECTION, SOLUTION INTRAVENOUS at 06:08

## 2020-08-12 NOTE — SUBJECTIVE & OBJECTIVE
Interval History:  Awake. Oriented. Follows commands. S/p L1 corpectomy with cage insertion and T12-L2 lateral fusion on 8/7. Posterior instrumentation deferred until next week. NSGY notified family. Closed suction post-op drain remains intact. Will discontinue NGT to suction and start clear liquid diet. Continue to trend Na. Post-op pain managed with ketamine gtt via Pain Management Team. Continue PCA. Oxygen saturation maintained > 94% on 2L NC. MAP maintained > 65 without difficulty. The patient requires critical care stay due to high probability of life threatening deterioration in their condition.    Review of Systems   Constitutional: Negative for fever.   HENT: Negative for trouble swallowing and voice change.    Eyes: Negative for visual disturbance.   Respiratory: Negative for shortness of breath and wheezing.    Cardiovascular: Negative for palpitations.   Gastrointestinal: Positive for abdominal distention and abdominal pain.   Endocrine: Negative for polyuria.   Genitourinary: Negative for difficulty urinating.   Musculoskeletal: Positive for back pain.   Neurological: Positive for weakness. Negative for dizziness and headaches.   Psychiatric/Behavioral: Negative for agitation.       Objective:     Vitals:  Temp: 99.3 °F (37.4 °C)  Pulse: 98  Rhythm: normal sinus rhythm  BP: 110/74  MAP (mmHg): 87  Resp: 16  ETCO2 (mmHg): 30 mmHg  SpO2: 100 %  Oxygen Concentration (%): 28  O2 Device (Oxygen Therapy): nasal cannula    Temp  Min: 97.2 °F (36.2 °C)  Max: 100.1 °F (37.8 °C)  Pulse  Min: 88  Max: 123  BP  Min: 107/74  Max: 147/80  MAP (mmHg)  Min: 81  Max: 110  Resp  Min: 11  Max: 28  ETCO2 (mmHg)  Min: 26 mmHg  Max: 31 mmHg  SpO2  Min: 95 %  Max: 100 %  Oxygen Concentration (%)  Min: 28  Max: 28    08/11 0701 - 08/12 0700  In: 3458.1 [I.V.:2358.1]  Out: 4920 [Urine:3220; Drains:1700]   Unmeasured Output  Stool Occurrence: 1       Physical Exam  Vitals signs and nursing note reviewed.   Constitutional:        Appearance: Normal appearance. He is normal weight.   HENT:      Head: Normocephalic and atraumatic.      Right Ear: External ear normal.      Left Ear: External ear normal.      Nose: Nose normal. No congestion.   Eyes:      General: No scleral icterus.     Extraocular Movements: Extraocular movements intact.      Conjunctiva/sclera: Conjunctivae normal.      Pupils: Pupils are equal, round, and reactive to light.   Neck:      Musculoskeletal: No neck rigidity.   Cardiovascular:      Rate and Rhythm: Normal rate and regular rhythm.      Pulses: Normal pulses.      Heart sounds: Normal heart sounds.   Pulmonary:      Effort: Pulmonary effort is normal. No respiratory distress.      Breath sounds: Normal breath sounds.   Abdominal:      General: There is distension.   Musculoskeletal: Normal range of motion.   Skin:     General: Skin is warm.      Capillary Refill: Capillary refill takes less than 2 seconds.   Neurological:      Mental Status: He is alert and oriented to person, place, and time.      GCS: GCS eye subscore is 4. GCS verbal subscore is 5. GCS motor subscore is 6.      Motor: Weakness present.      Comments: Awake, oriented  PERRLA  EOMI  JONES spontaneously   Weakness in RLE             Medications:  Continuoussodium chloride 0.9%, Last Rate: 100 mL/hr at 08/12/20 1836  hydromorphone in 0.9 % NaCl 6 mg/30 ml  ketamine (KETALAR) infusion (titrating), Last Rate: 2 mcg/kg/min (08/12/20 0605)    Scheduledacetaminophen, 1,000 mg, Q6H  diazePAM, 5 mg, TID  methocarbamol (ROBAXIN) IVPB, 1,000 mg, Q8H  pantoprazole, 40 mg, Daily  senna-docusate 8.6-50 mg, 2 tablet, Daily    PRNcalcium gluconate IVPB, 1 g, PRN  calcium gluconate IVPB, 2 g, PRN  calcium gluconate IVPB, 3 g, PRN  dextrose 50%, 12.5 g, PRN  dextrose 50%, 25 g, PRN  glucagon (human recombinant), 1 mg, PRN  magnesium sulfate IVPB, 2 g, PRN  magnesium sulfate IVPB, 4 g, PRN  naloxone, 0.02 mg, PRN  ondansetron, 8 mg, Q8H PRN  polyethylene glycol, 17 g,  Daily PRN  potassium chloride in water, 40 mEq, PRN    And  potassium chloride in water, 60 mEq, PRN    And  potassium chloride in water, 80 mEq, PRN  sodium chloride 0.9%, 10 mL, PRN  sodium phosphate IVPB, 15 mmol, PRN  sodium phosphate IVPB, 20.01 mmol, PRN  sodium phosphate IVPB, 30 mmol, PRN        Diet  Diet clear liquid  Diet clear liquid

## 2020-08-12 NOTE — ASSESSMENT & PLAN NOTE
46 y/o male with no significant PMH with L1 burst fracture with retropulsion resulting in severe canal stenosis s/p fall from [employer] building     S/p L1 corpectomy with cage insertion and T12-L2 lateral fusion on 8/7. Posterior instrumentation deferred for 5 days due to increasing amounts of intraoperative bleeding. Plan for OR today, consented.     --Admitted to ICU  --All labs and diagnostics reviewed.  --Continue bed rest  --MAPs stable off pressors, no goals  --Bedrest/Log roll, HOB <30  --Pain control  --NPO with NG placed for postop ileus. Advanced BR. Monitor for increasing distention or abdominal pain.   --close neurological monitoring, notify neurosurgery with any acute changes  --PSIF today w/Dr. Snider. Coags reviewed. Blood on hold. Consented. COVID neg.     Dispo:  To OR today.

## 2020-08-12 NOTE — ANESTHESIA POST-OP PAIN MANAGEMENT
Acute Pain Service Progress Note    Oscar Bills is a 45 y.o., male, [id number]. With no PMHx. Admitted for L1 burst fracture s/p mechanical fall from roof. OR 8/7 for L1 corpectomy w/cage insertion, T12-L2 lateral fusion, deferred posterior instrumentation at the time due to intraop bleeding, plan for take back 8/11.     Post op course complicated by inadequate analgesia and ileus. Profound respiratory depression with hypercarbia 8/8, required narcan. APS consulted for possible thoracic epidural.       Interval Hx: Patient continues to have pain control with PCA, IV ketamine, and multimodals 4/10. To OR today for posterior instrumentation.    Surgery:  S/p L1 corpectomy with cage insertion and T12-L2 lateral fusion on 8/7    Post Op Day #: 6    Problem List:    Active Hospital Problems    Diagnosis  POA    *Traumatic compression fracture of L1 lumbar vertebra [S32.010A]  Yes    Ileus [K56.7]  Unknown    Burst fracture of lumbar vertebra, open, initial encounter [S32.001B]  Yes    Spinal stenosis of lumbar region [M48.061]  Yes    Fall from building [W13.9XXA]  Yes    Acute urinary retention [R33.8]  Yes      Resolved Hospital Problems   No resolved problems to display.       Subjective:     General appearance of alert, oriented, no complaints   Pain with rest: 4    Numbers   Pain with movement: 4    Numbers     Objective:        Vitals   Vitals:    08/12/20 1200   BP: 125/82   Pulse: 96   Resp: 14   Temp:         Labs    No results displayed because visit has over 200 results.           Meds   Current Facility-Administered Medications   Medication Dose Route Frequency Provider Last Rate Last Dose    0.9%  NaCl infusion   Intravenous Continuous Dimitry Alexandre  mL/hr at 08/12/20 1200      calcium gluconate 1g in dextrose 5% 100mL (ready to mix system)  1 g Intravenous PRN Jai Douglas MD        calcium gluconate 2 g in dextrose 5 % 100 mL IVPB  2 g Intravenous PRN Jai Geiger  MD Stella        calcium gluconate 3 g in dextrose 5 % 100 mL IVPB  3 g Intravenous PRN Jai Douglas MD        dextrose 50% injection 12.5 g  12.5 g Intravenous PRN Itzel Jimenez PA-C        dextrose 50% injection 25 g  25 g Intravenous PRN Itzel Jimenez PA-C        diazePAM injection 5 mg  5 mg Intravenous TID Ramona Messer MD   5 mg at 08/12/20 0903    glucagon (human recombinant) injection 1 mg  1 mg Intramuscular PRN Itzel Jimenez PA-C        HYDROmorphone PCA syringe 6 mg/30 mL (0.2 mg/mL) NS   Intravenous Continuous Sandy Katz MD        ketamine 500 mg/250 mL (2 mg/mL) in sodium chloride 0.9% infusion  2 mcg/kg/min Intravenous Continuous Ramona Messer MD 5.4 mL/hr at 08/12/20 0605 2 mcg/kg/min at 08/12/20 0605    magnesium sulfate 2g in water 50mL IVPB (premix)  2 g Intravenous PRN Jai Douglas MD   2 g at 08/12/20 0255    magnesium sulfate 2g in water 50mL IVPB (premix)  4 g Intravenous PRN Jai Douglas MD        methocarbamol 1000 mg in dextrose 5 % 100 mL IVPB (ready to mix system)  1,000 mg Intravenous Q8H Ramona Messer MD   1,000 mg at 08/12/20 0736    naloxone 0.4 mg/mL injection 0.02 mg  0.02 mg Intravenous PRN Sandy Katz MD        ondansetron injection 8 mg  8 mg Intravenous Q8H PRN Janeen Gan NP   8 mg at 08/10/20 1737    polyethylene glycol packet 17 g  17 g Per NG tube Daily PRN Zenaida Young NP        potassium chloride 10 mEq in 100 mL IVPB  40 mEq Intravenous PRN Jai Douglas  mL/hr at 08/12/20 0255 40 mEq at 08/12/20 0255    And    potassium chloride 10 mEq in 100 mL IVPB  60 mEq Intravenous PRN Jai Douglas  mL/hr at 08/09/20 0400 60 mEq at 08/09/20 0400    And    potassium chloride 10 mEq in 100 mL IVPB  80 mEq Intravenous PRN Jai Douglas MD        senna-docusate 8.6-50 mg per tablet 2 tablet  2  tablet Per NG tube Daily Mir Hou MD   2 tablet at 08/12/20 0903    sodium chloride 0.9% flush 10 mL  10 mL Intravenous PRN Janeen Gan NP        sodium phosphate 15 mmol in dextrose 5 % 250 mL IVPB  15 mmol Intravenous PRN Jai Douglas MD        sodium phosphate 20.01 mmol in dextrose 5 % 250 mL IVPB  20.01 mmol Intravenous PRN Jai Douglas MD        sodium phosphate 30 mmol in dextrose 5 % 250 mL IVPB  30 mmol Intravenous PRN Jai Douglas MD            Anticoagulant dose 40mg lovenox at 1700 8/8    Assessment:     Pain control adequate.  Plan:   -Acetaminophen 1000mg q6h, will order IV due to post-op ileus   -Robaxin 1000mg IV q8hr     -Dilaudid PCA, after posterior instrumentation will begin weaning of PCA   -Ketamine infusion 1.5mcg/kg/min increased to 2mcg/kg/min 8/11   -Valium 5mg IV q6h changed to BID 8/10 as patient appeared sedated, currently up to TID due to some increase in discomfort of patient see 8/11, now improved   - Patient to likely go to OR 8/12 for posterior approach per NSGY team   -Given recent spine surgery, not recommending any neuraxial or perineural block options.       Evaluator Ramona Messer   PGY-4, CA-3  Spectra [phone] or [phone]

## 2020-08-12 NOTE — CARE UPDATE
D/w Dr. Snider    Case moved to 8/18  Ok for diet pending resolution of ileus    D/w [facility]    Chery Garner MD  Neurosurgery  [facility]

## 2020-08-12 NOTE — PLAN OF CARE
POC reviewed with patient. Questions and concerns addressed with patient and patient's family. Pt is on Hydromorphone PCA Pump, Ketamine continuous infusion, and NS drip.Pre-op checklist initiated. NGT still connected to intermittent low suction but has been turned off. Pantoprazole 40 mg given. Lumbar fusion surgery has been rescheduled. Will continue to monitor. See flowsheets for full assessment and VS info

## 2020-08-12 NOTE — PLAN OF CARE
POC reviewed with Oscar at 0400. Pt verbalized understanding. Questions and concerns addressed. Pt is on Hydromorphone PCA Pump, Ketamine continuous infusion, and NS drip. Chlorhexidine bath given. Pre-op checklist initiated. NGT still connected to intermittent low suction. Pt progressing toward goals. Will continue to monitor. See flowsheets for full assessment and VS info

## 2020-08-12 NOTE — PROGRESS NOTES
[facility]  Neurosurgery  Progress Note    Subjective:     History of Present Illness: Patient is a 46 y/o male with no significant PMH who was transferred from  after falling from 10ft roof at work. Patient denies LOC, complained of excrutiating low back/sacral pain exacerbated with any movement of legs. CT CAP significant for L1 burst fracture with retropulsion into the spinal canal resulting in severe spinal stenosis. Patient states he could feel he had to urinate but could not start a stream, kraus placed and he reports he felt the kraus inserted and felt his bladder release once the kraus was placed. He reports he felt the OSMIN and per report from ED physician, patient had full rectal tone. The patient declined repeat rectal exam. Denies saddle anesthesia. Endorses any movement of lower extremities causes excruciating pain in the sacram and lower back. Denies radicular pain into the legs. Reports slight numbness in left calf. Denies any prior medical conditions or daily medication.     Post-Op Info:  Procedure(s) (LRB):  L1 CORPECTOMY, SPINE, LUMBAR  (Left)  FUSION, SPINE, LUMBAR, TLIF T12-L2 Interbody Fusion with Instrumentation - Lateral (Left)   6 Days Post-Op     Interval History: NAEON. Coags reviewed. Blood on hold for OR. At neuro baseline. Stomach remains distended, improved. To OR today for posterior instrumentation, consented.     Medications:  Continuous Infusions:   sodium chloride 0.9% 75 mL/hr at 08/12/20 0605    hydromorphone in 0.9 % NaCl 6 mg/30 ml      ketamine (KETALAR) infusion (titrating) 2 mcg/kg/min (08/12/20 0605)     Scheduled Meds:   bisacodyL  10 mg Rectal Daily    diazePAM  5 mg Intravenous TID    methocarbamol (ROBAXIN) IVPB  1,000 mg Intravenous Q8H    polyethylene glycol  17 g Per NG tube Daily    senna-docusate 8.6-50 mg  2 tablet Per NG tube Daily     PRN Meds:calcium gluconate IVPB, calcium gluconate IVPB, calcium gluconate IVPB, dextrose 50%,  dextrose 50%, glucagon (human recombinant), magnesium sulfate IVPB, magnesium sulfate IVPB, naloxone, ondansetron, potassium chloride in water **AND** potassium chloride in water **AND** potassium chloride in water, sodium chloride 0.9%, sodium phosphate IVPB, sodium phosphate IVPB, sodium phosphate IVPB     Review of Systems  Objective:     Weight: 90.7 kg (199 lb 15.3 oz)  Body mass index is 28.69 kg/m².  Vital Signs (Most Recent):  Temp: 100.1 °F (37.8 °C) (08/12/20 0305)  Pulse: 101 (08/12/20 0630)  Resp: 15 (08/12/20 0630)  BP: 124/86 (08/12/20 0605)  SpO2: 100 % (08/12/20 0630) Vital Signs (24h Range):  Temp:  [99.1 °F (37.3 °C)-100.1 °F (37.8 °C)] 100.1 °F (37.8 °C)  Pulse:  [] 101  Resp:  [11-35] 15  SpO2:  [95 %-100 %] 100 %  BP: (109-142)/(64-89) 124/86                Oxygen Concentration (%):  [28] 28  Resp Rate Total:  [15 br/min-20 br/min] 15 br/min         NG/OG Tube 08/08/20 1415 nasogastric Right nostril (Active)   Placement Check placement verified by aspirate characteristics;placement verified by distal tube length measurement;placement verified by x-ray 08/12/20 0305   Advancement advanced manually 08/12/20 0305   Tolerance signs/symptoms of discomfort 08/12/20 0305   Securement secured to commercial device 08/12/20 0305   Clamp Status/Tolerance unclamped 08/12/20 0305   Suction Setting/Drainage Method suction at;intermittent setting 08/12/20 0305   Insertion Site Appearance no redness, warmth, tenderness, skin breakdown, drainage 08/12/20 0305   Drainage Coffee ground;Brown;Green 08/12/20 0305   Flush/Irrigation flushed w/;water;no resistance met 08/12/20 0305   Feeding Type other (see comments) 08/10/20 1500   Feeding Action feeding held 08/12/20 0305   Intake (mL) 0 mL 08/10/20 1800   Water Bolus (mL) 0 mL 08/12/20 0536   Tube Output(mL)(Include Discarded Residual) 600 mL 08/12/20 0536   Intake (mL) - Formula Tube Feeding 0 08/10/20 1800            Urethral Catheter 08/05/20 1131  "Straight-tip 14 Fr. (Active)   Site Assessment Clean;Intact 08/12/20 0305   Collection Container Urimeter 08/12/20 0305   Securement Method secured to top of thigh w/ adhesive device 08/12/20 0305   Catheter Care Performed yes 08/12/20 0305   Reason for Continuing Urinary Catheterization Critically ill in ICU requiring intensive monitoring;Post operative 08/12/20 0305   CAUTI Prevention Bundle StatLock in place w 1" slack;Intact seal between catheter & drainage tubing;Drainage bag/urimeter off the floor;No dependent loops or kinks;Drainage bag/urimeter not overfilled (<2/3 full);Drainage bag/urimeter below bladder;Green sheeting clip in use 08/12/20 0305   Output (mL) 100 mL 08/12/20 0536       Neurosurgery Physical Exam   AOX3  PERRL  5/5 in BUE/BLE  SILT  Abdomen distended, improved from prior     Significant Labs:  Recent Labs   Lab 08/10/20  1959 08/11/20  0115 08/11/20  1036 08/12/20 0110   GLU  --  77  --  90   NA  --  133* 132* 131*   K 4.1 3.9  --  3.8   CL  --  101  --  97   CO2  --  22*  --  23   BUN  --  9  --  11   CREATININE  --  0.6  --  0.6   CALCIUM  --  7.4*  --  8.2*   MG 1.9 1.7  --  1.7     Recent Labs   Lab 08/11/20 0115 08/12/20 0110   WBC 6.17 5.40   HGB 7.5* 8.0*   HCT 22.6* 24.3*    302     Recent Labs   Lab 08/11/20 0115 08/12/20 0110   INR 1.0 1.0   APTT 27.8 29.2         Significant Diagnostics:  X-ray Abdomen Ap 1 View    Result Date: 8/11/2020  Ileus. Electronically signed by: Dano Casanova MD Date:    08/11/2020 Time:    10:34    X-ray Chest Ap Portable    Result Date: 8/11/2020  Patchy airspace consolidation in the right upper lobe appears minimally more prominent than on 08/08/2020 at 13:36.  Appearance of the chest is otherwise unchanged since that time. Electronically signed by: Vinayak Beckman MD Date:    08/11/2020 Time:    10:29      Assessment/Plan:     * Traumatic compression fracture of L1 lumbar vertebra  46 y/o male with no significant PMH with L1 burst fracture " with retropulsion resulting in severe canal stenosis s/p fall from [employer] building     S/p L1 corpectomy with cage insertion and T12-L2 lateral fusion on 8/7. Posterior instrumentation deferred for 5 days due to increasing amounts of intraoperative bleeding. Plan for OR today, consented.     --Admitted to ICU  --All labs and diagnostics reviewed.  --Continue bed rest  --MAPs stable off pressors, no goals  --Bedrest/Log roll, HOB <30  --Pain control  --NPO with NG placed for postop ileus. Advanced BR. Monitor for increasing distention or abdominal pain.   --close neurological monitoring, notify neurosurgery with any acute changes  --PSIF today w/Dr. Snider. Coags reviewed. Blood on hold. Consented. COVID neg.     Dispo:  To OR today.        Chery Clement MD  Neurosurgery  [facility]

## 2020-08-12 NOTE — SUBJECTIVE & OBJECTIVE
Interval History: NAEON. Coags reviewed. Blood on hold for OR. At neuro baseline. Stomach remains distended, improved. To OR today for posterior instrumentation, consented.     Medications:  Continuous Infusions:   sodium chloride 0.9% 75 mL/hr at 08/12/20 0605    hydromorphone in 0.9 % NaCl 6 mg/30 ml      ketamine (KETALAR) infusion (titrating) 2 mcg/kg/min (08/12/20 0605)     Scheduled Meds:   bisacodyL  10 mg Rectal Daily    diazePAM  5 mg Intravenous TID    methocarbamol (ROBAXIN) IVPB  1,000 mg Intravenous Q8H    polyethylene glycol  17 g Per NG tube Daily    senna-docusate 8.6-50 mg  2 tablet Per NG tube Daily     PRN Meds:calcium gluconate IVPB, calcium gluconate IVPB, calcium gluconate IVPB, dextrose 50%, dextrose 50%, glucagon (human recombinant), magnesium sulfate IVPB, magnesium sulfate IVPB, naloxone, ondansetron, potassium chloride in water **AND** potassium chloride in water **AND** potassium chloride in water, sodium chloride 0.9%, sodium phosphate IVPB, sodium phosphate IVPB, sodium phosphate IVPB     Review of Systems  Objective:     Weight: 90.7 kg (199 lb 15.3 oz)  Body mass index is 28.69 kg/m².  Vital Signs (Most Recent):  Temp: 100.1 °F (37.8 °C) (08/12/20 0305)  Pulse: 101 (08/12/20 0630)  Resp: 15 (08/12/20 0630)  BP: 124/86 (08/12/20 0605)  SpO2: 100 % (08/12/20 0630) Vital Signs (24h Range):  Temp:  [99.1 °F (37.3 °C)-100.1 °F (37.8 °C)] 100.1 °F (37.8 °C)  Pulse:  [] 101  Resp:  [11-35] 15  SpO2:  [95 %-100 %] 100 %  BP: (109-142)/(64-89) 124/86                Oxygen Concentration (%):  [28] 28  Resp Rate Total:  [15 br/min-20 br/min] 15 br/min         NG/OG Tube 08/08/20 1415 nasogastric Right nostril (Active)   Placement Check placement verified by aspirate characteristics;placement verified by distal tube length measurement;placement verified by x-ray 08/12/20 0305   Advancement advanced manually 08/12/20 0305   Tolerance signs/symptoms of discomfort 08/12/20 0305  "  Securement secured to commercial device 08/12/20 0305   Clamp Status/Tolerance unclamped 08/12/20 0305   Suction Setting/Drainage Method suction at;intermittent setting 08/12/20 0305   Insertion Site Appearance no redness, warmth, tenderness, skin breakdown, drainage 08/12/20 0305   Drainage Coffee ground;Brown;Green 08/12/20 0305   Flush/Irrigation flushed w/;water;no resistance met 08/12/20 0305   Feeding Type other (see comments) 08/10/20 1500   Feeding Action feeding held 08/12/20 0305   Intake (mL) 0 mL 08/10/20 1800   Water Bolus (mL) 0 mL 08/12/20 0536   Tube Output(mL)(Include Discarded Residual) 600 mL 08/12/20 0536   Intake (mL) - Formula Tube Feeding 0 08/10/20 1800            Urethral Catheter 08/05/20 1133 Straight-tip 14 Fr. (Active)   Site Assessment Clean;Intact 08/12/20 0305   Collection Container Urimeter 08/12/20 0305   Securement Method secured to top of thigh w/ adhesive device 08/12/20 0305   Catheter Care Performed yes 08/12/20 0305   Reason for Continuing Urinary Catheterization Critically ill in ICU requiring intensive monitoring;Post operative 08/12/20 0305   CAUTI Prevention Bundle StatLock in place w 1" slack;Intact seal between catheter & drainage tubing;Drainage bag/urimeter off the floor;No dependent loops or kinks;Drainage bag/urimeter not overfilled (<2/3 full);Drainage bag/urimeter below bladder;Green sheeting clip in use 08/12/20 0305   Output (mL) 100 mL 08/12/20 0536       Neurosurgery Physical Exam   AOX3  PERRL  5/5 in BUE/BLE  SILT  Abdomen distended, improved from prior     Significant Labs:  Recent Labs   Lab 08/10/20  1959 08/11/20  0115 08/11/20  1036 08/12/20 0110   GLU  --  77  --  90   NA  --  133* 132* 131*   K 4.1 3.9  --  3.8   CL  --  101  --  97   CO2  --  22*  --  23   BUN  --  9  --  11   CREATININE  --  0.6  --  0.6   CALCIUM  --  7.4*  --  8.2*   MG 1.9 1.7  --  1.7     Recent Labs   Lab 08/11/20  0115 08/12/20  0110   WBC 6.17 5.40   HGB 7.5* 8.0*   HCT " 22.6* 24.3*    302     Recent Labs   Lab 08/11/20  0115 08/12/20  0110   INR 1.0 1.0   APTT 27.8 29.2         Significant Diagnostics:  X-ray Abdomen Ap 1 View    Result Date: 8/11/2020  Ileus. Electronically signed by: Dano Casanova MD Date:    08/11/2020 Time:    10:34    X-ray Chest Ap Portable    Result Date: 8/11/2020  Patchy airspace consolidation in the right upper lobe appears minimally more prominent than on 08/08/2020 at 13:36.  Appearance of the chest is otherwise unchanged since that time. Electronically signed by: Vinayak Beckman MD Date:    08/11/2020 Time:    10:29

## 2020-08-13 ENCOUNTER — ANESTHESIA EVENT (OUTPATIENT)
Dept: SURGERY | Facility: HOSPITAL | Age: 45
DRG: 453 | End: 2020-08-13
Payer: MEDICAID

## 2020-08-13 LAB
ALBUMIN SERPL BCP-MCNC: 2.3 G/DL (ref 3.5–5.2)
ALP SERPL-CCNC: 55 U/L (ref 55–135)
ALT SERPL W/O P-5'-P-CCNC: 22 U/L (ref 10–44)
ANION GAP SERPL CALC-SCNC: 10 MMOL/L (ref 8–16)
APTT BLDCRRT: 29.5 SEC (ref 21–32)
AST SERPL-CCNC: 49 U/L (ref 10–40)
BASOPHILS # BLD AUTO: 0.02 K/UL (ref 0–0.2)
BASOPHILS NFR BLD: 0.3 % (ref 0–1.9)
BILIRUB SERPL-MCNC: 0.4 MG/DL (ref 0.1–1)
BUN SERPL-MCNC: 11 MG/DL (ref 6–20)
CALCIUM SERPL-MCNC: 7.9 MG/DL (ref 8.7–10.5)
CHLORIDE SERPL-SCNC: 100 MMOL/L (ref 95–110)
CO2 SERPL-SCNC: 24 MMOL/L (ref 23–29)
CREAT SERPL-MCNC: 0.5 MG/DL (ref 0.5–1.4)
DIFFERENTIAL METHOD: ABNORMAL
EOSINOPHIL # BLD AUTO: 0.1 K/UL (ref 0–0.5)
EOSINOPHIL NFR BLD: 2.2 % (ref 0–8)
ERYTHROCYTE [DISTWIDTH] IN BLOOD BY AUTOMATED COUNT: 12.8 % (ref 11.5–14.5)
EST. GFR  (AFRICAN AMERICAN): >60 ML/MIN/1.73 M^2
EST. GFR  (NON AFRICAN AMERICAN): >60 ML/MIN/1.73 M^2
GLUCOSE SERPL-MCNC: 90 MG/DL (ref 70–110)
HCT VFR BLD AUTO: 23.5 % (ref 40–54)
HGB BLD-MCNC: 7.8 G/DL (ref 14–18)
IMM GRANULOCYTES # BLD AUTO: 0.1 K/UL (ref 0–0.04)
IMM GRANULOCYTES NFR BLD AUTO: 1.6 % (ref 0–0.5)
INR PPP: 1 (ref 0.8–1.2)
LYMPHOCYTES # BLD AUTO: 0.6 K/UL (ref 1–4.8)
LYMPHOCYTES NFR BLD: 9.8 % (ref 18–48)
MAGNESIUM SERPL-MCNC: 1.5 MG/DL (ref 1.6–2.6)
MAGNESIUM SERPL-MCNC: 1.8 MG/DL (ref 1.6–2.6)
MCH RBC QN AUTO: 31 PG (ref 27–31)
MCHC RBC AUTO-ENTMCNC: 33.2 G/DL (ref 32–36)
MCV RBC AUTO: 93 FL (ref 82–98)
MONOCYTES # BLD AUTO: 0.7 K/UL (ref 0.3–1)
MONOCYTES NFR BLD: 11 % (ref 4–15)
NEUTROPHILS # BLD AUTO: 4.7 K/UL (ref 1.8–7.7)
NEUTROPHILS NFR BLD: 75.1 % (ref 38–73)
NRBC BLD-RTO: 0 /100 WBC
PHOSPHATE SERPL-MCNC: 3.7 MG/DL (ref 2.7–4.5)
PLATELET # BLD AUTO: 320 K/UL (ref 150–350)
PMV BLD AUTO: 8.9 FL (ref 9.2–12.9)
POTASSIUM SERPL-SCNC: 3.6 MMOL/L (ref 3.5–5.1)
PROT SERPL-MCNC: 5.6 G/DL (ref 6–8.4)
PROTHROMBIN TIME: 11.3 SEC (ref 9–12.5)
RBC # BLD AUTO: 2.52 M/UL (ref 4.6–6.2)
SODIUM SERPL-SCNC: 134 MMOL/L (ref 136–145)
WBC # BLD AUTO: 6.3 K/UL (ref 3.9–12.7)

## 2020-08-13 PROCEDURE — 27000221 HC OXYGEN, UP TO 24 HOURS

## 2020-08-13 PROCEDURE — 20000000 HC ICU ROOM

## 2020-08-13 PROCEDURE — 84100 ASSAY OF PHOSPHORUS: CPT

## 2020-08-13 PROCEDURE — 85025 COMPLETE CBC W/AUTO DIFF WBC: CPT

## 2020-08-13 PROCEDURE — 85730 THROMBOPLASTIN TIME PARTIAL: CPT

## 2020-08-13 PROCEDURE — 63600175 PHARM REV CODE 636 W HCPCS: Performed by: NURSE PRACTITIONER

## 2020-08-13 PROCEDURE — 83735 ASSAY OF MAGNESIUM: CPT

## 2020-08-13 PROCEDURE — 25000003 PHARM REV CODE 250: Performed by: STUDENT IN AN ORGANIZED HEALTH CARE EDUCATION/TRAINING PROGRAM

## 2020-08-13 PROCEDURE — C9113 INJ PANTOPRAZOLE SODIUM, VIA: HCPCS | Performed by: STUDENT IN AN ORGANIZED HEALTH CARE EDUCATION/TRAINING PROGRAM

## 2020-08-13 PROCEDURE — 94770 HC EXHALED C02 TEST: CPT

## 2020-08-13 PROCEDURE — 63600175 PHARM REV CODE 636 W HCPCS: Performed by: STUDENT IN AN ORGANIZED HEALTH CARE EDUCATION/TRAINING PROGRAM

## 2020-08-13 PROCEDURE — 99231 SBSQ HOSP IP/OBS SF/LOW 25: CPT | Mod: ,,, | Performed by: ANESTHESIOLOGY

## 2020-08-13 PROCEDURE — 99900035 HC TECH TIME PER 15 MIN (STAT)

## 2020-08-13 PROCEDURE — 83735 ASSAY OF MAGNESIUM: CPT | Mod: 91

## 2020-08-13 PROCEDURE — 25000003 PHARM REV CODE 250: Performed by: PSYCHIATRY & NEUROLOGY

## 2020-08-13 PROCEDURE — 80053 COMPREHEN METABOLIC PANEL: CPT

## 2020-08-13 PROCEDURE — 63600175 PHARM REV CODE 636 W HCPCS: Performed by: UROLOGY

## 2020-08-13 PROCEDURE — 99231 PR SUBSEQUENT HOSPITAL CARE,LEVL I: ICD-10-PCS | Mod: ,,, | Performed by: ANESTHESIOLOGY

## 2020-08-13 PROCEDURE — 63600175 PHARM REV CODE 636 W HCPCS: Performed by: ANESTHESIOLOGY

## 2020-08-13 PROCEDURE — 85610 PROTHROMBIN TIME: CPT

## 2020-08-13 PROCEDURE — 94761 N-INVAS EAR/PLS OXIMETRY MLT: CPT

## 2020-08-13 RX ORDER — HYDROCODONE BITARTRATE AND ACETAMINOPHEN 500; 5 MG/1; MG/1
TABLET ORAL
Status: DISCONTINUED | OUTPATIENT
Start: 2020-08-13 | End: 2020-08-20

## 2020-08-13 RX ORDER — HEPARIN SODIUM 5000 [USP'U]/ML
5000 INJECTION, SOLUTION INTRAVENOUS; SUBCUTANEOUS EVERY 8 HOURS
Status: DISCONTINUED | OUTPATIENT
Start: 2020-08-13 | End: 2020-08-21 | Stop reason: HOSPADM

## 2020-08-13 RX ADMIN — METHOCARBAMOL 1000 MG: 100 INJECTION INTRAMUSCULAR; INTRAVENOUS at 05:08

## 2020-08-13 RX ADMIN — DIAZEPAM 5 MG: 5 INJECTION, SOLUTION INTRAMUSCULAR; INTRAVENOUS at 03:08

## 2020-08-13 RX ADMIN — MAGNESIUM SULFATE IN WATER 2 G: 40 INJECTION, SOLUTION INTRAVENOUS at 05:08

## 2020-08-13 RX ADMIN — ACETAMINOPHEN 1000 MG: 10 INJECTION, SOLUTION INTRAVENOUS at 12:08

## 2020-08-13 RX ADMIN — METHOCARBAMOL 1000 MG: 100 INJECTION INTRAMUSCULAR; INTRAVENOUS at 09:08

## 2020-08-13 RX ADMIN — KETAMINE HYDROCHLORIDE 1.5 MCG/KG/MIN: 100 INJECTION INTRAMUSCULAR; INTRAVENOUS at 11:08

## 2020-08-13 RX ADMIN — HEPARIN SODIUM 5000 UNITS: 5000 INJECTION INTRAVENOUS; SUBCUTANEOUS at 09:08

## 2020-08-13 RX ADMIN — PANTOPRAZOLE SODIUM 40 MG: 40 INJECTION, POWDER, LYOPHILIZED, FOR SOLUTION INTRAVENOUS at 08:08

## 2020-08-13 RX ADMIN — METHOCARBAMOL 1000 MG: 100 INJECTION INTRAMUSCULAR; INTRAVENOUS at 02:08

## 2020-08-13 RX ADMIN — Medication: at 11:08

## 2020-08-13 RX ADMIN — DIAZEPAM 5 MG: 5 INJECTION, SOLUTION INTRAMUSCULAR; INTRAVENOUS at 08:08

## 2020-08-13 RX ADMIN — POTASSIUM CHLORIDE 40 MEQ: 7.46 INJECTION, SOLUTION INTRAVENOUS at 07:08

## 2020-08-13 RX ADMIN — DIAZEPAM 5 MG: 5 INJECTION, SOLUTION INTRAMUSCULAR; INTRAVENOUS at 09:08

## 2020-08-13 RX ADMIN — HEPARIN SODIUM 5000 UNITS: 5000 INJECTION INTRAVENOUS; SUBCUTANEOUS at 02:08

## 2020-08-13 RX ADMIN — Medication: at 09:08

## 2020-08-13 RX ADMIN — DOCUSATE SODIUM 50MG AND SENNOSIDES 8.6MG 2 TABLET: 8.6; 5 TABLET, FILM COATED ORAL at 08:08

## 2020-08-13 RX ADMIN — ACETAMINOPHEN 1000 MG: 10 INJECTION, SOLUTION INTRAVENOUS at 05:08

## 2020-08-13 NOTE — PLAN OF CARE
POC reviewed with Mr. Bills at 1430. Pt verbalized understanding. Questions and concerns addressed. No acute events today. Back surgery pending today per NSY. Plan is for NSY to update Mr. Bills and family about surgery plans. Pain managed today with ketamine gtt and PCA hydromorphone. Pt progressing toward goals. Will continue to monitor. See flowsheets for full assessment and VS info.

## 2020-08-13 NOTE — ASSESSMENT & PLAN NOTE
S/P L1 CORPECTOMY, SPINE, LUMBAR  (Left)  FUSION, SPINE, LUMBAR, TLIF T12-L2 Interbody Fusion with Instrumentation - Lateral (Left)     -Plans for OR 8/18

## 2020-08-13 NOTE — PLAN OF CARE
Patient to have surgery tomorrow.  Not medically ready for discharge.       08/13/20 1633   Discharge Reassessment   Assessment Type Discharge Planning Reassessment   Provided patient/caregiver education on the expected discharge date and the discharge plan Yes   Do you have any problems affording any of your prescribed medications? No   Discharge Plan A Rehab   Discharge Plan B Home   DME Needed Upon Discharge  other (see comments)  (tbd)   Patient choice form signed by patient/caregiver N/A   Anticipated Discharge Disposition Rehab   Can the patient/caregiver answer the patient profile reliably? Yes, cognitively intact   Describe the patient's ability to walk at the present time. Does not walk or unable to take any steps at all   How often would a person be available to care for the patient? Often       Tere Yang RN, CCRN-K, [facility]  Neuro-Critical Care   [phone]

## 2020-08-13 NOTE — ASSESSMENT & PLAN NOTE
45 y.o male with no significant PMHx was transferred from  to [facility] with CT CAP significant for L1 burst fracture with retropulsion into the spinal canal resulting in severe spinal stenosis s/p falling 10ft from roof. He will be admitted to the Neuro ICU s/p  L1 CORPECTOMY and TLIF T12-L2 Interbody Instrumentation.       -Neurosurgery Following  -Neuro checks q1hr   -Vital signs q1hr  -Daily labs  -Dilaudid PCA  Continue prn Zofran   pain management following  Ketamine gtt managed by Anesthesia pain mgmt

## 2020-08-13 NOTE — PLAN OF CARE
POC reviewed with pt and family at 1200. Pt verbalized understanding. Questions and concerns addressed. No acute events today. Ketamine, NS, and PCA pump continued. Pt progressing toward goals. Will continue to monitor. See flowsheets for full assessment and VS info.

## 2020-08-13 NOTE — ANESTHESIA POST-OP PAIN MANAGEMENT
Acute Pain Service Progress Note    Oscar Bills is a 45 y.o., male, [id number]. With no PMHx. Admitted for L1 burst fracture s/p mechanical fall from roof. OR 8/7 for L1 corpectomy w/cage insertion, T12-L2 lateral fusion, deferred posterior instrumentation at the time due to intraop bleeding, plan for take back 8/11.     Post op course complicated by inadequate analgesia and ileus. Profound respiratory depression with hypercarbia 8/8, required narcan. APS consulted for possible thoracic epidural.       Interval Hx: Patient surgery re-scheduled to 8/18. Weaning PCA and ketamine starting today.    Surgery:  S/p L1 corpectomy with cage insertion and T12-L2 lateral fusion on 8/7    Post Op Day #: 7    Problem List:    Active Hospital Problems    Diagnosis  POA    *Traumatic compression fracture of L1 lumbar vertebra [S32.010A]  Yes    Ileus [K56.7]  Unknown    Burst fracture of lumbar vertebra, open, initial encounter [S32.001B]  Yes    Spinal stenosis of lumbar region [M48.061]  Yes    Fall from building [W13.9XXA]  Yes    Acute urinary retention [R33.8]  Yes      Resolved Hospital Problems   No resolved problems to display.       Subjective:     General appearance of alert, oriented, no complaints   Pain with rest: 3    Numbers   Pain with movement: 4    Numbers     Objective:        Vitals   Vitals:    08/13/20 1000   BP: 116/76   Pulse: 92   Resp: 15   Temp:         Labs    No results displayed because visit has over 200 results.           Meds   Current Facility-Administered Medications   Medication Dose Route Frequency Provider Last Rate Last Dose    0.9%  NaCl infusion   Intravenous Continuous Dimirty Alexandre  mL/hr at 08/13/20 1000      acetaminophen 1,000 mg/100 mL (10 mg/mL) injection 1,000 mg  1,000 mg Intravenous Q6H Ramona Messer  mL/hr at 08/13/20 0513 1,000 mg at 08/13/20 0513    calcium carbonate 200 mg calcium (500 mg) chewable tablet 500 mg  500 mg Oral Daily PRN Beckie WEBB  SWETA Horta   500 mg at 08/12/20 2107    calcium gluconate 1g in dextrose 5% 100mL (ready to mix system)  1 g Intravenous PRN Jai Douglas MD        calcium gluconate 2 g in dextrose 5 % 100 mL IVPB  2 g Intravenous PRN Jai Douglas MD        calcium gluconate 3 g in dextrose 5 % 100 mL IVPB  3 g Intravenous PRN Jai Douglas MD        dextrose 50% injection 12.5 g  12.5 g Intravenous PRN Itzel Jimenez PA-C        dextrose 50% injection 25 g  25 g Intravenous PRN Itzel Jimenez PA-C        diazePAM injection 5 mg  5 mg Intravenous TID Ramona Messer MD   5 mg at 08/13/20 0848    glucagon (human recombinant) injection 1 mg  1 mg Intramuscular PRN Itzel Jimenez PA-C        heparin (porcine) injection 5,000 Units  5,000 Units Subcutaneous Q8H Zenaida Young NP        HYDROmorphone PCA syringe 6 mg/30 mL (0.2 mg/mL) NS   Intravenous Continuous Ramona Messer MD        ketamine 500 mg/250 mL (2 mg/mL) in sodium chloride 0.9% infusion  1.5 mcg/kg/min Intravenous Continuous Ramona Messer MD 4.1 mL/hr at 08/13/20 0931 1.5 mcg/kg/min at 08/13/20 0931    magnesium sulfate 2g in water 50mL IVPB (premix)  2 g Intravenous PRN Jai Douglas MD   2 g at 08/13/20 0546    magnesium sulfate 2g in water 50mL IVPB (premix)  4 g Intravenous PRN Jai Douglas MD        methocarbamol 1000 mg in dextrose 5 % 100 mL IVPB (ready to mix system)  1,000 mg Intravenous Q8H Ramona Messer MD   1,000 mg at 08/13/20 0507    naloxone 0.4 mg/mL injection 0.02 mg  0.02 mg Intravenous PRN Sandy Katz MD        ondansetron injection 8 mg  8 mg Intravenous Q8H PRN Janeen Gan NP   8 mg at 08/10/20 1737    pantoprazole injection 40 mg  40 mg Intravenous Daily Alvin Maier MD   40 mg at 08/13/20 0843    polyethylene glycol packet 17 g  17 g Per NG tube Daily PRN Zenaida Young NP        potassium  chloride 10 mEq in 100 mL IVPB  40 mEq Intravenous PRN Jai Douglas  mL/hr at 08/13/20 0749 40 mEq at 08/13/20 0749    And    potassium chloride 10 mEq in 100 mL IVPB  60 mEq Intravenous PRN Jai Douglas  mL/hr at 08/09/20 0400 60 mEq at 08/09/20 0400    And    potassium chloride 10 mEq in 100 mL IVPB  80 mEq Intravenous PRN Jai Douglas MD        senna-docusate 8.6-50 mg per tablet 2 tablet  2 tablet Per NG tube Daily Mir Hou MD   2 tablet at 08/13/20 0843    sodium chloride 0.9% flush 10 mL  10 mL Intravenous PRN Janeen Gan, NP        sodium phosphate 15 mmol in dextrose 5 % 250 mL IVPB  15 mmol Intravenous PRN Jai Douglas MD        sodium phosphate 20.01 mmol in dextrose 5 % 250 mL IVPB  20.01 mmol Intravenous PRN Jai Douglas MD        sodium phosphate 30 mmol in dextrose 5 % 250 mL IVPB  30 mmol Intravenous PRN Jai Douglas MD            Anticoagulant dose 40mg lovenox at 1700 8/8    Assessment:     Pain control adequate.  Plan:   -Acetaminophen 1000mg q6h, will order IV due to post-op ileus   -Robaxin 1000mg IV q8hr     -Dilaudid PCA, will begin weaning of PCA, changed to 0.2mg q8mins instead of q6mins   -Ketamine infusion 2mcg/kg/min 8/11 decreased to 1.5mcg/kg/min   -Valium 5mg TID while on ketamine   - Patient to likely go to OR 8/18 for posterior approach per NSGY team   -Given recent spine surgery, not recommending any neuraxial or perineural block options.       Evaluator Ramona Messer   PGY-4, CA-3  Spectra [phone] or [phone]

## 2020-08-13 NOTE — PROGRESS NOTES
[facility]  Neurocritical Care  Progress Note    Admit Date: 8/5/2020  Service Date: 08/12/2020  Length of Stay: 7    Subjective:     Chief Complaint: Traumatic compression fracture of L1 lumbar vertebra    History of Present Illness: Patient is a 46 y/o male with no significant PMH who was transferred from  after falling from 10ft roof at work. Patient denies LOC, complained of excrutiating low back/sacral pain exacerbated with any movement of legs. CT CAP significant for L1 burst fracture with retropulsion into the spinal canal resulting in severe spinal stenosis. Patient states he could feel he had to urinate but could not start a stream, kraus placed and he reports he felt the kraus inserted and felt his bladder release once the kraus was placed. He reports he felt the OSMIN and per report from ED physician, patient had full rectal tone. He is being admitted to Neuro ICU for a higher level of neurological care s/p L1 CORPECTOMY and TLIF T12-L2 Interbody Instrumentation.    Hospital Course: 08/07/2020: Admit to Neuro ICU.  08/08/2020: Added long acting pain medications. Pain better controlled. Stable for stepdown.   08/09/2020: Acute pain management consulted. Ketamine gtt. Ilues, NGT. Suppository.    08/10/2020: Appreciate pain management reccs. Plans for OR tomorrow.  08/11/2020 ABG, KUB/CXR, Mag citrate, cont ketamine  08/12/2020  Surgery rescheduled 8/181L bolus continue IVF ketamine /PCA. Follow Na dc suction, start clear liquid        Interval History:  Awake. Oriented. Follows commands. S/p L1 corpectomy with cage insertion and T12-L2 lateral fusion on 8/7. Posterior instrumentation deferred until next week. NSGY notified family. Closed suction post-op drain remains intact. Will discontinue NGT to suction and start clear liquid diet. Continue to trend Na. Post-op pain managed with ketamine gtt via Pain Management Team. Continue PCA. Oxygen saturation maintained > 94% on 2L NC. MAP  maintained > 65 without difficulty. The patient requires critical care stay due to high probability of life threatening deterioration in their condition.    Review of Systems   Constitutional: Negative for fever.   HENT: Negative for trouble swallowing and voice change.    Eyes: Negative for visual disturbance.   Respiratory: Negative for shortness of breath and wheezing.    Cardiovascular: Negative for palpitations.   Gastrointestinal: Positive for abdominal distention and abdominal pain.   Endocrine: Negative for polyuria.   Genitourinary: Negative for difficulty urinating.   Musculoskeletal: Positive for back pain.   Neurological: Positive for weakness. Negative for dizziness and headaches.   Psychiatric/Behavioral: Negative for agitation.       Objective:     Vitals:  Temp: 99.3 °F (37.4 °C)  Pulse: 98  Rhythm: normal sinus rhythm  BP: 110/74  MAP (mmHg): 87  Resp: 16  ETCO2 (mmHg): 30 mmHg  SpO2: 100 %  Oxygen Concentration (%): 28  O2 Device (Oxygen Therapy): nasal cannula    Temp  Min: 97.2 °F (36.2 °C)  Max: 100.1 °F (37.8 °C)  Pulse  Min: 88  Max: 123  BP  Min: 107/74  Max: 147/80  MAP (mmHg)  Min: 81  Max: 110  Resp  Min: 11  Max: 28  ETCO2 (mmHg)  Min: 26 mmHg  Max: 31 mmHg  SpO2  Min: 95 %  Max: 100 %  Oxygen Concentration (%)  Min: 28  Max: 28    08/11 0701 - 08/12 0700  In: 3458.1 [I.V.:2358.1]  Out: 4920 [Urine:3220; Drains:1700]   Unmeasured Output  Stool Occurrence: 1       Physical Exam  Vitals signs and nursing note reviewed.   Constitutional:       Appearance: Normal appearance. He is normal weight.   HENT:      Head: Normocephalic and atraumatic.      Right Ear: External ear normal.      Left Ear: External ear normal.      Nose: Nose normal. No congestion.   Eyes:      General: No scleral icterus.     Extraocular Movements: Extraocular movements intact.      Conjunctiva/sclera: Conjunctivae normal.      Pupils: Pupils are equal, round, and reactive to light.   Neck:      Musculoskeletal: No neck  rigidity.   Cardiovascular:      Rate and Rhythm: Normal rate and regular rhythm.      Pulses: Normal pulses.      Heart sounds: Normal heart sounds.   Pulmonary:      Effort: Pulmonary effort is normal. No respiratory distress.      Breath sounds: Normal breath sounds.   Abdominal:      General: There is distension.   Musculoskeletal: Normal range of motion.   Skin:     General: Skin is warm.      Capillary Refill: Capillary refill takes less than 2 seconds.   Neurological:      Mental Status: He is alert and oriented to person, place, and time.      GCS: GCS eye subscore is 4. GCS verbal subscore is 5. GCS motor subscore is 6.      Motor: Weakness present.      Comments: Awake, oriented  PERRLA  EOMI  JONES spontaneously   Weakness in RLE             Medications:  Continuoussodium chloride 0.9%, Last Rate: 100 mL/hr at 08/12/20 1836  hydromorphone in 0.9 % NaCl 6 mg/30 ml  ketamine (KETALAR) infusion (titrating), Last Rate: 2 mcg/kg/min (08/12/20 0605)    Scheduledacetaminophen, 1,000 mg, Q6H  diazePAM, 5 mg, TID  methocarbamol (ROBAXIN) IVPB, 1,000 mg, Q8H  pantoprazole, 40 mg, Daily  senna-docusate 8.6-50 mg, 2 tablet, Daily    PRNcalcium gluconate IVPB, 1 g, PRN  calcium gluconate IVPB, 2 g, PRN  calcium gluconate IVPB, 3 g, PRN  dextrose 50%, 12.5 g, PRN  dextrose 50%, 25 g, PRN  glucagon (human recombinant), 1 mg, PRN  magnesium sulfate IVPB, 2 g, PRN  magnesium sulfate IVPB, 4 g, PRN  naloxone, 0.02 mg, PRN  ondansetron, 8 mg, Q8H PRN  polyethylene glycol, 17 g, Daily PRN  potassium chloride in water, 40 mEq, PRN    And  potassium chloride in water, 60 mEq, PRN    And  potassium chloride in water, 80 mEq, PRN  sodium chloride 0.9%, 10 mL, PRN  sodium phosphate IVPB, 15 mmol, PRN  sodium phosphate IVPB, 20.01 mmol, PRN  sodium phosphate IVPB, 30 mmol, PRN        Diet  Diet clear liquid  Diet clear liquid        Assessment/Plan:     Neuro  * Traumatic compression fracture of L1 lumbar vertebra  45 y.o male with  no significant PMHx was transferred from  to [facility] with CT CAP significant for L1 burst fracture with retropulsion into the spinal canal resulting in severe spinal stenosis s/p falling 10ft from roof. He will be admitted to the Neuro ICU s/p  L1 CORPECTOMY and TLIF T12-L2 Interbody Instrumentation.       -Neurosurgery Following  -Neuro checks q1hr   -Vital signs q1hr  -Daily labs  -Dilaudid PCA  Continue prn Zofran   pain management following  Ketamine gtt managed by Anesthesia pain mgmt          Spinal stenosis of lumbar region  S/P L1 CORPECTOMY, SPINE, LUMBAR  (Left)  FUSION, SPINE, LUMBAR, TLIF T12-L2 Interbody Fusion with Instrumentation - Lateral (Left)     -Plans for OR 8/18    Renal/  Acute urinary retention  Victor placed, continue to monitor strict I's and O's    GI  Ileus  Discontinue NGT to low intermittent suction   -Suppositories daily until bm   KUB/Mag Citrate productive  Initiate CL diet as tolerated    Orthopedic  Fall from building  See above    Other  Burst fracture of lumbar vertebra, open, initial encounter  -see traumatic compression fracture of L1 lumbar fracture           The patient is being Prophylaxed for:  Venous Thromboembolism with: Mechanical  Stress Ulcer with: PPI  Ventilator Pneumonia with: none    Activity Orders          Diet clear liquid: Clear Liquid starting at 08/12 1858    Commode at bedside starting at 08/07 0746        Full Code     Critical Care 32 minutes  Critical secondary to Patient has a condition that poses threat to life and bodily function:      Critical care was time spent personally by me on the following activities: development of treatment plan with patient or surrogate and bedside caregivers, discussions with consultants, evaluation of patient's response to treatment, examination of patient, ordering and performing treatments and interventions, ordering and review of laboratory studies, ordering and review of radiographic studies, pulse oximetry,  re-evaluation of patient's condition. This critical care time did not overlap with that of any other provider or involve time for any procedures.      Zenaida Young NP  Neurocritical Care  [facility]

## 2020-08-13 NOTE — ASSESSMENT & PLAN NOTE
Discontinue NGT to low intermittent suction   -Suppositories daily until bm   KUB/Mag Citrate productive  Initiate CL diet as tolerated

## 2020-08-13 NOTE — ASSESSMENT & PLAN NOTE
45 y.o male with no significant PMHx was transferred from  to [facility] with CT CAP significant for L1 burst fracture with retropulsion into the spinal canal resulting in severe spinal stenosis s/p falling 10ft from roof. He will be admitted to the Neuro ICU s/p  L1 CORPECTOMY and TLIF T12-L2 Interbody Instrumentation.       -Neurosurgery Following  -Neuro checks q1hr   -Vital signs q1hr  -Daily labs  -Dilaudid PCA  Continue prn Zofran   pain management following  Ketamine gtt managed by Anesthesia pain mgmt

## 2020-08-13 NOTE — PROGRESS NOTES
[facility]  Neurocritical Care  Progress Note    Admit Date: 8/5/2020  Service Date: 08/13/2020  Length of Stay: 8    Subjective:     Chief Complaint: Traumatic compression fracture of L1 lumbar vertebra    History of Present Illness: Patient is a 46 y/o male with no significant PMH who was transferred from  after falling from 10ft roof at work. Patient denies LOC, complained of excrutiating low back/sacral pain exacerbated with any movement of legs. CT CAP significant for L1 burst fracture with retropulsion into the spinal canal resulting in severe spinal stenosis. Patient states he could feel he had to urinate but could not start a stream, kraus placed and he reports he felt the kraus inserted and felt his bladder release once the kraus was placed. He reports he felt the OSMIN and per report from ED physician, patient had full rectal tone. He is being admitted to Neuro ICU for a higher level of neurological care s/p L1 CORPECTOMY and TLIF T12-L2 Interbody Instrumentation.    Hospital Course: 08/07/2020: Admit to Neuro ICU.  08/08/2020: Added long acting pain medications. Pain better controlled. Stable for stepdown.   08/09/2020: Acute pain management consulted. Ketamine gtt. Ilues, NGT. Suppository.    08/10/2020: Appreciate pain management reccs. Plans for OR tomorrow.  08/11/2020 ABG, KUB/CXR, Mag citrate, cont ketamine  08/12/2020  Surgery rescheduled 8/181L bolus continue IVF ketamine /PCA. Follow Na dc suction, start clear liquid  08/13/2020 wean ketamine gtt, heparin          Interval History: Neurologically unchanged.S/p L1 corpectomy with cage insertion and T12-L2 lateral fusion on 8/7. Posterior instrumentation deferred until next week.Closed suction post-op drain remains intact. Tolerating intermittent feeding with clear liquid. Suction previously discontinued Continue pain management with ketamine  and PCA. Pain Management Team to wean Ketamine to facilitate TTF. Oxygen saturation  maintained > 94% on 2L NC. MAP maintained > 65 without difficulty. The patient requires critical care stay due to high probability of life threatening deterioration in their condition.    Review of Systems   Constitutional: Negative for fever.   HENT: Negative for trouble swallowing and voice change.    Eyes: Negative for visual disturbance.   Respiratory: Negative for shortness of breath and wheezing.    Cardiovascular: Negative for palpitations.   Gastrointestinal: Positive for abdominal distention and abdominal pain.   Endocrine: Negative for polyuria.   Genitourinary: Negative for difficulty urinating.   Musculoskeletal: Positive for back pain.   Neurological: Positive for weakness. Negative for dizziness and headaches.   Psychiatric/Behavioral: Negative for agitation.       Objective:     Vitals:  Temp: 98.3 °F (36.8 °C)  Pulse: 107  Rhythm: normal sinus rhythm  BP: (!) 129/90  MAP (mmHg): 104  Resp: 19  ETCO2 (mmHg): 30 mmHg  SpO2: 100 %  Oxygen Concentration (%): 28  O2 Device (Oxygen Therapy): nasal cannula    Temp  Min: 97.9 °F (36.6 °C)  Max: 98.9 °F (37.2 °C)  Pulse  Min: 91  Max: 107  BP  Min: 105/80  Max: 139/96  MAP (mmHg)  Min: 87  Max: 111  Resp  Min: 12  Max: 21  ETCO2 (mmHg)  Min: 28 mmHg  Max: 33 mmHg  SpO2  Min: 96 %  Max: 100 %  Oxygen Concentration (%)  Min: 28  Max: 28    08/12 0701 - 08/13 0700  In: 4400.8 [I.V.:2625.8]  Out: 4295 [Urine:2845; Drains:1450]   Unmeasured Output  Stool Occurrence: 0       Physical Exam  Vitals signs and nursing note reviewed.   Constitutional:       Appearance: Normal appearance. He is normal weight.   HENT:      Head: Normocephalic and atraumatic.      Right Ear: External ear normal.      Left Ear: External ear normal.      Nose: Nose normal. No congestion.   Eyes:      General: No scleral icterus.     Extraocular Movements: Extraocular movements intact.      Conjunctiva/sclera: Conjunctivae normal.      Pupils: Pupils are equal, round, and reactive to light.    Neck:      Musculoskeletal: No neck rigidity.   Cardiovascular:      Rate and Rhythm: Normal rate and regular rhythm.      Pulses: Normal pulses.      Heart sounds: Normal heart sounds.   Pulmonary:      Effort: Pulmonary effort is normal. No respiratory distress.      Breath sounds: Normal breath sounds.   Abdominal:      General: There is distension.   Musculoskeletal: Normal range of motion.   Skin:     General: Skin is warm.      Capillary Refill: Capillary refill takes less than 2 seconds.   Neurological:      Mental Status: He is alert and oriented to person, place, and time.      GCS: GCS eye subscore is 4. GCS verbal subscore is 5. GCS motor subscore is 6.      Motor: Weakness present.      Comments: Awake, oriented  PERRLA  EOMI  JONES spontaneously   Weakness in RLE             Medications:  Continuoussodium chloride 0.9%, Last Rate: 100 mL/hr at 08/13/20 1500  hydromorphone in 0.9 % NaCl 6 mg/30 ml  ketamine (KETALAR) infusion (titrating), Last Rate: 1.5 mcg/kg/min (08/13/20 1500)    ScheduleddiazePAM, 5 mg, TID  heparin (porcine), 5,000 Units, Q8H  methocarbamol (ROBAXIN) IVPB, 1,000 mg, Q8H  pantoprazole, 40 mg, Daily  senna-docusate 8.6-50 mg, 2 tablet, Daily    PRNsodium chloride, , Q24H PRN  calcium carbonate, 500 mg, Daily PRN  calcium gluconate IVPB, 1 g, PRN  calcium gluconate IVPB, 2 g, PRN  calcium gluconate IVPB, 3 g, PRN  dextrose 50%, 12.5 g, PRN  dextrose 50%, 25 g, PRN  glucagon (human recombinant), 1 mg, PRN  magnesium sulfate IVPB, 2 g, PRN  magnesium sulfate IVPB, 4 g, PRN  naloxone, 0.02 mg, PRN  ondansetron, 8 mg, Q8H PRN  polyethylene glycol, 17 g, Daily PRN  potassium chloride in water, 40 mEq, PRN    And  potassium chloride in water, 60 mEq, PRN    And  potassium chloride in water, 80 mEq, PRN  sodium chloride 0.9%, 10 mL, PRN  sodium phosphate IVPB, 15 mmol, PRN  sodium phosphate IVPB, 20.01 mmol, PRN  sodium phosphate IVPB, 30 mmol, PRN        Diet  Diet clear liquid  Diet  NPO  Diet clear liquid  Diet NPO        Assessment/Plan:     Neuro  * Traumatic compression fracture of L1 lumbar vertebra  45 y.o male with no significant PMHx was transferred from  to [facility] with CT CAP significant for L1 burst fracture with retropulsion into the spinal canal resulting in severe spinal stenosis s/p falling 10ft from roof. He will be admitted to the Neuro ICU s/p  L1 CORPECTOMY and TLIF T12-L2 Interbody Instrumentation.       -Neurosurgery Following  -Neuro checks q1hr   -Vital signs q1hr  -Daily labs  -Dilaudid PCA  Continue prn Zofran   pain management following  Ketamine gtt managed by Anesthesia pain mgmt          Spinal stenosis of lumbar region  S/P L1 CORPECTOMY, SPINE, LUMBAR  (Left)  FUSION, SPINE, LUMBAR, TLIF T12-L2 Interbody Fusion with Instrumentation - Lateral (Left)     -Plans for OR 8/18    Renal/  Acute urinary retention  Victor placed, continue to monitor strict I's and O's    GI  Ileus  Discontinue NGT to low intermittent suction   -Suppositories daily until bm   KUB/Mag Citrate productive  Initiate CL diet as tolerated    Orthopedic  Fall from building  See above    Other  Burst fracture of lumbar vertebra, open, initial encounter  -see traumatic compression fracture of L1 lumbar fracture           The patient is being Prophylaxed for:  Venous Thromboembolism with: Chemical  Stress Ulcer with: PPI  Ventilator Pneumonia with: none    Activity Orders          Diet NPO: NPO starting at 08/14 0001    Diet clear liquid: Clear Liquid starting at 08/12 1858    Commode at bedside starting at 08/07 0746        Full Code    Zenaida Young NP  Neurocritical Care  [facility]

## 2020-08-13 NOTE — SUBJECTIVE & OBJECTIVE
Interval History: NAEON. Posterior fixation delayed until 8/18. Neuro stable, remains on Ketamine gtt/Dilaudid PCA.     Medications:  Continuous Infusions:   sodium chloride 0.9% 100 mL/hr at 08/13/20 0505    hydromorphone in 0.9 % NaCl 6 mg/30 ml      ketamine (KETALAR) infusion (titrating) 2 mcg/kg/min (08/13/20 0505)     Scheduled Meds:   acetaminophen  1,000 mg Intravenous Q6H    diazePAM  5 mg Intravenous TID    methocarbamol (ROBAXIN) IVPB  1,000 mg Intravenous Q8H    pantoprazole  40 mg Intravenous Daily    senna-docusate 8.6-50 mg  2 tablet Per NG tube Daily     PRN Meds:calcium carbonate, calcium gluconate IVPB, calcium gluconate IVPB, calcium gluconate IVPB, dextrose 50%, dextrose 50%, glucagon (human recombinant), magnesium sulfate IVPB, magnesium sulfate IVPB, naloxone, ondansetron, polyethylene glycol, potassium chloride in water **AND** potassium chloride in water **AND** potassium chloride in water, sodium chloride 0.9%, sodium phosphate IVPB, sodium phosphate IVPB, sodium phosphate IVPB     Review of Systems  Objective:     Weight: 94.9 kg (209 lb 3.5 oz)  Body mass index is 30.02 kg/m².  Vital Signs (Most Recent):  Temp: 98.9 °F (37.2 °C) (08/13/20 0305)  Pulse: 97 (08/13/20 0505)  Resp: 13 (08/13/20 0505)  BP: 127/85 (08/13/20 0505)  SpO2: 98 % (08/13/20 0505) Vital Signs (24h Range):  Temp:  [97.2 °F (36.2 °C)-99.3 °F (37.4 °C)] 98.9 °F (37.2 °C)  Pulse:  [] 97  Resp:  [12-21] 13  SpO2:  [96 %-100 %] 98 %  BP: (105-147)/(74-95) 127/85                Oxygen Concentration (%):  [28] 28         NG/OG Tube 08/08/20 1415 nasogastric Right nostril (Active)   Placement Check placement verified by aspirate characteristics;placement verified by distal tube length measurement 08/13/20 0305   Advancement advanced manually 08/12/20 1500   Tolerance no signs/symptoms of discomfort 08/13/20 0305   Securement secured to commercial device 08/12/20 1500   Clamp Status/Tolerance clamped 08/13/20 0305  "  Suction Setting/Drainage Method suction at;low;intermittent setting 08/12/20 2305   Insertion Site Appearance no redness, warmth, tenderness, skin breakdown, drainage 08/13/20 0305   Drainage Bile;Green;Brown 08/13/20 0305   Flush/Irrigation flushed w/;water 08/12/20 1500   Feeding Type other (see comments) 08/10/20 1500   Feeding Action feeding held 08/12/20 1500   Intake (mL) 75 mL 08/12/20 0900   Water Bolus (mL) 0 mL 08/12/20 0536   Tube Output(mL)(Include Discarded Residual) 250 mL 08/13/20 0005   Intake (mL) - Formula Tube Feeding 0 08/10/20 1800            Urethral Catheter 08/05/20 1133 Straight-tip 14 Fr. (Active)   Site Assessment Clean;Intact 08/13/20 0305   Collection Container Urimeter 08/13/20 0305   Securement Method secured to top of thigh w/ adhesive device 08/13/20 0305   Catheter Care Performed yes 08/13/20 0305   Reason for Continuing Urinary Catheterization Required immobilization;Critically ill in ICU requiring intensive monitoring;Post operative 08/13/20 0305   CAUTI Prevention Bundle StatLock in place w 1" slack;Intact seal between catheter & drainage tubing;Drainage bag/urimeter off the floor;Green sheeting clip in use;No dependent loops or kinks;Drainage bag/urimeter not overfilled (<2/3 full);Drainage bag/urimeter below bladder 08/13/20 0305   Output (mL) 100 mL 08/13/20 0505       Neurosurgery Physical Exam   AOX3  PERRL  5/5 in BUE/BLE  SILT  Abdomen soft, improved from prior     Significant Labs:  Recent Labs   Lab 08/12/20 0110 08/12/20  1850 08/13/20  0330   GLU 90  --  90   * 134* 134*   K 3.8  --  3.6   CL 97  --  100   CO2 23  --  24   BUN 11  --  11   CREATININE 0.6  --  0.5   CALCIUM 8.2*  --  7.9*   MG 1.7  --  1.5*     Recent Labs   Lab 08/12/20  0110 08/13/20  0330   WBC 5.40 6.30   HGB 8.0* 7.8*   HCT 24.3* 23.5*    320     Recent Labs   Lab 08/12/20  0110 08/13/20  0330   INR 1.0 1.0   APTT 29.2 29.5       Significant Diagnostics:  No results found in the " last 24 hours.

## 2020-08-13 NOTE — ANESTHESIA PREPROCEDURE EVALUATION
[facility]  Anesthesia Pre-Operative Evaluation         Patient Name: Oscar Bills  YOB: 1975  MRN: [id number]    SUBJECTIVE:     Pre-operative evaluation for Procedure(s) (LRB):  FUSION, SPINE, LUMBAR (N/A)     08/13/2020    Oscar Bills is a 45 y.o. male w/ a significant PMH of asthma who was transferred from  after falling from 10ft roof at work. CT CAP significant for L1 burst fracture with retropulsion into the spinal canal resulting in severe spinal stenosis s/p L1 corpectomy with cage insertion [id number] lateral fusion (8/6/20); deferred posterior instrumentation at the time due to intraop bleeding. Post op ileus has resolved.    Patient now presents for the above procedure(s).       *(Pt endorses asthma for which he takes albuterol rescue inhaler. Endorses an asthma attack 2 weeks ago. Has been hospitalized as a child for asthma. Never intubated)    Consent was signed 8/10/20 for above procedure, which was postponed.      LDA:        Peripheral IV - Single Lumen 08/06/20 2329 18 G Right Forearm (Active)   Site Assessment Clean;Dry;Intact;No redness;No swelling 08/10/20 0305   Line Status Flushed;Saline locked 08/10/20 0305   Dressing Status Clean;Dry;Intact 08/10/20 0305   Dressing Intervention Integrity maintained 08/10/20 0305   Dressing Change Due 08/10/20 08/10/20 0305   Site Change Due 08/10/20 08/09/20 1905   Reason Not Rotated Not due 08/10/20 0305   Number of days: 3            Peripheral IV - Single Lumen 08/09/20 0200 18 G Anterior;Proximal;Right Forearm (Active)   Site Assessment Clean;Dry;Intact;No redness;No swelling 08/10/20 0305   Line Status Blood return noted;Flushed;Saline locked 08/10/20 0305   Dressing Status Clean;Dry;Intact 08/10/20 0305   Dressing Intervention Integrity maintained 08/10/20 0305   Dressing Change Due 08/13/20 08/10/20 0305   Site Change Due 08/13/20 08/09/20 1905   Reason Not Rotated Not due 08/10/20 0305   Number of days: 1             Peripheral IV - Single Lumen 08/09/20 1626 20 G Left Upper Arm (Active)   Site Assessment Clean;Dry;Intact;No redness;No swelling 08/10/20 0305   Line Status Blood return noted;Flushed;Infusing 08/10/20 0305   Dressing Status Clean;Dry;Intact 08/10/20 0305   Dressing Intervention Integrity maintained 08/10/20 0305   Dressing Change Due 08/13/20 08/10/20 0305   Site Change Due 08/13/20 08/09/20 1905   Reason Not Rotated Not due 08/10/20 0305   Number of days: 0            Peripheral IV - Single Lumen 08/09/20 2330 18 G Left Antecubital (Active)   Site Assessment Clean;Dry;Intact;No redness;No swelling 08/10/20 0305   Line Status Blood return noted;Flushed;Saline locked 08/10/20 0305   Dressing Status Clean;Dry;Intact 08/10/20 0305   Dressing Intervention First dressing 08/10/20 0305   Dressing Change Due 08/14/20 08/10/20 0305   Site Change Due 08/14/20 08/10/20 0105   Reason Not Rotated Not due 08/10/20 0305   Number of days: 0            Closed/Suction Drain 08/06/20 2337 Left Other (Comment) Bulb 10 Fr. (Active)   Site Description Healing 08/10/20 0305   Dressing Type Transparent (Tegaderm) 08/10/20 0305   Dressing Status Old drainage;Clean;Dry;Intact 08/10/20 0305   Dressing Intervention Integrity maintained 08/10/20 0305   Drainage Serosanguineous 08/10/20 0305   Status To bulb suction 08/10/20 0305   Output (mL) 10 mL 08/10/20 0605   Number of days: 3            NG/OG Tube 08/08/20 1415 nasogastric Right nostril (Active)   Placement Check placement verified by aspirate characteristics;placement verified by distal tube length measurement;placement verified by x-ray 08/10/20 0305   Advancement advanced manually 08/10/20 0305   Tolerance signs/symptoms of discomfort 08/10/20 0305   Securement secured to commercial device 08/10/20 0305   Clamp Status/Tolerance unclamped 08/10/20 0305   Suction Setting/Drainage Method suction at;intermittent setting 08/10/20 0305   Insertion Site Appearance no redness, warmth,  "tenderness, skin breakdown, drainage 08/10/20 0305   Drainage Coffee ground;Brown;Green 08/10/20 0305   Flush/Irrigation flushed w/;water;no resistance met 08/10/20 0305   Feeding Action feeding held 08/10/20 0305   Intake (mL) 60 mL 08/09/20 0900   Tube Output(mL)(Include Discarded Residual) 400 mL 08/10/20 0605   Number of days: 1            Urethral Catheter 08/05/20 1133 Straight-tip 14 Fr. (Active)   Site Assessment Clean;Intact 08/10/20 0305   Collection Container Urimeter 08/10/20 0305   Securement Method secured to top of thigh w/ adhesive device 08/10/20 0305   Catheter Care Performed yes 08/10/20 0305   Reason for Continuing Urinary Catheterization Critically ill in ICU requiring intensive monitoring;Post operative 08/10/20 0305   CAUTI Prevention Bundle StatLock in place w 1" slack;Green sheeting clip in use;Drainage bag/urimeter off the floor;Intact seal between catheter & drainage tubing;No dependent loops or kinks;Drainage bag/urimeter not overfilled (<2/3 full);Drainage bag/urimeter below bladder 08/10/20 0305   Output (mL) 85 mL 08/10/20 0605   Number of days: 4        Prev airway:   Placement Date: 08/07/20; Placement Time: 0553 (created via procedure documentation); Method of Intubation: Direct laryngoscopy; Mask Ventilation: Easy; Intubated: Postinduction; Blade: Hollis #2; Airway Device Size: 7.5; Placement Verified By: Capnometry; Complicating Factors: None    Drips:        Patient Active Problem List   Diagnosis    Traumatic compression fracture of L1 lumbar vertebra    Spinal stenosis of lumbar region    Fall from building    Acute urinary retention    Ileus    Burst fracture of lumbar vertebra, open, initial encounter       Review of patient's allergies indicates:  No Known Allergies    Current Outpatient Medications:  No current facility-administered medications for this visit.   No current outpatient medications on file.    Facility-Administered Medications Ordered in Other Visits: "     0.9%  NaCl infusion (for blood administration), , Intravenous, Q24H PRN, Camilo Levy MD    0.9%  NaCl infusion, , Intravenous, Continuous, Dimitry Alexandre MD, Last Rate: 100 mL/hr at 08/13/20 1500    calcium carbonate 200 mg calcium (500 mg) chewable tablet 500 mg, 500 mg, Oral, Daily PRN, Beckie Horta PA-C, 500 mg at 08/12/20 2107    calcium gluconate 1g in dextrose 5% 100mL (ready to mix system), 1 g, Intravenous, PRN, Jai Douglas MD    calcium gluconate 2 g in dextrose 5 % 100 mL IVPB, 2 g, Intravenous, PRN, Jai Douglas MD    calcium gluconate 3 g in dextrose 5 % 100 mL IVPB, 3 g, Intravenous, PRN, Jai Douglas MD    dextrose 50% injection 12.5 g, 12.5 g, Intravenous, PRN, Itzel Jimenez PA-C    dextrose 50% injection 25 g, 25 g, Intravenous, PRN, Itzel Jimenez PA-C    diazePAM injection 5 mg, 5 mg, Intravenous, TID, Ramona Messer MD, 5 mg at 08/13/20 1511    glucagon (human recombinant) injection 1 mg, 1 mg, Intramuscular, PRN, Itzel Jimenez PA-C    heparin (porcine) injection 5,000 Units, 5,000 Units, Subcutaneous, Q8H, Zenaida Young NP, 5,000 Units at 08/13/20 1432    HYDROmorphone PCA syringe 6 mg/30 mL (0.2 mg/mL) NS, , Intravenous, Continuous, Ramona Messer MD    ketamine 500 mg/250 mL (2 mg/mL) in sodium chloride 0.9% infusion, 1.5 mcg/kg/min, Intravenous, Continuous, Ramona Messer MD, Last Rate: 4.1 mL/hr at 08/13/20 1500, 1.5 mcg/kg/min at 08/13/20 1500    magnesium sulfate 2g in water 50mL IVPB (premix), 2 g, Intravenous, PRN, Jai Douglas MD, 2 g at 08/13/20 0546    magnesium sulfate 2g in water 50mL IVPB (premix), 4 g, Intravenous, PRN, Jai Douglas MD    methocarbamol 1000 mg in dextrose 5 % 100 mL IVPB (ready to mix system), 1,000 mg, Intravenous, Q8H, Ramona Messer MD, 1,000 mg at 08/13/20 1432    naloxone 0.4 mg/mL injection 0.02 mg, 0.02  mg, Intravenous, PRN, Sandy Katz MD    ondansetron injection 8 mg, 8 mg, Intravenous, Q8H PRN, Janeen Gan, NP, 8 mg at 08/10/20 1737    pantoprazole injection 40 mg, 40 mg, Intravenous, Daily, Alvin Maier MD, 40 mg at 08/13/20 0843    polyethylene glycol packet 17 g, 17 g, Per NG tube, Daily PRN, Zenaida Young NP    potassium chloride 10 mEq in 100 mL IVPB, 40 mEq, Intravenous, PRN, Last Rate: 100 mL/hr at 08/13/20 0749, 40 mEq at 08/13/20 0749 **AND** potassium chloride 10 mEq in 100 mL IVPB, 60 mEq, Intravenous, PRN, Last Rate: 100 mL/hr at 08/09/20 0400, 60 mEq at 08/09/20 0400 **AND** potassium chloride 10 mEq in 100 mL IVPB, 80 mEq, Intravenous, PRN, Jai Douglas MD    senna-docusate 8.6-50 mg per tablet 2 tablet, 2 tablet, Per NG tube, Daily, Mir Hou MD, 2 tablet at 08/13/20 0843    sodium chloride 0.9% flush 10 mL, 10 mL, Intravenous, PRN, Janeen Gan, NP    sodium phosphate 15 mmol in dextrose 5 % 250 mL IVPB, 15 mmol, Intravenous, PRN, Jai Douglas MD    sodium phosphate 20.01 mmol in dextrose 5 % 250 mL IVPB, 20.01 mmol, Intravenous, PRN, Jai Douglas MD    sodium phosphate 30 mmol in dextrose 5 % 250 mL IVPB, 30 mmol, Intravenous, PRN, Jai Douglas MD    Past Surgical History:   Procedure Laterality Date    SURGICAL REMOVAL OF VERTEBRAL BODY OF LUMBAR SPINE Left 8/6/2020    Procedure: L1 CORPECTOMY, SPINE, LUMBAR ;  Surgeon: Leonid Snider MD;  Location: [facility] OR [address] [place] [address];  Service: Neurosurgery;  Laterality: Left;  Globus, Please obtain neuromonitoring.     TRANSFORAMINAL LUMBAR INTERBODY FUSION Left 8/6/2020    Procedure: FUSION, SPINE, LUMBAR, TLIF T12-L2 Interbody Fusion with Instrumentation - Lateral;  Surgeon: Leonid Snider MD;  Location: [facility] OR [address] [place] [address];  Service: Neurosurgery;  Laterality: Left;       Social History     Socioeconomic History    Marital status: Single      Spouse name: Not on file    Number of children: Not on file    Years of education: Not on file    Highest education level: Not on file   Occupational History    Not on file   Social Needs    Financial resource strain: Not on file    Food insecurity     Worry: Not on file     Inability: Not on file    Transportation needs     Medical: Not on file     Non-medical: Not on file   Tobacco Use    Smoking status: Never Smoker    Smokeless tobacco: Never Used   Substance and Sexual Activity    Alcohol use: Yes    Drug use: Never    Sexual activity: Yes     Partners: Female     Birth control/protection: Condom   Lifestyle    Physical activity     Days per week: Not on file     Minutes per session: Not on file    Stress: Not on file   Relationships    Social connections     Talks on phone: Not on file     Gets together: Not on file     Attends Buddhist service: Not on file     Active member of club or organization: Not on file     Attends meetings of clubs or organizations: Not on file     Relationship status: Not on file   Other Topics Concern    Not on file   Social History Narrative    Not on file       OBJECTIVE:     Vital Signs Range (Last 24H):  Temp:  [36.6 °C (97.9 °F)-37.2 °C (98.9 °F)]   Pulse:  []   Resp:  [12-21]   BP: (105-139)/(74-96)   SpO2:  [96 %-100 %]       Significant Labs:  Lab Results   Component Value Date    WBC 6.30 08/13/2020    HGB 7.8 (L) 08/13/2020    HCT 23.5 (L) 08/13/2020     08/13/2020    ALT 22 08/13/2020    AST 49 (H) 08/13/2020     (L) 08/13/2020    K 3.6 08/13/2020     08/13/2020    CREATININE 0.5 08/13/2020    BUN 11 08/13/2020    CO2 24 08/13/2020    INR 1.0 08/13/2020       Diagnostic Studies: No relevant studies.    EKG:   No results found for this or any previous visit.    2D ECHO:  TTE:  No results found for this or any previous visit.    LOBITO:  No results found for this or any previous visit.    ASSESSMENT/PLAN:       Pre-op  Assessment    I have reviewed the Patient Summary Reports.     I have reviewed the Nursing Notes. I have reviewed the NPO Status.   I have reviewed the Medications.     Review of Systems  Anesthesia Hx:  No problems with previous Anesthesia Denies Hx of Anesthetic complications  History of prior surgery of interest to airway management or planning: Previous anesthesia: General Denies Family Hx of Anesthesia complications.   Denies Personal Hx of Anesthesia complications.   Social:  Non-Smoker, Social Alcohol Use    Hematology/Oncology:  Hematology Normal   Oncology Normal     EENT/Dental:EENT/Dental Normal   Cardiovascular:  Cardiovascular Normal     Pulmonary:   Asthma    Renal/:  Renal/ Normal     Hepatic/GI:  Hepatic/GI Normal    Neurological:  Neurology Normal    Endocrine:  Endocrine Normal    Psych:  Psychiatric Normal           Physical Exam  General:  Well nourished    Airway/Jaw/Neck:  Airway Findings: Mouth Opening: Normal Tongue: Normal  General Airway Assessment: Adult, Good  Mallampati: II  TM Distance: Normal, at least 6 cm  Jaw/Neck Findings:  Neck ROM: Normal ROM      Dental:  Dental Findings: Periodontal disease, Severe    Chest/Lungs:  Chest/Lungs Findings: Clear to auscultation, Normal Respiratory Rate     Heart/Vascular:  Heart Findings: Rate: Normal  Rhythm: Regular Rhythm     Abdomen:  Abdomen Findings:  Normal, Soft       Mental Status:  Mental Status Findings:  Alert and Oriented, Cooperative         Anesthesia Plan  Type of Anesthesia, risks & benefits discussed:  Anesthesia Type:  general, MAC, regional  Patient's Preference:   Intra-op Monitoring Plan: standard ASA monitors and arterial line  Intra-op Monitoring Plan Comments:   Post Op Pain Control Plan: multimodal analgesia, IV/PO Opioids PRN and per primary service following discharge from PACU  Post Op Pain Control Plan Comments:   Induction:   IV  Beta Blocker:  Patient is not currently on a Beta-Blocker (No further documentation  required).       Informed Consent: Patient understands risks and agrees with Anesthesia plan.  Questions answered. Anesthesia consent signed with patient.  ASA Score: 2     Day of Surgery Review of History & Physical: I have interviewed and examined the patient. I have reviewed the patient's H&P dated:    H&P update referred to the surgeon.         Ready For Surgery From Anesthesia Perspective.

## 2020-08-13 NOTE — SUBJECTIVE & OBJECTIVE
Interval History: Neurologically unchanged.S/p L1 corpectomy with cage insertion and T12-L2 lateral fusion on 8/7. Posterior instrumentation deferred until next week.Closed suction post-op drain remains intact. Tolerating intermittent feeding with clear liquid. Suction previously discontinued Continue pain management with ketamine  and PCA. Pain Management Team to wean Ketamine to facilitate TTF. Oxygen saturation maintained > 94% on 2L NC. MAP maintained > 65 without difficulty. The patient requires critical care stay due to high probability of life threatening deterioration in their condition.    Review of Systems   Constitutional: Negative for fever.   HENT: Negative for trouble swallowing and voice change.    Eyes: Negative for visual disturbance.   Respiratory: Negative for shortness of breath and wheezing.    Cardiovascular: Negative for palpitations.   Gastrointestinal: Positive for abdominal distention and abdominal pain.   Endocrine: Negative for polyuria.   Genitourinary: Negative for difficulty urinating.   Musculoskeletal: Positive for back pain.   Neurological: Positive for weakness. Negative for dizziness and headaches.   Psychiatric/Behavioral: Negative for agitation.       Objective:     Vitals:  Temp: 98.3 °F (36.8 °C)  Pulse: 107  Rhythm: normal sinus rhythm  BP: (!) 129/90  MAP (mmHg): 104  Resp: 19  ETCO2 (mmHg): 30 mmHg  SpO2: 100 %  Oxygen Concentration (%): 28  O2 Device (Oxygen Therapy): nasal cannula    Temp  Min: 97.9 °F (36.6 °C)  Max: 98.9 °F (37.2 °C)  Pulse  Min: 91  Max: 107  BP  Min: 105/80  Max: 139/96  MAP (mmHg)  Min: 87  Max: 111  Resp  Min: 12  Max: 21  ETCO2 (mmHg)  Min: 28 mmHg  Max: 33 mmHg  SpO2  Min: 96 %  Max: 100 %  Oxygen Concentration (%)  Min: 28  Max: 28    08/12 0701 - 08/13 0700  In: 4400.8 [I.V.:2625.8]  Out: 4295 [Urine:2845; Drains:1450]   Unmeasured Output  Stool Occurrence: 0       Physical Exam  Vitals signs and nursing note reviewed.   Constitutional:        Appearance: Normal appearance. He is normal weight.   HENT:      Head: Normocephalic and atraumatic.      Right Ear: External ear normal.      Left Ear: External ear normal.      Nose: Nose normal. No congestion.   Eyes:      General: No scleral icterus.     Extraocular Movements: Extraocular movements intact.      Conjunctiva/sclera: Conjunctivae normal.      Pupils: Pupils are equal, round, and reactive to light.   Neck:      Musculoskeletal: No neck rigidity.   Cardiovascular:      Rate and Rhythm: Normal rate and regular rhythm.      Pulses: Normal pulses.      Heart sounds: Normal heart sounds.   Pulmonary:      Effort: Pulmonary effort is normal. No respiratory distress.      Breath sounds: Normal breath sounds.   Abdominal:      General: There is distension.   Musculoskeletal: Normal range of motion.   Skin:     General: Skin is warm.      Capillary Refill: Capillary refill takes less than 2 seconds.   Neurological:      Mental Status: He is alert and oriented to person, place, and time.      GCS: GCS eye subscore is 4. GCS verbal subscore is 5. GCS motor subscore is 6.      Motor: Weakness present.      Comments: Awake, oriented  PERRLA  EOMI  JONES spontaneously   Weakness in RLE             Medications:  Continuoussodium chloride 0.9%, Last Rate: 100 mL/hr at 08/13/20 1500  hydromorphone in 0.9 % NaCl 6 mg/30 ml  ketamine (KETALAR) infusion (titrating), Last Rate: 1.5 mcg/kg/min (08/13/20 1500)    ScheduleddiazePAM, 5 mg, TID  heparin (porcine), 5,000 Units, Q8H  methocarbamol (ROBAXIN) IVPB, 1,000 mg, Q8H  pantoprazole, 40 mg, Daily  senna-docusate 8.6-50 mg, 2 tablet, Daily    PRNsodium chloride, , Q24H PRN  calcium carbonate, 500 mg, Daily PRN  calcium gluconate IVPB, 1 g, PRN  calcium gluconate IVPB, 2 g, PRN  calcium gluconate IVPB, 3 g, PRN  dextrose 50%, 12.5 g, PRN  dextrose 50%, 25 g, PRN  glucagon (human recombinant), 1 mg, PRN  magnesium sulfate IVPB, 2 g, PRN  magnesium sulfate IVPB, 4 g,  PRN  naloxone, 0.02 mg, PRN  ondansetron, 8 mg, Q8H PRN  polyethylene glycol, 17 g, Daily PRN  potassium chloride in water, 40 mEq, PRN    And  potassium chloride in water, 60 mEq, PRN    And  potassium chloride in water, 80 mEq, PRN  sodium chloride 0.9%, 10 mL, PRN  sodium phosphate IVPB, 15 mmol, PRN  sodium phosphate IVPB, 20.01 mmol, PRN  sodium phosphate IVPB, 30 mmol, PRN        Diet  Diet clear liquid  Diet NPO  Diet clear liquid  Diet NPO

## 2020-08-13 NOTE — ASSESSMENT & PLAN NOTE
44 y/o male with no significant PMH with L1 burst fracture with retropulsion resulting in severe canal stenosis s/p fall from [employer]     S/p L1 corpectomy with cage insertion and T12-L2 lateral fusion on 8/7. Posterior instrumentation deferred for 5 days due to increasing amounts of intraoperative bleeding. Plan for OR today, consented.     --Admitted to ICU  --All labs and diagnostics reviewed.  --Continue bed rest  --MAPs stable off pressors, no goals  --Bedrest/Log roll, HOB <30  --Pain control  --NPO with NG placed for postop ileus. Advanced BR. Monitor for increasing distention or abdominal pain. Patient states having BM.    --Close neurological monitoring, notify neurosurgery with any acute changes  --PSIF 8/18 w/Dr. Snider. Consented.     Dispo: continue ICU care

## 2020-08-13 NOTE — PROGRESS NOTES
[facility]  Neurosurgery  Progress Note    Subjective:     History of Present Illness: Patient is a 46 y/o male with no significant PMH who was transferred from  after falling from 10ft roof at work. Patient denies LOC, complained of excrutiating low back/sacral pain exacerbated with any movement of legs. CT CAP significant for L1 burst fracture with retropulsion into the spinal canal resulting in severe spinal stenosis. Patient states he could feel he had to urinate but could not start a stream, kraus placed and he reports he felt the kraus inserted and felt his bladder release once the kraus was placed. He reports he felt the OSMIN and per report from ED physician, patient had full rectal tone. The patient declined repeat rectal exam. Denies saddle anesthesia. Endorses any movement of lower extremities causes excruciating pain in the sacram and lower back. Denies radicular pain into the legs. Reports slight numbness in left calf. Denies any prior medical conditions or daily medication.     Post-Op Info:  Procedure(s) (LRB):  L1 CORPECTOMY, SPINE, LUMBAR  (Left)  FUSION, SPINE, LUMBAR, TLIF T12-L2 Interbody Fusion with Instrumentation - Lateral (Left)   7 Days Post-Op     Interval History: NAEON. Posterior fixation delayed until 8/18. Neuro stable, remains on Ketamine gtt/Dilaudid PCA.     Medications:  Continuous Infusions:   sodium chloride 0.9% 100 mL/hr at 08/13/20 0505    hydromorphone in 0.9 % NaCl 6 mg/30 ml      ketamine (KETALAR) infusion (titrating) 2 mcg/kg/min (08/13/20 0505)     Scheduled Meds:   acetaminophen  1,000 mg Intravenous Q6H    diazePAM  5 mg Intravenous TID    methocarbamol (ROBAXIN) IVPB  1,000 mg Intravenous Q8H    pantoprazole  40 mg Intravenous Daily    senna-docusate 8.6-50 mg  2 tablet Per NG tube Daily     PRN Meds:calcium carbonate, calcium gluconate IVPB, calcium gluconate IVPB, calcium gluconate IVPB, dextrose 50%, dextrose 50%, glucagon (human  recombinant), magnesium sulfate IVPB, magnesium sulfate IVPB, naloxone, ondansetron, polyethylene glycol, potassium chloride in water **AND** potassium chloride in water **AND** potassium chloride in water, sodium chloride 0.9%, sodium phosphate IVPB, sodium phosphate IVPB, sodium phosphate IVPB     Review of Systems  Objective:     Weight: 94.9 kg (209 lb 3.5 oz)  Body mass index is 30.02 kg/m².  Vital Signs (Most Recent):  Temp: 98.9 °F (37.2 °C) (08/13/20 0305)  Pulse: 97 (08/13/20 0505)  Resp: 13 (08/13/20 0505)  BP: 127/85 (08/13/20 0505)  SpO2: 98 % (08/13/20 0505) Vital Signs (24h Range):  Temp:  [97.2 °F (36.2 °C)-99.3 °F (37.4 °C)] 98.9 °F (37.2 °C)  Pulse:  [] 97  Resp:  [12-21] 13  SpO2:  [96 %-100 %] 98 %  BP: (105-147)/(74-95) 127/85                Oxygen Concentration (%):  [28] 28         NG/OG Tube 08/08/20 1415 nasogastric Right nostril (Active)   Placement Check placement verified by aspirate characteristics;placement verified by distal tube length measurement 08/13/20 0305   Advancement advanced manually 08/12/20 1500   Tolerance no signs/symptoms of discomfort 08/13/20 0305   Securement secured to commercial device 08/12/20 1500   Clamp Status/Tolerance clamped 08/13/20 0305   Suction Setting/Drainage Method suction at;low;intermittent setting 08/12/20 2305   Insertion Site Appearance no redness, warmth, tenderness, skin breakdown, drainage 08/13/20 0305   Drainage Bile;Green;Brown 08/13/20 0305   Flush/Irrigation flushed w/;water 08/12/20 1500   Feeding Type other (see comments) 08/10/20 1500   Feeding Action feeding held 08/12/20 1500   Intake (mL) 75 mL 08/12/20 0900   Water Bolus (mL) 0 mL 08/12/20 0536   Tube Output(mL)(Include Discarded Residual) 250 mL 08/13/20 0005   Intake (mL) - Formula Tube Feeding 0 08/10/20 1800            Urethral Catheter 08/05/20 1133 Straight-tip 14 Fr. (Active)   Site Assessment Clean;Intact 08/13/20 0305   Collection Container Urimeter 08/13/20 0305  "  Securement Method secured to top of thigh w/ adhesive device 08/13/20 0305   Catheter Care Performed yes 08/13/20 0305   Reason for Continuing Urinary Catheterization Required immobilization;Critically ill in ICU requiring intensive monitoring;Post operative 08/13/20 0305   CAUTI Prevention Bundle StatLock in place w 1" slack;Intact seal between catheter & drainage tubing;Drainage bag/urimeter off the floor;Green sheeting clip in use;No dependent loops or kinks;Drainage bag/urimeter not overfilled (<2/3 full);Drainage bag/urimeter below bladder 08/13/20 0305   Output (mL) 100 mL 08/13/20 0505       Neurosurgery Physical Exam   AOX3  PERRL  5/5 in BUE/BLE  SILT  Abdomen soft, improved from prior     Significant Labs:  Recent Labs   Lab 08/12/20  0110 08/12/20  1850 08/13/20  0330   GLU 90  --  90   * 134* 134*   K 3.8  --  3.6   CL 97  --  100   CO2 23  --  24   BUN 11  --  11   CREATININE 0.6  --  0.5   CALCIUM 8.2*  --  7.9*   MG 1.7  --  1.5*     Recent Labs   Lab 08/12/20  0110 08/13/20  0330   WBC 5.40 6.30   HGB 8.0* 7.8*   HCT 24.3* 23.5*    320     Recent Labs   Lab 08/12/20 0110 08/13/20  0330   INR 1.0 1.0   APTT 29.2 29.5       Significant Diagnostics:  No results found in the last 24 hours.      Assessment/Plan:     * Traumatic compression fracture of L1 lumbar vertebra  46 y/o male with no significant PMH with L1 burst fracture with retropulsion resulting in severe canal stenosis s/p fall from [employer] building     S/p L1 corpectomy with cage insertion and T12-L2 lateral fusion on 8/7. Posterior instrumentation deferred for 5 days due to increasing amounts of intraoperative bleeding. Plan for OR today, consented.     --Admitted to ICU  --All labs and diagnostics reviewed.  --Continue bed rest  --MAPs stable off pressors, no goals  --Bedrest/Log roll, HOB <30  --Pain control  --NPO with NG placed for postop ileus. Advanced BR. Monitor for increasing distention or abdominal pain. Patient states " having BM.    --Close neurological monitoring, notify neurosurgery with any acute changes  --PSIF 8/18 w/Dr. Snider. Consented.     Dispo: stable for TTF when medically stable per NCC.         Chery Clement MD  Neurosurgery  [facility]

## 2020-08-13 NOTE — NURSING
Patient's status for Lumbar fusion surgery was pending today. Patient's family verbalized concerns. Approximately 1730, Neuro surgery was contacted for an update in regards to surgery status. According to MD Jass, (Neuro Surgery), surgery has been postponed.

## 2020-08-13 NOTE — PLAN OF CARE
POC reviewed with pt and sister at 2000. Both pt and sibling verbalized understanding. Questions and concerns addressed. No acute events overnight. Pt started on clear liquid diet overnight. Pt experienced indigestion relieved temporarily by Tums, but still persisted. NGT to low intermittent suction per NCC team - indigestion discomfort now relieved per pt. Pt progressing toward goals. PCA pump still infusing, EtCO2 and RR monitored. NS @ 100. Ketamine @ 2mcg/kg/min continued. Néstor Garcias MD with NSGY notified of pt not being updated about postpone of surgery. Will continue to monitor. See flowsheets for full assessment and VS info.

## 2020-08-14 ENCOUNTER — ANESTHESIA (OUTPATIENT)
Dept: SURGERY | Facility: HOSPITAL | Age: 45
DRG: 453 | End: 2020-08-14
Payer: MEDICAID

## 2020-08-14 PROBLEM — E83.51 HYPOCALCEMIA: Status: ACTIVE | Noted: 2020-08-14

## 2020-08-14 LAB
ALBUMIN SERPL BCP-MCNC: 2.5 G/DL (ref 3.5–5.2)
ALP SERPL-CCNC: 67 U/L (ref 55–135)
ALT SERPL W/O P-5'-P-CCNC: 20 U/L (ref 10–44)
ANION GAP SERPL CALC-SCNC: 12 MMOL/L (ref 8–16)
APTT BLDCRRT: 30.8 SEC (ref 21–32)
APTT BLDCRRT: 30.8 SEC (ref 21–32)
AST SERPL-CCNC: 31 U/L (ref 10–40)
BASOPHILS # BLD AUTO: 0.02 K/UL (ref 0–0.2)
BASOPHILS NFR BLD: 0.3 % (ref 0–1.9)
BILIRUB SERPL-MCNC: 0.3 MG/DL (ref 0.1–1)
BLD PROD TYP BPU: NORMAL
BLD PROD TYP BPU: NORMAL
BLOOD UNIT EXPIRATION DATE: NORMAL
BLOOD UNIT EXPIRATION DATE: NORMAL
BLOOD UNIT TYPE CODE: 600
BLOOD UNIT TYPE CODE: 600
BLOOD UNIT TYPE: NORMAL
BLOOD UNIT TYPE: NORMAL
BUN SERPL-MCNC: 8 MG/DL (ref 6–20)
CALCIUM SERPL-MCNC: 8.1 MG/DL (ref 8.7–10.5)
CHLORIDE SERPL-SCNC: 101 MMOL/L (ref 95–110)
CO2 SERPL-SCNC: 24 MMOL/L (ref 23–29)
CODING SYSTEM: NORMAL
CODING SYSTEM: NORMAL
CREAT SERPL-MCNC: 0.5 MG/DL (ref 0.5–1.4)
DIFFERENTIAL METHOD: ABNORMAL
DISPENSE STATUS: NORMAL
DISPENSE STATUS: NORMAL
EOSINOPHIL # BLD AUTO: 0.2 K/UL (ref 0–0.5)
EOSINOPHIL NFR BLD: 2.5 % (ref 0–8)
ERYTHROCYTE [DISTWIDTH] IN BLOOD BY AUTOMATED COUNT: 12.9 % (ref 11.5–14.5)
EST. GFR  (AFRICAN AMERICAN): >60 ML/MIN/1.73 M^2
EST. GFR  (NON AFRICAN AMERICAN): >60 ML/MIN/1.73 M^2
GLUCOSE SERPL-MCNC: 85 MG/DL (ref 70–110)
HCT VFR BLD AUTO: 24.4 % (ref 40–54)
HGB BLD-MCNC: 8.1 G/DL (ref 14–18)
IMM GRANULOCYTES # BLD AUTO: 0.14 K/UL (ref 0–0.04)
IMM GRANULOCYTES NFR BLD AUTO: 2.3 % (ref 0–0.5)
INR PPP: 1.1 (ref 0.8–1.2)
INR PPP: 1.1 (ref 0.8–1.2)
LYMPHOCYTES # BLD AUTO: 0.9 K/UL (ref 1–4.8)
LYMPHOCYTES NFR BLD: 15.4 % (ref 18–48)
MAGNESIUM SERPL-MCNC: 1.5 MG/DL (ref 1.6–2.6)
MAGNESIUM SERPL-MCNC: 2.6 MG/DL (ref 1.6–2.6)
MCH RBC QN AUTO: 30.9 PG (ref 27–31)
MCHC RBC AUTO-ENTMCNC: 33.2 G/DL (ref 32–36)
MCV RBC AUTO: 93 FL (ref 82–98)
MONOCYTES # BLD AUTO: 0.6 K/UL (ref 0.3–1)
MONOCYTES NFR BLD: 10.5 % (ref 4–15)
NEUTROPHILS # BLD AUTO: 4.2 K/UL (ref 1.8–7.7)
NEUTROPHILS NFR BLD: 69 % (ref 38–73)
NRBC BLD-RTO: 0 /100 WBC
PHOSPHATE SERPL-MCNC: 4.2 MG/DL (ref 2.7–4.5)
PLATELET # BLD AUTO: 393 K/UL (ref 150–350)
PMV BLD AUTO: 8.9 FL (ref 9.2–12.9)
POTASSIUM SERPL-SCNC: 3.8 MMOL/L (ref 3.5–5.1)
PROT SERPL-MCNC: 5.8 G/DL (ref 6–8.4)
PROTHROMBIN TIME: 11.9 SEC (ref 9–12.5)
PROTHROMBIN TIME: 11.9 SEC (ref 9–12.5)
RBC # BLD AUTO: 2.62 M/UL (ref 4.6–6.2)
SARS-COV-2 RDRP RESP QL NAA+PROBE: NEGATIVE
SODIUM SERPL-SCNC: 137 MMOL/L (ref 136–145)
TRANS ERYTHROCYTES VOL PATIENT: NORMAL ML
TRANS ERYTHROCYTES VOL PATIENT: NORMAL ML
WBC # BLD AUTO: 6.12 K/UL (ref 3.9–12.7)

## 2020-08-14 PROCEDURE — 22842 PR POSTERIOR SEGMENTAL INSTRUMENTATION 3-6 VRT SEG: ICD-10-PCS | Mod: 80,,, | Performed by: NEUROLOGICAL SURGERY

## 2020-08-14 PROCEDURE — 22842 PR POSTERIOR SEGMENTAL INSTRUMENTATION 3-6 VRT SEG: ICD-10-PCS | Mod: ,,, | Performed by: NEUROLOGICAL SURGERY

## 2020-08-14 PROCEDURE — 99231 SBSQ HOSP IP/OBS SF/LOW 25: CPT | Mod: ,,, | Performed by: ANESTHESIOLOGY

## 2020-08-14 PROCEDURE — 83735 ASSAY OF MAGNESIUM: CPT | Mod: 91

## 2020-08-14 PROCEDURE — 61783 PR STEREOTACTIC COMP ASSIST PROC,SPINAL: ICD-10-PCS | Mod: ,,, | Performed by: NEUROLOGICAL SURGERY

## 2020-08-14 PROCEDURE — 36000710: Performed by: NEUROLOGICAL SURGERY

## 2020-08-14 PROCEDURE — 63600175 PHARM REV CODE 636 W HCPCS: Performed by: STUDENT IN AN ORGANIZED HEALTH CARE EDUCATION/TRAINING PROGRAM

## 2020-08-14 PROCEDURE — 99233 PR SUBSEQUENT HOSPITAL CARE,LEVL III: ICD-10-PCS | Mod: ,,, | Performed by: NURSE PRACTITIONER

## 2020-08-14 PROCEDURE — 37000009 HC ANESTHESIA EA ADD 15 MINS: Performed by: NEUROLOGICAL SURGERY

## 2020-08-14 PROCEDURE — 36620 PR INSERT CATH,ART,PERCUT,SHORTTERM: ICD-10-PCS | Mod: 59,,, | Performed by: ANESTHESIOLOGY

## 2020-08-14 PROCEDURE — D9220A PRA ANESTHESIA: Mod: CRNA,,, | Performed by: NURSE ANESTHETIST, CERTIFIED REGISTERED

## 2020-08-14 PROCEDURE — C9113 INJ PANTOPRAZOLE SODIUM, VIA: HCPCS | Performed by: STUDENT IN AN ORGANIZED HEALTH CARE EDUCATION/TRAINING PROGRAM

## 2020-08-14 PROCEDURE — 27201423 OPTIME MED/SURG SUP & DEVICES STERILE SUPPLY: Performed by: NEUROLOGICAL SURGERY

## 2020-08-14 PROCEDURE — 20930 SP BONE ALGRFT MORSEL ADD-ON: CPT | Mod: ,,, | Performed by: NEUROLOGICAL SURGERY

## 2020-08-14 PROCEDURE — 22842 INSERT SPINE FIXATION DEVICE: CPT | Mod: 80,,, | Performed by: NEUROLOGICAL SURGERY

## 2020-08-14 PROCEDURE — C1729 CATH, DRAINAGE: HCPCS | Performed by: NEUROLOGICAL SURGERY

## 2020-08-14 PROCEDURE — U0002 COVID-19 LAB TEST NON-CDC: HCPCS

## 2020-08-14 PROCEDURE — 36000711: Performed by: NEUROLOGICAL SURGERY

## 2020-08-14 PROCEDURE — 22842 INSERT SPINE FIXATION DEVICE: CPT | Mod: ,,, | Performed by: NEUROLOGICAL SURGERY

## 2020-08-14 PROCEDURE — 97803 MED NUTRITION INDIV SUBSEQ: CPT

## 2020-08-14 PROCEDURE — 22612 PR ARTHRODESIS, POST/POSTLAT, SNGL INTERSPACE, LUMBAR: ICD-10-PCS | Mod: 58,,, | Performed by: NEUROLOGICAL SURGERY

## 2020-08-14 PROCEDURE — 22614 ARTHRD PST TQ 1NTRSPC EA ADD: CPT | Mod: ,,, | Performed by: NEUROLOGICAL SURGERY

## 2020-08-14 PROCEDURE — 20930 PR ALLOGRAFT FOR SPINE SURGERY ONLY MORSELIZED: ICD-10-PCS | Mod: ,,, | Performed by: NEUROLOGICAL SURGERY

## 2020-08-14 PROCEDURE — 99233 SBSQ HOSP IP/OBS HIGH 50: CPT | Mod: ,,, | Performed by: NURSE PRACTITIONER

## 2020-08-14 PROCEDURE — 22614 PR ARTHRODESIS, POST/POSTLAT, SNGL INTERSPACE, EA ADDTL: ICD-10-PCS | Mod: ,,, | Performed by: NEUROLOGICAL SURGERY

## 2020-08-14 PROCEDURE — 63600175 PHARM REV CODE 636 W HCPCS: Performed by: UROLOGY

## 2020-08-14 PROCEDURE — 22614 ARTHRD PST TQ 1NTRSPC EA ADD: CPT | Mod: 80,,, | Performed by: NEUROLOGICAL SURGERY

## 2020-08-14 PROCEDURE — 63600175 PHARM REV CODE 636 W HCPCS: Performed by: PHYSICIAN ASSISTANT

## 2020-08-14 PROCEDURE — 22614 PR ARTHRODESIS, POST/POSTLAT, SNGL INTERSPACE, EA ADDTL: ICD-10-PCS | Mod: 80,,, | Performed by: NEUROLOGICAL SURGERY

## 2020-08-14 PROCEDURE — 94761 N-INVAS EAR/PLS OXIMETRY MLT: CPT

## 2020-08-14 PROCEDURE — 99231 PR SUBSEQUENT HOSPITAL CARE,LEVL I: ICD-10-PCS | Mod: ,,, | Performed by: ANESTHESIOLOGY

## 2020-08-14 PROCEDURE — D9220A PRA ANESTHESIA: Mod: ANES,,, | Performed by: ANESTHESIOLOGY

## 2020-08-14 PROCEDURE — 85730 THROMBOPLASTIN TIME PARTIAL: CPT

## 2020-08-14 PROCEDURE — D9220A PRA ANESTHESIA: ICD-10-PCS | Mod: CRNA,,, | Performed by: NURSE ANESTHETIST, CERTIFIED REGISTERED

## 2020-08-14 PROCEDURE — 25000003 PHARM REV CODE 250: Performed by: STUDENT IN AN ORGANIZED HEALTH CARE EDUCATION/TRAINING PROGRAM

## 2020-08-14 PROCEDURE — 22612 PR ARTHRODESIS, POST/POSTLAT, SNGL INTERSPACE, LUMBAR: ICD-10-PCS | Mod: 58,80,, | Performed by: NEUROLOGICAL SURGERY

## 2020-08-14 PROCEDURE — 83735 ASSAY OF MAGNESIUM: CPT

## 2020-08-14 PROCEDURE — C1713 ANCHOR/SCREW BN/BN,TIS/BN: HCPCS | Performed by: NEUROLOGICAL SURGERY

## 2020-08-14 PROCEDURE — 84100 ASSAY OF PHOSPHORUS: CPT

## 2020-08-14 PROCEDURE — 61783 SCAN PROC SPINAL: CPT | Mod: ,,, | Performed by: NEUROLOGICAL SURGERY

## 2020-08-14 PROCEDURE — 63600175 PHARM REV CODE 636 W HCPCS: Performed by: NURSE PRACTITIONER

## 2020-08-14 PROCEDURE — 27201037 HC PRESSURE MONITORING SET UP

## 2020-08-14 PROCEDURE — 20000000 HC ICU ROOM

## 2020-08-14 PROCEDURE — 94770 HC EXHALED C02 TEST: CPT

## 2020-08-14 PROCEDURE — 63600175 PHARM REV CODE 636 W HCPCS: Performed by: NURSE ANESTHETIST, CERTIFIED REGISTERED

## 2020-08-14 PROCEDURE — 80053 COMPREHEN METABOLIC PANEL: CPT

## 2020-08-14 PROCEDURE — 25000003 PHARM REV CODE 250: Performed by: NURSE ANESTHETIST, CERTIFIED REGISTERED

## 2020-08-14 PROCEDURE — C1769 GUIDE WIRE: HCPCS | Performed by: NEUROLOGICAL SURGERY

## 2020-08-14 PROCEDURE — D9220A PRA ANESTHESIA: ICD-10-PCS | Mod: ANES,,, | Performed by: ANESTHESIOLOGY

## 2020-08-14 PROCEDURE — 85610 PROTHROMBIN TIME: CPT

## 2020-08-14 PROCEDURE — 36620 INSERTION CATHETER ARTERY: CPT | Mod: 59,,, | Performed by: ANESTHESIOLOGY

## 2020-08-14 PROCEDURE — 22612 ARTHRD PST TQ 1NTRSPC LUMBAR: CPT | Mod: 58,,, | Performed by: NEUROLOGICAL SURGERY

## 2020-08-14 PROCEDURE — 25000003 PHARM REV CODE 250: Performed by: NEUROLOGICAL SURGERY

## 2020-08-14 PROCEDURE — 37000008 HC ANESTHESIA 1ST 15 MINUTES: Performed by: NEUROLOGICAL SURGERY

## 2020-08-14 PROCEDURE — 99900035 HC TECH TIME PER 15 MIN (STAT)

## 2020-08-14 PROCEDURE — 22612 ARTHRD PST TQ 1NTRSPC LUMBAR: CPT | Mod: 58,80,, | Performed by: NEUROLOGICAL SURGERY

## 2020-08-14 PROCEDURE — 85025 COMPLETE CBC W/AUTO DIFF WBC: CPT

## 2020-08-14 DEVICE — IMPLANTABLE DEVICE: Type: IMPLANTABLE DEVICE | Site: SPINE LUMBAR | Status: FUNCTIONAL

## 2020-08-14 DEVICE — SET SCREW SPINAL LOCKING: Type: IMPLANTABLE DEVICE | Site: SPINE LUMBAR | Status: FUNCTIONAL

## 2020-08-14 RX ORDER — FENTANYL CITRATE 50 UG/ML
INJECTION, SOLUTION INTRAMUSCULAR; INTRAVENOUS
Status: DISCONTINUED | OUTPATIENT
Start: 2020-08-14 | End: 2020-08-14

## 2020-08-14 RX ORDER — CEFAZOLIN SODIUM 1 G/3ML
2 INJECTION, POWDER, FOR SOLUTION INTRAMUSCULAR; INTRAVENOUS
Status: DISCONTINUED | OUTPATIENT
Start: 2020-08-14 | End: 2020-08-15

## 2020-08-14 RX ORDER — PROPOFOL 10 MG/ML
VIAL (ML) INTRAVENOUS CONTINUOUS PRN
Status: DISCONTINUED | OUTPATIENT
Start: 2020-08-14 | End: 2020-08-14

## 2020-08-14 RX ORDER — DIAZEPAM 10 MG/2ML
5 INJECTION INTRAMUSCULAR EVERY 4 HOURS
Status: DISCONTINUED | OUTPATIENT
Start: 2020-08-14 | End: 2020-08-14

## 2020-08-14 RX ORDER — LIDOCAINE HCL/PF 100 MG/5ML
SYRINGE (ML) INTRAVENOUS
Status: DISCONTINUED | OUTPATIENT
Start: 2020-08-14 | End: 2020-08-14

## 2020-08-14 RX ORDER — LIDOCAINE HYDROCHLORIDE AND EPINEPHRINE 10; 10 MG/ML; UG/ML
INJECTION, SOLUTION INFILTRATION; PERINEURAL
Status: DISCONTINUED | OUTPATIENT
Start: 2020-08-14 | End: 2020-08-14 | Stop reason: HOSPADM

## 2020-08-14 RX ORDER — ROCURONIUM BROMIDE 10 MG/ML
INJECTION, SOLUTION INTRAVENOUS
Status: DISCONTINUED | OUTPATIENT
Start: 2020-08-14 | End: 2020-08-14

## 2020-08-14 RX ORDER — PROPOFOL 10 MG/ML
VIAL (ML) INTRAVENOUS
Status: DISCONTINUED | OUTPATIENT
Start: 2020-08-14 | End: 2020-08-14

## 2020-08-14 RX ORDER — SUCCINYLCHOLINE CHLORIDE 20 MG/ML
INJECTION INTRAMUSCULAR; INTRAVENOUS
Status: DISCONTINUED | OUTPATIENT
Start: 2020-08-14 | End: 2020-08-14

## 2020-08-14 RX ORDER — CEFAZOLIN SODIUM 1 G/3ML
INJECTION, POWDER, FOR SOLUTION INTRAMUSCULAR; INTRAVENOUS
Status: DISCONTINUED | OUTPATIENT
Start: 2020-08-14 | End: 2020-08-14

## 2020-08-14 RX ORDER — DIAZEPAM 10 MG/2ML
5 INJECTION INTRAMUSCULAR 4 TIMES DAILY
Status: DISCONTINUED | OUTPATIENT
Start: 2020-08-15 | End: 2020-08-15

## 2020-08-14 RX ORDER — FENTANYL CITRATE 50 UG/ML
25 INJECTION, SOLUTION INTRAMUSCULAR; INTRAVENOUS ONCE
Status: COMPLETED | OUTPATIENT
Start: 2020-08-14 | End: 2020-08-14

## 2020-08-14 RX ORDER — DEXAMETHASONE SODIUM PHOSPHATE 4 MG/ML
INJECTION, SOLUTION INTRA-ARTICULAR; INTRALESIONAL; INTRAMUSCULAR; INTRAVENOUS; SOFT TISSUE
Status: DISCONTINUED | OUTPATIENT
Start: 2020-08-14 | End: 2020-08-14

## 2020-08-14 RX ORDER — ONDANSETRON 2 MG/ML
INJECTION INTRAMUSCULAR; INTRAVENOUS
Status: DISCONTINUED | OUTPATIENT
Start: 2020-08-14 | End: 2020-08-14

## 2020-08-14 RX ORDER — KETAMINE HCL IN 0.9 % NACL 50 MG/5 ML
SYRINGE (ML) INTRAVENOUS
Status: DISCONTINUED | OUTPATIENT
Start: 2020-08-14 | End: 2020-08-14

## 2020-08-14 RX ORDER — DIAZEPAM 10 MG/2ML
5 INJECTION INTRAMUSCULAR 4 TIMES DAILY
Status: DISCONTINUED | OUTPATIENT
Start: 2020-08-14 | End: 2020-08-14

## 2020-08-14 RX ADMIN — METHOCARBAMOL 1000 MG: 100 INJECTION INTRAMUSCULAR; INTRAVENOUS at 09:08

## 2020-08-14 RX ADMIN — DIAZEPAM 5 MG: 5 INJECTION, SOLUTION INTRAMUSCULAR; INTRAVENOUS at 04:08

## 2020-08-14 RX ADMIN — Medication 20 MG: at 02:08

## 2020-08-14 RX ADMIN — SODIUM CHLORIDE, SODIUM GLUCONATE, SODIUM ACETATE, POTASSIUM CHLORIDE, MAGNESIUM CHLORIDE, SODIUM PHOSPHATE, DIBASIC, AND POTASSIUM PHOSPHATE: .53; .5; .37; .037; .03; .012; .00082 INJECTION, SOLUTION INTRAVENOUS at 02:08

## 2020-08-14 RX ADMIN — PROPOFOL 125 MCG/KG/MIN: 10 INJECTION, EMULSION INTRAVENOUS at 01:08

## 2020-08-14 RX ADMIN — HEPARIN SODIUM 5000 UNITS: 5000 INJECTION INTRAVENOUS; SUBCUTANEOUS at 06:08

## 2020-08-14 RX ADMIN — CEFAZOLIN 2 G: 330 INJECTION, POWDER, FOR SOLUTION INTRAMUSCULAR; INTRAVENOUS at 01:08

## 2020-08-14 RX ADMIN — Medication: at 11:08

## 2020-08-14 RX ADMIN — METHOCARBAMOL 1000 MG: 100 INJECTION INTRAMUSCULAR; INTRAVENOUS at 06:08

## 2020-08-14 RX ADMIN — CEFAZOLIN 2 G: 1 INJECTION, POWDER, FOR SOLUTION INTRAMUSCULAR; INTRAVENOUS at 04:08

## 2020-08-14 RX ADMIN — FENTANYL CITRATE 25 MCG: 50 INJECTION INTRAMUSCULAR; INTRAVENOUS at 10:08

## 2020-08-14 RX ADMIN — HEPARIN SODIUM 5000 UNITS: 5000 INJECTION INTRAVENOUS; SUBCUTANEOUS at 09:08

## 2020-08-14 RX ADMIN — FENTANYL CITRATE 25 MCG: 50 INJECTION, SOLUTION INTRAMUSCULAR; INTRAVENOUS at 03:08

## 2020-08-14 RX ADMIN — LIDOCAINE HYDROCHLORIDE 75 MG: 20 INJECTION, SOLUTION INTRAVENOUS at 01:08

## 2020-08-14 RX ADMIN — HEPARIN SODIUM 5000 UNITS: 5000 INJECTION INTRAVENOUS; SUBCUTANEOUS at 04:08

## 2020-08-14 RX ADMIN — REMIFENTANIL HYDROCHLORIDE 0.2 MCG/KG/MIN: 1 INJECTION, POWDER, LYOPHILIZED, FOR SOLUTION INTRAVENOUS at 01:08

## 2020-08-14 RX ADMIN — FENTANYL CITRATE 100 MCG: 50 INJECTION, SOLUTION INTRAMUSCULAR; INTRAVENOUS at 01:08

## 2020-08-14 RX ADMIN — DIAZEPAM 5 MG: 5 INJECTION, SOLUTION INTRAMUSCULAR; INTRAVENOUS at 08:08

## 2020-08-14 RX ADMIN — Medication: at 08:08

## 2020-08-14 RX ADMIN — MAGNESIUM SULFATE IN WATER 2 G: 40 INJECTION, SOLUTION INTRAVENOUS at 06:08

## 2020-08-14 RX ADMIN — REMIFENTANIL HYDROCHLORIDE 0.1 MCG/KG/MIN: 1 INJECTION, POWDER, LYOPHILIZED, FOR SOLUTION INTRAVENOUS at 12:08

## 2020-08-14 RX ADMIN — DIAZEPAM 5 MG: 5 INJECTION, SOLUTION INTRAMUSCULAR; INTRAVENOUS at 12:08

## 2020-08-14 RX ADMIN — SODIUM CHLORIDE 0.25 MCG/KG/MIN: 9 INJECTION, SOLUTION INTRAVENOUS at 02:08

## 2020-08-14 RX ADMIN — METHOCARBAMOL 1000 MG: 100 INJECTION INTRAMUSCULAR; INTRAVENOUS at 01:08

## 2020-08-14 RX ADMIN — PANTOPRAZOLE SODIUM 40 MG: 40 INJECTION, POWDER, LYOPHILIZED, FOR SOLUTION INTRAVENOUS at 08:08

## 2020-08-14 RX ADMIN — DEXAMETHASONE SODIUM PHOSPHATE 8 MG: 4 INJECTION, SOLUTION INTRAMUSCULAR; INTRAVENOUS at 02:08

## 2020-08-14 RX ADMIN — SUCCINYLCHOLINE CHLORIDE 100 MG: 20 INJECTION, SOLUTION INTRAMUSCULAR; INTRAVENOUS at 01:08

## 2020-08-14 RX ADMIN — SODIUM CHLORIDE, SODIUM GLUCONATE, SODIUM ACETATE, POTASSIUM CHLORIDE, MAGNESIUM CHLORIDE, SODIUM PHOSPHATE, DIBASIC, AND POTASSIUM PHOSPHATE: .53; .5; .37; .037; .03; .012; .00082 INJECTION, SOLUTION INTRAVENOUS at 01:08

## 2020-08-14 RX ADMIN — ROCURONIUM BROMIDE 5 MG: 10 INJECTION, SOLUTION INTRAVENOUS at 01:08

## 2020-08-14 RX ADMIN — PROPOFOL 100 MG: 10 INJECTION, EMULSION INTRAVENOUS at 01:08

## 2020-08-14 RX ADMIN — ONDANSETRON 4 MG: 2 INJECTION, SOLUTION INTRAMUSCULAR; INTRAVENOUS at 03:08

## 2020-08-14 RX ADMIN — DIAZEPAM 5 MG: 5 INJECTION, SOLUTION INTRAMUSCULAR; INTRAVENOUS at 10:08

## 2020-08-14 NOTE — SUBJECTIVE & OBJECTIVE
Interval History: Neurologically unchanged.S/p L1 corpectomy with cage insertion and T12-L2 lateral fusion on 8/7. Posterior instrumentation scheduled for this afternoon.  Drains remains intact. Continues to tolerate  clear liquid diet. Pain Management Team following for Ketamine gtt/PCA dilaudid. Oxygen saturation maintained > 94% on 2L NC. MAP maintained > 65 without difficulty. Electrolyte replacements as needed. The patient requires critical care stay due to high probability of life threatening deterioration in their condition.    Review of Systems   Constitutional: Negative for fever.   HENT: Negative for trouble swallowing and voice change.    Eyes: Negative for visual disturbance.   Respiratory: Negative for shortness of breath and wheezing.    Cardiovascular: Negative for palpitations.   Gastrointestinal: Positive for abdominal distention and abdominal pain.   Endocrine: Negative for polyuria.   Genitourinary: Negative for difficulty urinating.   Musculoskeletal: Positive for back pain.   Neurological: Positive for weakness. Negative for dizziness and headaches.   Psychiatric/Behavioral: Negative for agitation.       Objective:     Vitals:  Temp: 98.9 °F (37.2 °C)  Pulse: 105  Rhythm: normal sinus rhythm  BP: 118/82  MAP (mmHg): 95  Resp: 13  SpO2: 99 %  O2 Device (Oxygen Therapy): nasal cannula    Temp  Min: 98.3 °F (36.8 °C)  Max: 98.9 °F (37.2 °C)  Pulse  Min: 92  Max: 111  BP  Min: 103/68  Max: 146/97  MAP (mmHg)  Min: 80  Max: 113  Resp  Min: 12  Max: 26  SpO2  Min: 97 %  Max: 100 %    08/13 0701 - 08/14 0700  In: 3407.6 [I.V.:2607.6]  Out: 3315 [Urine:3315]   Unmeasured Output  Stool Occurrence: 0       Physical Exam  Vitals signs and nursing note reviewed.   Constitutional:       Appearance: Normal appearance. He is normal weight.   HENT:      Head: Normocephalic and atraumatic.      Right Ear: External ear normal.      Left Ear: External ear normal.      Nose: Nose normal. No congestion.   Eyes:       General: No scleral icterus.     Extraocular Movements: Extraocular movements intact.      Conjunctiva/sclera: Conjunctivae normal.      Pupils: Pupils are equal, round, and reactive to light.   Neck:      Musculoskeletal: No neck rigidity.   Cardiovascular:      Rate and Rhythm: Normal rate and regular rhythm.      Pulses: Normal pulses.      Heart sounds: Normal heart sounds.   Pulmonary:      Effort: Pulmonary effort is normal. No respiratory distress.      Breath sounds: Normal breath sounds.   Abdominal:      General: There is distension.   Musculoskeletal: Normal range of motion.   Skin:     General: Skin is warm.      Capillary Refill: Capillary refill takes less than 2 seconds.   Neurological:      Mental Status: He is alert and oriented to person, place, and time.      GCS: GCS eye subscore is 4. GCS verbal subscore is 5. GCS motor subscore is 6.      Motor: Weakness present.      Comments: Awake, oriented  PERRLA  EOMI  JONES spontaneously   Weakness in RLE             Medications:  Continuoussodium chloride 0.9%, Last Rate: 100 mL/hr at 08/14/20 0805  hydromorphone in 0.9 % NaCl 6 mg/30 ml  ketamine (KETALAR) infusion (titrating), Last Rate: 1.5 mcg/kg/min (08/14/20 0805)    ScheduleddiazePAM, 5 mg, TID  heparin (porcine), 5,000 Units, Q8H  methocarbamol (ROBAXIN) IVPB, 1,000 mg, Q8H  pantoprazole, 40 mg, Daily  senna-docusate 8.6-50 mg, 2 tablet, Daily    PRNsodium chloride, , Q24H PRN  calcium carbonate, 500 mg, Daily PRN  calcium gluconate IVPB, 1 g, PRN  calcium gluconate IVPB, 2 g, PRN  calcium gluconate IVPB, 3 g, PRN  dextrose 50%, 12.5 g, PRN  dextrose 50%, 25 g, PRN  glucagon (human recombinant), 1 mg, PRN  magnesium sulfate IVPB, 2 g, PRN  magnesium sulfate IVPB, 4 g, PRN  naloxone, 0.02 mg, PRN  ondansetron, 8 mg, Q8H PRN  polyethylene glycol, 17 g, Daily PRN  potassium chloride in water, 40 mEq, PRN    And  potassium chloride in water, 60 mEq, PRN    And  potassium chloride in water, 80 mEq,  PRN  sodium chloride 0.9%, 10 mL, PRN  sodium phosphate IVPB, 15 mmol, PRN  sodium phosphate IVPB, 20.01 mmol, PRN  sodium phosphate IVPB, 30 mmol, PRN        Diet  Diet NPO  Diet NPO

## 2020-08-14 NOTE — SUBJECTIVE & OBJECTIVE
Interval History: NAEON. To OR today for posterior fixation. Coags/CBC reviewed. COVID pending.     Medications:  Continuous Infusions:   sodium chloride 0.9% 100 mL/hr at 08/14/20 0705    hydromorphone in 0.9 % NaCl 6 mg/30 ml      ketamine (KETALAR) infusion (titrating) 1.5 mcg/kg/min (08/14/20 0705)     Scheduled Meds:   diazePAM  5 mg Intravenous TID    heparin (porcine)  5,000 Units Subcutaneous Q8H    methocarbamol (ROBAXIN) IVPB  1,000 mg Intravenous Q8H    pantoprazole  40 mg Intravenous Daily    senna-docusate 8.6-50 mg  2 tablet Per NG tube Daily     PRN Meds:sodium chloride, calcium carbonate, calcium gluconate IVPB, calcium gluconate IVPB, calcium gluconate IVPB, dextrose 50%, dextrose 50%, glucagon (human recombinant), magnesium sulfate IVPB, magnesium sulfate IVPB, naloxone, ondansetron, polyethylene glycol, potassium chloride in water **AND** potassium chloride in water **AND** potassium chloride in water, sodium chloride 0.9%, sodium phosphate IVPB, sodium phosphate IVPB, sodium phosphate IVPB     Review of Systems  Objective:     Weight: 94.9 kg (209 lb 3.5 oz)  Body mass index is 30.02 kg/m².  Vital Signs (Most Recent):  Temp: 98.4 °F (36.9 °C) (08/14/20 0302)  Pulse: 97 (08/14/20 0705)  Resp: 12 (08/14/20 0705)  BP: 136/89 (08/14/20 0705)  SpO2: 97 % (08/14/20 0705) Vital Signs (24h Range):  Temp:  [98.3 °F (36.8 °C)-98.5 °F (36.9 °C)] 98.4 °F (36.9 °C)  Pulse:  [] 97  Resp:  [12-26] 12  SpO2:  [97 %-100 %] 97 %  BP: (103-146)/(66-97) 136/89     Date 08/14/20 0700 - 08/15/20 0659   Shift 8989-3366 0851-2485 1505-8359 24 Hour Total   INTAKE   I.V.(mL/kg) 114.3(1.2)   114.3(1.2)   Shift Total(mL/kg) 114.3(1.2)   114.3(1.2)   OUTPUT   Urine(mL/kg/hr) 175   175   Shift Total(mL/kg) 175(1.8)   175(1.8)   Weight (kg) 94.9 94.9 94.9 94.9              Oxygen Concentration (%):  [28] 28         NG/OG Tube 08/08/20 1415 nasogastric Right nostril (Active)   Placement Check placement verified by  "distal tube length measurement;placement verified by x-ray 08/14/20 0302   Advancement advanced manually 08/12/20 1500   Tolerance no signs/symptoms of discomfort 08/14/20 0302   Securement secured to nostril center w/ adhesive device 08/14/20 0302   Clamp Status/Tolerance clamped 08/14/20 0302   Suction Setting/Drainage Method suction at the bedside 08/14/20 0302   Insertion Site Appearance no redness, warmth, tenderness, skin breakdown, drainage 08/14/20 0302   Drainage Brown;Green 08/14/20 0302   Flush/Irrigation flushed w/;water 08/14/20 0302   Feeding Type other (see comments) 08/10/20 1500   Feeding Action feeding held 08/14/20 0302   Intake (mL) 75 mL 08/12/20 0900   Water Bolus (mL) 0 mL 08/12/20 0536   Tube Output(mL)(Include Discarded Residual) 250 mL 08/13/20 0005   Intake (mL) - Formula Tube Feeding 0 08/10/20 1800            Urethral Catheter 08/05/20 1133 Straight-tip 14 Fr. (Active)   Site Assessment Clean;Intact 08/14/20 0302   Collection Container Urimeter 08/14/20 0302   Securement Method secured to top of thigh w/ adhesive device 08/14/20 0302   Catheter Care Performed yes 08/14/20 0302   Reason for Continuing Urinary Catheterization Required immobilization 08/14/20 0302   CAUTI Prevention Bundle StatLock in place w 1" slack;Intact seal between catheter & drainage tubing;Drainage bag/urimeter off the floor;Green sheeting clip in use;No dependent loops or kinks;Drainage bag/urimeter not overfilled (<2/3 full);Drainage bag/urimeter below bladder 08/13/20 1902   Output (mL) 175 mL 08/14/20 0705       Neurosurgery Physical Exam   AOX3  PERRL  5/5 in BUE/BLE  SILT  Abdomen soft    Significant Labs:  Recent Labs   Lab 08/12/20  1850 08/13/20  0330 08/13/20  1203 08/14/20  0314   GLU  --  90  --  85   * 134*  --  137   K  --  3.6  --  3.8   CL  --  100  --  101   CO2  --  24  --  24   BUN  --  11  --  8   CREATININE  --  0.5  --  0.5   CALCIUM  --  7.9*  --  8.1*   MG  --  1.5* 1.8 1.5*     Recent " Labs   Lab 08/13/20 0330 08/14/20 0314   WBC 6.30 6.12   HGB 7.8* 8.1*   HCT 23.5* 24.4*    393*     Recent Labs   Lab 08/13/20 0330 08/14/20 0314   INR 1.0 1.1  1.1   APTT 29.5 30.8  30.8       Significant Diagnostics:  No results found in the last 24 hours.

## 2020-08-14 NOTE — PROGRESS NOTES
[facility]  Neurosurgery  Progress Note    Subjective:     History of Present Illness: Patient is a 44 y/o male with no significant PMH who was transferred from  after falling from 10ft roof at work. Patient denies LOC, complained of excrutiating low back/sacral pain exacerbated with any movement of legs. CT CAP significant for L1 burst fracture with retropulsion into the spinal canal resulting in severe spinal stenosis. Patient states he could feel he had to urinate but could not start a stream, kraus placed and he reports he felt the kraus inserted and felt his bladder release once the kraus was placed. He reports he felt the OSMIN and per report from ED physician, patient had full rectal tone. The patient declined repeat rectal exam. Denies saddle anesthesia. Endorses any movement of lower extremities causes excruciating pain in the sacram and lower back. Denies radicular pain into the legs. Reports slight numbness in left calf. Denies any prior medical conditions or daily medication.     Post-Op Info:  Procedure(s) (LRB):  L1 CORPECTOMY, SPINE, LUMBAR  (Left)  FUSION, SPINE, LUMBAR, TLIF T12-L2 Interbody Fusion with Instrumentation - Lateral (Left)   8 Days Post-Op     Interval History: NAEON. To OR today for posterior fixation. Coags/CBC reviewed. COVID pending.     Medications:  Continuous Infusions:   sodium chloride 0.9% 100 mL/hr at 08/14/20 0705    hydromorphone in 0.9 % NaCl 6 mg/30 ml      ketamine (KETALAR) infusion (titrating) 1.5 mcg/kg/min (08/14/20 0705)     Scheduled Meds:   diazePAM  5 mg Intravenous TID    heparin (porcine)  5,000 Units Subcutaneous Q8H    methocarbamol (ROBAXIN) IVPB  1,000 mg Intravenous Q8H    pantoprazole  40 mg Intravenous Daily    senna-docusate 8.6-50 mg  2 tablet Per NG tube Daily     PRN Meds:sodium chloride, calcium carbonate, calcium gluconate IVPB, calcium gluconate IVPB, calcium gluconate IVPB, dextrose 50%, dextrose 50%, glucagon (human  recombinant), magnesium sulfate IVPB, magnesium sulfate IVPB, naloxone, ondansetron, polyethylene glycol, potassium chloride in water **AND** potassium chloride in water **AND** potassium chloride in water, sodium chloride 0.9%, sodium phosphate IVPB, sodium phosphate IVPB, sodium phosphate IVPB     Review of Systems  Objective:     Weight: 94.9 kg (209 lb 3.5 oz)  Body mass index is 30.02 kg/m².  Vital Signs (Most Recent):  Temp: 98.4 °F (36.9 °C) (08/14/20 0302)  Pulse: 97 (08/14/20 0705)  Resp: 12 (08/14/20 0705)  BP: 136/89 (08/14/20 0705)  SpO2: 97 % (08/14/20 0705) Vital Signs (24h Range):  Temp:  [98.3 °F (36.8 °C)-98.5 °F (36.9 °C)] 98.4 °F (36.9 °C)  Pulse:  [] 97  Resp:  [12-26] 12  SpO2:  [97 %-100 %] 97 %  BP: (103-146)/(66-97) 136/89     Date 08/14/20 0700 - 08/15/20 0659   Shift 9215-7611 3562-9436 2017-9334 24 Hour Total   INTAKE   I.V.(mL/kg) 114.3(1.2)   114.3(1.2)   Shift Total(mL/kg) 114.3(1.2)   114.3(1.2)   OUTPUT   Urine(mL/kg/hr) 175   175   Shift Total(mL/kg) 175(1.8)   175(1.8)   Weight (kg) 94.9 94.9 94.9 94.9              Oxygen Concentration (%):  [28] 28         NG/OG Tube 08/08/20 1415 nasogastric Right nostril (Active)   Placement Check placement verified by distal tube length measurement;placement verified by x-ray 08/14/20 0302   Advancement advanced manually 08/12/20 1500   Tolerance no signs/symptoms of discomfort 08/14/20 0302   Securement secured to nostril center w/ adhesive device 08/14/20 0302   Clamp Status/Tolerance clamped 08/14/20 0302   Suction Setting/Drainage Method suction at the bedside 08/14/20 0302   Insertion Site Appearance no redness, warmth, tenderness, skin breakdown, drainage 08/14/20 0302   Drainage Brown;Green 08/14/20 0302   Flush/Irrigation flushed w/;water 08/14/20 0302   Feeding Type other (see comments) 08/10/20 1500   Feeding Action feeding held 08/14/20 0302   Intake (mL) 75 mL 08/12/20 0900   Water Bolus (mL) 0 mL 08/12/20 0536   Tube  "Output(mL)(Include Discarded Residual) 250 mL 08/13/20 0005   Intake (mL) - Formula Tube Feeding 0 08/10/20 1800            Urethral Catheter 08/05/20 1133 Straight-tip 14 Fr. (Active)   Site Assessment Clean;Intact 08/14/20 0302   Collection Container Urimeter 08/14/20 0302   Securement Method secured to top of thigh w/ adhesive device 08/14/20 0302   Catheter Care Performed yes 08/14/20 0302   Reason for Continuing Urinary Catheterization Required immobilization 08/14/20 0302   CAUTI Prevention Bundle StatLock in place w 1" slack;Intact seal between catheter & drainage tubing;Drainage bag/urimeter off the floor;Green sheeting clip in use;No dependent loops or kinks;Drainage bag/urimeter not overfilled (<2/3 full);Drainage bag/urimeter below bladder 08/13/20 1902   Output (mL) 175 mL 08/14/20 0705       Neurosurgery Physical Exam   AOX3  PERRL  5/5 in BUE/BLE  SILT  Abdomen soft    Significant Labs:  Recent Labs   Lab 08/12/20  1850 08/13/20  0330 08/13/20  1203 08/14/20  0314   GLU  --  90  --  85   * 134*  --  137   K  --  3.6  --  3.8   CL  --  100  --  101   CO2  --  24  --  24   BUN  --  11  --  8   CREATININE  --  0.5  --  0.5   CALCIUM  --  7.9*  --  8.1*   MG  --  1.5* 1.8 1.5*     Recent Labs   Lab 08/13/20  0330 08/14/20  0314   WBC 6.30 6.12   HGB 7.8* 8.1*   HCT 23.5* 24.4*    393*     Recent Labs   Lab 08/13/20  0330 08/14/20  0314   INR 1.0 1.1  1.1   APTT 29.5 30.8  30.8       Significant Diagnostics:  No results found in the last 24 hours.      Assessment/Plan:     * Traumatic compression fracture of L1 lumbar vertebra  46 y/o male with no significant PMH with L1 burst fracture with retropulsion resulting in severe canal stenosis s/p fall from [employer] building     S/p L1 corpectomy with cage insertion and T12-L2 lateral fusion on 8/7. Posterior instrumentation deferred for 5 days due to increasing amounts of intraoperative bleeding. Plan for OR today, consented.     --Admitted to " ICU  --All labs and diagnostics reviewed. Rapid COVID pending.   --Continue bed rest  --MAPs stable off pressors, no goals  --Bedrest/Log roll, HOB <30  --Pain control  --NPO  For OR    --Close neurological monitoring, notify neurosurgery with any acute changes  --PSIF today w/Dr. Perez. Consented.     Dispo: continue ICU care        Chery Clement MD  Neurosurgery  [facility]

## 2020-08-14 NOTE — PLAN OF CARE
Problem: Oral Intake Inadequate  Goal: Improved Oral Intake  Outcome: Ongoing, Progressing  Intervention: Promote and Optimize Oral Intake  Flowsheets (Taken 8/14/2020 4407)  Oral Nutrition Promotion: other (see comments)     Recommendations     1.) ADAT to regular; texture per SLP.   2.) If nutrition support warranted and gut accessible: begin Impact Peptide @ 20mL/hr advancing 10mL q4h to goal rate 50mL/hr to provide 1800 kcals, 113 gm protein and 924mL of free water.               MD to manage fluid.               Hold if GRV >500mL.               TF to meet 97% EEN and 100% EPN.   3.) If gut inaccessible, begin custom TPN: AA 120gm/L; Dextrose 200gm/L; 20% lipids to provide 1660 kcals, 120gm protein; GIR 1.5.               TPN to meet 90% EEN and 100% EPN.               Monitor triglycerides while receiving lipids  4.) Add MVI.   5.) Daily weights     Goals:   1.) Pt to return/meet >75% EEN and EPN by follow up.   Nutrition Goal Status: new  Communication of MALIKA Recs: reviewed with RN

## 2020-08-14 NOTE — TRANSFER OF CARE
"Anesthesia Transfer of Care Note    Patient: Oscar Bills    Procedure(s) Performed: Procedure(s) (LRB):  FUSION, SPINE, LUMBAR T12-L2 Ziehm MIS  Spinewave Neuromonitoring   (N/A)    Patient location: ICU    Anesthesia Type: general    Transport from OR: Transported from OR on 6-10 L/min O2 by face mask with adequate spontaneous ventilation. Continuous ECG monitoring in transport. Continuous SpO2 monitoring in transport. Continuos invasive BP monitoring in transport    Post pain: adequate analgesia    Post assessment: no apparent anesthetic complications and tolerated procedure well    Post vital signs: stable    Level of consciousness: awake and alert    Nausea/Vomiting: no nausea/vomiting    Complications: none    Transfer of care protocol was followed      Last vitals:   Visit Vitals  /88   Pulse 110   Temp 36.9 °C (98.5 °F) (Axillary)   Resp 16   Ht 5' 10" (1.778 m)   Wt 94.9 kg (209 lb 3.5 oz)   SpO2 98%   BMI 30.02 kg/m²     "

## 2020-08-14 NOTE — PROGRESS NOTES
Pt arrived back to room 9074 from 2nd floor OR. VSS stable. Pt reoriented to room. AAOx4. Pain under control. Will continue to monitor closely.     Pt's sister, Ara, updated via telephone.

## 2020-08-14 NOTE — PROGRESS NOTES
Pt transported from room 9074 to OR 2nd floor via bed with RNx2, gtts, portable monitor, O2, ambu bag, and chart. VSS. Pt tolerated transport. Handoff report given to Chika OR RN and Anesthesia who will assume care during surgery.     Family members Domenica and Ara updated via telephone.

## 2020-08-14 NOTE — PLAN OF CARE
[facility] Care Plan    POC reviewed with Oscar Bills and family at 0200. Pt verbalized understanding. Questions and concerns addressed. No acute events overnight. Pt scheduled for lumbar fusion @1210 on 8/14. Pt consents in chart. PCA up in use. Victor in place. Bath given.  Pt progressing toward goals. Will continue to monitor. See below and flowsheets for full assessment and VS info.       Neuro:  [place] Coma Scale  Best Eye Response: 4-->(E4) spontaneous  Best Motor Response: 6-->(M6) obeys commands  Best Verbal Response: 5-->(V5) oriented  [place] Coma Scale Score: 15  Assessment Qualifiers: no eye obstruction present  Pupil PERRLA: yes     24hr Temp:  [98.1 °F (36.7 °C)-98.5 °F (36.9 °C)]     CV:   Rhythm: normal sinus rhythm  BP goals:   SBP < 180  MAP > 80    Resp:   O2 Device (Oxygen Therapy): nasal cannula  Oxygen Concentration (%): 28    Plan: N/A    GI/:  NEFTALI Total Score: 20  Diet/Nutrition Received: clear liquid  Last Bowel Movement: 08/14/20  Voiding Characteristics: urethral catheter (bladder)    Intake/Output Summary (Last 24 hours) at 8/14/2020 0629  Last data filed at 8/14/2020 0502  Gross per 24 hour   Intake 3213.82 ml   Output 3460 ml   Net -246.18 ml       Labs/Accuchecks:  Recent Labs   Lab 08/14/20 0314   WBC 6.12   RBC 2.62*   HGB 8.1*   HCT 24.4*   *      Recent Labs   Lab 08/14/20 0314      K 3.8   CO2 24      BUN 8   CREATININE 0.5   ALKPHOS 67   ALT 20   AST 31   BILITOT 0.3      Recent Labs   Lab 08/14/20 0314   INR 1.1  1.1   APTT 30.8  30.8    No results for input(s): CPK, CPKMB, TROPONINI, MB in the last 168 hours.    Electrolytes: N/A - electrolytes WDL  Accuchecks: none    Gtts:   sodium chloride 0.9% 100 mL/hr at 08/14/20 0502    hydromorphone in 0.9 % NaCl 6 mg/30 ml      ketamine (KETALAR) infusion (titrating) 1.5 mcg/kg/min (08/14/20 0502)       LDA/Wounds:  Lines/Drains/Airways       Drain                   Urethral Catheter 08/05/20 113 Straight-tip 14  Fr. 8 days         NG/OG Tube 08/08/20 1415 nasogastric Right nostril 5 days              Peripheral Intravenous Line                   Peripheral IV - Single Lumen 08/11/20 0300 18 G Posterior;Right Hand 3 days         Peripheral IV - Single Lumen 08/12/20 0200 18 G Anterior;Right Forearm 2 days         Peripheral IV - Single Lumen 08/14/20 0615 20 G Left Antecubital less than 1 day                  Wounds: Yes (back incisions)  Wound care consulted: Yes

## 2020-08-14 NOTE — ANESTHESIA PROCEDURE NOTES
Arterial    Diagnosis: Lumbar fusion    Patient location during procedure: done in OR  Procedure start time: 8/14/2020 1:55 PM  Timeout: 8/14/2020 1:52 PM  Procedure end time: 8/14/2020 1:57 PM    Staffing  Authorizing Provider: Jimi Woodruff MD  Performing Provider: Tammy Santillan MD    Anesthesiologist was present at the time of the procedure.    Preanesthetic Checklist  Completed: patient identified, surgical consent, pre-op evaluation, timeout performed, IV checked, risks and benefits discussed, monitors and equipment checked and anesthesia consent givenArterial  Skin Prep: chlorhexidine gluconate  Local Infiltration: none  Orientation: left  Location: radial  Catheter Size: 20 G  Catheter placement by Anatomical landmarks. Heme positive aspiration all ports.Insertion Attempts: 1  Assessment  Dressing: secured with tape and tegaderm  Patient: Tolerated well

## 2020-08-14 NOTE — ASSESSMENT & PLAN NOTE
Discontinue NGT to low intermittent suction   -Suppositories daily until bm   KUB/Mag Citrate productive  CL diet as tolerated

## 2020-08-14 NOTE — ANESTHESIA PROCEDURE NOTES
Intubation  Performed by: Tammy Santillan MD  Authorized by: Jimi Woodruff MD     Intubation:     Induction:  Intravenous    Intubated:  Postinduction    Mask Ventilation:  Easy with oral airway    Attempts:  1    Attempted By:  Resident anesthesiologist    Method of Intubation:  Direct    Blade:  Hollis 2    Laryngeal View Grade: Grade I - full view of chords      Difficult Airway Encountered?: No      Complications:  None    Airway Device:  Oral endotracheal tube    Airway Device Size:  7.5    Style/Cuff Inflation:  Cuffed    Inflation Amount (mL):  8    Secured at:  The lips    Placement Verified By:  Capnometry    Complicating Factors:  None    Findings Post-Intubation:  BS equal bilateral

## 2020-08-14 NOTE — ANESTHESIA POST-OP PAIN MANAGEMENT
Acute Pain Service Progress Note    Oscar Bills is a 45 y.o., male, [id number]. With no PMHx. Admitted for L1 burst fracture s/p mechanical fall from roof. OR 8/7 for L1 corpectomy w/cage insertion, T12-L2 lateral fusion, deferred posterior instrumentation at the time due to intraop bleeding, plan for take back 8/11.     Post op course complicated by inadequate analgesia and ileus. Profound respiratory depression with hypercarbia 8/8, required narcan. APS consulted for possible thoracic epidural.       Interval Hx: Patient surgery re-scheduled for today. Back brace placed overnight. Patient with 4-8/10 pain today, valium increased to TID, ketamine increased to 2mcg/kg/min.    Surgery:  S/p L1 corpectomy with cage insertion and T12-L2 lateral fusion on 8/7    Post Op Day #: 8    Problem List:    Active Hospital Problems    Diagnosis  POA    *Traumatic compression fracture of L1 lumbar vertebra [S32.010A]  Yes    Hypocalcemia [E83.51]  Unknown    Ileus [K56.7]  Unknown    Burst fracture of lumbar vertebra, open, initial encounter [S32.001B]  Yes    Spinal stenosis of lumbar region [M48.061]  Yes    Fall from building [W13.9XXA]  Yes    Acute urinary retention [R33.8]  Yes      Resolved Hospital Problems   No resolved problems to display.       Subjective:     General appearance of alert, oriented, no complaints   Pain with rest: 4    Numbers   Pain with movement: 8    Numbers     Objective:        Vitals   Vitals:    08/14/20 1205   BP: 127/88   Pulse: 105   Resp: 14   Temp:         Labs    No results displayed because visit has over 200 results.           Meds   Current Facility-Administered Medications   Medication Dose Route Frequency Provider Last Rate Last Dose    0.9%  NaCl infusion (for blood administration)   Intravenous Q24H PRN Camilo Levy MD        0.9%  NaCl infusion   Intravenous Continuous Dimitry Alexandre  mL/hr at 08/14/20 1205      calcium carbonate 200 mg calcium (500 mg)  chewable tablet 500 mg  500 mg Oral Daily PRN Beckie Horta PA-C   500 mg at 08/12/20 2107    calcium gluconate 1g in dextrose 5% 100mL (ready to mix system)  1 g Intravenous PRN Jai Douglas MD        calcium gluconate 2 g in dextrose 5 % 100 mL IVPB  2 g Intravenous PRN Jai Douglas MD        calcium gluconate 3 g in dextrose 5 % 100 mL IVPB  3 g Intravenous PRN Jai Douglas MD        dextrose 50% injection 12.5 g  12.5 g Intravenous PRN Itzel Jimenez PA-C        dextrose 50% injection 25 g  25 g Intravenous PRN Itzel Jimenez PA-C        diazePAM injection 5 mg  5 mg Intravenous QID Ramona Messer MD        glucagon (human recombinant) injection 1 mg  1 mg Intramuscular PRN Itzel Jimenez PA-C        heparin (porcine) injection 5,000 Units  5,000 Units Subcutaneous Q8H Zenaida Young NP   5,000 Units at 08/14/20 0601    HYDROmorphone PCA syringe 6 mg/30 mL (0.2 mg/mL) NS   Intravenous Continuous Ramona Messer MD        ketamine 500 mg/250 mL (2 mg/mL) in sodium chloride 0.9% infusion  2 mcg/kg/min Intravenous Continuous Ramona Messer MD 5.4 mL/hr at 08/14/20 1205 2 mcg/kg/min at 08/14/20 1205    magnesium sulfate 2g in water 50mL IVPB (premix)  2 g Intravenous PRN Jai Douglas MD   2 g at 08/14/20 0651    magnesium sulfate 2g in water 50mL IVPB (premix)  4 g Intravenous PRN Jai Douglas MD        methocarbamol 1000 mg in dextrose 5 % 100 mL IVPB (ready to mix system)  1,000 mg Intravenous Q8H Ramona Messer MD   1,000 mg at 08/14/20 0601    naloxone 0.4 mg/mL injection 0.02 mg  0.02 mg Intravenous PRN Sandy Katz MD        ondansetron injection 8 mg  8 mg Intravenous Q8H PRN Janeen Gan NP   8 mg at 08/10/20 1737    pantoprazole injection 40 mg  40 mg Intravenous Daily Alvin Maier MD   40 mg at 08/14/20 0823    polyethylene glycol packet 17 g  17 g Per  NG tube Daily PRN Zenaida Young NP        potassium chloride 10 mEq in 100 mL IVPB  40 mEq Intravenous PRN Jai Douglas  mL/hr at 08/13/20 0749 40 mEq at 08/13/20 0749    And    potassium chloride 10 mEq in 100 mL IVPB  60 mEq Intravenous PRN Jai Douglas  mL/hr at 08/09/20 0400 60 mEq at 08/09/20 0400    And    potassium chloride 10 mEq in 100 mL IVPB  80 mEq Intravenous PRN Jai Douglas MD        senna-docusate 8.6-50 mg per tablet 2 tablet  2 tablet Per NG tube Daily Mir Hou MD   Stopped at 08/14/20 0900    sodium chloride 0.9% flush 10 mL  10 mL Intravenous PRN Janeen Gan NP        sodium phosphate 15 mmol in dextrose 5 % 250 mL IVPB  15 mmol Intravenous PRN Jai Douglas MD        sodium phosphate 20.01 mmol in dextrose 5 % 250 mL IVPB  20.01 mmol Intravenous PRN Jai Douglas MD        sodium phosphate 30 mmol in dextrose 5 % 250 mL IVPB  30 mmol Intravenous PRN Jai Douglas MD            Anticoagulant dose 40mg lovenox at 1700 8/8    Assessment:     Pain control adequate.  Plan:   -Acetaminophen 1000mg q6h, will order IV due to post-op ileus   -Robaxin 1000mg IV q8hr     -Dilaudid PCA, 0.2mg q8mins    -Ketamine infusion increased to 2 from 1.5mcg/kg/min   -Valium 5mg QID while on ketamine   - Patient to likely go to OR 8/18 for posterior approach per NSGY team   -Given recent spine surgery, not recommending any neuraxial or perineural block options.    -Will resume weaning IV pca once posterior fusion complete.      Evaluator Ramona Messer   PGY-4, CA-3  Spectra [phone] or [phone]

## 2020-08-14 NOTE — ASSESSMENT & PLAN NOTE
45 y.o male with no significant PMHx was transferred from  to [facility] with [place] CAP significant for L1 burst fracture with retropulsion into the spinal canal resulting in severe spinal stenosis s/p falling 10ft from roof. He will be admitted to the Neuro ICU s/p  L1 CORPECTOMY and TLIF T12-L2 Interbody Instrumentation.       -Neurosurgery Following  -Neuro checks q1hr   -Vital signs q1hr  -Daily labs  -Dilaudid PCA  Continue prn Zofran   pain management following  Ketamine gtt managed by Anesthesia pain mgmt

## 2020-08-14 NOTE — ASSESSMENT & PLAN NOTE
S/P L1 CORPECTOMY, SPINE, LUMBAR  (Left)  FUSION, SPINE, LUMBAR, TLIF T12-L2 Interbody Fusion with Instrumentation - Lateral (Left)     -Plans for OR 8/14

## 2020-08-14 NOTE — PLAN OF CARE
[facility] Care Plan    POC reviewed with Oscar Bills and family at 1600. Pt verbalized understanding; pt's sister and aunt updated via telephone. Questions and concerns addressed. Fusion surgery completed today. NS, Ketamine gtts remain. PCA pump remains.  Will continue to monitor. See below and flowsheets for full assessment and VS info.       Neuro:  [place] Coma Scale  Best Eye Response: 4-->(E4) spontaneous  Best Motor Response: 6-->(M6) obeys commands  Best Verbal Response: 5-->(V5) oriented  M[place]o Coma Scale Score: 15  Assessment Qualifiers: patient not sedated/intubated  Pupil PERRLA: yes     24 hr Temp:  [98.4 °F (36.9 °C)-98.9 °F (37.2 °C)]     CV:   Rhythm: normal sinus rhythm  BP goals:   SBP < 180  MAP > 80    Resp:   O2 Device (Oxygen Therapy): Simple Face Mask  Oxygen Concentration (%): 28    Plan: wean pain meds and oxygen needs    GI/:  NEFTALI Total Score: 20  Diet/Nutrition Received: NPO  Last Bowel Movement: 08/14/20  Voiding Characteristics: urethral catheter (bladder)    Intake/Output Summary (Last 24 hours) at 8/14/2020 1725  Last data filed at 8/14/2020 1705  Gross per 24 hour   Intake 3517.89 ml   Output 3255 ml   Net 262.89 ml       Labs/Accuchecks:  Recent Labs   Lab 08/14/20 0314   WBC 6.12   RBC 2.62*   HGB 8.1*   HCT 24.4*   *      Recent Labs   Lab 08/14/20 0314      K 3.8   CO2 24      BUN 8   CREATININE 0.5   ALKPHOS 67   ALT 20   AST 31   BILITOT 0.3      Recent Labs   Lab 08/14/20 0314   INR 1.1  1.1   APTT 30.8  30.8    No results for input(s): CPK, CPKMB, TROPONINI, MB in the last 168 hours.    Electrolytes: Replaced magnesium  Accuchecks: N/A    Gtts:   sodium chloride 0.9% 100 mL/hr at 08/14/20 1705    hydromorphone in 0.9 % NaCl 6 mg/30 ml      ketamine (KETALAR) infusion (titrating) 2.5 mcg/kg/min (08/14/20 1705)       LDA/Wounds:  Lines/Drains/Airways       Drain                   Urethral Catheter 08/05/20 1133 Straight-tip 14 Fr. 9 days         NG/OG  Tube 08/08/20 1415 nasogastric Right nostril 6 days              Arterial Line                   Arterial Line 08/14/20 1355 Left Radial less than 1 day              Peripheral Intravenous Line                   Peripheral IV - Single Lumen 08/11/20 0300 18 G Posterior;Right Hand 3 days         Peripheral IV - Single Lumen 08/12/20 0200 18 G Anterior;Right Forearm 2 days         Peripheral IV - Single Lumen 08/14/20 0615 20 G Left Antecubital less than 1 day         Peripheral IV - Single Lumen 08/14/20 1400 16 G Left;Lateral Wrist less than 1 day                  Wounds: two back incisions from surgery  Wound care consulted: no

## 2020-08-14 NOTE — PROGRESS NOTES
[facility]  Neurocritical Care  Progress Note    Admit Date: 8/5/2020  Service Date: 08/14/2020  Length of Stay: 9    Subjective:     Chief Complaint: Traumatic compression fracture of L1 lumbar vertebra    History of Present Illness: Patient is a 46 y/o male with no significant PMH who was transferred from  after falling from 10ft roof at work. Patient denies LOC, complained of excrutiating low back/sacral pain exacerbated with any movement of legs. CT CAP significant for L1 burst fracture with retropulsion into the spinal canal resulting in severe spinal stenosis. Patient states he could feel he had to urinate but could not start a stream, kraus placed and he reports he felt the kraus inserted and felt his bladder release once the kraus was placed. He reports he felt the OSMIN and per report from ED physician, patient had full rectal tone. He is being admitted to Neuro ICU for a higher level of neurological care s/p L1 CORPECTOMY and TLIF T12-L2 Interbody Instrumentation.    Hospital Course: 08/07/2020: Admit to Neuro ICU.  08/08/2020: Added long acting pain medications. Pain better controlled. Stable for stepdown.   08/09/2020: Acute pain management consulted. Ketamine gtt. Ilues, NGT. Suppository.    08/10/2020: Appreciate pain management reccs. Plans for OR tomorrow.  08/11/2020 ABG, KUB/CXR, Mag citrate, cont ketamine  08/12/2020  Surgery rescheduled 8/181L bolus continue IVF ketamine /PCA. Follow Na dc suction, start clear liquid  08/13/2020 wean ketamine gtt, heparin  08/14/2020 for surgery today; continue pain mgmt gtt          Interval History: Neurologically unchanged.S/p L1 corpectomy with cage insertion and T12-L2 lateral fusion on 8/7. Posterior instrumentation scheduled for this afternoon.  Drains remains intact. Continues to tolerate  clear liquid diet. Pain Management Team following for Ketamine gtt/PCA dilaudid. Oxygen saturation maintained > 94% on 2L NC. MAP maintained > 65  without difficulty. Electrolyte replacements as needed. The patient requires critical care stay due to high probability of life threatening deterioration in their condition.    Review of Systems   Constitutional: Negative for fever.   HENT: Negative for trouble swallowing and voice change.    Eyes: Negative for visual disturbance.   Respiratory: Negative for shortness of breath and wheezing.    Cardiovascular: Negative for palpitations.   Gastrointestinal: Positive for abdominal distention and abdominal pain.   Endocrine: Negative for polyuria.   Genitourinary: Negative for difficulty urinating.   Musculoskeletal: Positive for back pain.   Neurological: Positive for weakness. Negative for dizziness and headaches.   Psychiatric/Behavioral: Negative for agitation.       Objective:     Vitals:  Temp: 98.9 °F (37.2 °C)  Pulse: 105  Rhythm: normal sinus rhythm  BP: 118/82  MAP (mmHg): 95  Resp: 13  SpO2: 99 %  O2 Device (Oxygen Therapy): nasal cannula    Temp  Min: 98.3 °F (36.8 °C)  Max: 98.9 °F (37.2 °C)  Pulse  Min: 92  Max: 111  BP  Min: 103/68  Max: 146/97  MAP (mmHg)  Min: 80  Max: 113  Resp  Min: 12  Max: 26  SpO2  Min: 97 %  Max: 100 %    08/13 0701 - 08/14 0700  In: 3407.6 [I.V.:2607.6]  Out: 3315 [Urine:3315]   Unmeasured Output  Stool Occurrence: 0       Physical Exam  Vitals signs and nursing note reviewed.   Constitutional:       Appearance: Normal appearance. He is normal weight.   HENT:      Head: Normocephalic and atraumatic.      Right Ear: External ear normal.      Left Ear: External ear normal.      Nose: Nose normal. No congestion.   Eyes:      General: No scleral icterus.     Extraocular Movements: Extraocular movements intact.      Conjunctiva/sclera: Conjunctivae normal.      Pupils: Pupils are equal, round, and reactive to light.   Neck:      Musculoskeletal: No neck rigidity.   Cardiovascular:      Rate and Rhythm: Normal rate and regular rhythm.      Pulses: Normal pulses.      Heart sounds:  Normal heart sounds.   Pulmonary:      Effort: Pulmonary effort is normal. No respiratory distress.      Breath sounds: Normal breath sounds.   Abdominal:      General: There is distension.   Musculoskeletal: Normal range of motion.   Skin:     General: Skin is warm.      Capillary Refill: Capillary refill takes less than 2 seconds.   Neurological:      Mental Status: He is alert and oriented to person, place, and time.      GCS: GCS eye subscore is 4. GCS verbal subscore is 5. GCS motor subscore is 6.      Motor: Weakness present.      Comments: Awake, oriented  PERRLA  EOMI  JONES spontaneously   Weakness in RLE             Medications:  Continuoussodium chloride 0.9%, Last Rate: 100 mL/hr at 08/14/20 0805  hydromorphone in 0.9 % NaCl 6 mg/30 ml  ketamine (KETALAR) infusion (titrating), Last Rate: 1.5 mcg/kg/min (08/14/20 0805)    ScheduleddiazePAM, 5 mg, TID  heparin (porcine), 5,000 Units, Q8H  methocarbamol (ROBAXIN) IVPB, 1,000 mg, Q8H  pantoprazole, 40 mg, Daily  senna-docusate 8.6-50 mg, 2 tablet, Daily    PRNsodium chloride, , Q24H PRN  calcium carbonate, 500 mg, Daily PRN  calcium gluconate IVPB, 1 g, PRN  calcium gluconate IVPB, 2 g, PRN  calcium gluconate IVPB, 3 g, PRN  dextrose 50%, 12.5 g, PRN  dextrose 50%, 25 g, PRN  glucagon (human recombinant), 1 mg, PRN  magnesium sulfate IVPB, 2 g, PRN  magnesium sulfate IVPB, 4 g, PRN  naloxone, 0.02 mg, PRN  ondansetron, 8 mg, Q8H PRN  polyethylene glycol, 17 g, Daily PRN  potassium chloride in water, 40 mEq, PRN    And  potassium chloride in water, 60 mEq, PRN    And  potassium chloride in water, 80 mEq, PRN  sodium chloride 0.9%, 10 mL, PRN  sodium phosphate IVPB, 15 mmol, PRN  sodium phosphate IVPB, 20.01 mmol, PRN  sodium phosphate IVPB, 30 mmol, PRN        Diet  Diet NPO  Diet NPO        Assessment/Plan:     Neuro  * Traumatic compression fracture of L1 lumbar vertebra  45 y.o male with no significant PMHx was transferred from  to [facility] with [place] CAP  significant for L1 burst fracture with retropulsion into the spinal canal resulting in severe spinal stenosis s/p falling 10ft from roof. He will be admitted to the Neuro ICU s/p  L1 CORPECTOMY and TLIF T12-L2 Interbody Instrumentation.       -Neurosurgery Following  -Neuro checks q1hr   -Vital signs q1hr  -Daily labs  -Dilaudid PCA  Continue prn Zofran   pain management following  Ketamine gtt managed by Anesthesia pain mgmt          Spinal stenosis of lumbar region  S/P L1 CORPECTOMY, SPINE, LUMBAR  (Left)  FUSION, SPINE, LUMBAR, TLIF T12-L2 Interbody Fusion with Instrumentation - Lateral (Left)     -Plans for OR 8/14    Renal/  Hypocalcemia  CMP Daily  Electrolyte Replacements PRN      Acute urinary retention  Victor placed, continue to monitor strict I's and O's    GI  Ileus  Discontinue NGT to low intermittent suction   -Suppositories daily until bm   KUB/Mag Citrate productive  Initiate CL diet as tolerated    Orthopedic  Fall from building  See above    Other  Burst fracture of lumbar vertebra, open, initial encounter  -see traumatic compression fracture of L1 lumbar fracture           The patient is being Prophylaxed for:  Venous Thromboembolism with: Chemical  Stress Ulcer with: PPI  Ventilator Pneumonia with: none    Activity Orders          Diet NPO: NPO starting at 08/14 0001    Commode at bedside starting at 08/07 0746        Full Code    Zenaida Young NP  Neurocritical Care  [facility]

## 2020-08-14 NOTE — PROGRESS NOTES
"[facility]  Adult Nutrition  Progress Note    SUMMARY       Recommendations    1.) ADAT to regular; texture per SLP.   2.) If nutrition support warranted and gut accessible: begin Impact Peptide @ 20mL/hr advancing 10mL q4h to goal rate 50mL/hr to provide 1800 kcals, 113 gm protein and 924mL of free water.    MD to manage fluid.    Hold if GRV >500mL.    TF to meet 97% EEN and 100% EPN.   3.) If gut inaccessible, begin custom TPN: AA 120gm/L; Dextrose 200gm/L; 20% lipids to provide 1660 kcals, 120gm protein; GIR 1.5.    TPN to meet 90% EEN and 100% EPN.    Monitor triglycerides while receiving lipids  4.) Add MVI.   5.) Daily weights    Goals:   1.) Pt to return/meet >75% EEN and EPN by follow up.   Nutrition Goal Status: new  Communication of RD Recs: reviewed with RN    Reason for Assessment    Reason For Assessment: RD follow-up  Diagnosis: other (see comments)(Compression fracture of L1 lumbar vertebra)  Interdisciplinary Rounds: did not attend  General Information Comments: POD8 s/p L1 corpectomy with cage insertion and T12-L2 lateral fusion. Pt currently in OR for FUSION, SPINE, LUMBAR, TLIF T12-L2 Interbody Fusion with Instrumentation - Lateral (Left). Pt with very poor PO intake due to NPO/CLD due to ileus. NGT remains in place at this time. No GI distress. Wt hx: admit 90.7kg, CBW 94.9kg. NFPE unable to assessed at this time due to pt in OR. Pt at risk for malnutrition.   Nutrition Discharge Planning: Unable to determine at this time    Nutrition Risk Screen    Nutrition Risk Screen: no indicators present    Nutrition/Diet History    Patient Reported Diet/Restrictions/Preferences: general  Factors Affecting Nutritional Intake: NPO    Anthropometrics    Temp: 98.5 °F (36.9 °C)  Height Method: Stated  Height: 5' 10" (177.8 cm)  Height (inches): 70 in  Weight Method: Bed Scale  Weight: 94.9 kg (209 lb 3.5 oz)  Weight (lb): 209.22 lb  Ideal Body Weight (IBW), Male: 166 lb  % Ideal Body " Weight, Male (lb): 120.46 %  BMI (Calculated): 30  BMI Grade: 25 - 29.9 - overweight     Lab/Procedures/Meds    Pertinent Labs Reviewed: reviewed  Pertinent Labs Comments: Ca 8.1, Mg 1.5, albumin 2.5  Pertinent Medications Reviewed: reviewed  Pertinent Medications Comments: protonix, senna-docusate, ketamine    Estimated/Assessed Needs    Weight Used For Calorie Calculations: 94.9 kg (209 lb 3.5 oz)  Energy Calorie Requirements (kcal): 1850  Energy Need Method: Pratt-St Jeor(no AF)  Protein Requirements: 113-151(1.2-1.5 g/kg)  Weight Used For Protein Calculations: 75.5 kg (166 lb 7.2 oz)(1.5-2.0 g/kg of IBW)     Estimated Fluid Requirement Method: RDA Method(or per MD)  RDA Method (mL): 1850     Nutrition Prescription Ordered    Current Diet Order: NPO    Evaluation of Received Nutrient/Fluid Intake    I/O: +2.1L since admit  Energy Calories Required: not meeting needs  Protein Required: not meeting needs  Fluid Required: not meeting needs  Comments: LBM 8/14  Tolerance: tolerating  % Intake of Estimated Energy Needs: 0 - 25 %  % Meal Intake: NPO    Nutrition Risk    Level of Risk/Frequency of Follow-up: high(x2/week)     Assessment and Plan  Nutrition Problem  1.) Inadequate energy intake  2.) Increased nutrient needs    Related to (etiology):   1.) Decreased ability to consume sufficient energy  2.) Physiological causes related to healing    Signs and Symptoms (as evidenced by):   1.) <75% of nutritional needs being met; NPO  2.) POD8 s/p L1 corpectomy with cage insertion and T12-L2 lateral fusion. Pt currently in OR for FUSION, SPINE, LUMBAR, TLIF T12-L2 Interbody Fusion with Instrumentation - Lateral (Left).    Interventions(treatment strategy):  Collaboration of care with other providers  Referral of care    Nutrition Diagnosis Status:   New      Monitor and Evaluation    Food and Nutrient Intake: energy intake, food and beverage intake  Food and Nutrient Adminstration: diet order  Physical Activity and  Function: nutrition-related ADLs and IADLs  Anthropometric Measurements: weight, weight change  Biochemical Data, Medical Tests and Procedures: lipid profile, inflammatory profile, glucose/endocrine profile, gastrointestinal profile, electrolyte and renal panel  Nutrition-Focused Physical Findings: overall appearance     Nutrition Follow-Up    RD Follow-up?: Yes     Immediate family member

## 2020-08-14 NOTE — PROGRESS NOTES
Pt complaining of 10/10 pain. Notified MD Apolinar with pain management/anesthesia. Orders to increase ketamine infusion. Will continue to monitor closely.

## 2020-08-14 NOTE — PT/OT/SLP PROGRESS
Speech Language Pathology/ Discharge Summary       Oscar Bills  MRN: [id number]    Patient not seen today secondary to off he floor for surgical intervention. Current orders discontinued at this time team to place new orders once medically appropriate.       Alm aRosa Lane CCC-SLP

## 2020-08-15 LAB
ABO + RH BLD: NORMAL
ALBUMIN SERPL BCP-MCNC: 2.5 G/DL (ref 3.5–5.2)
ALP SERPL-CCNC: 84 U/L (ref 55–135)
ALT SERPL W/O P-5'-P-CCNC: 20 U/L (ref 10–44)
ANION GAP SERPL CALC-SCNC: 9 MMOL/L (ref 8–16)
APTT BLDCRRT: 28.2 SEC (ref 21–32)
AST SERPL-CCNC: 30 U/L (ref 10–40)
BASOPHILS # BLD AUTO: 0.02 K/UL (ref 0–0.2)
BASOPHILS NFR BLD: 0.3 % (ref 0–1.9)
BILIRUB SERPL-MCNC: 0.3 MG/DL (ref 0.1–1)
BLD GP AB SCN CELLS X3 SERPL QL: NORMAL
BUN SERPL-MCNC: 8 MG/DL (ref 6–20)
CALCIUM SERPL-MCNC: 8.1 MG/DL (ref 8.7–10.5)
CHLORIDE SERPL-SCNC: 101 MMOL/L (ref 95–110)
CO2 SERPL-SCNC: 27 MMOL/L (ref 23–29)
CREAT SERPL-MCNC: 0.6 MG/DL (ref 0.5–1.4)
DIFFERENTIAL METHOD: ABNORMAL
EOSINOPHIL # BLD AUTO: 0 K/UL (ref 0–0.5)
EOSINOPHIL NFR BLD: 0 % (ref 0–8)
ERYTHROCYTE [DISTWIDTH] IN BLOOD BY AUTOMATED COUNT: 12.6 % (ref 11.5–14.5)
EST. GFR  (AFRICAN AMERICAN): >60 ML/MIN/1.73 M^2
EST. GFR  (NON AFRICAN AMERICAN): >60 ML/MIN/1.73 M^2
GLUCOSE SERPL-MCNC: 94 MG/DL (ref 70–110)
HCT VFR BLD AUTO: 23.1 % (ref 40–54)
HGB BLD-MCNC: 7.8 G/DL (ref 14–18)
IMM GRANULOCYTES # BLD AUTO: 0.13 K/UL (ref 0–0.04)
IMM GRANULOCYTES NFR BLD AUTO: 1.7 % (ref 0–0.5)
INR PPP: 1 (ref 0.8–1.2)
LYMPHOCYTES # BLD AUTO: 0.7 K/UL (ref 1–4.8)
LYMPHOCYTES NFR BLD: 9.3 % (ref 18–48)
MAGNESIUM SERPL-MCNC: 1.8 MG/DL (ref 1.6–2.6)
MCH RBC QN AUTO: 31.1 PG (ref 27–31)
MCHC RBC AUTO-ENTMCNC: 33.8 G/DL (ref 32–36)
MCV RBC AUTO: 92 FL (ref 82–98)
MONOCYTES # BLD AUTO: 0.7 K/UL (ref 0.3–1)
MONOCYTES NFR BLD: 8.9 % (ref 4–15)
NEUTROPHILS # BLD AUTO: 6 K/UL (ref 1.8–7.7)
NEUTROPHILS NFR BLD: 79.8 % (ref 38–73)
NRBC BLD-RTO: 0 /100 WBC
PHOSPHATE SERPL-MCNC: 5.1 MG/DL (ref 2.7–4.5)
PLATELET # BLD AUTO: 448 K/UL (ref 150–350)
PMV BLD AUTO: 8.9 FL (ref 9.2–12.9)
POTASSIUM SERPL-SCNC: 4.5 MMOL/L (ref 3.5–5.1)
PROT SERPL-MCNC: 5.8 G/DL (ref 6–8.4)
PROTHROMBIN TIME: 11.5 SEC (ref 9–12.5)
RBC # BLD AUTO: 2.51 M/UL (ref 4.6–6.2)
SODIUM SERPL-SCNC: 137 MMOL/L (ref 136–145)
WBC # BLD AUTO: 7.5 K/UL (ref 3.9–12.7)

## 2020-08-15 PROCEDURE — 25000003 PHARM REV CODE 250: Performed by: UROLOGY

## 2020-08-15 PROCEDURE — 94761 N-INVAS EAR/PLS OXIMETRY MLT: CPT

## 2020-08-15 PROCEDURE — 99233 PR SUBSEQUENT HOSPITAL CARE,LEVL III: ICD-10-PCS | Mod: ,,, | Performed by: PSYCHIATRY & NEUROLOGY

## 2020-08-15 PROCEDURE — 99231 SBSQ HOSP IP/OBS SF/LOW 25: CPT | Mod: ,,, | Performed by: ANESTHESIOLOGY

## 2020-08-15 PROCEDURE — 63600175 PHARM REV CODE 636 W HCPCS: Performed by: UROLOGY

## 2020-08-15 PROCEDURE — C9113 INJ PANTOPRAZOLE SODIUM, VIA: HCPCS | Performed by: STUDENT IN AN ORGANIZED HEALTH CARE EDUCATION/TRAINING PROGRAM

## 2020-08-15 PROCEDURE — 86920 COMPATIBILITY TEST SPIN: CPT

## 2020-08-15 PROCEDURE — 85730 THROMBOPLASTIN TIME PARTIAL: CPT

## 2020-08-15 PROCEDURE — 86850 RBC ANTIBODY SCREEN: CPT

## 2020-08-15 PROCEDURE — 94770 HC EXHALED C02 TEST: CPT

## 2020-08-15 PROCEDURE — 63600175 PHARM REV CODE 636 W HCPCS: Performed by: STUDENT IN AN ORGANIZED HEALTH CARE EDUCATION/TRAINING PROGRAM

## 2020-08-15 PROCEDURE — 99233 SBSQ HOSP IP/OBS HIGH 50: CPT | Mod: ,,, | Performed by: PSYCHIATRY & NEUROLOGY

## 2020-08-15 PROCEDURE — 85610 PROTHROMBIN TIME: CPT

## 2020-08-15 PROCEDURE — 63600175 PHARM REV CODE 636 W HCPCS: Performed by: PHYSICIAN ASSISTANT

## 2020-08-15 PROCEDURE — 80053 COMPREHEN METABOLIC PANEL: CPT

## 2020-08-15 PROCEDURE — 63600175 PHARM REV CODE 636 W HCPCS: Performed by: NURSE PRACTITIONER

## 2020-08-15 PROCEDURE — 84100 ASSAY OF PHOSPHORUS: CPT

## 2020-08-15 PROCEDURE — 25000003 PHARM REV CODE 250: Performed by: PSYCHIATRY & NEUROLOGY

## 2020-08-15 PROCEDURE — 99231 PR SUBSEQUENT HOSPITAL CARE,LEVL I: ICD-10-PCS | Mod: ,,, | Performed by: ANESTHESIOLOGY

## 2020-08-15 PROCEDURE — 20000000 HC ICU ROOM

## 2020-08-15 PROCEDURE — 99900035 HC TECH TIME PER 15 MIN (STAT)

## 2020-08-15 PROCEDURE — 83735 ASSAY OF MAGNESIUM: CPT

## 2020-08-15 PROCEDURE — 25000003 PHARM REV CODE 250: Performed by: NURSE PRACTITIONER

## 2020-08-15 PROCEDURE — 63600175 PHARM REV CODE 636 W HCPCS: Performed by: SURGERY

## 2020-08-15 PROCEDURE — 27000221 HC OXYGEN, UP TO 24 HOURS

## 2020-08-15 PROCEDURE — 85025 COMPLETE CBC W/AUTO DIFF WBC: CPT

## 2020-08-15 RX ORDER — DIAZEPAM 10 MG/2ML
5 INJECTION INTRAMUSCULAR EVERY 8 HOURS
Status: DISCONTINUED | OUTPATIENT
Start: 2020-08-15 | End: 2020-08-21 | Stop reason: HOSPADM

## 2020-08-15 RX ORDER — POLYETHYLENE GLYCOL 3350 17 G/17G
17 POWDER, FOR SOLUTION ORAL DAILY
Status: DISCONTINUED | OUTPATIENT
Start: 2020-08-15 | End: 2020-08-16

## 2020-08-15 RX ORDER — GABAPENTIN 250 MG/5ML
500 SOLUTION ORAL EVERY 6 HOURS
Status: DISCONTINUED | OUTPATIENT
Start: 2020-08-15 | End: 2020-08-16

## 2020-08-15 RX ORDER — GABAPENTIN 250 MG/5ML
500 SOLUTION ORAL EVERY 8 HOURS
Status: DISCONTINUED | OUTPATIENT
Start: 2020-08-15 | End: 2020-08-15

## 2020-08-15 RX ADMIN — DIAZEPAM 5 MG: 5 INJECTION, SOLUTION INTRAMUSCULAR; INTRAVENOUS at 08:08

## 2020-08-15 RX ADMIN — POLYETHYLENE GLYCOL 3350 17 G: 17 POWDER, FOR SOLUTION ORAL at 11:08

## 2020-08-15 RX ADMIN — DIAZEPAM 5 MG: 5 INJECTION, SOLUTION INTRAMUSCULAR; INTRAVENOUS at 09:08

## 2020-08-15 RX ADMIN — GABAPENTIN 500 MG: 250 SOLUTION ORAL at 09:08

## 2020-08-15 RX ADMIN — SODIUM CHLORIDE: 0.9 INJECTION, SOLUTION INTRAVENOUS at 09:08

## 2020-08-15 RX ADMIN — Medication: at 09:08

## 2020-08-15 RX ADMIN — CEFAZOLIN 2 G: 1 INJECTION, POWDER, FOR SOLUTION INTRAMUSCULAR; INTRAVENOUS at 12:08

## 2020-08-15 RX ADMIN — METHOCARBAMOL 1000 MG: 100 INJECTION INTRAMUSCULAR; INTRAVENOUS at 09:08

## 2020-08-15 RX ADMIN — METHOCARBAMOL 1000 MG: 100 INJECTION INTRAMUSCULAR; INTRAVENOUS at 05:08

## 2020-08-15 RX ADMIN — HEPARIN SODIUM 5000 UNITS: 5000 INJECTION INTRAVENOUS; SUBCUTANEOUS at 05:08

## 2020-08-15 RX ADMIN — DIAZEPAM 5 MG: 5 INJECTION, SOLUTION INTRAMUSCULAR; INTRAVENOUS at 05:08

## 2020-08-15 RX ADMIN — HEPARIN SODIUM 5000 UNITS: 5000 INJECTION INTRAVENOUS; SUBCUTANEOUS at 01:08

## 2020-08-15 RX ADMIN — CEFAZOLIN 2 G: 1 INJECTION, POWDER, FOR SOLUTION INTRAMUSCULAR; INTRAVENOUS at 08:08

## 2020-08-15 RX ADMIN — Medication: at 07:08

## 2020-08-15 RX ADMIN — DOCUSATE SODIUM 50MG AND SENNOSIDES 8.6MG 2 TABLET: 8.6; 5 TABLET, FILM COATED ORAL at 08:08

## 2020-08-15 RX ADMIN — HEPARIN SODIUM 5000 UNITS: 5000 INJECTION INTRAVENOUS; SUBCUTANEOUS at 09:08

## 2020-08-15 RX ADMIN — Medication: at 02:08

## 2020-08-15 RX ADMIN — KETAMINE HYDROCHLORIDE 3.5 MCG/KG/MIN: 100 INJECTION INTRAMUSCULAR; INTRAVENOUS at 05:08

## 2020-08-15 RX ADMIN — METHOCARBAMOL 1000 MG: 100 INJECTION INTRAMUSCULAR; INTRAVENOUS at 01:08

## 2020-08-15 RX ADMIN — PANTOPRAZOLE SODIUM 40 MG: 40 INJECTION, POWDER, LYOPHILIZED, FOR SOLUTION INTRAVENOUS at 08:08

## 2020-08-15 NOTE — SUBJECTIVE & OBJECTIVE
Interval History: 8/10: POD 1 from T12-L2 PSIF, NAEON, AFVSS, Exam stable, plan to TTF once off sedation/pain medication drips    Medications:  Continuous Infusions:   sodium chloride 0.9% 100 mL/hr at 08/15/20 1405    hydromorphone in 0.9 % NaCl 6 mg/30 ml      ketamine (KETALAR) infusion (titrating) 3.5 mcg/kg/min (08/15/20 1405)     Scheduled Meds:   diazePAM  5 mg Intravenous QID    gabapentin  500 mg Per NG tube Q8H    heparin (porcine)  5,000 Units Subcutaneous Q8H    methocarbamol (ROBAXIN) IVPB  1,000 mg Intravenous Q8H    methylnaltrexone  12 mg Subcutaneous Once    pantoprazole  40 mg Intravenous Daily    polyethylene glycol  17 g Oral Daily    senna-docusate 8.6-50 mg  2 tablet Per NG tube Daily     PRN Meds:sodium chloride, calcium carbonate, calcium gluconate IVPB, calcium gluconate IVPB, calcium gluconate IVPB, dextrose 50%, dextrose 50%, glucagon (human recombinant), magnesium sulfate IVPB, magnesium sulfate IVPB, naloxone, ondansetron, polyethylene glycol, potassium chloride in water **AND** potassium chloride in water **AND** potassium chloride in water, sodium chloride 0.9%, sodium phosphate IVPB, sodium phosphate IVPB, sodium phosphate IVPB     Review of Systems    Objective:     Weight: 94.9 kg (209 lb 3.5 oz)  Body mass index is 30.02 kg/m².  Vital Signs (Most Recent):  Temp: 97.8 °F (36.6 °C) (08/15/20 0705)  Pulse: (!) 123 (08/15/20 1405)  Resp: 16 (08/15/20 1405)  BP: (!) 130/90 (08/15/20 1405)  SpO2: 97 % (08/15/20 1405) Vital Signs (24h Range):  Temp:  [97.8 °F (36.6 °C)-98.6 °F (37 °C)] 97.8 °F (36.6 °C)  Pulse:  [101-129] 123  Resp:  [11-25] 16  SpO2:  [93 %-99 %] 97 %  BP: (110-148)/(67-98) 130/90  Arterial Line BP: ()/(63-83) 128/67     Date 08/15/20 0700 - 08/16/20 0659   Shift 6706-7370 0885-8187 8932-0633 24 Hour Total   INTAKE   I.V.(mL/kg) 914.2(9.6)   914.2(9.6)   IV Piggyback 100   100   Shift Total(mL/kg) 1014.2(10.7)   1014.2(10.7)   OUTPUT   Urine(mL/kg/hr)  "875(1.2)   875   Shift Total(mL/kg) 875(9.2)   875(9.2)   Weight (kg) 94.9 94.9 94.9 94.9                        NG/OG Tube 08/08/20 1415 nasogastric Right nostril (Active)   Placement Check placement verified by distal tube length measurement;placement verified by x-ray 08/14/20 0302   Advancement advanced manually 08/12/20 1500   Tolerance no signs/symptoms of discomfort 08/14/20 0302   Securement secured to nostril center w/ adhesive device 08/14/20 0302   Clamp Status/Tolerance clamped 08/14/20 0302   Suction Setting/Drainage Method suction at the bedside 08/14/20 0302   Insertion Site Appearance no redness, warmth, tenderness, skin breakdown, drainage 08/14/20 0302   Drainage Brown;Green 08/14/20 0302   Flush/Irrigation flushed w/;water 08/14/20 0302   Feeding Type other (see comments) 08/10/20 1500   Feeding Action feeding held 08/14/20 0302   Intake (mL) 75 mL 08/12/20 0900   Water Bolus (mL) 0 mL 08/12/20 0536   Tube Output(mL)(Include Discarded Residual) 250 mL 08/13/20 0005   Intake (mL) - Formula Tube Feeding 0 08/10/20 1800            Urethral Catheter 08/05/20 1133 Straight-tip 14 Fr. (Active)   Site Assessment Clean;Intact 08/14/20 0302   Collection Container Urimeter 08/14/20 0302   Securement Method secured to top of thigh w/ adhesive device 08/14/20 0302   Catheter Care Performed yes 08/14/20 0302   Reason for Continuing Urinary Catheterization Required immobilization 08/14/20 0302   CAUTI Prevention Bundle StatLock in place w 1" slack;Intact seal between catheter & drainage tubing;Drainage bag/urimeter off the floor;Green sheeting clip in use;No dependent loops or kinks;Drainage bag/urimeter not overfilled (<2/3 full);Drainage bag/urimeter below bladder 08/13/20 1902   Output (mL) 175 mL 08/14/20 0705       Neurosurgery Physical Exam  AOX3  PERRL  5/5 in BUE/BLE  SILT  Abdomen soft    Significant Labs:  Recent Labs   Lab 08/14/20  0314 08/14/20  1257 08/15/20  0145   GLU 85  --  94     --  " 137   K 3.8  --  4.5     --  101   CO2 24  --  27   BUN 8  --  8   CREATININE 0.5  --  0.6   CALCIUM 8.1*  --  8.1*   MG 1.5* 2.6 1.8     Recent Labs   Lab 08/14/20  0314 08/15/20  0145   WBC 6.12 7.50   HGB 8.1* 7.8*   HCT 24.4* 23.1*   * 448*     Recent Labs   Lab 08/14/20  0314 08/15/20  0145   INR 1.1  1.1 1.0   APTT 30.8  30.8 28.2       Significant Diagnostics:  No results found in the last 24 hours.

## 2020-08-15 NOTE — SUBJECTIVE & OBJECTIVE
Interval History:  See hospital course     Review of Systems   Constitutional: Negative for fever.   HENT: Negative for trouble swallowing and voice change.    Eyes: Negative for visual disturbance.   Respiratory: Negative for shortness of breath and wheezing.    Cardiovascular: Negative for palpitations.   Gastrointestinal: Positive for abdominal distention and abdominal pain.   Endocrine: Negative for polyuria.   Genitourinary: Negative for difficulty urinating.   Musculoskeletal: Positive for back pain.   Neurological: Positive for weakness. Negative for dizziness and headaches.   Psychiatric/Behavioral: Negative for agitation.     Objective:     Vitals:  Temp: 97.8 °F (36.6 °C)  Pulse: (!) 122  Rhythm: normal sinus rhythm  BP: (!) 148/92  MAP (mmHg): 115  Resp: 18  SpO2: 97 %  O2 Device (Oxygen Therapy): nasal cannula    Temp  Min: 97.8 °F (36.6 °C)  Max: 98.6 °F (37 °C)  Pulse  Min: 101  Max: 122  BP  Min: 110/82  Max: 148/92  MAP (mmHg)  Min: 92  Max: 115  Resp  Min: 11  Max: 25  ETCO2 (mmHg)  Min: 33 mmHg  Max: 33 mmHg  SpO2  Min: 93 %  Max: 99 %    08/14 0701 - 08/15 0700  In: 3451.2 [I.V.:3151.2]  Out: 3810 [Urine:3810]   Unmeasured Output  Stool Occurrence: 0       Physical Exam  Vitals signs and nursing note reviewed.   Constitutional:       Appearance: Normal appearance. He is normal weight.   HENT:      Head: Normocephalic and atraumatic.      Right Ear: External ear normal.      Left Ear: External ear normal.      Nose: Nose normal. No congestion.   Eyes:      General: No scleral icterus.     Extraocular Movements: Extraocular movements intact.      Conjunctiva/sclera: Conjunctivae normal.      Pupils: Pupils are equal, round, and reactive to light.   Neck:      Musculoskeletal: No neck rigidity.   Cardiovascular:      Rate and Rhythm: Normal rate and regular rhythm.      Pulses: Normal pulses.      Heart sounds: Normal heart sounds.   Pulmonary:      Effort: Pulmonary effort is normal. No respiratory  distress.      Breath sounds: Normal breath sounds.   Abdominal:      General: There is distension.   Musculoskeletal: Normal range of motion.   Skin:     General: Skin is warm.      Capillary Refill: Capillary refill takes less than 2 seconds.   Neurological:      Mental Status: He is alert and oriented to person, place, and time.      GCS: GCS eye subscore is 4. GCS verbal subscore is 5. GCS motor subscore is 6.      Motor: Weakness present.      Comments: Awake, oriented  PERRLA  EOMI  JONES spontaneously   Weakness in RLE             Medications:  Continuoussodium chloride 0.9%, Last Rate: 100 mL/hr at 08/15/20 1205  hydromorphone in 0.9 % NaCl 6 mg/30 ml  ketamine (KETALAR) infusion (titrating), Last Rate: 3.5 mcg/kg/min (08/15/20 1205)    ScheduledceFAZolin (ANCEF) IVPB, 2 g, Q8H  diazePAM, 5 mg, QID  gabapentin, 500 mg, Q8H  heparin (porcine), 5,000 Units, Q8H  methocarbamol (ROBAXIN) IVPB, 1,000 mg, Q8H  methylnaltrexone, 12 mg, Once  pantoprazole, 40 mg, Daily  polyethylene glycol, 17 g, Daily  senna-docusate 8.6-50 mg, 2 tablet, Daily    PRNsodium chloride, , Q24H PRN  calcium carbonate, 500 mg, Daily PRN  calcium gluconate IVPB, 1 g, PRN  calcium gluconate IVPB, 2 g, PRN  calcium gluconate IVPB, 3 g, PRN  dextrose 50%, 12.5 g, PRN  dextrose 50%, 25 g, PRN  glucagon (human recombinant), 1 mg, PRN  magnesium sulfate IVPB, 2 g, PRN  magnesium sulfate IVPB, 4 g, PRN  naloxone, 0.02 mg, PRN  ondansetron, 8 mg, Q8H PRN  polyethylene glycol, 17 g, Daily PRN  potassium chloride in water, 40 mEq, PRN    And  potassium chloride in water, 60 mEq, PRN    And  potassium chloride in water, 80 mEq, PRN  sodium chloride 0.9%, 10 mL, PRN  sodium phosphate IVPB, 15 mmol, PRN  sodium phosphate IVPB, 20.01 mmol, PRN  sodium phosphate IVPB, 30 mmol, PRN      Today I personally reviewed pertinent medications, lines/drains/airways, imaging, notably:    Diet  Diet clear liquid  Diet clear liquid

## 2020-08-15 NOTE — PLAN OF CARE
[facility] Care Plan    POC reviewed with Oscar Bills and family at 1500. Pt verbalized understanding; pt's sister and aunt updated via telephone. Questions and concerns addressed. No acute events today. Pt progressing toward goals. Will continue to monitor. See below and flowsheets for full assessment and VS info.       Neuro:  Mateo Coma Scale  Best Eye Response: 4-->(E4) spontaneous  Best Motor Response: 6-->(M6) obeys commands  Best Verbal Response: 5-->(V5) oriented  [place] Coma Scale Score: 15  Assessment Qualifiers: patient not sedated/intubated  Pupil PERRLA: yes     24 hr Temp:  [97.8 °F (36.6 °C)-98.6 °F (37 °C)]     CV:   Rhythm: sinus tachycardia  BP goals:   SBP < 180  MAP > 80    Resp:   O2 Device (Oxygen Therapy): nasal cannula  Oxygen Concentration (%): 28    Plan: pain management, PT/OT    GI/:  NEFTALI Total Score: 20  Diet/Nutrition Received: clear liquid  Last Bowel Movement: 08/15/20  Voiding Characteristics: urethral catheter (bladder)    Intake/Output Summary (Last 24 hours) at 8/15/2020 1646  Last data filed at 8/15/2020 1605  Gross per 24 hour   Intake 3116.46 ml   Output 3710 ml   Net -593.54 ml       Labs/Accuchecks:  Recent Labs   Lab 08/15/20  0145   WBC 7.50   RBC 2.51*   HGB 7.8*   HCT 23.1*   *      Recent Labs   Lab 08/15/20  0145      K 4.5   CO2 27      BUN 8   CREATININE 0.6   ALKPHOS 84   ALT 20   AST 30   BILITOT 0.3      Recent Labs   Lab 08/15/20  0145   INR 1.0   APTT 28.2    No results for input(s): CPK, CPKMB, TROPONINI, MB in the last 168 hours.    Electrolytes: WNL  Accuchecks: N/A    Gtts:   sodium chloride 0.9% 100 mL/hr at 08/15/20 1605    hydromorphone in 0.9 % NaCl 6 mg/30 ml      ketamine (KETALAR) infusion (titrating) 3.5 mcg/kg/min (08/15/20 1605)       LDA/Wounds:  Lines/Drains/Airways       Drain                   Urethral Catheter 08/05/20 1133 Straight-tip 14 Fr. 10 days         NG/OG Tube 08/08/20 1415 nasogastric Right nostril 7 days               Arterial Line                   Arterial Line 08/14/20 1355 Left Radial 1 day              Peripheral Intravenous Line                   Peripheral IV - Single Lumen 08/11/20 0300 18 G Posterior;Right Hand 4 days         Peripheral IV - Single Lumen 08/12/20 0200 18 G Anterior;Right Forearm 3 days         Peripheral IV - Single Lumen 08/14/20 0615 20 G Left Antecubital 1 day         Peripheral IV - Single Lumen 08/14/20 1400 16 G Left;Lateral Wrist 1 day                  Wounds: surgical incisions x2 on back  Wound care consulted: No

## 2020-08-15 NOTE — ASSESSMENT & PLAN NOTE
44 y/o male with no significant PMH with L1 burst fracture with retropulsion resulting in severe canal stenosis s/p fall from [employer]     S/p L1 corpectomy with cage insertion and T12-L2 lateral fusion on 8/7. T12-L2 PSIF 8/14:     --Admitted to ICU  --All labs and diagnostics reviewed  --Continue bed rest  --MAPs stable off pressors, no goals  --PT/OT/OOB, Pain control, TLSO when OOB  --Close neurological monitoring, notify neurosurgery with any acute changes  --Stable for TTF once sedation and pain medications weaned    Dispo: continue ICU care

## 2020-08-15 NOTE — ASSESSMENT & PLAN NOTE
S/P L1 CORPECTOMY, SPINE, LUMBAR  (Left)  FUSION, SPINE, LUMBAR, TLIF T12-L2 Interbody Fusion with Instrumentation - Lateral (Left)

## 2020-08-15 NOTE — ASSESSMENT & PLAN NOTE
45 y.o male with no significant PMHx was transferred from  to [facility] with [facility] CAP significant for L1 burst fracture with retropulsion into the spinal canal resulting in severe spinal stenosis s/p falling 10ft from roof. He will be admitted to the Neuro ICU s/p  L1 CORPECTOMY and TLIF T12-L2 Interbody Instrumentation.       -Neurosurgery Following  -Neuro checks q1hr   -Vital signs q1hr  -Daily labs  -Dilaudid PCA  Continue prn Zofran   pain management following  Ketamine gtt managed by Anesthesia pain mgmt  D/c ancef per nsg   PT/OT   Transfer to floor when able to wean ketamine

## 2020-08-15 NOTE — PROGRESS NOTES
[facility]  Neurocritical Care  Progress Note    Admit Date: 8/5/2020  Service Date: 08/15/2020  Length of Stay: 10    Subjective:     Chief Complaint: Traumatic compression fracture of L1 lumbar vertebra    History of Present Illness: Patient is a 44 y/o male with no significant PMH who was transferred from  after falling from 10ft roof at work. Patient denies LOC, complained of excrutiating low back/sacral pain exacerbated with any movement of legs. CT CAP significant for L1 burst fracture with retropulsion into the spinal canal resulting in severe spinal stenosis. Patient states he could feel he had to urinate but could not start a stream, kraus placed and he reports he felt the kraus inserted and felt his bladder release once the kraus was placed. He reports he felt the OSMIN and per report from ED physician, patient had full rectal tone. He is being admitted to Neuro ICU for a higher level of neurological care s/p L1 CORPECTOMY and TLIF T12-L2 Interbody Instrumentation.    Hospital Course: 08/07/2020: Admit to Neuro ICU.  08/08/2020: Added long acting pain medications. Pain better controlled. Stable for stepdown.   08/09/2020: Acute pain management consulted. Ketamine gtt. Ilues, NGT. Suppository.    08/10/2020: Appreciate pain management reccs. Plans for OR tomorrow.  08/11/2020 ABG, KUB/CXR, Mag citrate, cont ketamine  08/12/2020  Surgery rescheduled 8/181L bolus continue IVF ketamine /PCA. Follow Na dc suction, start clear liquid  08/13/2020 wean ketamine gtt, heparin  08/14/2020 for surgery today; continue pain mgmt gtt  08/15/2020 continued post-op pain, APS following, increased ketamine infusion. Advancing diet, advancing bowel regimen, trial of methylnaltrexone.           Interval History:  See hospital course     Review of Systems   Constitutional: Negative for fever.   HENT: Negative for trouble swallowing and voice change.    Eyes: Negative for visual disturbance.   Respiratory:  Negative for shortness of breath and wheezing.    Cardiovascular: Negative for palpitations.   Gastrointestinal: Positive for abdominal distention and abdominal pain.   Endocrine: Negative for polyuria.   Genitourinary: Negative for difficulty urinating.   Musculoskeletal: Positive for back pain.   Neurological: Positive for weakness. Negative for dizziness and headaches.   Psychiatric/Behavioral: Negative for agitation.     Objective:     Vitals:  Temp: 97.8 °F (36.6 °C)  Pulse: (!) 122  Rhythm: normal sinus rhythm  BP: (!) 148/92  MAP (mmHg): 115  Resp: 18  SpO2: 97 %  O2 Device (Oxygen Therapy): nasal cannula    Temp  Min: 97.8 °F (36.6 °C)  Max: 98.6 °F (37 °C)  Pulse  Min: 101  Max: 122  BP  Min: 110/82  Max: 148/92  MAP (mmHg)  Min: 92  Max: 115  Resp  Min: 11  Max: 25  ETCO2 (mmHg)  Min: 33 mmHg  Max: 33 mmHg  SpO2  Min: 93 %  Max: 99 %    08/14 0701 - 08/15 0700  In: 3451.2 [I.V.:3151.2]  Out: 3810 [Urine:3810]   Unmeasured Output  Stool Occurrence: 0       Physical Exam  Vitals signs and nursing note reviewed.   Constitutional:       Appearance: Normal appearance. He is normal weight.   HENT:      Head: Normocephalic and atraumatic.      Right Ear: External ear normal.      Left Ear: External ear normal.      Nose: Nose normal. No congestion.   Eyes:      General: No scleral icterus.     Extraocular Movements: Extraocular movements intact.      Conjunctiva/sclera: Conjunctivae normal.      Pupils: Pupils are equal, round, and reactive to light.   Neck:      Musculoskeletal: No neck rigidity.   Cardiovascular:      Rate and Rhythm: Normal rate and regular rhythm.      Pulses: Normal pulses.      Heart sounds: Normal heart sounds.   Pulmonary:      Effort: Pulmonary effort is normal. No respiratory distress.      Breath sounds: Normal breath sounds.   Abdominal:      General: There is distension.   Musculoskeletal: Normal range of motion.   Skin:     General: Skin is warm.      Capillary Refill: Capillary  refill takes less than 2 seconds.   Neurological:      Mental Status: He is alert and oriented to person, place, and time.      GCS: GCS eye subscore is 4. GCS verbal subscore is 5. GCS motor subscore is 6.      Motor: Weakness present.      Comments: Awake, oriented  PERRLA  EOMI  JONES spontaneously   Weakness in RLE             Medications:  Continuoussodium chloride 0.9%, Last Rate: 100 mL/hr at 08/15/20 1205  hydromorphone in 0.9 % NaCl 6 mg/30 ml  ketamine (KETALAR) infusion (titrating), Last Rate: 3.5 mcg/kg/min (08/15/20 1205)    ScheduledceFAZolin (ANCEF) IVPB, 2 g, Q8H  diazePAM, 5 mg, QID  gabapentin, 500 mg, Q8H  heparin (porcine), 5,000 Units, Q8H  methocarbamol (ROBAXIN) IVPB, 1,000 mg, Q8H  methylnaltrexone, 12 mg, Once  pantoprazole, 40 mg, Daily  polyethylene glycol, 17 g, Daily  senna-docusate 8.6-50 mg, 2 tablet, Daily    PRNsodium chloride, , Q24H PRN  calcium carbonate, 500 mg, Daily PRN  calcium gluconate IVPB, 1 g, PRN  calcium gluconate IVPB, 2 g, PRN  calcium gluconate IVPB, 3 g, PRN  dextrose 50%, 12.5 g, PRN  dextrose 50%, 25 g, PRN  glucagon (human recombinant), 1 mg, PRN  magnesium sulfate IVPB, 2 g, PRN  magnesium sulfate IVPB, 4 g, PRN  naloxone, 0.02 mg, PRN  ondansetron, 8 mg, Q8H PRN  polyethylene glycol, 17 g, Daily PRN  potassium chloride in water, 40 mEq, PRN    And  potassium chloride in water, 60 mEq, PRN    And  potassium chloride in water, 80 mEq, PRN  sodium chloride 0.9%, 10 mL, PRN  sodium phosphate IVPB, 15 mmol, PRN  sodium phosphate IVPB, 20.01 mmol, PRN  sodium phosphate IVPB, 30 mmol, PRN      Today I personally reviewed pertinent medications, lines/drains/airways, imaging, notably:    Diet  Diet clear liquid  Diet clear liquid        Assessment/Plan:     Neuro  * Traumatic compression fracture of L1 lumbar vertebra  45 y.o male with no significant PMHx was transferred from  to [facility] with CT CAP significant for L1 burst fracture with retropulsion into the spinal canal  resulting in severe spinal stenosis s/p falling 10ft from roof. He will be admitted to the Neuro ICU s/p  L1 CORPECTOMY and TLIF T12-L2 Interbody Instrumentation.       -Neurosurgery Following  -Neuro checks q1hr   -Vital signs q1hr  -Daily labs  -Dilaudid PCA  Continue prn Zofran   pain management following  Ketamine gtt managed by Anesthesia pain mgmt  D/c ancef per nsg   PT/OT   Transfer to floor when able to wean ketamine         Spinal stenosis of lumbar region  S/P L1 CORPECTOMY, SPINE, LUMBAR  (Left)  FUSION, SPINE, LUMBAR, TLIF T12-L2 Interbody Fusion with Instrumentation - Lateral (Left)       GI  Ileus  Discontinue NGT to low intermittent suction   -Suppositories daily until bm   KUB/Mag Citrate productive  CL diet as tolerated  Trial of methylnaltrexone           The patient is being Prophylaxed for:  Venous Thromboembolism with: Chemical  Stress Ulcer with: Not Applicable   Ventilator Pneumonia with: not applicable    Activity Orders          Diet clear liquid: Clear Liquid starting at 08/15 1005    Commode at bedside starting at 08/07 0746        Full Code    Wesly Orozco MD  Neurocritical Care  [facility]

## 2020-08-15 NOTE — PROGRESS NOTES
[facility]  Neurosurgery  Progress Note    Subjective:     History of Present Illness: Patient is a 46 y/o male with no significant PMH who was transferred from  after falling from 10ft roof at work. Patient denies LOC, complained of excrutiating low back/sacral pain exacerbated with any movement of legs. CT CAP significant for L1 burst fracture with retropulsion into the spinal canal resulting in severe spinal stenosis. Patient states he could feel he had to urinate but could not start a stream, kraus placed and he reports he felt the kraus inserted and felt his bladder release once the kraus was placed. He reports he felt the OSMIN and per report from ED physician, patient had full rectal tone. The patient declined repeat rectal exam. Denies saddle anesthesia. Endorses any movement of lower extremities causes excruciating pain in the sacram and lower back. Denies radicular pain into the legs. Reports slight numbness in left calf. Denies any prior medical conditions or daily medication.     Post-Op Info:  Procedure(s) (LRB):  FUSION, SPINE, LUMBAR T12-L2 [facility]  Spinewave Neuromonitoring   (N/A)   1 Day Post-Op     Interval History: 8/10: POD 1 from T12-L2 PSIF, HUMBLE LANDAVERDE, Exam stable, plan to TTF once off sedation/pain medication drips    Medications:  Continuous Infusions:   sodium chloride 0.9% 100 mL/hr at 08/15/20 1405    hydromorphone in 0.9 % NaCl 6 mg/30 ml      ketamine (KETALAR) infusion (titrating) 3.5 mcg/kg/min (08/15/20 1405)     Scheduled Meds:   diazePAM  5 mg Intravenous QID    gabapentin  500 mg Per NG tube Q8H    heparin (porcine)  5,000 Units Subcutaneous Q8H    methocarbamol (ROBAXIN) IVPB  1,000 mg Intravenous Q8H    methylnaltrexone  12 mg Subcutaneous Once    pantoprazole  40 mg Intravenous Daily    polyethylene glycol  17 g Oral Daily    senna-docusate 8.6-50 mg  2 tablet Per NG tube Daily     PRN Meds:sodium chloride, calcium carbonate, calcium  gluconate IVPB, calcium gluconate IVPB, calcium gluconate IVPB, dextrose 50%, dextrose 50%, glucagon (human recombinant), magnesium sulfate IVPB, magnesium sulfate IVPB, naloxone, ondansetron, polyethylene glycol, potassium chloride in water **AND** potassium chloride in water **AND** potassium chloride in water, sodium chloride 0.9%, sodium phosphate IVPB, sodium phosphate IVPB, sodium phosphate IVPB     Review of Systems    Objective:     Weight: 94.9 kg (209 lb 3.5 oz)  Body mass index is 30.02 kg/m².  Vital Signs (Most Recent):  Temp: 97.8 °F (36.6 °C) (08/15/20 0705)  Pulse: (!) 123 (08/15/20 1405)  Resp: 16 (08/15/20 1405)  BP: (!) 130/90 (08/15/20 1405)  SpO2: 97 % (08/15/20 1405) Vital Signs (24h Range):  Temp:  [97.8 °F (36.6 °C)-98.6 °F (37 °C)] 97.8 °F (36.6 °C)  Pulse:  [101-129] 123  Resp:  [11-25] 16  SpO2:  [93 %-99 %] 97 %  BP: (110-148)/(67-98) 130/90  Arterial Line BP: ()/(63-83) 128/67     Date 08/15/20 0700 - 08/16/20 0659   Shift 9281-7232 0202-4597 7296-5534 24 Hour Total   INTAKE   I.V.(mL/kg) 914.2(9.6)   914.2(9.6)   IV Piggyback 100   100   Shift Total(mL/kg) 1014.2(10.7)   1014.2(10.7)   OUTPUT   Urine(mL/kg/hr) 875(1.2)   875   Shift Total(mL/kg) 875(9.2)   875(9.2)   Weight (kg) 94.9 94.9 94.9 94.9                        NG/OG Tube 08/08/20 1415 nasogastric Right nostril (Active)   Placement Check placement verified by distal tube length measurement;placement verified by x-ray 08/14/20 0302   Advancement advanced manually 08/12/20 1500   Tolerance no signs/symptoms of discomfort 08/14/20 0302   Securement secured to nostril center w/ adhesive device 08/14/20 0302   Clamp Status/Tolerance clamped 08/14/20 0302   Suction Setting/Drainage Method suction at the bedside 08/14/20 0302   Insertion Site Appearance no redness, warmth, tenderness, skin breakdown, drainage 08/14/20 0302   Drainage Brown;Green 08/14/20 0302   Flush/Irrigation flushed w/;water 08/14/20 0302   Feeding Type other  "(see comments) 08/10/20 1500   Feeding Action feeding held 08/14/20 0302   Intake (mL) 75 mL 08/12/20 0900   Water Bolus (mL) 0 mL 08/12/20 0536   Tube Output(mL)(Include Discarded Residual) 250 mL 08/13/20 0005   Intake (mL) - Formula Tube Feeding 0 08/10/20 1800            Urethral Catheter 08/05/20 1133 Straight-tip 14 Fr. (Active)   Site Assessment Clean;Intact 08/14/20 0302   Collection Container Urimeter 08/14/20 0302   Securement Method secured to top of thigh w/ adhesive device 08/14/20 0302   Catheter Care Performed yes 08/14/20 0302   Reason for Continuing Urinary Catheterization Required immobilization 08/14/20 0302   CAUTI Prevention Bundle StatLock in place w 1" slack;Intact seal between catheter & drainage tubing;Drainage bag/urimeter off the floor;Green sheeting clip in use;No dependent loops or kinks;Drainage bag/urimeter not overfilled (<2/3 full);Drainage bag/urimeter below bladder 08/13/20 1902   Output (mL) 175 mL 08/14/20 0705       Neurosurgery Physical Exam  AOX3  PERRL  5/5 in BUE/BLE  SILT  Abdomen soft    Significant Labs:  Recent Labs   Lab 08/14/20  0314 08/14/20  1257 08/15/20  0145   GLU 85  --  94     --  137   K 3.8  --  4.5     --  101   CO2 24  --  27   BUN 8  --  8   CREATININE 0.5  --  0.6   CALCIUM 8.1*  --  8.1*   MG 1.5* 2.6 1.8     Recent Labs   Lab 08/14/20 0314 08/15/20  0145   WBC 6.12 7.50   HGB 8.1* 7.8*   HCT 24.4* 23.1*   * 448*     Recent Labs   Lab 08/14/20  0314 08/15/20  0145   INR 1.1  1.1 1.0   APTT 30.8  30.8 28.2       Significant Diagnostics:  No results found in the last 24 hours.      Assessment/Plan:     * Traumatic compression fracture of L1 lumbar vertebra  46 y/o male with no significant PMH with L1 burst fracture with retropulsion resulting in severe canal stenosis s/p fall from [employer] building     S/p L1 corpectomy with cage insertion and T12-L2 lateral fusion on 8/7. T12-L2 PSIF 8/14:     --Admitted to ICU  --All labs and " diagnostics reviewed  --Continue bed rest  --MAPs stable off pressors, no goals  --PT/OT/OOB, Pain control, TLSO when OOB  --Close neurological monitoring, notify neurosurgery with any acute changes  --Stable for TTF once sedation and pain medications weaned    Dispo: continue ICU care        Miguel Hampton MD  Neurosurgery  [facility]

## 2020-08-15 NOTE — PLAN OF CARE
[facility] Care Plan    POC reviewed with Oscar Bills and sister at 0200. Pt verbalized understanding. Questions and concerns addressed. PCA pump settings changed per anesthesia. Discussed with pt sister about pt's pain regime. VSS. No acute events overnight. Pt progressing toward goals. Will continue to monitor. See below and flowsheets for full assessment and VS info.       Neuro:  [place] Coma Scale  Best Eye Response: 4-->(E4) spontaneous  Best Motor Response: 6-->(M6) obeys commands  Best Verbal Response: 5-->(V5) oriented  [place] Coma Scale Score: 15  Assessment Qualifiers: no eye obstruction present  Pupil PERRLA: yes     24hr Temp:  [98.4 °F (36.9 °C)-98.9 °F (37.2 °C)]     CV:   Rhythm: normal sinus rhythm  BP goals:   SBP < 180  MAP > 80    Resp:   O2 Device (Oxygen Therapy): nasal cannula  Oxygen Concentration (%): 28    Plan: N/A    GI/:  NEFTALI Total Score: 20  Diet/Nutrition Received: NPO  Last Bowel Movement: 08/14/20  Voiding Characteristics: urethral catheter (bladder)    Intake/Output Summary (Last 24 hours) at 8/15/2020 0423  Last data filed at 8/15/2020 0402  Gross per 24 hour   Intake 3545.79 ml   Output 3770 ml   Net -224.21 ml       Labs/Accuchecks:  Recent Labs   Lab 08/15/20  0145   WBC 7.50   RBC 2.51*   HGB 7.8*   HCT 23.1*   *      Recent Labs   Lab 08/15/20  0145      K 4.5   CO2 27      BUN 8   CREATININE 0.6   ALKPHOS 84   ALT 20   AST 30   BILITOT 0.3      Recent Labs   Lab 08/15/20  0145   INR 1.0   APTT 28.2    No results for input(s): CPK, CPKMB, TROPONINI, MB in the last 168 hours.    Electrolytes: N/A - electrolytes WDL  Accuchecks: none    Gtts:   sodium chloride 0.9% 100 mL/hr at 08/15/20 0402    hydromorphone in 0.9 % NaCl 6 mg/30 ml      ketamine (KETALAR) infusion (titrating) 2.5 mcg/kg/min (08/15/20 0402)       LDA/Wounds:  Lines/Drains/Airways     Drain                 Urethral Catheter 08/05/20 1133 Straight-tip 14 Fr. 9 days         NG/OG Tube 08/08/20  1415 nasogastric Right nostril 6 days          Arterial Line                 Arterial Line 08/14/20 1355 Left Radial less than 1 day          Peripheral Intravenous Line                 Peripheral IV - Single Lumen 08/11/20 0300 18 G Posterior;Right Hand 4 days         Peripheral IV - Single Lumen 08/12/20 0200 18 G Anterior;Right Forearm 3 days         Peripheral IV - Single Lumen 08/14/20 0615 20 G Left Antecubital less than 1 day         Peripheral IV - Single Lumen 08/14/20 1400 16 G Left;Lateral Wrist less than 1 day              Wounds: Yes (back incisions from spinal surgery)  Wound care consulted: No

## 2020-08-15 NOTE — ASSESSMENT & PLAN NOTE
Discontinue NGT to low intermittent suction   -Suppositories daily until bm   KUB/Mag Citrate productive  CL diet as tolerated  Trial of methylnaltrexone

## 2020-08-15 NOTE — ANESTHESIA POST-OP PAIN MANAGEMENT
Acute Pain Service Progress Note    Oscar Bills is a 45 y.o., male, [id number].  Admitted for L1 burst fracture s/p mechanical fall from roof. OR 8/7 for L1 corpectomy w/cage insertion, T12-L2 lateral fusion, deferred posterior instrumentation at the time due to intraop bleeding, plan for take back 8/11.      Post op course complicated by inadequate analgesia and ileus.   Profound respiratory depression with hypercarbia 8/8, required narcan.     APS consulted for recommendations      Interval Hx: Patient surgery re-scheduled for today. Back brace placed overnight. Patient with 4-8/10 pain today, valium increased to TID, ketamine increased to 2mcg/kg/min.     Surgery:    08/07 - S/p L1 corpectomy with cage insertion and T12-L2 lateral fusion   08/14 - Posterior Spine Instrumentation     Post Op Day #: 1  Received Fentanyl 25 mcg overnight and PCA lock out decreased to q6 minutes    Subjective:   Remains in pain but improved   Not as bothered by ileus / abdominal distension   No nausea / vomiting    NGT not to suction. Small BM x 2       Objective:     Alert, active, Oriented   No respiratory distress   NGT in place. Not to suction   Abdomen soft, depressible, slightly distended   Exam markedly improved since previous examination on my behalf      Vitals   Vitals:    08/15/20 0905   BP: 117/70   Pulse: 108   Resp: 14   Temp:         Labs    No results displayed because visit has over 200 results.           Meds   Current Facility-Administered Medications   Medication Dose Route Frequency Provider Last Rate Last Dose    0.9%  NaCl infusion (for blood administration)   Intravenous Q24H PRN Camilo Levy MD        0.9%  NaCl infusion   Intravenous Continuous Dimitry Alexandre  mL/hr at 08/15/20 0905      calcium carbonate 200 mg calcium (500 mg) chewable tablet 500 mg  500 mg Oral Daily PRN Beckie Horta PA-C   500 mg at 08/12/20 2107    calcium gluconate 1g in dextrose 5% 100mL (ready to mix system)  1  g Intravenous PRN Jai Douglas MD        calcium gluconate 2 g in dextrose 5 % 100 mL IVPB  2 g Intravenous PRN Jai Douglas MD        calcium gluconate 3 g in dextrose 5 % 100 mL IVPB  3 g Intravenous PRN Jai Douglas MD        ceFAZolin injection 2 g  2 g Intravenous Q8H Jay Prieto DO   2 g at 08/15/20 0814    dextrose 50% injection 12.5 g  12.5 g Intravenous PRN Itzel Jimenez PA-C        dextrose 50% injection 25 g  25 g Intravenous PRN Itzel Jimenez PA-C        diazePAM injection 5 mg  5 mg Intravenous QID Danette Sanders PA-C   5 mg at 08/15/20 0815    gabapentin 250 mg/5 mL (5 mL) solution 500 mg  500 mg Per NG tube Q8H Jai Douglas MD   500 mg at 08/15/20 0941    glucagon (human recombinant) injection 1 mg  1 mg Intramuscular PRN Itzel Jimenez PA-C        heparin (porcine) injection 5,000 Units  5,000 Units Subcutaneous Q8H Zenaida Young NP   5,000 Units at 08/15/20 0532    HYDROmorphone PCA syringe 6 mg/30 mL (0.2 mg/mL) NS   Intravenous Continuous Jorge Lara MD        ketamine 500 mg/250 mL (2 mg/mL) in sodium chloride 0.9% infusion  3.5 mcg/kg/min Intravenous Continuous Jai Douglas MD 9.5 mL/hr at 08/15/20 0905 3.5 mcg/kg/min at 08/15/20 0905    magnesium sulfate 2g in water 50mL IVPB (premix)  2 g Intravenous PRN Jai Douglas MD   2 g at 08/14/20 0651    magnesium sulfate 2g in water 50mL IVPB (premix)  4 g Intravenous PRN Jai Douglas MD        methocarbamol 1000 mg in dextrose 5 % 100 mL IVPB (ready to mix system)  1,000 mg Intravenous Q8H Ramona Messer MD   1,000 mg at 08/15/20 0531    methylnaltrexone injection 12 mg  12 mg Subcutaneous Once Patricia Perkins NP        naloxone 0.4 mg/mL injection 0.02 mg  0.02 mg Intravenous PRN Sandy Katz MD        ondansetron injection 8 mg  8 mg Intravenous Q8H PRN Janeen BRENNER  GORDON Gan   8 mg at 08/10/20 1737    pantoprazole injection 40 mg  40 mg Intravenous Daily Alvin Maier MD   40 mg at 08/15/20 0814    polyethylene glycol packet 17 g  17 g Per NG tube Daily PRN Zenaida Young NP        polyethylene glycol packet 17 g  17 g Oral Daily Patricia Perkins NP        potassium chloride 10 mEq in 100 mL IVPB  40 mEq Intravenous PRN Jai Douglas  mL/hr at 08/13/20 0749 40 mEq at 08/13/20 0749    And    potassium chloride 10 mEq in 100 mL IVPB  60 mEq Intravenous PRN Jai Douglas  mL/hr at 08/09/20 0400 60 mEq at 08/09/20 0400    And    potassium chloride 10 mEq in 100 mL IVPB  80 mEq Intravenous PRN Jai Douglas MD        senna-docusate 8.6-50 mg per tablet 2 tablet  2 tablet Per NG tube Daily Mir Hou MD   2 tablet at 08/15/20 0814    sodium chloride 0.9% flush 10 mL  10 mL Intravenous PRN Janeen Gan, NP        sodium phosphate 15 mmol in dextrose 5 % 250 mL IVPB  15 mmol Intravenous PRN Jai Douglas MD        sodium phosphate 20.01 mmol in dextrose 5 % 250 mL IVPB  20.01 mmol Intravenous PRN Jai Douglas MD        sodium phosphate 30 mmol in dextrose 5 % 250 mL IVPB  30 mmol Intravenous PRN Jai Douglas MD            Anticoagulant dose Heparin 5K TID at     Assessment:     Pain control adequate at this time with multiple modifications to regimen.   Will increase Ketamine Infusion to 3.5 mcg/kg/min   Start Gabapentin Elixir 500 mg TID since patient w/o nausea/ vomiting / nGT output and BM+   Space out PCA lock out to 10 minutes in aim to wean off     Plan:     1) Changes to Regimen as Above   2) Continue Robaxin / Valium as scheduled   3) Will reassess pain scores with changes made     Jai Douglas   08/15/2020  11:16 AM

## 2020-08-16 LAB
ALBUMIN SERPL BCP-MCNC: 2.4 G/DL (ref 3.5–5.2)
ALP SERPL-CCNC: 87 U/L (ref 55–135)
ALT SERPL W/O P-5'-P-CCNC: 23 U/L (ref 10–44)
ANION GAP SERPL CALC-SCNC: 12 MMOL/L (ref 8–16)
APTT BLDCRRT: 30.9 SEC (ref 21–32)
AST SERPL-CCNC: 29 U/L (ref 10–40)
BASOPHILS # BLD AUTO: 0.04 K/UL (ref 0–0.2)
BASOPHILS NFR BLD: 0.4 % (ref 0–1.9)
BILIRUB SERPL-MCNC: 0.6 MG/DL (ref 0.1–1)
BUN SERPL-MCNC: 7 MG/DL (ref 6–20)
CALCIUM SERPL-MCNC: 8.1 MG/DL (ref 8.7–10.5)
CHLORIDE SERPL-SCNC: 95 MMOL/L (ref 95–110)
CO2 SERPL-SCNC: 27 MMOL/L (ref 23–29)
CREAT SERPL-MCNC: 0.6 MG/DL (ref 0.5–1.4)
DIFFERENTIAL METHOD: ABNORMAL
EOSINOPHIL # BLD AUTO: 0.1 K/UL (ref 0–0.5)
EOSINOPHIL NFR BLD: 0.7 % (ref 0–8)
ERYTHROCYTE [DISTWIDTH] IN BLOOD BY AUTOMATED COUNT: 12.9 % (ref 11.5–14.5)
EST. GFR  (AFRICAN AMERICAN): >60 ML/MIN/1.73 M^2
EST. GFR  (NON AFRICAN AMERICAN): >60 ML/MIN/1.73 M^2
GLUCOSE SERPL-MCNC: 93 MG/DL (ref 70–110)
HCT VFR BLD AUTO: 24.2 % (ref 40–54)
HGB BLD-MCNC: 8.1 G/DL (ref 14–18)
IMM GRANULOCYTES # BLD AUTO: 0.22 K/UL (ref 0–0.04)
IMM GRANULOCYTES NFR BLD AUTO: 2.1 % (ref 0–0.5)
LYMPHOCYTES # BLD AUTO: 1 K/UL (ref 1–4.8)
LYMPHOCYTES NFR BLD: 9.5 % (ref 18–48)
MAGNESIUM SERPL-MCNC: 1.5 MG/DL (ref 1.6–2.6)
MAGNESIUM SERPL-MCNC: 2.5 MG/DL (ref 1.6–2.6)
MCH RBC QN AUTO: 30.8 PG (ref 27–31)
MCHC RBC AUTO-ENTMCNC: 33.5 G/DL (ref 32–36)
MCV RBC AUTO: 92 FL (ref 82–98)
MONOCYTES # BLD AUTO: 0.9 K/UL (ref 0.3–1)
MONOCYTES NFR BLD: 8 % (ref 4–15)
NEUTROPHILS # BLD AUTO: 8.4 K/UL (ref 1.8–7.7)
NEUTROPHILS NFR BLD: 79.3 % (ref 38–73)
NRBC BLD-RTO: 0 /100 WBC
PHOSPHATE SERPL-MCNC: 4.7 MG/DL (ref 2.7–4.5)
PLATELET # BLD AUTO: 528 K/UL (ref 150–350)
PMV BLD AUTO: 8.8 FL (ref 9.2–12.9)
POTASSIUM SERPL-SCNC: 3.9 MMOL/L (ref 3.5–5.1)
PROT SERPL-MCNC: 5.9 G/DL (ref 6–8.4)
RBC # BLD AUTO: 2.63 M/UL (ref 4.6–6.2)
SODIUM SERPL-SCNC: 134 MMOL/L (ref 136–145)
WBC # BLD AUTO: 10.56 K/UL (ref 3.9–12.7)

## 2020-08-16 PROCEDURE — 83735 ASSAY OF MAGNESIUM: CPT

## 2020-08-16 PROCEDURE — 97162 PT EVAL MOD COMPLEX 30 MIN: CPT

## 2020-08-16 PROCEDURE — 94761 N-INVAS EAR/PLS OXIMETRY MLT: CPT

## 2020-08-16 PROCEDURE — 99233 SBSQ HOSP IP/OBS HIGH 50: CPT | Mod: ,,, | Performed by: NURSE PRACTITIONER

## 2020-08-16 PROCEDURE — 25000003 PHARM REV CODE 250: Performed by: UROLOGY

## 2020-08-16 PROCEDURE — 99900035 HC TECH TIME PER 15 MIN (STAT)

## 2020-08-16 PROCEDURE — 97530 THERAPEUTIC ACTIVITIES: CPT

## 2020-08-16 PROCEDURE — 27000221 HC OXYGEN, UP TO 24 HOURS

## 2020-08-16 PROCEDURE — C9113 INJ PANTOPRAZOLE SODIUM, VIA: HCPCS | Performed by: STUDENT IN AN ORGANIZED HEALTH CARE EDUCATION/TRAINING PROGRAM

## 2020-08-16 PROCEDURE — 85025 COMPLETE CBC W/AUTO DIFF WBC: CPT

## 2020-08-16 PROCEDURE — 97112 NEUROMUSCULAR REEDUCATION: CPT

## 2020-08-16 PROCEDURE — 84100 ASSAY OF PHOSPHORUS: CPT

## 2020-08-16 PROCEDURE — 97165 OT EVAL LOW COMPLEX 30 MIN: CPT

## 2020-08-16 PROCEDURE — 20000000 HC ICU ROOM

## 2020-08-16 PROCEDURE — 80053 COMPREHEN METABOLIC PANEL: CPT

## 2020-08-16 PROCEDURE — 63600175 PHARM REV CODE 636 W HCPCS: Performed by: UROLOGY

## 2020-08-16 PROCEDURE — 99231 SBSQ HOSP IP/OBS SF/LOW 25: CPT | Mod: ,,, | Performed by: ANESTHESIOLOGY

## 2020-08-16 PROCEDURE — 85730 THROMBOPLASTIN TIME PARTIAL: CPT

## 2020-08-16 PROCEDURE — 63600175 PHARM REV CODE 636 W HCPCS: Performed by: STUDENT IN AN ORGANIZED HEALTH CARE EDUCATION/TRAINING PROGRAM

## 2020-08-16 PROCEDURE — 99233 PR SUBSEQUENT HOSPITAL CARE,LEVL III: ICD-10-PCS | Mod: ,,, | Performed by: NURSE PRACTITIONER

## 2020-08-16 PROCEDURE — 83735 ASSAY OF MAGNESIUM: CPT | Mod: 91

## 2020-08-16 PROCEDURE — 63600175 PHARM REV CODE 636 W HCPCS: Performed by: NURSE PRACTITIONER

## 2020-08-16 PROCEDURE — 99231 PR SUBSEQUENT HOSPITAL CARE,LEVL I: ICD-10-PCS | Mod: ,,, | Performed by: ANESTHESIOLOGY

## 2020-08-16 RX ORDER — KETOROLAC TROMETHAMINE 30 MG/ML
30 INJECTION, SOLUTION INTRAMUSCULAR; INTRAVENOUS EVERY 6 HOURS
Status: DISPENSED | OUTPATIENT
Start: 2020-08-16 | End: 2020-08-17

## 2020-08-16 RX ORDER — GABAPENTIN 250 MG/5ML
500 SOLUTION ORAL EVERY 12 HOURS
Status: DISCONTINUED | OUTPATIENT
Start: 2020-08-16 | End: 2020-08-18

## 2020-08-16 RX ORDER — HYDROMORPHONE HCL IN 0.9% NACL 6 MG/30 ML
PATIENT CONTROLLED ANALGESIA SYRINGE INTRAVENOUS CONTINUOUS
Status: DISCONTINUED | OUTPATIENT
Start: 2020-08-16 | End: 2020-08-17

## 2020-08-16 RX ORDER — POLYETHYLENE GLYCOL 3350 17 G/17G
17 POWDER, FOR SOLUTION ORAL DAILY
Status: DISCONTINUED | OUTPATIENT
Start: 2020-08-16 | End: 2020-08-21 | Stop reason: HOSPADM

## 2020-08-16 RX ADMIN — MAGNESIUM SULFATE IN WATER 2 G: 40 INJECTION, SOLUTION INTRAVENOUS at 09:08

## 2020-08-16 RX ADMIN — HEPARIN SODIUM 5000 UNITS: 5000 INJECTION INTRAVENOUS; SUBCUTANEOUS at 09:08

## 2020-08-16 RX ADMIN — Medication: at 09:08

## 2020-08-16 RX ADMIN — PANTOPRAZOLE SODIUM 40 MG: 40 INJECTION, POWDER, LYOPHILIZED, FOR SOLUTION INTRAVENOUS at 09:08

## 2020-08-16 RX ADMIN — GABAPENTIN 500 MG: 250 SOLUTION ORAL at 12:08

## 2020-08-16 RX ADMIN — DIAZEPAM 5 MG: 5 INJECTION, SOLUTION INTRAMUSCULAR; INTRAVENOUS at 05:08

## 2020-08-16 RX ADMIN — METHOCARBAMOL 1000 MG: 100 INJECTION INTRAMUSCULAR; INTRAVENOUS at 05:08

## 2020-08-16 RX ADMIN — HEPARIN SODIUM 5000 UNITS: 5000 INJECTION INTRAVENOUS; SUBCUTANEOUS at 05:08

## 2020-08-16 RX ADMIN — METHOCARBAMOL 1000 MG: 100 INJECTION INTRAMUSCULAR; INTRAVENOUS at 02:08

## 2020-08-16 RX ADMIN — KETOROLAC TROMETHAMINE 30 MG: 30 INJECTION, SOLUTION INTRAMUSCULAR at 06:08

## 2020-08-16 RX ADMIN — METHOCARBAMOL 1000 MG: 100 INJECTION INTRAMUSCULAR; INTRAVENOUS at 09:08

## 2020-08-16 RX ADMIN — GABAPENTIN 500 MG: 250 SOLUTION ORAL at 05:08

## 2020-08-16 RX ADMIN — DIAZEPAM 5 MG: 5 INJECTION, SOLUTION INTRAMUSCULAR; INTRAVENOUS at 09:08

## 2020-08-16 RX ADMIN — HEPARIN SODIUM 5000 UNITS: 5000 INJECTION INTRAVENOUS; SUBCUTANEOUS at 02:08

## 2020-08-16 RX ADMIN — GABAPENTIN 500 MG: 250 SOLUTION ORAL at 09:08

## 2020-08-16 RX ADMIN — KETAMINE HYDROCHLORIDE 3.5 MCG/KG/MIN: 100 INJECTION INTRAMUSCULAR; INTRAVENOUS at 10:08

## 2020-08-16 RX ADMIN — DIAZEPAM 5 MG: 5 INJECTION, SOLUTION INTRAMUSCULAR; INTRAVENOUS at 02:08

## 2020-08-16 RX ADMIN — KETOROLAC TROMETHAMINE 30 MG: 30 INJECTION, SOLUTION INTRAMUSCULAR at 10:08

## 2020-08-16 NOTE — ASSESSMENT & PLAN NOTE
45 y.o male with no significant PMHx was transferred from  to [facility] with CT CAP significant for L1 burst fracture with retropulsion into the spinal canal resulting in severe spinal stenosis s/p falling 10ft from roof. He will be admitted to the Neuro ICU s/p  L1 CORPECTOMY and TLIF T12-L2 Interbody Instrumentation.       -Neurosurgery Following  -Neuro checks q1hr   -Vital signs q1hr  -Daily labs  -Dilaudid PCA  Continue prn Zofran   pain management following  Ketamine gtt managed by Anesthesia pain mgmt  D/c ancef per nsg   PT/OT   Transfer to floor when able to wean ketamine

## 2020-08-16 NOTE — SUBJECTIVE & OBJECTIVE
Interval History: 8/17: NAEON. AFVSS. Exam stable. TTF once off pain mgmt drips.     Medications:  Continuous Infusions:   sodium chloride 0.9% 100 mL/hr at 08/16/20 1505    hydromorphone in 0.9 % NaCl 6 mg/30 ml      ketamine (KETALAR) infusion (titrating) 3.5 mcg/kg/min (08/16/20 1505)     Scheduled Meds:   diazePAM  5 mg Intravenous Q8H    gabapentin  500 mg Per NG tube Q12H    heparin (porcine)  5,000 Units Subcutaneous Q8H    ketorolac  30 mg Intravenous Q6H    methocarbamol (ROBAXIN) IVPB  1,000 mg Intravenous Q8H    methylnaltrexone  12 mg Subcutaneous Once    pantoprazole  40 mg Intravenous Daily    polyethylene glycol  17 g Per NG tube Daily    senna-docusate 8.6-50 mg  2 tablet Per NG tube Daily     PRN Meds:sodium chloride, calcium carbonate, calcium gluconate IVPB, calcium gluconate IVPB, calcium gluconate IVPB, dextrose 50%, dextrose 50%, glucagon (human recombinant), magnesium sulfate IVPB, magnesium sulfate IVPB, naloxone, ondansetron, polyethylene glycol, potassium chloride in water **AND** potassium chloride in water **AND** potassium chloride in water, sodium chloride 0.9%, sodium phosphate IVPB, sodium phosphate IVPB, sodium phosphate IVPB     Review of Systems   Constitutional: Positive for fatigue.     Objective:     Weight: 94.9 kg (209 lb 3.5 oz)  Body mass index is 30.02 kg/m².  Vital Signs (Most Recent):  Temp: 97.9 °F (36.6 °C) (08/16/20 1505)  Pulse: 108 (08/16/20 1505)  Resp: 19 (08/16/20 1505)  BP: 117/85 (08/16/20 1505)  SpO2: 98 % (08/16/20 1505) Vital Signs (24h Range):  Temp:  [97.9 °F (36.6 °C)-99 °F (37.2 °C)] 97.9 °F (36.6 °C)  Pulse:  [] 108  Resp:  [12-28] 19  SpO2:  [94 %-100 %] 98 %  BP: (112-160)/(71-92) 117/85  Arterial Line BP: (106-126)/(63-76) 126/68     Date 08/16/20 0700 - 08/17/20 0659   Shift 5109-9357 5447-6634 0211-9595 24 Hour Total   INTAKE   I.V.(mL/kg) 962(10.1) 124(1.3)  1086(11.4)   IV Piggyback 100   100   Shift Total(mL/kg) 1062(11.2)  "124(1.3)  1186(12.5)   OUTPUT   Urine(mL/kg/hr) 1095(1.4) 50  1145   Shift Total(mL/kg) 1095(11.5) 50(0.5)  1145(12.1)   Weight (kg) 94.9 94.9 94.9 94.9                        NG/OG Tube 08/08/20 1415 nasogastric Right nostril (Active)   Placement Check placement verified by distal tube length measurement;placement verified by x-ray 08/16/20 1505   Advancement advanced manually 08/12/20 1500   Tolerance no signs/symptoms of discomfort 08/16/20 1505   Securement secured to nostril center w/ adhesive device 08/16/20 1505   Clamp Status/Tolerance unclamped 08/16/20 1505   Suction Setting/Drainage Method low;intermittent setting 08/16/20 1505   Insertion Site Appearance no redness, warmth, tenderness, skin breakdown, drainage 08/16/20 1505   Drainage Green;Brown 08/16/20 1505   Flush/Irrigation flushed w/;water;no resistance met 08/16/20 1505   Feeding Type other (see comments) 08/10/20 1500   Feeding Action feeding held 08/16/20 0302   Intake (mL) 75 mL 08/12/20 0900   Water Bolus (mL) 0 mL 08/12/20 0536   Tube Output(mL)(Include Discarded Residual) 400 mL 08/15/20 1805   Intake (mL) - Formula Tube Feeding 0 08/10/20 1800            Urethral Catheter 08/05/20 1133 Straight-tip 14 Fr. (Active)   Site Assessment Clean;Intact 08/16/20 1505   Collection Container Urimeter 08/16/20 1505   Securement Method secured to top of thigh w/ adhesive device 08/16/20 1505   Catheter Care Performed yes 08/16/20 1505   Reason for Continuing Urinary Catheterization Urinary retention 08/16/20 1505   CAUTI Prevention Bundle StatLock in place w 1" slack;Drainage bag/urimeter off the floor;Intact seal between catheter & drainage tubing;Green sheeting clip in use;No dependent loops or kinks;Drainage bag/urimeter not overfilled (<2/3 full);Drainage bag/urimeter below bladder 08/16/20 1505   Output (mL) 50 mL 08/16/20 1505       Neurosurgery Physical Exam     GCS 15  CNi  PERRL, EOMi  fc x 4, FS throughout  SILT    Abdomen soft, NGT to " intermittent suction.     Incision c/d/i     Significant Labs:  Recent Labs   Lab 08/15/20  0145 08/16/20  0139 08/16/20  1430   GLU 94 93  --     134*  --    K 4.5 3.9  --     95  --    CO2 27 27  --    BUN 8 7  --    CREATININE 0.6 0.6  --    CALCIUM 8.1* 8.1*  --    MG 1.8 1.5* 2.5     Recent Labs   Lab 08/15/20  0145 08/16/20  0139   WBC 7.50 10.56   HGB 7.8* 8.1*   HCT 23.1* 24.2*   * 528*     Recent Labs   Lab 08/15/20  0145 08/16/20  0139   INR 1.0  --    APTT 28.2 30.9     Microbiology Results (last 7 days)     ** No results found for the last 168 hours. **        All pertinent labs from the last 24 hours have been reviewed.    Significant Diagnostics:  I have reviewed all pertinent imaging results/findings within the past 24 hours.   No results found in the last 24 hours.

## 2020-08-16 NOTE — PT/OT/SLP EVAL
Occupational Therapy   Evaluation and Treatment    Name: Oscar Bills  MRN: [id number]  Admitting Diagnosis:  Traumatic compression fracture of L1 lumbar vertebra 2 Days Post-Op    Recommendations:     Discharge Recommendations: rehabilitation facility  Discharge Equipment Recommendations:  (TBD pending progress)  Barriers to discharge:  (requires increased level of assistance)    Assessment:     Oscar Bills is a 45 y.o. male with a medical diagnosis of Traumatic compression fracture of L1 lumbar vertebra.  He presents with lethargy and delayed motor/verbal responses during evaluaiton. Pt tolerated session well, limited by episode of orthostatic hypotension while seated EOB. Pt sat EOB x8 minutes initially requiring min A progressing to SBA. After 8 minutes, pt diaphoretic, BP 78/50 (MAP 59). Pt returned supine and Trendelenburg-ed with BP increasing to 109/73 (MAP 86). Performance deficits affecting function: weakness, impaired endurance, impaired self care skills, impaired functional mobilty, gait instability, impaired balance, pain, impaired skin, impaired fine motor, orthopedic precautions, impaired cardiopulmonary response to activity.  Pt would benefit from skilled OT services in order to maximize independence with ADLs and facilitate safe discharge. Because of patient's significant decline from PLOF, patient would benefit from Inpatient Rehab to maximize return to PLOF. Pt is an excellent candidate for inpatient rehabilitation, as he has a qualifying diagnosis, displays good activity tolerance, has experienced decline from PLOF, has good family support, and is motivated to participate in therapy.       Rehab Prognosis: Good; patient would benefit from acute skilled OT services to address these deficits and reach maximum level of function.       Plan:     Patient to be seen 5 x/week to address the above listed problems via self-care/home management, therapeutic activities, therapeutic exercises  · Plan of Care  Expires: 09/15/20  · Plan of Care Reviewed with: patient    Subjective     Chief Complaint: pain and feeling hot after sitting EOB x8 minutes  Patient/Family Comments/goals: to get better and return to PLOF    Occupational Profile:  Living Environment: Pt lives with his sister in a [facility] with 0 DAMASO. 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  Patient found HOB elevated with arterial line, bed alarm, blood pressure cuff, kraus catheter, NG tube, oxygen, peripheral IV, pulse ox (continuous), SCD, telemetry upon OT entry to room.    General Precautions: Standard, fall   Orthopedic Precautions:spinal precautions   Braces: TLSO     Occupational Performance:    Bed Mobility:    · Patient completed Rolling/Turning to Left with  moderate assistance  · Patient completed Supine to Sit with maximal assistance  · Patient completed Sit to Supine with total assistance and 2 persons 2* hypotension  · VCs for log roll technique    Functional Mobility/Transfers:  NT 2* hypotension    Activities of Daily Living:  · Upper Body Dressing: maximal assistance to do/don hospital gown and TLSO    Cognitive/Visual Perceptual:  Cognitive/Psychosocial Skills:     -       Oriented to: Person, Place, Time and Situation   -       Follows Commands/attention:Follows one-step commands and delayed responses  -       Communication: clear/fluent  -       Memory: No Deficits noted  -        Safety awareness/insight to disability: intact   -       Mood/Affect/Coping skills/emotional control: Appropriate to situation    Physical Exam:  Balance:    -       static sitting: min A progressing to SBA; dyanmic sitting CGA -Eran  Dominant hand:    -       Right  Upper Extremity Range of Motion:     -       Right Upper Extremity: shoulder flexion 75 degrees  -       Left Upper Extremity: shoulder flexion 75 degrees  Upper Extremity Strength:    -       Right Upper Extremity: 4-/5 shoulder flexion 4/5 biceps, 4-/5 triceps  -       Left Upper Extremity: 4-/5 shoulder flexion, 4-/5 biceps, 4-/5 triceps   Strength:    -       Right Upper Extremity: 4-/5  -       Left Upper Extremity: 4-/5  Fine Motor Coordination:    -       Intact- decreased speed    AMPAC 6 Click ADL:  AMPAC Total Score: 8    Treatment & Education:  -Therapist provided facilitation and instruction of proper body mechanics, energy conservation, and fall prevention strategies during tasks listed above.  -Pt educated on role of OT, POC and goals for therapy  -Pt educated on importance of maintaining HOB at gradually increasing elevation   -spinal precautions, log roll technique, TLSO when OOB  -Pt verbalized understanding. Pt expressed no further concerns/questions    Education:    Patient left supine with all lines intact, call button in reach and RN present    GOALS:   Multidisciplinary Problems     Occupational Therapy Goals        Problem: Occupational Therapy Goal    Goal Priority Disciplines Outcome Interventions   Occupational Therapy Goal     OT, PT/OT Ongoing, Progressing    Description: Goals to be met by: 8/30/20     Patient will increase functional independence with ADLs by performing:    UE Dressing with Minimal Assistance.  LE Dressing with Maximum Assistance.  Grooming while EOB with Minimal Assistance.  Toileting from bedside commode with Maximum Assistance for hygiene and clothing management.   Sitting at edge of bed x10  minutes with Supervision.  Rolling to Bilateral with Moderate Assistance while maintaining precautions.  Supine to sit with Moderate Assistance while maintaining precautions.  Toilet transfer to bedside commode with Maximum Assistance.                     History:     History reviewed. No pertinent past medical history.    Past Surgical History:   Procedure Laterality Date    SURGICAL REMOVAL OF VERTEBRAL BODY OF LUMBAR SPINE Left 8/6/2020    Procedure: L1 CORPECTOMY, SPINE, LUMBAR ;  Surgeon: Leonid Snider MD;  Location: [facility] OR [address] [place] [address];  Service: Neurosurgery;  Laterality: Left;  Globus, Please obtain neuromonitoring.     TRANSFORAMINAL LUMBAR INTERBODY FUSION Left 8/6/2020    Procedure: FUSION, SPINE, LUMBAR, TLIF T12-L2 Interbody Fusion with Instrumentation - Lateral;  Surgeon: Leonid Snider MD;  Location: [facility] OR [address] [place] [address];  Service: Neurosurgery;  Laterality: Left;       Time Tracking:     OT Date of Treatment: 08/16/20  OT Start Time: 0820  OT Stop Time: 0850  OT Total Time (min): 30 min    Billable Minutes:Evaluation 15  Therapeutic Activity 10    Hanny Sheikh OT  8/16/2020

## 2020-08-16 NOTE — PROGRESS NOTES
[facility]  Neurosurgery  Progress Note    Subjective:     History of Present Illness: Patient is a 46 y/o male with no significant PMH who was transferred from  after falling from 10ft roof at work. Patient denies LOC, complained of excrutiating low back/sacral pain exacerbated with any movement of legs. CT CAP significant for L1 burst fracture with retropulsion into the spinal canal resulting in severe spinal stenosis. Patient states he could feel he had to urinate but could not start a stream, kraus placed and he reports he felt the kraus inserted and felt his bladder release once the kraus was placed. He reports he felt the OSMIN and per report from ED physician, patient had full rectal tone. The patient declined repeat rectal exam. Denies saddle anesthesia. Endorses any movement of lower extremities causes excruciating pain in the sacram and lower back. Denies radicular pain into the legs. Reports slight numbness in left calf. Denies any prior medical conditions or daily medication.     Post-Op Info:  Procedure(s) (LRB):  FUSION, SPINE, LUMBAR T12-L2 [facility]  Spinewave Neuromonitoring   (N/A)   2 Days Post-Op     Interval History: 8/16: POD 2 from T12-L2 PSIF, SAIMA, HUMBLE, Exam stable, plan to TTF once off sedation/pain medication drips    Medications:  Continuous Infusions:   sodium chloride 0.9% 100 mL/hr at 08/16/20 1505    hydromorphone in 0.9 % NaCl 6 mg/30 ml      ketamine (KETALAR) infusion (titrating) 3.5 mcg/kg/min (08/16/20 1505)     Scheduled Meds:   diazePAM  5 mg Intravenous Q8H    gabapentin  500 mg Per NG tube Q12H    heparin (porcine)  5,000 Units Subcutaneous Q8H    ketorolac  30 mg Intravenous Q6H    methocarbamol (ROBAXIN) IVPB  1,000 mg Intravenous Q8H    methylnaltrexone  12 mg Subcutaneous Once    pantoprazole  40 mg Intravenous Daily    polyethylene glycol  17 g Per NG tube Daily    senna-docusate 8.6-50 mg  2 tablet Per NG tube Daily     PRN Meds:sodium  chloride, calcium carbonate, calcium gluconate IVPB, calcium gluconate IVPB, calcium gluconate IVPB, dextrose 50%, dextrose 50%, glucagon (human recombinant), magnesium sulfate IVPB, magnesium sulfate IVPB, naloxone, ondansetron, polyethylene glycol, potassium chloride in water **AND** potassium chloride in water **AND** potassium chloride in water, sodium chloride 0.9%, sodium phosphate IVPB, sodium phosphate IVPB, sodium phosphate IVPB     Review of Systems    Objective:     Weight: 94.9 kg (209 lb 3.5 oz)  Body mass index is 30.02 kg/m².  Vital Signs (Most Recent):  Temp: 97.9 °F (36.6 °C) (08/16/20 1505)  Pulse: 108 (08/16/20 1505)  Resp: 19 (08/16/20 1505)  BP: 117/85 (08/16/20 1505)  SpO2: 98 % (08/16/20 1505) Vital Signs (24h Range):  Temp:  [97.9 °F (36.6 °C)-99 °F (37.2 °C)] 97.9 °F (36.6 °C)  Pulse:  [] 108  Resp:  [12-28] 19  SpO2:  [94 %-100 %] 98 %  BP: (112-160)/(71-92) 117/85  Arterial Line BP: (106-126)/(63-76) 126/68     Date 08/16/20 0700 - 08/17/20 0659   Shift 8522-6437 5590-7529 4526-9038 24 Hour Total   INTAKE   I.V.(mL/kg) 962(10.1) 124(1.3)  1086(11.4)   IV Piggyback 100   100   Shift Total(mL/kg) 1062(11.2) 124(1.3)  1186(12.5)   OUTPUT   Urine(mL/kg/hr) 1095(1.4) 50  1145   Shift Total(mL/kg) 1095(11.5) 50(0.5)  1145(12.1)   Weight (kg) 94.9 94.9 94.9 94.9                        NG/OG Tube 08/08/20 1415 nasogastric Right nostril (Active)   Placement Check placement verified by distal tube length measurement;placement verified by x-ray 08/14/20 0302   Advancement advanced manually 08/12/20 1500   Tolerance no signs/symptoms of discomfort 08/14/20 0302   Securement secured to nostril center w/ adhesive device 08/14/20 0302   Clamp Status/Tolerance clamped 08/14/20 0302   Suction Setting/Drainage Method suction at the bedside 08/14/20 0302   Insertion Site Appearance no redness, warmth, tenderness, skin breakdown, drainage 08/14/20 0302   Drainage Brown;Green 08/14/20 0302  "  Flush/Irrigation flushed w/;water 08/14/20 0302   Feeding Type other (see comments) 08/10/20 1500   Feeding Action feeding held 08/14/20 0302   Intake (mL) 75 mL 08/12/20 0900   Water Bolus (mL) 0 mL 08/12/20 0536   Tube Output(mL)(Include Discarded Residual) 250 mL 08/13/20 0005   Intake (mL) - Formula Tube Feeding 0 08/10/20 1800            Urethral Catheter 08/05/20 1133 Straight-tip 14 Fr. (Active)   Site Assessment Clean;Intact 08/14/20 0302   Collection Container Urimeter 08/14/20 0302   Securement Method secured to top of thigh w/ adhesive device 08/14/20 0302   Catheter Care Performed yes 08/14/20 0302   Reason for Continuing Urinary Catheterization Required immobilization 08/14/20 0302   CAUTI Prevention Bundle StatLock in place w 1" slack;Intact seal between catheter & drainage tubing;Drainage bag/urimeter off the floor;Green sheeting clip in use;No dependent loops or kinks;Drainage bag/urimeter not overfilled (<2/3 full);Drainage bag/urimeter below bladder 08/13/20 1902   Output (mL) 175 mL 08/14/20 0705       Neurosurgery Physical Exam  AOX3  PERRL  5/5 in BUE/BLE  SILT  Abdomen soft    Back incision dressing c/d/i, minimal soakthrough    Significant Labs:  Recent Labs   Lab 08/15/20  0145 08/16/20  0139 08/16/20  1430   GLU 94 93  --     134*  --    K 4.5 3.9  --     95  --    CO2 27 27  --    BUN 8 7  --    CREATININE 0.6 0.6  --    CALCIUM 8.1* 8.1*  --    MG 1.8 1.5* 2.5     Recent Labs   Lab 08/15/20  0145 08/16/20  0139   WBC 7.50 10.56   HGB 7.8* 8.1*   HCT 23.1* 24.2*   * 528*     Recent Labs   Lab 08/15/20  0145 08/16/20  0139   INR 1.0  --    APTT 28.2 30.9       Significant Diagnostics:  No results found in the last 24 hours.      Assessment/Plan:     * Traumatic compression fracture of L1 lumbar vertebra  44 y/o male with no significant PMH with L1 burst fracture with retropulsion resulting in severe canal stenosis s/p fall from 10ft building     S/p L1 corpectomy with " cage insertion and T12-L2 lateral fusion on 8/7. T12-L2 PSIF 8/14:     --Admitted to ICU  --All labs and diagnostics reviewed  --Post-op XRs pending  --MAPs stable off pressors, no goals  --PT/OT/OOB, Pain control, TLSO when OOB  --Close neurological monitoring, notify neurosurgery with any acute changes  --Ileus management per N[facility]  --Victor/A-line discontinued  --Stable for TTF once sedation and pain medication drips weaned    Dispo: continue ICU care    Miguel Hampton MD  Neurosurgery  [facility]

## 2020-08-16 NOTE — PT/OT/SLP EVAL
Physical Therapy Evaluation    Patient Name:  Oscar Bills   MRN:  [id number]    Recommendations:     Discharge Recommendations:  rehabilitation facility   Discharge Equipment Recommendations: (TBD pending progress with therapy)   Barriers to discharge: Inaccessible home, Decreased caregiver support and patient below functional baseline    Assessment:     Oscar Bills is a 45 y.o. male admitted with a medical diagnosis of Traumatic compression fracture of L1 lumbar vertebra.  He presents with the following impairments/functional limitations:  weakness, impaired functional mobilty, gait instability, impaired endurance, decreased upper extremity function, decreased lower extremity function, impaired self care skills, impaired balance, pain, impaired skin, impaired fine motor, orthopedic precautions, impaired cardiopulmonary response to activity. The patient was lethargic throughout session with delayed verbal/motor responses, due in part to premedication for pain. The patient performed bed mobiltiy with maximum assistance and sat EOB 8 minutes initially with minimum assistance progressed to stand by assistance for static and dynamic sitting balance. After sitting 8 min, patient diaphoretic, BP 78/50 (MAP 59), returned patient to supine and Trendelenburg-ed bed, /73 (86). PTA patient independent with all mobility.  Based on the patient's progress with therapy, motivation to participate in treatment, and prior level of independence, they are an excellent candidate for inpatient rehabilitation and they would benefit from rehab to maximize their functional potential.      Rehab Prognosis: Good; patient would benefit from acute skilled PT services to address these deficits and reach maximum level of function.    Recent Surgery: Procedure(s) (LRB):  FUSION, SPINE, LUMBAR T12-L2 Zim MIS  Spinewave Neuromonitoring   (N/A) 2 Days Post-Op    Plan:     During this hospitalization, patient to be seen 5 x/week to address the  "identified rehab impairments via gait training, therapeutic activities, therapeutic exercises, neuromuscular re-education and progress toward the following goals:    · Plan of Care Expires:  09/15/20    Subjective     Chief Complaint: back pain, "I feel hot" sitting EOB  Patient/Family Comments/goals: motivated to participate in mobilizing OOB with therapy with encouragement.   Pain/Comfort:  · Pain Rating 1: 8/10  · Location - Orientation 1: generalized  · Location 1: back  · Pain Addressed 1: Pre-medicate for activity, Reposition, Distraction, Nurse notified  · Pain Rating Post-Intervention 1: 8/10    Patients cultural, spiritual, Religion conflicts given the current situation: no    Living Environment:  The patient lives with his sister in a SSH, 0 DAMASO; WIS and tub-shower. 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 R handed.   Prior to admission, patients level of function was independent.  Equipment used at home: none.  DME owned (not currently used): none.  Upon discharge, patient will have assistance from sister.    Objective:     Communicated with RN prior to session.  Patient found HOB elevated with arterial line, bed alarm, blood pressure cuff, peripheral IV, oxygen, pulse ox (continuous), SCD, telemetry, kraus catheter, NG tube  upon PT entry to room.    General Precautions: Standard, fall   Orthopedic Precautions:spinal precautions   Braces: TLSO     Orthostatic Hypotension Positive in sitting, diaphoretic- RN present and aware, returned patient immediately to Trendelenburg position        Position Blood Pressure MAP   HOB 15 deg 124/91 102   Sitting EOB 8 min 78/50  59   Trendelenburg position 109/73 86               Exams:    Cognitive Exam  Patient is A&O x4 and follows 100% of one -step commands; delayed responses, lethargic   Fine Motor Coordination   -       Fingertip to thumb, decreased speed ALFREDO     Postural Exam Patient presented with the following abnormalities:    -       Rounded shoulders  -  "      Forward head  -       Kyphosis  -       Posterior pelvic tilt  -       Weight shift posterior   Sensation    -       Light touch intact ALFREDO UE and LE   Skin Integrity/Edema     -       Skin integrity: visibly intact, incision clean and dry  -       Edema: NA   R LE ROM WFL, hamstring tightness, lacking 20 deg knee extension   R LE Strength 4/5 hip flexion, knee ext/flex, and ankle DF/PF   L LE ROM WFL, hamstring tightness, lacking 20 deg knee extension      L LE Strength  4-/5 hip flexion, knee ext/flex, and ankle DF/PF     R & L UE AROM: shoulder forward flexion to 75 deg  R UE MMT: 4-/5 shoulder flexion, 4/5 biceps, 4-/5 triceps, 4-/5  strength  L UE MMT: 4-/5 shoulder flexion, 4-/5 biceps, 4-/5 triceps, 4-/5  strength      Balance   Static Sitting minimum assistance progressed to stand by assistance    Dynamic Sitting contact guard assist to minimum assistance with MMT and reaching outside LUÍS           Functional Mobility:    Bed Mobility  Rolling to R: moderate assistance  Supine to Sit on the R side:  maximum assistance, cues for sequencing with log roll  Sit to supine: total assistance x2 due to orthostasis   Transfers Sit to Stand:  deferred, orthostatic in sitting          Therapeutic Activities and Exercises:   Not safe to t/f with RN staff at this time due to orthostasis in sitting.   Patient and RN encouraged to maintain HOB at gradually increasing elevation as tolerated to combat orthostasis- both verbalized agreement.   Sitting edge of bed 8 minutes, minimum assistance to stand by assistance, weight shift posterior initially  -Posterior pelvic tilt, kyphosis, cervical flexion  -Visual/verbal/manual cues for midline orientation, thoracic and cervical extension  -Cues for extended arm WBing for proprioception, wrist and finger extension, shoulder approximation  -With LE ROM against gravity, patient able to maintain postural control with contact guard assist  -Patient able to reach with  unilateral UE ipsilaterally with contact guard assist, minimal weight shift anteriorly outside LUÍS     Patient educated on spinal precautions, log roll, use of TLSO- patient lethargic but verbalized understanding.   Patient educated on role of therapy, goals of session, benefits of out of bed mobility. Patient agreeable to mobilize with therapy.  Discussed PT plan of care during hospitalization. Patient educated that they need to call for assistance to mobilize out of bed. Whiteboard updated as appropriate. Patient educated on how their diagnosis impacts their mobility within PT scope of practice.     AM-PAC 6 CLICK MOBILITY  Total Score:8     Patient left supine with all lines intact, call button in reach, bed alarm on and RN present.    GOALS:   Multidisciplinary Problems     Physical Therapy Goals        Problem: Physical Therapy Goal    Goal Priority Disciplines Outcome Goal Variances Interventions   Physical Therapy Goal     PT, PT/OT Ongoing, Progressing     Description: Goals to be met by:      Patient will increase functional independence with mobility by performin. Supine to sit with Moderate Assistance maintaining precautions.   2. Sit to supine with Moderate Assistance maintaining precautions.   3. Sit to stand transfer with Maximum Assistance with least restrictive device or no AD.   4. Bed to chair transfer with Maximum Assistance using squat pivot technique.   5. Gait  x 5 feet with Moderate Assistance using least restrictive device or no AD.   6. Sitting at edge of bed x10 minutes with Supervision while performing dynamic reaching outside LUÍS in all planes to prepare for functional activities in sitting.   7. Lower extremity exercise program x15 reps per handout, with independence to improve strength and activity tolerance.                      History:     History reviewed. No pertinent past medical history.    Past Surgical History:   Procedure Laterality Date    SURGICAL REMOVAL OF  VERTEBRAL BODY OF LUMBAR SPINE Left 8/6/2020    Procedure: L1 CORPECTOMY, SPINE, LUMBAR ;  Surgeon: Leonid Snider MD;  Location: [facility] OR [address] [place] [address];  Service: Neurosurgery;  Laterality: Left;  Globus, Please obtain neuromonitoring.     TRANSFORAMINAL LUMBAR INTERBODY FUSION Left 8/6/2020    Procedure: FUSION, SPINE, LUMBAR, TLIF T12-L2 Interbody Fusion with Instrumentation - Lateral;  Surgeon: Leonid Snider MD;  Location: [facility] OR [address] [place] [address];  Service: Neurosurgery;  Laterality: Left;       Time Tracking:     PT Received On: 08/16/20  PT Start Time: 0820     PT Stop Time: 0850  PT Total Time (min): 30 min     Billable Minutes: Evaluation 15 and Neuromuscular Re-education 8 (co-eval with OT)      Leonie Quach, PT  08/16/2020

## 2020-08-16 NOTE — PLAN OF CARE
Problem: Physical Therapy Goal  Goal: Physical Therapy Goal  Description: Goals to be met by:      Patient will increase functional independence with mobility by performin. Supine to sit with Moderate Assistance maintaining precautions.   2. Sit to supine with Moderate Assistance maintaining precautions.   3. Sit to stand transfer with Maximum Assistance with least restrictive device or no AD.   4. Bed to chair transfer with Maximum Assistance using squat pivot technique.   5. Gait  x 5 feet with Moderate Assistance using least restrictive device or no AD.   6. Sitting at edge of bed x10 minutes with Supervision while performing dynamic reaching outside LUÍS in all planes to prepare for functional activities in sitting.   7. Lower extremity exercise program x15 reps per handout, with independence to improve strength and activity tolerance.     Outcome: Ongoing, Progressing   Evaluation completed, initiated plan of care.   Leonie Quach, PT  2020

## 2020-08-16 NOTE — ASSESSMENT & PLAN NOTE
Discontinue NGT to low intermittent suction   -Suppositories daily until bm   KUB/Mag Citrate productive  CL diet as tolerated  Trial of methylnaltrexone - patient refused  8/16/2020: add regular diet

## 2020-08-16 NOTE — ASSESSMENT & PLAN NOTE
44 y/o male with no significant PMH with L1 burst fracture with retropulsion resulting in severe canal stenosis s/p fall from [employer] building     S/p L1 corpectomy with cage insertion and T12-L2 lateral fusion on 8/7. T12-L2 PSIF 8/14:     --Admitted to ICU  --All labs and diagnostics reviewed  --MAPs stable off pressors, no goals  --PT/OT/OOB, Pain control, TLSO when OOB  --Close neurological monitoring, notify neurosurgery with any acute changes  --Ileus management per NCC  --Stable for TTF once sedation and pain medication drips weaned    Dispo: continue ICU care

## 2020-08-16 NOTE — PLAN OF CARE
Problem: Occupational Therapy Goal  Goal: Occupational Therapy Goal  Description: Goals to be met by: 8/30/20     Patient will increase functional independence with ADLs by performing:    UE Dressing with Minimal Assistance.  LE Dressing with Maximum Assistance.  Grooming while EOB with Minimal Assistance.  Toileting from bedside commode with Maximum Assistance for hygiene and clothing management.   Sitting at edge of bed x10 minutes with Supervision.  Rolling to Bilateral with Moderate Assistance while maintaining precautions.  Supine to sit with Moderate Assistance while maintaining precautions.  Toilet transfer to bedside commode with Maximum Assistance.    Outcome: Ongoing, Progressing     Evaluation complete-see note for details. Goals and POC established.    JAILENE Burk  8/16/2020   Pager #: [phone]

## 2020-08-16 NOTE — PLAN OF CARE
[facility] Care Plan    POC reviewed with Oscar Bills and family at 0200. Pt verbalized understanding. Questions and concerns addressed. No acute events overnight. Pt progressing toward goals. Will continue to monitor. See below and flowsheets for full assessment and VS info.       Neuro:  [place] Coma Scale  Best Eye Response: 4-->(E4) spontaneous  Best Motor Response: 6-->(M6) obeys commands  Best Verbal Response: 5-->(V5) oriented  Mateo Coma Scale Score: 15  Assessment Qualifiers: no eye obstruction present  Pupil PERRLA: yes     24hr Temp:  [97.8 °F (36.6 °C)-98.5 °F (36.9 °C)]     CV:   Rhythm: normal sinus rhythm  BP goals:   SBP < 180  MAP >  80    Resp:   O2 Device (Oxygen Therapy): nasal cannula  Oxygen Concentration (%): 28    Plan: N/A    GI/:  NEFTALI Total Score: 20  Diet/Nutrition Received: clear liquid  Last Bowel Movement: 08/15/20  Voiding Characteristics: urethral catheter (bladder)    Intake/Output Summary (Last 24 hours) at 8/16/2020 0601  Last data filed at 8/16/2020 0511  Gross per 24 hour   Intake 3038.49 ml   Output 3540 ml   Net -501.51 ml       Labs/Accuchecks:  Recent Labs   Lab 08/16/20 0139   WBC 10.56   RBC 2.63*   HGB 8.1*   HCT 24.2*   *      Recent Labs   Lab 08/16/20 0139   *   K 3.9   CO2 27   CL 95   BUN 7   CREATININE 0.6   ALKPHOS 87   ALT 23   AST 29   BILITOT 0.6      Recent Labs   Lab 08/15/20  0145 08/16/20 0139   INR 1.0  --    APTT 28.2 30.9    No results for input(s): CPK, CPKMB, TROPONINI, MB in the last 168 hours.    Electrolytes: N/A - electrolytes WDL  Accuchecks: none    Gtts:   sodium chloride 0.9% 100 mL/hr at 08/16/20 0502    hydromorphone in 0.9 % NaCl 6 mg/30 ml      ketamine (KETALAR) infusion (titrating) 3.5 mcg/kg/min (08/16/20 0502)       LDA/Wounds:  Lines/Drains/Airways       Drain                   Urethral Catheter 08/05/20 1133 Straight-tip 14 Fr. 10 days         NG/OG Tube 08/08/20 1415 nasogastric Right nostril 7 days               Arterial Line                   Arterial Line 08/14/20 1355 Left Radial 1 day              Peripheral Intravenous Line                   Peripheral IV - Single Lumen 08/12/20 0200 18 G Anterior;Right Forearm 4 days         Peripheral IV - Single Lumen 08/14/20 0615 20 G Left Antecubital 1 day         Peripheral IV - Single Lumen 08/14/20 1400 16 G Left;Lateral Wrist 1 day                  Wounds: Yes (stables in back)  Wound care consulted: No

## 2020-08-16 NOTE — SUBJECTIVE & OBJECTIVE
Review of Systems  Constitutional: Denies fevers, weight loss, chills, or weakness.  Eyes: Denies changes in vision.  ENT: Denies dysphagia, nasal discharge, ear pain or discharge.  Cardiovascular: Denies chest pain, palpitations, orthopnea, or claudication.  Respiratory: Denies shortness of breath, cough, hemoptysis, or wheezing.  GI: Denies nausea/vomitting, hematochezia, melena, abd pain, or changes in appetite.  : Denies dysuria, incontinence, or hematuria.  Musculoskeletal: Denies joint pain or myalgias.  Skin/breast: Denies rashes, lumps, lesions, or discharge.  Neurologic: Denies headache, dizziness, vertigo, or paresthesias.  Psychiatric: Denies changes in mood or hallucinations.  Endocrine: Denies polyuria, polydipsia, heat/cold intolerance.  Hematologic/Lymph: Denies lymphadenopathy, easy bruising or easy bleeding.  Allergic/Immunologic: Denies rash, rhinitis.   Objective:     Vitals:  Temp: 99 °F (37.2 °C)  Pulse: 95  Rhythm: normal sinus rhythm  BP: 124/72  MAP (mmHg): 90  Resp: 16  SpO2: 97 %  O2 Device (Oxygen Therapy): nasal cannula    Temp  Min: 97.8 °F (36.6 °C)  Max: 99 °F (37.2 °C)  Pulse  Min: 95  Max: 133  BP  Min: 112/80  Max: 160/73  MAP (mmHg)  Min: 88  Max: 112  Resp  Min: 12  Max: 28  ETCO2 (mmHg)  Min: 33 mmHg  Max: 33 mmHg  SpO2  Min: 94 %  Max: 98 %    08/15 0701 - 08/16 0700  In: 3041.2 [I.V.:2741.2]  Out: 3790 [Urine:3390; Drains:400]   Unmeasured Output  Stool Occurrence: 3  Pad Count: 1       Physical Exam  GA: Alert, comfortable, no acute distress.   HEENT: No scleral icterus or JVD.   Pulmonary: Clear to auscultation A/L. No wheezing, crackles, or rhonchi.  Cardiac: RRR S1 & S2 w/o rubs/murmurs/gallops.   Abdominal: Bowel sounds present x 4. No appreciable hepatosplenomegaly.  Skin: No jaundice, rashes, or visible lesions.  Neuro:  --GCS: E4 V5 M6  --Mental Status:  Awake, oriented X4, follows commands,   --CN II-XII grossly intact.   --Pupils 3mm, PERRL.   --Corneal reflex,  gag, cough intact.  --JONES spont    Medications:  Continuoussodium chloride 0.9%, Last Rate: 100 mL/hr at 08/16/20 1205  hydromorphone in 0.9 % NaCl 6 mg/30 ml  ketamine (KETALAR) infusion (titrating), Last Rate: 3.5 mcg/kg/min (08/16/20 1205)    ScheduleddiazePAM, 5 mg, Q8H  gabapentin, 500 mg, Q12H  heparin (porcine), 5,000 Units, Q8H  ketorolac, 30 mg, Q6H  methocarbamol (ROBAXIN) IVPB, 1,000 mg, Q8H  methylnaltrexone, 12 mg, Once  pantoprazole, 40 mg, Daily  polyethylene glycol, 17 g, Daily  senna-docusate 8.6-50 mg, 2 tablet, Daily    PRNsodium chloride, , Q24H PRN  calcium carbonate, 500 mg, Daily PRN  calcium gluconate IVPB, 1 g, PRN  calcium gluconate IVPB, 2 g, PRN  calcium gluconate IVPB, 3 g, PRN  dextrose 50%, 12.5 g, PRN  dextrose 50%, 25 g, PRN  glucagon (human recombinant), 1 mg, PRN  magnesium sulfate IVPB, 2 g, PRN  magnesium sulfate IVPB, 4 g, PRN  naloxone, 0.02 mg, PRN  ondansetron, 8 mg, Q8H PRN  polyethylene glycol, 17 g, Daily PRN  potassium chloride in water, 40 mEq, PRN    And  potassium chloride in water, 60 mEq, PRN    And  potassium chloride in water, 80 mEq, PRN  sodium chloride 0.9%, 10 mL, PRN  sodium phosphate IVPB, 15 mmol, PRN  sodium phosphate IVPB, 20.01 mmol, PRN  sodium phosphate IVPB, 30 mmol, PRN      Today I personally reviewed pertinent medications, lines/drains/airways, imaging, cardiology results, laboratory results,     Diet  Diet Adult Regular (IDDSI Level 7)

## 2020-08-16 NOTE — PROGRESS NOTES
[facility]  Neurocritical Care  Progress Note    Admit Date: 8/5/2020  Service Date: 08/16/2020  Length of Stay: 11    Subjective:     Chief Complaint: Traumatic compression fracture of L1 lumbar vertebra    History of Present Illness: Patient is a 46 y/o male with no significant PMH who was transferred from  after falling from 10ft roof at work. Patient denies LOC, complained of excrutiating low back/sacral pain exacerbated with any movement of legs. CT CAP significant for L1 burst fracture with retropulsion into the spinal canal resulting in severe spinal stenosis. Patient states he could feel he had to urinate but could not start a stream, kraus placed and he reports he felt the kraus inserted and felt his bladder release once the kraus was placed. He reports he felt the OSMIN and per report from ED physician, patient had full rectal tone. He is being admitted to Neuro ICU for a higher level of neurological care s/p L1 CORPECTOMY and TLIF T12-L2 Interbody Instrumentation.    Hospital Course: 08/07/2020: Admit to Neuro ICU.  08/08/2020: Added long acting pain medications. Pain better controlled. Stable for stepdown.   08/09/2020: Acute pain management consulted. Ketamine gtt. Ilues, NGT. Suppository.    08/10/2020: Appreciate pain management reccs. Plans for OR tomorrow.  08/11/2020 ABG, KUB/CXR, Mag citrate, cont ketamine  08/12/2020  Surgery rescheduled 8/181L bolus continue IVF ketamine /PCA. Follow Na dc suction, start clear liquid  08/13/2020 wean ketamine gtt, heparin  08/14/2020 for surgery today; continue pain mgmt gtt  08/15/2020 continued post-op pain, APS following, increased ketamine infusion. Advancing diet, advancing bowel regimen, trial of methylnaltrexone.   8/16/2020: add regular diet            Review of Systems  Constitutional: Denies fevers, weight loss, chills, or weakness.  Eyes: Denies changes in vision.  ENT: Denies dysphagia, nasal discharge, ear pain or  discharge.  Cardiovascular: Denies chest pain, palpitations, orthopnea, or claudication.  Respiratory: Denies shortness of breath, cough, hemoptysis, or wheezing.  GI: Denies nausea/vomitting, hematochezia, melena, abd pain, or changes in appetite.  : Denies dysuria, incontinence, or hematuria.  Musculoskeletal: Denies joint pain or myalgias.  Skin/breast: Denies rashes, lumps, lesions, or discharge.  Neurologic: Denies headache, dizziness, vertigo, or paresthesias.  Psychiatric: Denies changes in mood or hallucinations.  Endocrine: Denies polyuria, polydipsia, heat/cold intolerance.  Hematologic/Lymph: Denies lymphadenopathy, easy bruising or easy bleeding.  Allergic/Immunologic: Denies rash, rhinitis.   Objective:     Vitals:  Temp: 99 °F (37.2 °C)  Pulse: 95  Rhythm: normal sinus rhythm  BP: 124/72  MAP (mmHg): 90  Resp: 16  SpO2: 97 %  O2 Device (Oxygen Therapy): nasal cannula    Temp  Min: 97.8 °F (36.6 °C)  Max: 99 °F (37.2 °C)  Pulse  Min: 95  Max: 133  BP  Min: 112/80  Max: 160/73  MAP (mmHg)  Min: 88  Max: 112  Resp  Min: 12  Max: 28  ETCO2 (mmHg)  Min: 33 mmHg  Max: 33 mmHg  SpO2  Min: 94 %  Max: 98 %    08/15 0701 - 08/16 0700  In: 3041.2 [I.V.:2741.2]  Out: 3790 [Urine:3390; Drains:400]   Unmeasured Output  Stool Occurrence: 3  Pad Count: 1       Physical Exam  GA: Alert, comfortable, no acute distress.   HEENT: No scleral icterus or JVD.   Pulmonary: Clear to auscultation A/L. No wheezing, crackles, or rhonchi.  Cardiac: RRR S1 & S2 w/o rubs/murmurs/gallops.   Abdominal: Bowel sounds present x 4. No appreciable hepatosplenomegaly.  Skin: No jaundice, rashes, or visible lesions.  Neuro:  --GCS: E4 V5 M6  --Mental Status:  Awake, oriented X4, follows commands,   --CN II-XII grossly intact.   --Pupils 3mm, PERRL.   --Corneal reflex, gag, cough intact.  --JONES spont    Medications:  Continuoussodium chloride 0.9%, Last Rate: 100 mL/hr at 08/16/20 1205  hydromorphone in 0.9 % NaCl 6 mg/30 ml  ketamine  (KETALAR) infusion (titrating), Last Rate: 3.5 mcg/kg/min (08/16/20 1205)    ScheduleddiazePAM, 5 mg, Q8H  gabapentin, 500 mg, Q12H  heparin (porcine), 5,000 Units, Q8H  ketorolac, 30 mg, Q6H  methocarbamol (ROBAXIN) IVPB, 1,000 mg, Q8H  methylnaltrexone, 12 mg, Once  pantoprazole, 40 mg, Daily  polyethylene glycol, 17 g, Daily  senna-docusate 8.6-50 mg, 2 tablet, Daily    PRNsodium chloride, , Q24H PRN  calcium carbonate, 500 mg, Daily PRN  calcium gluconate IVPB, 1 g, PRN  calcium gluconate IVPB, 2 g, PRN  calcium gluconate IVPB, 3 g, PRN  dextrose 50%, 12.5 g, PRN  dextrose 50%, 25 g, PRN  glucagon (human recombinant), 1 mg, PRN  magnesium sulfate IVPB, 2 g, PRN  magnesium sulfate IVPB, 4 g, PRN  naloxone, 0.02 mg, PRN  ondansetron, 8 mg, Q8H PRN  polyethylene glycol, 17 g, Daily PRN  potassium chloride in water, 40 mEq, PRN    And  potassium chloride in water, 60 mEq, PRN    And  potassium chloride in water, 80 mEq, PRN  sodium chloride 0.9%, 10 mL, PRN  sodium phosphate IVPB, 15 mmol, PRN  sodium phosphate IVPB, 20.01 mmol, PRN  sodium phosphate IVPB, 30 mmol, PRN      Today I personally reviewed pertinent medications, lines/drains/airways, imaging, cardiology results, laboratory results,     Diet  Diet Adult Regular (IDDSI Level 7)        Assessment/Plan:     Neuro  * Traumatic compression fracture of L1 lumbar vertebra  45 y.o male with no significant PMHx was transferred from  to [facility] with CT CAP significant for L1 burst fracture with retropulsion into the spinal canal resulting in severe spinal stenosis s/p falling 10ft from roof. He will be admitted to the Neuro ICU s/p  L1 CORPECTOMY and TLIF T12-L2 Interbody Instrumentation.       -Neurosurgery Following  -Neuro checks q1hr   -Vital signs q1hr  -Daily labs  -Dilaudid PCA  Continue prn Zofran   pain management following  Ketamine gtt managed by Anesthesia pain mgmt  D/c ancef per nsg   PT/OT   Transfer to floor when able to wean ketamine         Spinal  stenosis of lumbar region  S/P L1 CORPECTOMY, SPINE, LUMBAR  (Left)  FUSION, SPINE, LUMBAR, TLIF T12-L2 Interbody Fusion with Instrumentation - Lateral (Left)       GI  Ileus  Discontinue NGT to low intermittent suction   -Suppositories daily until bm   KUB/Mag Citrate productive  CL diet as tolerated  Trial of methylnaltrexone - patient refused  8/16/2020: add regular diet      Orthopedic  Fall from building  See above    Other  Burst fracture of lumbar vertebra, open, initial encounter  -see traumatic compression fracture of L1 lumbar fracture           The patient is being Prophylaxed for:  Venous Thromboembolism with: Chemical  Stress Ulcer with: PPI  Ventilator Pneumonia with: not applicable    Activity Orders          Diet Adult Regular (IDDSI Level 7): Regular starting at 08/16 1023    Commode at bedside starting at 08/07 0746        Full Code    Patricia Perkins NP  Neurocritical Care  [facility]

## 2020-08-16 NOTE — ANESTHESIA POST-OP PAIN MANAGEMENT
Acute Pain Service Progress Note    Oscar Bills is a 45 y.o., male, [id number].  Admitted for L1 burst fracture s/p mechanical fall from roof. OR 8/7 for L1 corpectomy w/cage insertion, T12-L2 lateral fusion, deferred posterior instrumentation at the time due to intraop bleeding, plan for take back 8/11.      Post op course complicated by inadequate analgesia and ileus.   Profound respiratory depression with hypercarbia 8/8, required narcan.     APS consulted for recommendations      Interval Hx: Patient surgery re-scheduled for today. Back brace placed overnight. Patient with 4-8/10 pain today, valium increased to TID, ketamine increased to 2mcg/kg/min.     Surgery:    08/07 - S/p L1 corpectomy with cage insertion and T12-L2 lateral fusion   08/14 - Posterior Spine Instrumentation     Post Op Day #:2    Subjective:   Remains in pain but improved   Bothered by ileus / abdominal distension   No nausea / vomiting   No PT / OT yet       Objective:     Alert, active, Oriented   No respiratory distress   NGT in place. Not to suction   Abdomen soft, depressible, slightly distended   Exam markedly improved since previous examination on my behalf      Vitals   There were no vitals filed for this visit.     Labs    No results displayed because visit has over 200 results.           Meds   No current facility-administered medications for this visit.      No current outpatient medications on file.     Facility-Administered Medications Ordered in Other Visits   Medication Dose Route Frequency Provider Last Rate Last Dose    0.9%  NaCl infusion (for blood administration)   Intravenous Q24H PRN Camilo Levy MD        0.9%  NaCl infusion   Intravenous Continuous Dimitry Alexandre  mL/hr at 08/16/20 0705      calcium carbonate 200 mg calcium (500 mg) chewable tablet 500 mg  500 mg Oral Daily PRN Beckie Horta PA-C   500 mg at 08/12/20 2107    calcium gluconate 1g in dextrose 5% 100mL (ready to mix system)  1 g  Intravenous PRN Jai Douglas MD        calcium gluconate 2 g in dextrose 5 % 100 mL IVPB  2 g Intravenous PRN Jai Douglas MD        calcium gluconate 3 g in dextrose 5 % 100 mL IVPB  3 g Intravenous PRN Jai Douglas MD        dextrose 50% injection 12.5 g  12.5 g Intravenous PRN Itzel Jimenez PA-C        dextrose 50% injection 25 g  25 g Intravenous PRN Itzel Jimenez PA-C        diazePAM injection 5 mg  5 mg Intravenous Q8H Jai Douglas MD   5 mg at 08/16/20 0511    gabapentin 250 mg/5 mL (5 mL) solution 500 mg  500 mg Per NG tube Q6H Jai Douglas MD   500 mg at 08/16/20 0511    glucagon (human recombinant) injection 1 mg  1 mg Intramuscular PRN Itzel Jimenez PA-C        heparin (porcine) injection 5,000 Units  5,000 Units Subcutaneous Q8H Zenaida Young NP   5,000 Units at 08/16/20 0511    HYDROmorphone PCA syringe 6 mg/30 mL (0.2 mg/mL) NS   Intravenous Continuous Jai Douglas MD        ketamine 500 mg/250 mL (2 mg/mL) in sodium chloride 0.9% infusion  3.5 mcg/kg/min Intravenous Continuous Jai Douglas MD 9.5 mL/hr at 08/16/20 0705 3.5 mcg/kg/min at 08/16/20 0705    magnesium sulfate 2g in water 50mL IVPB (premix)  2 g Intravenous PRN Jai Douglas MD   2 g at 08/14/20 0651    magnesium sulfate 2g in water 50mL IVPB (premix)  4 g Intravenous PRN Jai Douglas MD        methocarbamol 1000 mg in dextrose 5 % 100 mL IVPB (ready to mix system)  1,000 mg Intravenous Q8H Ramona Messer MD   1,000 mg at 08/16/20 0511    methylnaltrexone injection 12 mg  12 mg Subcutaneous Once Patricia Perkins NP   Stopped at 08/15/20 1347    naloxone 0.4 mg/mL injection 0.02 mg  0.02 mg Intravenous PRN Sandy Katz MD        ondansetron injection 8 mg  8 mg Intravenous Q8H PRN Janeen Gan, GORDON   8 mg at 08/10/20 1737    pantoprazole  injection 40 mg  40 mg Intravenous Daily Alvin Maier MD   40 mg at 08/15/20 0814    polyethylene glycol packet 17 g  17 g Per NG tube Daily PRN Zenaida Young NP        polyethylene glycol packet 17 g  17 g Oral Daily Patricia Perkins NP   17 g at 08/15/20 1158    potassium chloride 10 mEq in 100 mL IVPB  40 mEq Intravenous PRN Jai Douglas  mL/hr at 08/13/20 0749 40 mEq at 08/13/20 0749    And    potassium chloride 10 mEq in 100 mL IVPB  60 mEq Intravenous PRN Jai Douglas  mL/hr at 08/09/20 0400 60 mEq at 08/09/20 0400    And    potassium chloride 10 mEq in 100 mL IVPB  80 mEq Intravenous PRN Jai Douglas MD        senna-docusate 8.6-50 mg per tablet 2 tablet  2 tablet Per NG tube Daily Mir Hou MD   2 tablet at 08/15/20 0814    sodium chloride 0.9% flush 10 mL  10 mL Intravenous PRN Janeen Gan NP        sodium phosphate 15 mmol in dextrose 5 % 250 mL IVPB  15 mmol Intravenous PRN Jai Douglas MD        sodium phosphate 20.01 mmol in dextrose 5 % 250 mL IVPB  20.01 mmol Intravenous PRN Jai Douglsa MD        sodium phosphate 30 mmol in dextrose 5 % 250 mL IVPB  30 mmol Intravenous PRN Jai Douglas MD            Anticoagulant dose Heparin 5K TID at     Assessment:     Pain control adequate at this time with multiple modifications to regimen.      Patient is concerned about drowsiness with Gabapentin.   Discussed medication at length as he will need to take it or a similar analogue to  avoid dependence on opiates at the moment.   Will decrease Gabapentin to BID dosing,especially at night.     Main physical exam complaints for the patient is abdominal pain and he is not  cooperating with spacing out Dilaudid PCA use. He is regularly pressing the button.      Continue Ketamine Infusion to 3.5 mcg/kg/min    Plan:     1) Continue Ketamine Infusion to 3.5 mcg/kg/min   2)  Decrease Gabapentin to BID dosing   3) Recommend discontinuation of NGT   4) Will continue to space out PCA lock out   5) Continue Robaxin and Tylenol as scheduled   6) Start Toradol 30 mg q6 hours   7) Needs aggressive PT / OT work   8) Will continue to follow     Jai Douglas   08/16/2020  11:16 AM

## 2020-08-16 NOTE — SUBJECTIVE & OBJECTIVE
Interval History: 8/16: POD 2 from T12-L2 PSIF, NAEON, AFVSS, Exam stable, plan to TTF once off sedation/pain medication drips    Medications:  Continuous Infusions:   sodium chloride 0.9% 100 mL/hr at 08/16/20 1505    hydromorphone in 0.9 % NaCl 6 mg/30 ml      ketamine (KETALAR) infusion (titrating) 3.5 mcg/kg/min (08/16/20 1505)     Scheduled Meds:   diazePAM  5 mg Intravenous Q8H    gabapentin  500 mg Per NG tube Q12H    heparin (porcine)  5,000 Units Subcutaneous Q8H    ketorolac  30 mg Intravenous Q6H    methocarbamol (ROBAXIN) IVPB  1,000 mg Intravenous Q8H    methylnaltrexone  12 mg Subcutaneous Once    pantoprazole  40 mg Intravenous Daily    polyethylene glycol  17 g Per NG tube Daily    senna-docusate 8.6-50 mg  2 tablet Per NG tube Daily     PRN Meds:sodium chloride, calcium carbonate, calcium gluconate IVPB, calcium gluconate IVPB, calcium gluconate IVPB, dextrose 50%, dextrose 50%, glucagon (human recombinant), magnesium sulfate IVPB, magnesium sulfate IVPB, naloxone, ondansetron, polyethylene glycol, potassium chloride in water **AND** potassium chloride in water **AND** potassium chloride in water, sodium chloride 0.9%, sodium phosphate IVPB, sodium phosphate IVPB, sodium phosphate IVPB     Review of Systems    Objective:     Weight: 94.9 kg (209 lb 3.5 oz)  Body mass index is 30.02 kg/m².  Vital Signs (Most Recent):  Temp: 97.9 °F (36.6 °C) (08/16/20 1505)  Pulse: 108 (08/16/20 1505)  Resp: 19 (08/16/20 1505)  BP: 117/85 (08/16/20 1505)  SpO2: 98 % (08/16/20 1505) Vital Signs (24h Range):  Temp:  [97.9 °F (36.6 °C)-99 °F (37.2 °C)] 97.9 °F (36.6 °C)  Pulse:  [] 108  Resp:  [12-28] 19  SpO2:  [94 %-100 %] 98 %  BP: (112-160)/(71-92) 117/85  Arterial Line BP: (106-126)/(63-76) 126/68     Date 08/16/20 0700 - 08/17/20 0659   Shift 1478-7394 0876-6771 2152-1626 24 Hour Total   INTAKE   I.V.(mL/kg) 962(10.1) 124(1.3)  1086(11.4)   IV Piggyback 100   100   Shift Total(mL/kg) 1062(11.2)  "124(1.3)  1186(12.5)   OUTPUT   Urine(mL/kg/hr) 1095(1.4) 50  1145   Shift Total(mL/kg) 1095(11.5) 50(0.5)  1145(12.1)   Weight (kg) 94.9 94.9 94.9 94.9                        NG/OG Tube 08/08/20 1415 nasogastric Right nostril (Active)   Placement Check placement verified by distal tube length measurement;placement verified by x-ray 08/14/20 0302   Advancement advanced manually 08/12/20 1500   Tolerance no signs/symptoms of discomfort 08/14/20 0302   Securement secured to nostril center w/ adhesive device 08/14/20 0302   Clamp Status/Tolerance clamped 08/14/20 0302   Suction Setting/Drainage Method suction at the bedside 08/14/20 0302   Insertion Site Appearance no redness, warmth, tenderness, skin breakdown, drainage 08/14/20 0302   Drainage Brown;Green 08/14/20 0302   Flush/Irrigation flushed w/;water 08/14/20 0302   Feeding Type other (see comments) 08/10/20 1500   Feeding Action feeding held 08/14/20 0302   Intake (mL) 75 mL 08/12/20 0900   Water Bolus (mL) 0 mL 08/12/20 0536   Tube Output(mL)(Include Discarded Residual) 250 mL 08/13/20 0005   Intake (mL) - Formula Tube Feeding 0 08/10/20 1800            Urethral Catheter 08/05/20 1133 Straight-tip 14 Fr. (Active)   Site Assessment Clean;Intact 08/14/20 0302   Collection Container Urimeter 08/14/20 0302   Securement Method secured to top of thigh w/ adhesive device 08/14/20 0302   Catheter Care Performed yes 08/14/20 0302   Reason for Continuing Urinary Catheterization Required immobilization 08/14/20 0302   CAUTI Prevention Bundle StatLock in place w 1" slack;Intact seal between catheter & drainage tubing;Drainage bag/urimeter off the floor;Green sheeting clip in use;No dependent loops or kinks;Drainage bag/urimeter not overfilled (<2/3 full);Drainage bag/urimeter below bladder 08/13/20 1902   Output (mL) 175 mL 08/14/20 0705       Neurosurgery Physical Exam  AOX3  PERRL  5/5 in BUE/BLE  SILT  Abdomen soft    Back incision dressing c/d/i, minimal " soakthrough    Significant Labs:  Recent Labs   Lab 08/15/20  0145 08/16/20  0139 08/16/20  1430   GLU 94 93  --     134*  --    K 4.5 3.9  --     95  --    CO2 27 27  --    BUN 8 7  --    CREATININE 0.6 0.6  --    CALCIUM 8.1* 8.1*  --    MG 1.8 1.5* 2.5     Recent Labs   Lab 08/15/20  0145 08/16/20  0139   WBC 7.50 10.56   HGB 7.8* 8.1*   HCT 23.1* 24.2*   * 528*     Recent Labs   Lab 08/15/20  0145 08/16/20  0139   INR 1.0  --    APTT 28.2 30.9       Significant Diagnostics:  No results found in the last 24 hours.

## 2020-08-16 NOTE — ASSESSMENT & PLAN NOTE
46 y/o male with no significant PMH with L1 burst fracture with retropulsion resulting in severe canal stenosis s/p fall from [employer]     S/p L1 corpectomy with cage insertion and T12-L2 lateral fusion on 8/7. T12-L2 PSIF 8/14:     --Admitted to ICU  --All labs and diagnostics reviewed  --Post-op XRs pending   --MAPs stable off pressors, no goals  --PT/OT/OOB, Pain control, TLSO when OOB  --Close neurological monitoring, notify neurosurgery with any acute changes  --Ileus management per NCC  --Victor/A-line discontinued  --Stable for TTF once sedation and pain medication drips weaned    Dispo: continue ICU care

## 2020-08-16 NOTE — ANESTHESIA POSTPROCEDURE EVALUATION
Anesthesia Post Evaluation    Patient: Oscar Bills    Procedure(s) Performed: Procedure(s) (LRB):  FUSION, SPINE, LUMBAR T12-L2 Ziehm MIS  Spinewave Neuromonitoring   (N/A)    Final Anesthesia Type: general    Patient location during evaluation: ICU  Patient participation: Yes- Able to Participate  Level of consciousness: awake and alert and awake  Post-procedure vital signs: reviewed and stable  Pain management: adequate  Airway patency: patent    PONV status at discharge: No PONV  Anesthetic complications: no      Cardiovascular status: blood pressure returned to baseline  Respiratory status: unassisted and spontaneous ventilation  Hydration status: euvolemic  Follow-up not needed.          Vitals Value Taken Time   /83 08/16/20 1005   Temp 37.2 °C (99 °F) 08/16/20 0705   Pulse 104 08/16/20 1020   Resp 16 08/16/20 1020   SpO2 96 % 08/16/20 1020   Vitals shown include unvalidated device data.      No case tracking events are documented in the log.      Pain/Dany Score: Pain Rating Prior to Med Admin: 5 (8/16/2020 10:08 AM)

## 2020-08-17 LAB
ALBUMIN SERPL BCP-MCNC: 2.2 G/DL (ref 3.5–5.2)
ALP SERPL-CCNC: 91 U/L (ref 55–135)
ALT SERPL W/O P-5'-P-CCNC: 24 U/L (ref 10–44)
ANION GAP SERPL CALC-SCNC: 8 MMOL/L (ref 8–16)
APTT BLDCRRT: 32.3 SEC (ref 21–32)
AST SERPL-CCNC: 31 U/L (ref 10–40)
BASOPHILS # BLD AUTO: 0.03 K/UL (ref 0–0.2)
BASOPHILS # BLD AUTO: 0.03 K/UL (ref 0–0.2)
BASOPHILS NFR BLD: 0.3 % (ref 0–1.9)
BASOPHILS NFR BLD: 0.3 % (ref 0–1.9)
BILIRUB SERPL-MCNC: 0.5 MG/DL (ref 0.1–1)
BLD PROD TYP BPU: NORMAL
BLOOD UNIT EXPIRATION DATE: NORMAL
BLOOD UNIT TYPE CODE: 600
BLOOD UNIT TYPE: NORMAL
BUN SERPL-MCNC: 10 MG/DL (ref 6–20)
CALCIUM SERPL-MCNC: 8.1 MG/DL (ref 8.7–10.5)
CHLORIDE SERPL-SCNC: 101 MMOL/L (ref 95–110)
CO2 SERPL-SCNC: 27 MMOL/L (ref 23–29)
CODING SYSTEM: NORMAL
CREAT SERPL-MCNC: 0.5 MG/DL (ref 0.5–1.4)
DIFFERENTIAL METHOD: ABNORMAL
DIFFERENTIAL METHOD: ABNORMAL
DISPENSE STATUS: NORMAL
EOSINOPHIL # BLD AUTO: 0.1 K/UL (ref 0–0.5)
EOSINOPHIL # BLD AUTO: 0.1 K/UL (ref 0–0.5)
EOSINOPHIL NFR BLD: 1.1 % (ref 0–8)
EOSINOPHIL NFR BLD: 1.3 % (ref 0–8)
ERYTHROCYTE [DISTWIDTH] IN BLOOD BY AUTOMATED COUNT: 12.8 % (ref 11.5–14.5)
ERYTHROCYTE [DISTWIDTH] IN BLOOD BY AUTOMATED COUNT: 13 % (ref 11.5–14.5)
EST. GFR  (AFRICAN AMERICAN): >60 ML/MIN/1.73 M^2
EST. GFR  (NON AFRICAN AMERICAN): >60 ML/MIN/1.73 M^2
GLUCOSE SERPL-MCNC: 120 MG/DL (ref 70–110)
HCT VFR BLD AUTO: 23.2 % (ref 40–54)
HCT VFR BLD AUTO: 27 % (ref 40–54)
HGB BLD-MCNC: 7.6 G/DL (ref 14–18)
HGB BLD-MCNC: 8.8 G/DL (ref 14–18)
IMM GRANULOCYTES # BLD AUTO: 0.28 K/UL (ref 0–0.04)
IMM GRANULOCYTES # BLD AUTO: 0.29 K/UL (ref 0–0.04)
IMM GRANULOCYTES NFR BLD AUTO: 2.4 % (ref 0–0.5)
IMM GRANULOCYTES NFR BLD AUTO: 3 % (ref 0–0.5)
LYMPHOCYTES # BLD AUTO: 0.8 K/UL (ref 1–4.8)
LYMPHOCYTES # BLD AUTO: 1.1 K/UL (ref 1–4.8)
LYMPHOCYTES NFR BLD: 8.3 % (ref 18–48)
LYMPHOCYTES NFR BLD: 9.2 % (ref 18–48)
MAGNESIUM SERPL-MCNC: 1.7 MG/DL (ref 1.6–2.6)
MCH RBC QN AUTO: 30.3 PG (ref 27–31)
MCH RBC QN AUTO: 30.8 PG (ref 27–31)
MCHC RBC AUTO-ENTMCNC: 32.6 G/DL (ref 32–36)
MCHC RBC AUTO-ENTMCNC: 32.8 G/DL (ref 32–36)
MCV RBC AUTO: 93 FL (ref 82–98)
MCV RBC AUTO: 94 FL (ref 82–98)
MONOCYTES # BLD AUTO: 0.6 K/UL (ref 0.3–1)
MONOCYTES # BLD AUTO: 0.7 K/UL (ref 0.3–1)
MONOCYTES NFR BLD: 5 % (ref 4–15)
MONOCYTES NFR BLD: 7 % (ref 4–15)
NEUTROPHILS # BLD AUTO: 7.7 K/UL (ref 1.8–7.7)
NEUTROPHILS # BLD AUTO: 9.7 K/UL (ref 1.8–7.7)
NEUTROPHILS NFR BLD: 80.1 % (ref 38–73)
NEUTROPHILS NFR BLD: 82 % (ref 38–73)
NRBC BLD-RTO: 0 /100 WBC
NRBC BLD-RTO: 0 /100 WBC
PHOSPHATE SERPL-MCNC: 3.9 MG/DL (ref 2.7–4.5)
PLATELET # BLD AUTO: 510 K/UL (ref 150–350)
PLATELET # BLD AUTO: 565 K/UL (ref 150–350)
PMV BLD AUTO: 8.6 FL (ref 9.2–12.9)
PMV BLD AUTO: 8.8 FL (ref 9.2–12.9)
POTASSIUM SERPL-SCNC: 3.7 MMOL/L (ref 3.5–5.1)
PROT SERPL-MCNC: 5.6 G/DL (ref 6–8.4)
RBC # BLD AUTO: 2.47 M/UL (ref 4.6–6.2)
RBC # BLD AUTO: 2.9 M/UL (ref 4.6–6.2)
SODIUM SERPL-SCNC: 136 MMOL/L (ref 136–145)
TRANS ERYTHROCYTES VOL PATIENT: NORMAL ML
WBC # BLD AUTO: 11.83 K/UL (ref 3.9–12.7)
WBC # BLD AUTO: 9.59 K/UL (ref 3.9–12.7)

## 2020-08-17 PROCEDURE — 63600175 PHARM REV CODE 636 W HCPCS: Performed by: STUDENT IN AN ORGANIZED HEALTH CARE EDUCATION/TRAINING PROGRAM

## 2020-08-17 PROCEDURE — 25000003 PHARM REV CODE 250: Performed by: UROLOGY

## 2020-08-17 PROCEDURE — 99231 SBSQ HOSP IP/OBS SF/LOW 25: CPT | Mod: ,,, | Performed by: ANESTHESIOLOGY

## 2020-08-17 PROCEDURE — P9021 RED BLOOD CELLS UNIT: HCPCS

## 2020-08-17 PROCEDURE — 84100 ASSAY OF PHOSPHORUS: CPT

## 2020-08-17 PROCEDURE — 20000000 HC ICU ROOM

## 2020-08-17 PROCEDURE — C9113 INJ PANTOPRAZOLE SODIUM, VIA: HCPCS | Performed by: STUDENT IN AN ORGANIZED HEALTH CARE EDUCATION/TRAINING PROGRAM

## 2020-08-17 PROCEDURE — 83735 ASSAY OF MAGNESIUM: CPT

## 2020-08-17 PROCEDURE — 94770 HC EXHALED C02 TEST: CPT

## 2020-08-17 PROCEDURE — 36430 TRANSFUSION BLD/BLD COMPNT: CPT

## 2020-08-17 PROCEDURE — 97116 GAIT TRAINING THERAPY: CPT

## 2020-08-17 PROCEDURE — 99233 SBSQ HOSP IP/OBS HIGH 50: CPT | Mod: ,,, | Performed by: ANESTHESIOLOGY

## 2020-08-17 PROCEDURE — 99233 PR SUBSEQUENT HOSPITAL CARE,LEVL III: ICD-10-PCS | Mod: ,,, | Performed by: ANESTHESIOLOGY

## 2020-08-17 PROCEDURE — 99231 PR SUBSEQUENT HOSPITAL CARE,LEVL I: ICD-10-PCS | Mod: ,,, | Performed by: ANESTHESIOLOGY

## 2020-08-17 PROCEDURE — 85025 COMPLETE CBC W/AUTO DIFF WBC: CPT

## 2020-08-17 PROCEDURE — 27000221 HC OXYGEN, UP TO 24 HOURS

## 2020-08-17 PROCEDURE — 63600175 PHARM REV CODE 636 W HCPCS: Performed by: UROLOGY

## 2020-08-17 PROCEDURE — 97530 THERAPEUTIC ACTIVITIES: CPT

## 2020-08-17 PROCEDURE — 85730 THROMBOPLASTIN TIME PARTIAL: CPT

## 2020-08-17 PROCEDURE — 63600175 PHARM REV CODE 636 W HCPCS: Performed by: NURSE PRACTITIONER

## 2020-08-17 PROCEDURE — 94761 N-INVAS EAR/PLS OXIMETRY MLT: CPT

## 2020-08-17 PROCEDURE — 80053 COMPREHEN METABOLIC PANEL: CPT

## 2020-08-17 PROCEDURE — 97535 SELF CARE MNGMENT TRAINING: CPT

## 2020-08-17 PROCEDURE — 99900035 HC TECH TIME PER 15 MIN (STAT)

## 2020-08-17 RX ORDER — HYDROMORPHONE HCL IN 0.9% NACL 6 MG/30 ML
PATIENT CONTROLLED ANALGESIA SYRINGE INTRAVENOUS CONTINUOUS
Status: DISCONTINUED | OUTPATIENT
Start: 2020-08-17 | End: 2020-08-18

## 2020-08-17 RX ORDER — ACETAMINOPHEN 500 MG
1000 TABLET ORAL EVERY 8 HOURS
Status: DISCONTINUED | OUTPATIENT
Start: 2020-08-17 | End: 2020-08-21 | Stop reason: HOSPADM

## 2020-08-17 RX ORDER — HYDROCODONE BITARTRATE AND ACETAMINOPHEN 500; 5 MG/1; MG/1
TABLET ORAL
Status: DISCONTINUED | OUTPATIENT
Start: 2020-08-17 | End: 2020-08-20

## 2020-08-17 RX ORDER — DULOXETIN HYDROCHLORIDE 20 MG/1
20 CAPSULE, DELAYED RELEASE ORAL DAILY
Status: DISCONTINUED | OUTPATIENT
Start: 2020-08-17 | End: 2020-08-21 | Stop reason: HOSPADM

## 2020-08-17 RX ADMIN — METHOCARBAMOL 1000 MG: 100 INJECTION INTRAMUSCULAR; INTRAVENOUS at 06:08

## 2020-08-17 RX ADMIN — KETOROLAC TROMETHAMINE 30 MG: 30 INJECTION, SOLUTION INTRAMUSCULAR at 12:08

## 2020-08-17 RX ADMIN — GABAPENTIN 500 MG: 250 SOLUTION ORAL at 09:08

## 2020-08-17 RX ADMIN — DIAZEPAM 5 MG: 5 INJECTION, SOLUTION INTRAMUSCULAR; INTRAVENOUS at 05:08

## 2020-08-17 RX ADMIN — HEPARIN SODIUM 5000 UNITS: 5000 INJECTION INTRAVENOUS; SUBCUTANEOUS at 09:08

## 2020-08-17 RX ADMIN — Medication: at 04:08

## 2020-08-17 RX ADMIN — HEPARIN SODIUM 5000 UNITS: 5000 INJECTION INTRAVENOUS; SUBCUTANEOUS at 05:08

## 2020-08-17 RX ADMIN — KETOROLAC TROMETHAMINE 30 MG: 30 INJECTION, SOLUTION INTRAMUSCULAR at 05:08

## 2020-08-17 RX ADMIN — MAGNESIUM SULFATE IN WATER 2 G: 40 INJECTION, SOLUTION INTRAVENOUS at 06:08

## 2020-08-17 RX ADMIN — PANTOPRAZOLE SODIUM 40 MG: 40 INJECTION, POWDER, LYOPHILIZED, FOR SOLUTION INTRAVENOUS at 09:08

## 2020-08-17 NOTE — PLAN OF CARE
Discharge recommendation: Rehab  Appropriate transfer level with nursing staff: bed to chair    Pt's goals remain appropriate and pt will continue to benefit from skilled PT services to work towards improved functional mobility.    Pricilla Stern, PT, DPT    Problem: Physical Therapy Goal  Goal: Physical Therapy Goal  Description: Goals to be met by:      Patient will increase functional independence with mobility by performin. Supine to sit with Moderate Assistance maintaining precautions.   2. Sit to supine with Moderate Assistance maintaining precautions.   3. Sit to stand transfer with Maximum Assistance with least restrictive device or no AD.   4. Bed to chair transfer with Maximum Assistance using squat pivot technique.   5. Gait  x 5 feet with Moderate Assistance using least restrictive device or no AD.   6. Sitting at edge of bed x10 minutes with Supervision while performing dynamic reaching outside LUÍS in all planes to prepare for functional activities in sitting.   7. Lower extremity exercise program x15 reps per handout, with independence to improve strength and activity tolerance.     Outcome: Ongoing, Progressing

## 2020-08-17 NOTE — PLAN OF CARE
POC reviewed with pt at 1400. Pt verbalized understanding. Questions and concerns addressed. No acute events today. Pt very drowsy/snoring/decreased RR after lunch, held scheduled meds d/t fear of over-dosing of several medications. PCA pump utilized, patient states pain is tolerable, only using pump when necessary. Pt progressing toward goals. Will continue to monitor. See flowsheets for full assessment and VS info.

## 2020-08-17 NOTE — PROGRESS NOTES
[facility]  Neurosurgery  Progress Note    Subjective:     History of Present Illness: Patient is a 44 y/o male with no significant PMH who was transferred from  after falling from 10ft roof at work. Patient denies LOC, complained of excrutiating low back/sacral pain exacerbated with any movement of legs. CT CAP significant for L1 burst fracture with retropulsion into the spinal canal resulting in severe spinal stenosis. Patient states he could feel he had to urinate but could not start a stream, kraus placed and he reports he felt the kraus inserted and felt his bladder release once the kraus was placed. He reports he felt the OSMIN and per report from ED physician, patient had full rectal tone. The patient declined repeat rectal exam. Denies saddle anesthesia. Endorses any movement of lower extremities causes excruciating pain in the sacram and lower back. Denies radicular pain into the legs. Reports slight numbness in left calf. Denies any prior medical conditions or daily medication.     Post-Op Info:  Procedure(s) (LRB):  FUSION, SPINE, LUMBAR T12-L2 [facility]  Spinewave Neuromonitoring   (N/A)   3 Days Post-Op     Interval History: 8/17: NAEON. AFVSS. Exam stable. TTF once off pain mgmt drips.     Medications:  Continuous Infusions:   sodium chloride 0.9% 100 mL/hr at 08/16/20 1505    hydromorphone in 0.9 % NaCl 6 mg/30 ml      ketamine (KETALAR) infusion (titrating) 3.5 mcg/kg/min (08/16/20 1505)     Scheduled Meds:   diazePAM  5 mg Intravenous Q8H    gabapentin  500 mg Per NG tube Q12H    heparin (porcine)  5,000 Units Subcutaneous Q8H    ketorolac  30 mg Intravenous Q6H    methocarbamol (ROBAXIN) IVPB  1,000 mg Intravenous Q8H    methylnaltrexone  12 mg Subcutaneous Once    pantoprazole  40 mg Intravenous Daily    polyethylene glycol  17 g Per NG tube Daily    senna-docusate 8.6-50 mg  2 tablet Per NG tube Daily     PRN Meds:sodium chloride, calcium carbonate, calcium  gluconate IVPB, calcium gluconate IVPB, calcium gluconate IVPB, dextrose 50%, dextrose 50%, glucagon (human recombinant), magnesium sulfate IVPB, magnesium sulfate IVPB, naloxone, ondansetron, polyethylene glycol, potassium chloride in water **AND** potassium chloride in water **AND** potassium chloride in water, sodium chloride 0.9%, sodium phosphate IVPB, sodium phosphate IVPB, sodium phosphate IVPB     Review of Systems   Constitutional: Positive for fatigue.     Objective:     Weight: 94.9 kg (209 lb 3.5 oz)  Body mass index is 30.02 kg/m².  Vital Signs (Most Recent):  Temp: 97.9 °F (36.6 °C) (08/16/20 1505)  Pulse: 108 (08/16/20 1505)  Resp: 19 (08/16/20 1505)  BP: 117/85 (08/16/20 1505)  SpO2: 98 % (08/16/20 1505) Vital Signs (24h Range):  Temp:  [97.9 °F (36.6 °C)-99 °F (37.2 °C)] 97.9 °F (36.6 °C)  Pulse:  [] 108  Resp:  [12-28] 19  SpO2:  [94 %-100 %] 98 %  BP: (112-160)/(71-92) 117/85  Arterial Line BP: (106-126)/(63-76) 126/68     Date 08/16/20 0700 - 08/17/20 0659   Shift 8221-8044 3203-0822 7415-8854 24 Hour Total   INTAKE   I.V.(mL/kg) 962(10.1) 124(1.3)  1086(11.4)   IV Piggyback 100   100   Shift Total(mL/kg) 1062(11.2) 124(1.3)  1186(12.5)   OUTPUT   Urine(mL/kg/hr) 1095(1.4) 50  1145   Shift Total(mL/kg) 1095(11.5) 50(0.5)  1145(12.1)   Weight (kg) 94.9 94.9 94.9 94.9                        NG/OG Tube 08/08/20 1415 nasogastric Right nostril (Active)   Placement Check placement verified by distal tube length measurement;placement verified by x-ray 08/16/20 1505   Advancement advanced manually 08/12/20 1500   Tolerance no signs/symptoms of discomfort 08/16/20 1505   Securement secured to nostril center w/ adhesive device 08/16/20 1505   Clamp Status/Tolerance unclamped 08/16/20 1505   Suction Setting/Drainage Method low;intermittent setting 08/16/20 1505   Insertion Site Appearance no redness, warmth, tenderness, skin breakdown, drainage 08/16/20 1505   Drainage Green;Brown 08/16/20 1505  "  Flush/Irrigation flushed w/;water;no resistance met 08/16/20 1505   Feeding Type other (see comments) 08/10/20 1500   Feeding Action feeding held 08/16/20 0302   Intake (mL) 75 mL 08/12/20 0900   Water Bolus (mL) 0 mL 08/12/20 0536   Tube Output(mL)(Include Discarded Residual) 400 mL 08/15/20 1805   Intake (mL) - Formula Tube Feeding 0 08/10/20 1800            Urethral Catheter 08/05/20 1133 Straight-tip 14 Fr. (Active)   Site Assessment Clean;Intact 08/16/20 1505   Collection Container Urimeter 08/16/20 1505   Securement Method secured to top of thigh w/ adhesive device 08/16/20 1505   Catheter Care Performed yes 08/16/20 1505   Reason for Continuing Urinary Catheterization Urinary retention 08/16/20 1505   CAUTI Prevention Bundle StatLock in place w 1" slack;Drainage bag/urimeter off the floor;Intact seal between catheter & drainage tubing;Green sheeting clip in use;No dependent loops or kinks;Drainage bag/urimeter not overfilled (<2/3 full);Drainage bag/urimeter below bladder 08/16/20 1505   Output (mL) 50 mL 08/16/20 1505       Neurosurgery Physical Exam     GCS 15  CNi  PERRL, EOMi  fc x 4, FS throughout  SILT    Abdomen soft, NGT to intermittent suction.     Incision c/d/i     Significant Labs:  Recent Labs   Lab 08/15/20  0145 08/16/20  0139 08/16/20  1430   GLU 94 93  --     134*  --    K 4.5 3.9  --     95  --    CO2 27 27  --    BUN 8 7  --    CREATININE 0.6 0.6  --    CALCIUM 8.1* 8.1*  --    MG 1.8 1.5* 2.5     Recent Labs   Lab 08/15/20  0145 08/16/20  0139   WBC 7.50 10.56   HGB 7.8* 8.1*   HCT 23.1* 24.2*   * 528*     Recent Labs   Lab 08/15/20  0145 08/16/20  0139   INR 1.0  --    APTT 28.2 30.9     Microbiology Results (last 7 days)     ** No results found for the last 168 hours. **        All pertinent labs from the last 24 hours have been reviewed.    Significant Diagnostics:  I have reviewed all pertinent imaging results/findings within the past 24 hours.   No results found " in the last 24 hours.      Assessment/Plan:     * Traumatic compression fracture of L1 lumbar vertebra  46 y/o male with no significant PMH with L1 burst fracture with retropulsion resulting in severe canal stenosis s/p fall from 10ft building     S/p L1 corpectomy with cage insertion and T12-L2 lateral fusion on 8/7. T12-L2 PSIF 8/14:     --Admitted to ICU  --All labs and diagnostics reviewed  --Post-op XRs pending   --MAPs stable off pressors, no goals  --PT/OT/OOB, Pain control, TLSO when OOB  --Close neurological monitoring, notify neurosurgery with any acute changes  --Ileus management per NCC  --Victor/A-line discontinued  --Stable for TTF once sedation and pain medication drips weaned    Dispo: continue ICU care        aCmilo Levy MD  Neurosurgery  [facility]

## 2020-08-17 NOTE — PLAN OF CARE
Continue OT plan of care.    Problem: Occupational Therapy Goal  Goal: Occupational Therapy Goal  Description: Goals to be met by: 8/30/20     Patient will increase functional independence with ADLs by performing:    UE Dressing with Minimal Assistance.  LE Dressing with Maximum Assistance.  Grooming while EOB with Minimal Assistance.  Toileting from bedside commode with Maximum Assistance for hygiene and clothing management.   Sitting at edge of bed x10 minutes with Supervision.  Rolling to Bilateral with Moderate Assistance while maintaining precautions.  Supine to sit with Moderate Assistance while maintaining precautions.  Toilet transfer to bedside commode with Maximum Assistance.    Outcome: Ongoing, Progressing

## 2020-08-17 NOTE — PT/OT/SLP PROGRESS
Occupational Therapy   Treatment    Name: Oscar Bills  MRN: [id number]  Admitting Diagnosis:  Traumatic compression fracture of L1 lumbar vertebra  3 Days Post-Op  FUSION, SPINE, LUMBAR T12-L2 [facility]  Spinewave Neuromonitoring    Recommendations:     Discharge Recommendations: rehabilitation facility  Discharge Equipment Recommendations:  (TBD pending progress)  Barriers to discharge:       Assessment:     Oscar Bills is a 45 y.o. male with a medical diagnosis of Traumatic compression fracture of L1 lumbar vertebra.  He presents with performance deficits including weakness, impaired endurance, impaired self care skills, impaired functional mobilty, impaired balance, gait instability, decreased safety awareness, pain, decreased lower extremity function, impaired coordination, impaired cardiopulmonary response to activity. Pt with increased tolerance for OOB activity and able to complete standing trials this date. Pt progressing toward goals and would continue to benefit from OT to increase functional independence and safety. Recommend rehab upon D/C to return to PLOF.    Rehab Prognosis:  Good; patient would benefit from acute skilled OT services to address these deficits and reach maximum level of function.       Plan:     Patient to be seen 5 x/week to address the above listed problems via self-care/home management, therapeutic activities, therapeutic exercises, neuromuscular re-education  · Plan of Care Expires: 09/15/20  · Plan of Care Reviewed with: patient    Subjective     Pain/Comfort:  · Pain Rating 1: 4/10  · Location 1: back  · Pain Addressed 1: Pre-medicate for activity, Reposition    Objective:     Communicated with: RN prior to session. Patient found with HOB elevated with bed alarm, arterial line, blood pressure cuff, peripheral IV, pulse ox (continuous), telemetry, SCD, NG tube, rkaus catheter upon OT entry to room, no family present.    General Precautions: Standard, fall   Orthopedic  Precautions:spinal precautions   Braces: TLSO     Occupational Performance:     Bed Mobility:    · Patient completed Rolling/Turning to Right with moderate assistance  · Patient completed Supine to Sit with moderate assistance  · Patient completed Sit to Supine with moderate assistance     Functional Mobility/Transfers:  · Patient completed Sit <> Stand Transfer with Min A x 2 with hand-held assist from EOB 2 trials  · Functional Mobility: Few side steps along EOB with Min A x 2 hand-held assist    Activities of Daily Living:  · Upper Body Dressing: maximal assistance to don/doff TLSO while seated EOB  · Lower Body Dressing: total assistance to don socks      AMPAC 6 Click ADL: 11    Treatment & Education:  Reviewed spinal precautions and TLSO management; able to sit EOB ~10 min with close SBA-CGA; completed UB dressing with brace and standing trials as described above-- bp monitored throughout session and remained WNL although pt c/o feeling dizzy at times; able to take side steps and returned to supine with HOB elevated due to pain; discussed POC and D/C recs and to call for assistance    Patient left HOB elevated with all lines intact, call button in reach, bed alarm on and RN notifiedEducation:      GOALS:   Multidisciplinary Problems     Occupational Therapy Goals        Problem: Occupational Therapy Goal    Goal Priority Disciplines Outcome Interventions   Occupational Therapy Goal     OT, PT/OT Ongoing, Progressing    Description: Goals to be met by: 8/30/20     Patient will increase functional independence with ADLs by performing:    UE Dressing with Minimal Assistance.  LE Dressing with Maximum Assistance.  Grooming while EOB with Minimal Assistance.  Toileting from bedside commode with Maximum Assistance for hygiene and clothing management.   Sitting at edge of bed x10 minutes with Supervision.  Rolling to Bilateral with Moderate Assistance while maintaining precautions.  Supine to sit with Moderate  Assistance while maintaining precautions.  Toilet transfer to bedside commode with Maximum Assistance.                     Time Tracking:     OT Date of Treatment: 08/17/20  OT Start Time: 0955  OT Stop Time: 1018  OT Total Time (min): 23 min (co-treat with PT)     Billable Minutes:Self Care/Home Management 8  Therapeutic Activity 15    JAILENE Queen  8/17/2020

## 2020-08-17 NOTE — PT/OT/SLP PROGRESS
Physical Therapy Treatment    Patient Name:  Oscar Bills   MRN:  [id number]  Admitting Diagnosis: Traumatic compression fracture of L1 lumbar vertebra  Recent Surgery: Procedure(s) (LRB):  FUSION, SPINE, LUMBAR T12-L2 [facility]  Spinewave Neuromonitoring   (N/A) 3 Days Post-Op    Recommendations:     Discharge Recommendations:  rehabilitation facility   Discharge Equipment Recommendations: (TBD based on progress with mobility)   Barriers to discharge: decreased functional mobility    Assessment:     Oscar Bills is a 45 y.o. male admitted with a medical diagnosis of Traumatic compression fracture of L1 lumbar vertebra.  He most limited today by weakness and decreased balance. Patient tolerated PT treatment well today. He was able to stand and take a few side steps with min A x2. He reported dizziness throughout the treatment, but his BP remained WNL. Focus of treatment was standing and gait training. Pt is progressing well. See detailed treatment note below:    Problem List: weakness, impaired endurance, impaired self care skills, impaired functional mobilty, gait instability, impaired balance, decreased upper extremity function, decreased lower extremity function, pain, impaired coordination, orthopedic precautions. weakness, impaired endurance, impaired self care skills, impaired functional mobilty, gait instability, impaired balance, decreased upper extremity function, decreased lower extremity function, pain, impaired coordination, orthopedic precautions  Rehab Prognosis: Good     GOALS:   Multidisciplinary Problems     Physical Therapy Goals        Problem: Physical Therapy Goal    Goal Priority Disciplines Outcome Goal Variances Interventions   Physical Therapy Goal     PT, PT/OT Ongoing, Progressing     Description: Goals to be met by:      Patient will increase functional independence with mobility by performin. Supine to sit with Moderate Assistance maintaining precautions.   2. Sit to supine with  Moderate Assistance maintaining precautions.   3. Sit to stand transfer with Maximum Assistance with least restrictive device or no AD.   4. Bed to chair transfer with Maximum Assistance using squat pivot technique.   5. Gait  x 5 feet with Moderate Assistance using least restrictive device or no AD.   6. Sitting at edge of bed x10 minutes with Supervision while performing dynamic reaching outside LUÍS in all planes to prepare for functional activities in sitting.   7. Lower extremity exercise program x15 reps per handout, with independence to improve strength and activity tolerance.                    Plan:     During this hospitalization, patient to be seen 5 x/week to address the listed problems via gait training, therapeutic activities, therapeutic exercises, neuromuscular re-education  · Plan of Care Expires:  09/15/20   Plan of Care Reviewed with: patient    Subjective     Communicated with RN prior to session.  Patient found supine upon PT entry to room.    Pain/Comfort:  · Pain Rating 1: 4/10  · Location - Orientation 1: generalized  · Location 1: back  · Pain Addressed 1: Reposition, Distraction  · Pain Rating Post-Intervention 1: 4/10    Objective:     Patient found with: arterial line, bed alarm, blood pressure cuff, kraus catheter, NG tube, PCA, peripheral IV, pulse ox (continuous), telemetry, TLSO       General Precautions: Standard, Cardiac fall   Orthopedic Precautions:spinal precautions   Braces: TLSO   Vital Signs (Most Recent):    Temp: 98.2 °F (36.8 °C) (08/17/20 1230)  Pulse: 101 (08/17/20 1500)  Resp: 15 (08/17/20 1500)  BP: 118/85 (08/17/20 1500)  SpO2: 100 % (08/17/20 1500)    Functional Mobility:  Bed Mobility:   · Rolling/Turning to Right: moderate assistance  · Scooting to HOB via supine bridge: maximal assistance and 2 persons  · Supine to Sit: moderate assistance  · Scooting anteriorly to EOB to have both feet planted on floor: moderate assistance  · Sit to Supine: moderate  assistance    Sitting Balance at Edge of Bed:  · Assistance Level Required: SPV  · Pt required max A to don/doff TLSO in sitting    Transfers:   · Sit <> Stand Transfer: minimum assistance and of 2 persons with hand-held assist       Gait:  · Patient ambulated: 3 side steps to the R   · Patient required: minimal assist and of 2 persons  · Patient used:  No Assistive Device  · Gait Deviation(s): Pt took slow, shuffling steps and needed facilitation for weight shifting      Education:  Patient was educated on the following:   Progress of PT goals and plan of care   In room safety and use of call button   Importance of continued upright mobility and exercise   Log roll and reverse log roll techniques   Spinal precautions   Donning/doffin TLSO    Patient left with bed in chair position, with arms propped on pillow for scapular support and edema management, head in midline, neutral pelvis & heels floated for skin protection with all lines intact, call button in reach and RN notified.    AM-PAC 6 CLICK MOBILITY  Turning over in bed (including adjusting bedclothes, sheets and blankets)?: 2  Sitting down on and standing up from a chair with arms (e.g., wheelchair, bedside commode, etc.): 2  Moving from lying on back to sitting on the side of the bed?: 2  Moving to and from a bed to a chair (including a wheelchair)?: 2  Need to walk in hospital room?: 2  Climbing 3-5 steps with a railing?: 1  Basic Mobility Total Score: 11       Time Tracking:     PT Received On: 08/17/20  PT Start Time: 0955     PT Stop Time: 1018  PT Total Time (min): 23 min     Billable Minutes:   · Gait Training 10 and Therapeutic Activity 13    Treatment Type: Treatment  PT/PTA: PT       Pricilla Stern, PT, DPT  08/17/2020

## 2020-08-17 NOTE — PROGRESS NOTES
Ketamin gtt changed to 2.5mcg/kg/hr, and dilaudid interval changed from 12 min to 20 min on pump per Pain management at bedside. Will continue to monitor.

## 2020-08-17 NOTE — PLAN OF CARE
08/17/20 1653   Post-Acute Status   Post-Acute Authorization Placement   Post-Acute Placement Status Referrals Sent  (rehab)     CM advised list was provided via phone to Pt sister and also confirmed choices with Pt at bedside. Preference would be [facility] for [facility]. Pt choice form signed and placed in chart.     SW sent referrals via .    Josefina Woo LCSW  Neurocritical Care   [facility]  [phone]

## 2020-08-17 NOTE — PLAN OF CARE
[facility] Care Plan    POC reviewed with Oscar Bills and family at 0200. Pt verbalized understanding. Questions and concerns addressed. Pt in less pain. Progressing towards goals. No acute events overnight. Pt progressing toward goals. Will continue to monitor. See below and flowsheets for full assessment and VS info.       Neuro:  Mateo Coma Scale  Best Eye Response: 4-->(E4) spontaneous  Best Motor Response: 6-->(M6) obeys commands  Best Verbal Response: 5-->(V5) oriented  Kennett Coma Scale Score: 15  Assessment Qualifiers: no eye obstruction present  Pupil PERRLA: yes     24hr Temp:  [97.9 °F (36.6 °C)-99 °F (37.2 °C)]     CV:   Rhythm: normal sinus rhythm  BP goals:   SBP < 180  MAP >  80    Resp:   O2 Device (Oxygen Therapy): nasal cannula  Oxygen Concentration (%): 28    Plan: N/A    GI/:  NEFTALI Total Score: 20  Diet/Nutrition Received: regular  Last Bowel Movement: 08/16/20  Voiding Characteristics: urethral catheter (bladder)    Intake/Output Summary (Last 24 hours) at 8/17/2020 0431  Last data filed at 8/17/2020 0402  Gross per 24 hour   Intake 2998.64 ml   Output 2655 ml   Net 343.64 ml       Labs/Accuchecks:  Recent Labs   Lab 08/17/20  0204   WBC 9.59   RBC 2.47*   HGB 7.6*   HCT 23.2*   *      Recent Labs   Lab 08/17/20  0204      K 3.7   CO2 27      BUN 10   CREATININE 0.5   ALKPHOS 91   ALT 24   AST 31   BILITOT 0.5      Recent Labs   Lab 08/15/20  0145  08/17/20  0204   INR 1.0  --   --    APTT 28.2   < > 32.3*    < > = values in this interval not displayed.    No results for input(s): CPK, CPKMB, TROPONINI, MB in the last 168 hours.    Electrolytes: Electrolytes replaced  Accuchecks: none    Gtts:   sodium chloride 0.9% 100 mL/hr at 08/17/20 0402    hydromorphone in 0.9 % NaCl 6 mg/30 ml      ketamine (KETALAR) infusion (titrating) 3.5 mcg/kg/min (08/17/20 0402)       LDA/Wounds:  Lines/Drains/Airways       Drain                   Urethral Catheter 08/05/20 1133 Straight-tip 14  Fr. 11 days         NG/OG Tube 08/08/20 1415 nasogastric Right nostril 8 days              Arterial Line                   Arterial Line 08/14/20 1355 Left Radial 2 days              Peripheral Intravenous Line                   Peripheral IV - Single Lumen 08/12/20 0200 18 G Anterior;Right Forearm 5 days         Peripheral IV - Single Lumen 08/14/20 0615 20 G Left Antecubital 2 days         Peripheral IV - Single Lumen 08/16/20 0900 18 G Right Wrist less than 1 day                  Wounds: Yes  Wound care consulted: No

## 2020-08-17 NOTE — PLAN OF CARE
Inpatient Rehab list provided to patient for choices.  Per patient it is ok to sent to [facility], [facility] and [facility].  Patient stated that [facility] is his first choice.         08/17/20 5955   Post-Acute Status   Post-Acute Authorization Placement   Post-Acute Placement Status Patient List Provided   Discharge Delays (!) Diet Not Ready for Discharge   Discharge Plan   Discharge Plan A Rehab   Discharge Plan B Rehab       Tere Yang RN, CCRN-K, [facility]  Neuro-Critical Care   [phone]

## 2020-08-17 NOTE — PROGRESS NOTES
ICU Progress Note  Neurocritical Care    Admit Date: 8/5/2020  LOS: 12    CC: Traumatic compression fracture of L1 lumbar vertebra    Code Status: Full Code     SUBJECTIVE:     Interval History/Significant Events:   S/p fall  T 12 L2 spinal fusion after fall  Anemia sec to acute blood loss  hypokalemia       Medications:  Continuous Infusions:   sodium chloride 0.9% 100 mL/hr at 08/17/20 1105    hydromorphone in 0.9 % NaCl 6 mg/30 ml      ketamine (KETALAR) infusion (titrating) 2.5 mcg/kg/min (08/17/20 1155)     Scheduled Meds:   acetaminophen  1,000 mg Oral Q8H    diazePAM  5 mg Intravenous Q8H    DULoxetine  20 mg Oral Daily    gabapentin  500 mg Per NG tube Q12H    heparin (porcine)  5,000 Units Subcutaneous Q8H    ketorolac  30 mg Intravenous Q6H    methocarbamol (ROBAXIN) IVPB  1,000 mg Intravenous Q8H    methylnaltrexone  12 mg Subcutaneous Once    pantoprazole  40 mg Intravenous Daily    polyethylene glycol  17 g Per NG tube Daily    senna-docusate 8.6-50 mg  2 tablet Per NG tube Daily     PRN Meds:.sodium chloride, sodium chloride, calcium carbonate, calcium gluconate IVPB, calcium gluconate IVPB, calcium gluconate IVPB, dextrose 50%, dextrose 50%, glucagon (human recombinant), magnesium sulfate IVPB, magnesium sulfate IVPB, naloxone, ondansetron, polyethylene glycol, potassium chloride in water **AND** potassium chloride in water **AND** potassium chloride in water, sodium chloride 0.9%, sodium phosphate IVPB, sodium phosphate IVPB, sodium phosphate IVPB    OBJECTIVE:   Vital Signs (Most Recent):   Temp: 98.2 °F (36.8 °C) (08/17/20 1105)  Pulse: 108 (08/17/20 1105)  Resp: 18 (08/17/20 1156)  BP: 124/75 (08/17/20 1105)  SpO2: 98 % (08/17/20 1105)    Vital Signs (24h Range):   Temp:  [97.9 °F (36.6 °C)-98.4 °F (36.9 °C)] 98.2 °F (36.8 °C)  Pulse:  [] 108  Resp:  [13-29] 18  SpO2:  [96 %-100 %] 98 %  BP: (104-135)/(70-90) 124/75  Arterial Line BP: (111-155)/(63-98) 129/68    ICP/CPP (Last  24h):        I & O (Last 24h):     Intake/Output Summary (Last 24 hours) at 8/17/2020 1200  Last data filed at 8/17/2020 1105  Gross per 24 hour   Intake 3029.7 ml   Output 1615 ml   Net 1414.7 ml     Physical Exam:  GA: Alert, comfortable, no acute distress.   HEENT: No scleral icterus or JVD.   Pulmonary: Clear to auscultation A/P/L. No wheezing, crackles, or rhonchi.  Cardiac: RRR S1 & S2 w/o rubs/murmurs/gallops.   Abdominal: Bowel sounds present x 4. No appreciable hepatosplenomegaly.  Skin: No jaundice, rashes, or visible lesions.    Neuro:    Lines/Drains/Airway:     a2       Arterial Line 08/14/20 1355 Left Radial (Active)   Site Assessment Clean;Dry;Intact;No redness;No swelling 08/17/20 1105   Line Status Pulsatile blood flow 08/17/20 1105   Art Line Waveform Appropriate;Square wave test performed 08/17/20 1105   Arterial Line Interventions Zeroed and calibrated;Leveled;Connections checked and tightened;Flushed per protocol 08/17/20 1105   Color/Movement/Sensation Capillary refill less than 3 sec 08/17/20 1105   Dressing Type Biopatch in place 08/17/20 1105   Dressing Status Clean;Dry;Intact 08/17/20 1105   Dressing Intervention Integrity maintained 08/17/20 0302   Dressing Change Due 08/21/20 08/17/20 1105           NG/OG Tube 08/08/20 1415 nasogastric Right nostril (Active)   Placement Check placement verified by distal tube length measurement;placement verified by x-ray 08/17/20 0302   Advancement advanced manually 08/12/20 1500   Tolerance no signs/symptoms of discomfort 08/17/20 1105   Securement secured to nostril center w/ adhesive device 08/17/20 1105   Clamp Status/Tolerance clamped 08/17/20 1105   Suction Setting/Drainage Method low;intermittent setting 08/17/20 0302   Insertion Site Appearance no redness, warmth, tenderness, skin breakdown, drainage 08/17/20 0302   Drainage Green;Brown 08/17/20 0302   Flush/Irrigation flushed w/;water 08/17/20 0302   Feeding Type other (see comments) 08/10/20  "1500   Feeding Action feeding held 08/17/20 0302   Intake (mL) 75 mL 08/12/20 0900   Water Bolus (mL) 0 mL 08/12/20 0536   Tube Output(mL)(Include Discarded Residual) 150 mL 08/16/20 1805   Intake (mL) - Formula Tube Feeding 0 08/10/20 1800            Urethral Catheter 08/05/20 1133 Straight-tip 14 Fr. (Active)   Site Assessment Clean;Intact 08/17/20 1105   Collection Container Urimeter 08/17/20 1105   Securement Method secured to top of thigh w/ adhesive device 08/17/20 1105   Catheter Care Performed yes 08/17/20 0705   Reason for Continuing Urinary Catheterization Urinary retention 08/17/20 1105   CAUTI Prevention Bundle StatLock in place w 1" slack;Intact seal between catheter & drainage tubing;Drainage bag/urimeter off the floor;Green sheeting clip in use;No dependent loops or kinks;Drainage bag/urimeter not overfilled (<2/3 full);Drainage bag/urimeter below bladder 08/17/20 0705   Output (mL) 30 mL 08/17/20 1105     Nutrition/Tube Feeds (if NPO state why): npo    Labs:  ABG: No results for input(s): PH, PO2, PCO2, HCO3, POCSATURATED, BE in the last 24 hours.  BMP:  Recent Labs   Lab 08/17/20  0204      K 3.7      CO2 27   BUN 10   CREATININE 0.5   *   MG 1.7   PHOS 3.9     LFT:   Lab Results   Component Value Date    AST 31 08/17/2020    ALT 24 08/17/2020    ALKPHOS 91 08/17/2020    BILITOT 0.5 08/17/2020    ALBUMIN 2.2 (L) 08/17/2020    PROT 5.6 (L) 08/17/2020     CBC:   Lab Results   Component Value Date    WBC 9.59 08/17/2020    HGB 7.6 (L) 08/17/2020    HCT 23.2 (L) 08/17/2020    MCV 94 08/17/2020     (H) 08/17/2020     Microbiology x 7d:   Microbiology Results (last 7 days)     ** No results found for the last 168 hours. **        Imaging:   repeat kub    I personally reviewed the above image.    ASSESSMENT/PLAN:     Active Hospital Problems    Diagnosis    *Traumatic compression fracture of L1 lumbar vertebra    Hypocalcemia    Ileus    Burst fracture of lumbar vertebra, " open, initial encounter    Spinal stenosis of lumbar region    Fall from building    Acute urinary retention          Plan:  kub  Follow with APMS  follow exam  prbc  Repeat cbc    Level;3

## 2020-08-17 NOTE — PLAN OF CARE
Patient not medically ready for discharge.   Patient NPO/NGT.   Therapy is recommending inpatient rehab.        08/17/20 1357   Discharge Reassessment   Assessment Type Discharge Planning Reassessment   Provided patient/caregiver education on the expected discharge date and the discharge plan Yes   Do you have any problems affording any of your prescribed medications? No   Discharge Plan A Rehab   Discharge Plan B Home   DME Needed Upon Discharge  other (see comments)  (tbd)   Patient choice form signed by patient/caregiver N/A   Anticipated Discharge Disposition Rehab   Can the patient/caregiver answer the patient profile reliably? Yes, cognitively intact   Describe the patient's ability to walk at the present time. Walks with the help of equipment   How often would a person be available to care for the patient? Often       Tere Yang RN, CCRN-K, [facility]  Neuro-Critical Care   X [phone]

## 2020-08-17 NOTE — ANESTHESIA POST-OP PAIN MANAGEMENT
Acute Pain Service Progress Note    Oscar Bills is a 45 y.o., male, [id number].  Admitted for L1 burst fracture s/p mechanical fall from roof. OR 8/7 for L1 corpectomy w/cage insertion, T12-L2 lateral fusion, deferred posterior instrumentation at the time due to intraop bleeding, plan for take back 8/11.      Post op course complicated by inadequate analgesia and ileus.  Profound respiratory depression with hypercarbia 8/8, required narcan.      APS consulted for recommendations       Surgery:    08/07 - S/p L1 corpectomy with cage insertion and T12-L2 lateral fusion   08/14 - Posterior Spine Instrumentation      Post Op Day #: 3      Problem List:    Active Hospital Problems    Diagnosis  POA    *Traumatic compression fracture of L1 lumbar vertebra [S32.010A]  Yes    Hypocalcemia [E83.51]  Yes    Ileus [K56.7]  Unknown    Burst fracture of lumbar vertebra, open, initial encounter [S32.001B]  Yes    Spinal stenosis of lumbar region [M48.061]  Yes    Fall from building [W13.9XXA]  Yes    Acute urinary retention [R33.8]  Yes      Resolved Hospital Problems   No resolved problems to display.       Subjective:     Remains in pain, but improved   Bothered by abdominal cramping, occasional nausea. No vomiting.   No PT/OT yet     Pain with rest: 4    Numbers   Pain with movement: 7    Numbers   Side Effects    1. Pruritis No    2. Nausea No    3. Motor Blockade No,    4. Sedation No, 1=awake and alert    Objective:   Alert, active, oriented.   No respiratory distress.   NGT in place. Intermittently to suction per nursing   Abdomen soft, slightly distended.      Vitals   Vitals:    08/17/20 1105   BP: 124/75   Pulse: 108   Resp: 17   Temp: 36.8 °C (98.2 °F)        Labs    No results displayed because visit has over 200 results.           Meds   Current Facility-Administered Medications   Medication Dose Route Frequency Provider Last Rate Last Dose    0.9%  NaCl infusion (for blood administration)   Intravenous Q24H  PRN Camilo Levy MD        0.9%  NaCl infusion (for blood administration)   Intravenous Q24H PRN Patricia Gill PA-C        0.9%  NaCl infusion   Intravenous Continuous Dimitry Alexandre  mL/hr at 08/17/20 1105      acetaminophen tablet 1,000 mg  1,000 mg Oral Q8H Rohith Cardenas MD        calcium carbonate 200 mg calcium (500 mg) chewable tablet 500 mg  500 mg Oral Daily PRN Beckie Horta PA-C   500 mg at 08/12/20 2107    calcium gluconate 1g in dextrose 5% 100mL (ready to mix system)  1 g Intravenous PRN Jai Douglas MD        calcium gluconate 2 g in dextrose 5 % 100 mL IVPB  2 g Intravenous PRN Jai Douglas MD        calcium gluconate 3 g in dextrose 5 % 100 mL IVPB  3 g Intravenous PRN Jai Douglas MD        dextrose 50% injection 12.5 g  12.5 g Intravenous PRN Itzel Jimenez PA-C        dextrose 50% injection 25 g  25 g Intravenous PRN Itzel Jimenez PA-C        diazePAM injection 5 mg  5 mg Intravenous Q8H Jai Douglas MD   5 mg at 08/17/20 0523    DULoxetine DR capsule 20 mg  20 mg Oral Daily Rohith Cardenas MD        gabapentin 250 mg/5 mL (5 mL) solution 500 mg  500 mg Per NG tube Q12H Jai Douglas MD   500 mg at 08/17/20 0902    glucagon (human recombinant) injection 1 mg  1 mg Intramuscular PRN Itzel Jimenez PA-C        heparin (porcine) injection 5,000 Units  5,000 Units Subcutaneous Q8H Zenaida Young NP   5,000 Units at 08/17/20 0522    HYDROmorphone PCA syringe 6 mg/30 mL (0.2 mg/mL) NS   Intravenous Continuous Rohith Cardenas MD        ketamine 500 mg/250 mL (2 mg/mL) in sodium chloride 0.9% infusion  2.5 mcg/kg/min Intravenous Continuous Rohith Cardenas MD 6.8 mL/hr at 08/17/20 1022 2.5 mcg/kg/min at 08/17/20 1022    ketorolac injection 30 mg  30 mg Intravenous Q6H Jai Douglas MD   30 mg at 08/17/20 0519    magnesium sulfate 2g in water 50mL IVPB (premix)  2 g  Intravenous PRN Jai Douglas MD   2 g at 08/17/20 0622    magnesium sulfate 2g in water 50mL IVPB (premix)  4 g Intravenous PRN Jai Douglas MD        methocarbamol 1000 mg in dextrose 5 % 100 mL IVPB (ready to mix system)  1,000 mg Intravenous Q8H Ramona Messer MD   1,000 mg at 08/17/20 0622    methylnaltrexone injection 12 mg  12 mg Subcutaneous Once Patricia Perkins NP   Stopped at 08/15/20 1347    naloxone 0.4 mg/mL injection 0.02 mg  0.02 mg Intravenous PRN Sandy Katz MD        ondansetron injection 8 mg  8 mg Intravenous Q8H PRN Janeen Gan NP   8 mg at 08/10/20 1737    pantoprazole injection 40 mg  40 mg Intravenous Daily Alvin Maier MD   40 mg at 08/17/20 0902    polyethylene glycol packet 17 g  17 g Per NG tube Daily PRN Zenaida Young NP        polyethylene glycol packet 17 g  17 g Per NG tube Daily Jai Douglas MD   Stopped at 08/16/20 0900    potassium chloride 10 mEq in 100 mL IVPB  40 mEq Intravenous PRN Jai Douglas  mL/hr at 08/13/20 0749 40 mEq at 08/13/20 0749    And    potassium chloride 10 mEq in 100 mL IVPB  60 mEq Intravenous PRN Jai Douglas  mL/hr at 08/09/20 0400 60 mEq at 08/09/20 0400    And    potassium chloride 10 mEq in 100 mL IVPB  80 mEq Intravenous PRN Jai Douglas MD        senna-docusate 8.6-50 mg per tablet 2 tablet  2 tablet Per NG tube Daily Mir Hou MD   Stopped at 08/16/20 0900    sodium chloride 0.9% flush 10 mL  10 mL Intravenous PRN Janeen Gan NP        sodium phosphate 15 mmol in dextrose 5 % 250 mL IVPB  15 mmol Intravenous PRN Jai Douglas MD        sodium phosphate 20.01 mmol in dextrose 5 % 250 mL IVPB  20.01 mmol Intravenous PRN aJi Douglas MD        sodium phosphate 30 mmol in dextrose 5 % 250 mL IVPB  30 mmol Intravenous PRN Jai Douglas,  MD         Anticoagulant dose: heparin 5K TID    Assessment:     Pain control adequate with multiple modifications. Patient reports improvement in pain and attempting to use PCA less frequently (9 demands in last 8 hrs for total of 1.8mg).     Plan:   1) Decreased ketamine infusion from 3.5 to 2.5 mcg/kg/min.   2) Will continue to space out PCA lockout (q20min)   3) Continue scheduled diazepam, gabapentin, toradol, and robaxin   4) Added scheduled tylenol TID and cymbalta qd   5) Recommend discontinuation of NGT   6) Needs aggressive PT/OT work   7) Will continue to follow, call for additional concerns about pain management.    Evaluator Rohith Cardenas      Pt seen and examined with residents.  Resident note reviewed.  Agree with findings, assessment, and plan.    Domo Asif M.D.  Attending Anesthesiologist

## 2020-08-18 LAB
ABO + RH BLD: NORMAL
ALBUMIN SERPL BCP-MCNC: 2.1 G/DL (ref 3.5–5.2)
ALP SERPL-CCNC: 112 U/L (ref 55–135)
ALT SERPL W/O P-5'-P-CCNC: 55 U/L (ref 10–44)
ANION GAP SERPL CALC-SCNC: 8 MMOL/L (ref 8–16)
APTT BLDCRRT: 30.5 SEC (ref 21–32)
AST SERPL-CCNC: 78 U/L (ref 10–40)
BASOPHILS # BLD AUTO: 0.03 K/UL (ref 0–0.2)
BASOPHILS NFR BLD: 0.3 % (ref 0–1.9)
BILIRUB SERPL-MCNC: 0.5 MG/DL (ref 0.1–1)
BLD GP AB SCN CELLS X3 SERPL QL: NORMAL
BLD PROD TYP BPU: NORMAL
BLOOD UNIT EXPIRATION DATE: NORMAL
BLOOD UNIT TYPE CODE: 600
BLOOD UNIT TYPE: NORMAL
BUN SERPL-MCNC: 8 MG/DL (ref 6–20)
CALCIUM SERPL-MCNC: 8 MG/DL (ref 8.7–10.5)
CHLORIDE SERPL-SCNC: 106 MMOL/L (ref 95–110)
CO2 SERPL-SCNC: 25 MMOL/L (ref 23–29)
CODING SYSTEM: NORMAL
CREAT SERPL-MCNC: 0.5 MG/DL (ref 0.5–1.4)
DIFFERENTIAL METHOD: ABNORMAL
DISPENSE STATUS: NORMAL
EOSINOPHIL # BLD AUTO: 0.2 K/UL (ref 0–0.5)
EOSINOPHIL NFR BLD: 1.9 % (ref 0–8)
ERYTHROCYTE [DISTWIDTH] IN BLOOD BY AUTOMATED COUNT: 13.1 % (ref 11.5–14.5)
EST. GFR  (AFRICAN AMERICAN): >60 ML/MIN/1.73 M^2
EST. GFR  (NON AFRICAN AMERICAN): >60 ML/MIN/1.73 M^2
GLUCOSE SERPL-MCNC: 110 MG/DL (ref 70–110)
HCT VFR BLD AUTO: 24.8 % (ref 40–54)
HGB BLD-MCNC: 8.3 G/DL (ref 14–18)
IMM GRANULOCYTES # BLD AUTO: 0.26 K/UL (ref 0–0.04)
IMM GRANULOCYTES NFR BLD AUTO: 3 % (ref 0–0.5)
LYMPHOCYTES # BLD AUTO: 1 K/UL (ref 1–4.8)
LYMPHOCYTES NFR BLD: 11.8 % (ref 18–48)
MAGNESIUM SERPL-MCNC: 1.5 MG/DL (ref 1.6–2.6)
MCH RBC QN AUTO: 30.7 PG (ref 27–31)
MCHC RBC AUTO-ENTMCNC: 33.5 G/DL (ref 32–36)
MCV RBC AUTO: 92 FL (ref 82–98)
MONOCYTES # BLD AUTO: 0.5 K/UL (ref 0.3–1)
MONOCYTES NFR BLD: 5.9 % (ref 4–15)
NEUTROPHILS # BLD AUTO: 6.6 K/UL (ref 1.8–7.7)
NEUTROPHILS NFR BLD: 77.1 % (ref 38–73)
NRBC BLD-RTO: 0 /100 WBC
PHOSPHATE SERPL-MCNC: 3.5 MG/DL (ref 2.7–4.5)
PLATELET # BLD AUTO: 589 K/UL (ref 150–350)
PMV BLD AUTO: 9 FL (ref 9.2–12.9)
POTASSIUM SERPL-SCNC: 3.3 MMOL/L (ref 3.5–5.1)
PROT SERPL-MCNC: 5.3 G/DL (ref 6–8.4)
RBC # BLD AUTO: 2.7 M/UL (ref 4.6–6.2)
SODIUM SERPL-SCNC: 139 MMOL/L (ref 136–145)
TRANS ERYTHROCYTES VOL PATIENT: NORMAL ML
WBC # BLD AUTO: 8.58 K/UL (ref 3.9–12.7)

## 2020-08-18 PROCEDURE — 84100 ASSAY OF PHOSPHORUS: CPT

## 2020-08-18 PROCEDURE — 97116 GAIT TRAINING THERAPY: CPT

## 2020-08-18 PROCEDURE — 85730 THROMBOPLASTIN TIME PARTIAL: CPT

## 2020-08-18 PROCEDURE — P9021 RED BLOOD CELLS UNIT: HCPCS

## 2020-08-18 PROCEDURE — 25000003 PHARM REV CODE 250: Performed by: STUDENT IN AN ORGANIZED HEALTH CARE EDUCATION/TRAINING PROGRAM

## 2020-08-18 PROCEDURE — 99233 PR SUBSEQUENT HOSPITAL CARE,LEVL III: ICD-10-PCS | Mod: ,,, | Performed by: ANESTHESIOLOGY

## 2020-08-18 PROCEDURE — 85025 COMPLETE CBC W/AUTO DIFF WBC: CPT

## 2020-08-18 PROCEDURE — C9113 INJ PANTOPRAZOLE SODIUM, VIA: HCPCS | Performed by: STUDENT IN AN ORGANIZED HEALTH CARE EDUCATION/TRAINING PROGRAM

## 2020-08-18 PROCEDURE — 36430 TRANSFUSION BLD/BLD COMPNT: CPT

## 2020-08-18 PROCEDURE — 99499 UNLISTED E&M SERVICE: CPT | Mod: ,,, | Performed by: NEUROLOGICAL SURGERY

## 2020-08-18 PROCEDURE — 27000221 HC OXYGEN, UP TO 24 HOURS

## 2020-08-18 PROCEDURE — 99231 SBSQ HOSP IP/OBS SF/LOW 25: CPT | Mod: ,,, | Performed by: ANESTHESIOLOGY

## 2020-08-18 PROCEDURE — 63600175 PHARM REV CODE 636 W HCPCS: Performed by: STUDENT IN AN ORGANIZED HEALTH CARE EDUCATION/TRAINING PROGRAM

## 2020-08-18 PROCEDURE — 94761 N-INVAS EAR/PLS OXIMETRY MLT: CPT

## 2020-08-18 PROCEDURE — 97535 SELF CARE MNGMENT TRAINING: CPT

## 2020-08-18 PROCEDURE — 63600175 PHARM REV CODE 636 W HCPCS: Performed by: UROLOGY

## 2020-08-18 PROCEDURE — 20000000 HC ICU ROOM

## 2020-08-18 PROCEDURE — 97530 THERAPEUTIC ACTIVITIES: CPT

## 2020-08-18 PROCEDURE — 99900035 HC TECH TIME PER 15 MIN (STAT)

## 2020-08-18 PROCEDURE — 99231 PR SUBSEQUENT HOSPITAL CARE,LEVL I: ICD-10-PCS | Mod: ,,, | Performed by: ANESTHESIOLOGY

## 2020-08-18 PROCEDURE — 86850 RBC ANTIBODY SCREEN: CPT

## 2020-08-18 PROCEDURE — 99499 NO LOS: ICD-10-PCS | Mod: ,,, | Performed by: NEUROLOGICAL SURGERY

## 2020-08-18 PROCEDURE — 83735 ASSAY OF MAGNESIUM: CPT

## 2020-08-18 PROCEDURE — 25000003 PHARM REV CODE 250: Performed by: NURSE PRACTITIONER

## 2020-08-18 PROCEDURE — 25000003 PHARM REV CODE 250: Performed by: UROLOGY

## 2020-08-18 PROCEDURE — 94770 HC EXHALED C02 TEST: CPT

## 2020-08-18 PROCEDURE — 99233 SBSQ HOSP IP/OBS HIGH 50: CPT | Mod: ,,, | Performed by: ANESTHESIOLOGY

## 2020-08-18 PROCEDURE — 63600175 PHARM REV CODE 636 W HCPCS: Performed by: NURSE PRACTITIONER

## 2020-08-18 PROCEDURE — 80053 COMPREHEN METABOLIC PANEL: CPT

## 2020-08-18 RX ORDER — METHOCARBAMOL 500 MG/1
500 TABLET, FILM COATED ORAL 4 TIMES DAILY
Status: DISCONTINUED | OUTPATIENT
Start: 2020-08-18 | End: 2020-08-21 | Stop reason: HOSPADM

## 2020-08-18 RX ORDER — POTASSIUM CHLORIDE 20 MEQ/1
40 TABLET, EXTENDED RELEASE ORAL ONCE
Status: COMPLETED | OUTPATIENT
Start: 2020-08-18 | End: 2020-08-18

## 2020-08-18 RX ORDER — OXYCODONE HYDROCHLORIDE 5 MG/1
10 TABLET ORAL EVERY 4 HOURS PRN
Status: DISCONTINUED | OUTPATIENT
Start: 2020-08-18 | End: 2020-08-21 | Stop reason: HOSPADM

## 2020-08-18 RX ORDER — HYDROCODONE BITARTRATE AND ACETAMINOPHEN 500; 5 MG/1; MG/1
TABLET ORAL
Status: DISCONTINUED | OUTPATIENT
Start: 2020-08-18 | End: 2020-08-20

## 2020-08-18 RX ORDER — OXYCODONE HYDROCHLORIDE 5 MG/1
5 TABLET ORAL EVERY 4 HOURS PRN
Status: DISCONTINUED | OUTPATIENT
Start: 2020-08-18 | End: 2020-08-21 | Stop reason: HOSPADM

## 2020-08-18 RX ORDER — GABAPENTIN 300 MG/1
300 CAPSULE ORAL 3 TIMES DAILY
Status: DISCONTINUED | OUTPATIENT
Start: 2020-08-18 | End: 2020-08-21 | Stop reason: HOSPADM

## 2020-08-18 RX ADMIN — KETAMINE HYDROCHLORIDE 1 MCG/KG/MIN: 100 INJECTION INTRAMUSCULAR; INTRAVENOUS at 10:08

## 2020-08-18 RX ADMIN — GABAPENTIN 500 MG: 250 SOLUTION ORAL at 09:08

## 2020-08-18 RX ADMIN — DIAZEPAM 5 MG: 5 INJECTION, SOLUTION INTRAMUSCULAR; INTRAVENOUS at 01:08

## 2020-08-18 RX ADMIN — METHOCARBAMOL TABLETS 500 MG: 500 TABLET, COATED ORAL at 09:08

## 2020-08-18 RX ADMIN — POTASSIUM CHLORIDE 10 MEQ: 7.46 INJECTION, SOLUTION INTRAVENOUS at 06:08

## 2020-08-18 RX ADMIN — MAGNESIUM SULFATE IN WATER 2 G: 40 INJECTION, SOLUTION INTRAVENOUS at 04:08

## 2020-08-18 RX ADMIN — HEPARIN SODIUM 5000 UNITS: 5000 INJECTION INTRAVENOUS; SUBCUTANEOUS at 01:08

## 2020-08-18 RX ADMIN — PANTOPRAZOLE SODIUM 40 MG: 40 INJECTION, POWDER, LYOPHILIZED, FOR SOLUTION INTRAVENOUS at 09:08

## 2020-08-18 RX ADMIN — ACETAMINOPHEN 1000 MG: 500 TABLET ORAL at 09:08

## 2020-08-18 RX ADMIN — POTASSIUM CHLORIDE 40 MEQ: 1500 TABLET, EXTENDED RELEASE ORAL at 01:08

## 2020-08-18 RX ADMIN — METHOCARBAMOL TABLETS 500 MG: 500 TABLET, COATED ORAL at 04:08

## 2020-08-18 RX ADMIN — ACETAMINOPHEN 1000 MG: 500 TABLET ORAL at 01:08

## 2020-08-18 RX ADMIN — HEPARIN SODIUM 5000 UNITS: 5000 INJECTION INTRAVENOUS; SUBCUTANEOUS at 09:08

## 2020-08-18 RX ADMIN — GABAPENTIN 300 MG: 300 CAPSULE ORAL at 09:08

## 2020-08-18 RX ADMIN — POTASSIUM CHLORIDE 10 MEQ: 7.46 INJECTION, SOLUTION INTRAVENOUS at 07:08

## 2020-08-18 RX ADMIN — HEPARIN SODIUM 5000 UNITS: 5000 INJECTION INTRAVENOUS; SUBCUTANEOUS at 05:08

## 2020-08-18 RX ADMIN — GABAPENTIN 300 MG: 300 CAPSULE ORAL at 04:08

## 2020-08-18 RX ADMIN — DIAZEPAM 5 MG: 5 INJECTION, SOLUTION INTRAMUSCULAR; INTRAVENOUS at 09:08

## 2020-08-18 NOTE — PROGRESS NOTES
[facility]  Neurosurgery  Progress Note    Subjective:     History of Present Illness: Patient is a 46 y/o male with no significant PMH who was transferred from  after falling from 10ft roof at work. Patient denies LOC, complained of excrutiating low back/sacral pain exacerbated with any movement of legs. CT CAP significant for L1 burst fracture with retropulsion into the spinal canal resulting in severe spinal stenosis. Patient states he could feel he had to urinate but could not start a stream, kraus placed and he reports he felt the kraus inserted and felt his bladder release once the kraus was placed. He reports he felt the OSMIN and per report from ED physician, patient had full rectal tone. The patient declined repeat rectal exam. Denies saddle anesthesia. Endorses any movement of lower extremities causes excruciating pain in the sacram and lower back. Denies radicular pain into the legs. Reports slight numbness in left calf. Denies any prior medical conditions or daily medication.     Post-Op Info:  Procedure(s) (LRB):  FUSION, SPINE, LUMBAR T12-L2 [facility]  Spinewave Neuromonitoring   (N/A)   4 Days Post-Op     Interval History: 8/18: NAEON. AFVSS. Exam intact. Needs to be titrated off gtt for TTF.     Medications:  Continuous Infusions:   sodium chloride 0.9% 100 mL/hr at 08/18/20 0602    hydromorphone in 0.9 % NaCl 6 mg/30 ml      ketamine (KETALAR) infusion (titrating) 2.5 mcg/kg/min (08/18/20 0602)     Scheduled Meds:   acetaminophen  1,000 mg Oral Q8H    diazePAM  5 mg Intravenous Q8H    DULoxetine  20 mg Oral Daily    gabapentin  500 mg Per NG tube Q12H    heparin (porcine)  5,000 Units Subcutaneous Q8H    methocarbamol (ROBAXIN) IVPB  1,000 mg Intravenous Q8H    methylnaltrexone  12 mg Subcutaneous Once    pantoprazole  40 mg Intravenous Daily    polyethylene glycol  17 g Per NG tube Daily    senna-docusate 8.6-50 mg  2 tablet Per NG tube Daily     PRN Meds:sodium  chloride, sodium chloride, calcium carbonate, calcium gluconate IVPB, calcium gluconate IVPB, calcium gluconate IVPB, dextrose 50%, dextrose 50%, glucagon (human recombinant), magnesium sulfate IVPB, magnesium sulfate IVPB, naloxone, ondansetron, polyethylene glycol, potassium chloride in water **AND** potassium chloride in water **AND** potassium chloride in water, sodium chloride 0.9%, sodium phosphate IVPB, sodium phosphate IVPB, sodium phosphate IVPB     Review of Systems  Objective:     Weight: 94.9 kg (209 lb 3.5 oz)  Body mass index is 30.02 kg/m².  Vital Signs (Most Recent):  Temp: 98 °F (36.7 °C) (08/18/20 0302)  Pulse: 83 (08/18/20 0602)  Resp: 15 (08/18/20 0602)  BP: 127/81 (08/18/20 0602)  SpO2: 99 % (08/18/20 0602) Vital Signs (24h Range):  Temp:  [98 °F (36.7 °C)-98.5 °F (36.9 °C)] 98 °F (36.7 °C)  Pulse:  [] 83  Resp:  [11-20] 15  SpO2:  [96 %-100 %] 99 %  BP: (117-140)/(68-94) 127/81  Arterial Line BP: (114-155)/(68-98) 125/77                Oxygen Concentration (%):  [28] 28         NG/OG Tube 08/08/20 1415 nasogastric Right nostril (Active)   Placement Check placement verified by distal tube length measurement;placement verified by x-ray 08/18/20 0302   Advancement advanced manually 08/12/20 1500   Tolerance no signs/symptoms of discomfort 08/18/20 0302   Securement secured to nostril center w/ adhesive device 08/18/20 0302   Clamp Status/Tolerance clamped 08/18/20 0302   Suction Setting/Drainage Method low;intermittent setting 08/18/20 0302   Insertion Site Appearance no redness, warmth, tenderness, skin breakdown, drainage 08/18/20 0302   Drainage Green;Brown 08/18/20 0302   Flush/Irrigation flushed w/;water 08/18/20 0302   Feeding Type other (see comments) 08/10/20 1500   Feeding Action feeding held 08/18/20 0302   Intake (mL) 75 mL 08/12/20 0900   Water Bolus (mL) 0 mL 08/12/20 0536   Tube Output(mL)(Include Discarded Residual) 150 mL 08/16/20 1805   Intake (mL) - Formula Tube Feeding 0  "08/10/20 1800            Urethral Catheter 08/05/20 1133 Straight-tip 14 Fr. (Active)   Site Assessment Clean;Intact 08/18/20 0302   Collection Container Urimeter 08/18/20 0302   Securement Method secured to top of thigh w/ adhesive device 08/18/20 0302   Catheter Care Performed yes 08/18/20 0302   Reason for Continuing Urinary Catheterization Urinary retention 08/18/20 0302   CAUTI Prevention Bundle StatLock in place w 1" slack;Intact seal between catheter & drainage tubing;Drainage bag/urimeter off the floor;Green sheeting clip in use;No dependent loops or kinks;Drainage bag/urimeter not overfilled (<2/3 full);Drainage bag/urimeter below bladder 08/17/20 1902   Output (mL) 200 mL 08/18/20 0602       Neurosurgery Physical Exam     GCS 15  AAO x 3  CNi  PERRL, EOMi  fc x 4, FS throughout  SILT     TLSO in place     Incisions c/d/i     Significant Labs:  Recent Labs   Lab 08/16/20  1430 08/17/20  0204 08/18/20  0113   GLU  --  120* 110   NA  --  136 139   K  --  3.7 3.3*   CL  --  101 106   CO2  --  27 25   BUN  --  10 8   CREATININE  --  0.5 0.5   CALCIUM  --  8.1* 8.0*   MG 2.5 1.7 1.5*     Recent Labs   Lab 08/17/20  0204 08/17/20 2000 08/18/20  0113   WBC 9.59 11.83 8.58   HGB 7.6* 8.8* 8.3*   HCT 23.2* 27.0* 24.8*   * 565* 589*     Recent Labs   Lab 08/17/20  0204 08/18/20  0113   APTT 32.3* 30.5     Microbiology Results (last 7 days)     ** No results found for the last 168 hours. **        All pertinent labs from the last 24 hours have been reviewed.    Significant Diagnostics:  I have reviewed all pertinent imaging results/findings within the past 24 hours.   X-ray Abdomen Ap 1 View    Result Date: 8/17/2020  See above Electronically signed by: Vinayak Garcia MD Date:    08/17/2020 Time:    12:53      Assessment/Plan:     * Traumatic compression fracture of L1 lumbar vertebra  46 y/o male with no significant PMH with L1 burst fracture with retropulsion resulting in severe canal stenosis s/p fall from " 10 building     S/p L1 corpectomy with cage insertion and T12-L2 lateral fusion on 8/7. T12-L2 PSIF 8/14:     --Admitted to ICU  --All labs and diagnostics reviewed  --Post-op XRs pending   --MAPs stable off pressors, no goals  --PT/OT/OOB, Pain control, TLSO when OOB  --Close neurological monitoring, notify neurosurgery with any acute changes  --Ileus management per NCC  --Victor/A-line discontinued  --Stable for TTF once sedation and pain medication drips weaned    Dispo: continue ICU care        Camilo Levy MD  Neurosurgery  [facility]

## 2020-08-18 NOTE — PLAN OF CARE
SW received message and left return message for [facility] in [place] ([phone]) regarding wanting additional clinicals and reporting they will follow this Pt. Addressed questions and reporting clinicals were sent.    MARISOL sent updated notes via  to three facilities.     MARISOL spoke with Pt sister (Teofilo: [phone]) regarding preference. She advised the preference is [facility] in [place] per herself and the Pt.     MARISOL spoke with Randa with [facility] [place] ([phone]) reporting the above.     Josefina Woo, VERNON  Neurocritical Care   [facility]  [phone]

## 2020-08-18 NOTE — PT/OT/SLP PROGRESS
Occupational Therapy   Treatment    Name: Oscar Bills  MRN: [id number]  Admitting Diagnosis:  Traumatic compression fracture of L1 lumbar vertebra  4 Days Post-Op  FUSION, SPINE, LUMBAR T12-L2 Ziehm MIS  Spinewave Neuromonitoring   (N/A)     Recommendations:     Discharge Recommendations: rehabilitation facility  Discharge Equipment Recommendations:  (TBD pending progress)  Barriers to discharge:  (increased assistance needed)    Assessment:     Oscar Bills is a 45 y.o. male with a medical diagnosis of Traumatic compression fracture of L1 lumbar vertebra. He presents with performance deficits including weakness, impaired endurance, impaired self care skills, impaired functional mobilty, gait instability, impaired balance, pain, decreased safety awareness, decreased lower extremity function, impaired cardiopulmonary response to activity. Pt motivated to participate and progressing toward goals, but limited by pain and dizziness with OOB activity. Pt would continue to benefit from OT to increase functional independence and safety. Recommend rehab upon D/C to return to PLOF.    Rehab Prognosis: Good; patient would benefit from acute skilled OT services to address these deficits and reach maximum level of function.       Plan:     Patient to be seen 5 x/week to address the above listed problems via self-care/home management, therapeutic exercises, therapeutic activities, neuromuscular re-education  · Plan of Care Expires: 09/15/20  · Plan of Care Reviewed with: patient    Subjective     Pain/Comfort:  · Pain Rating 1: 4/10  · Location 1: back  · Pain Addressed 1: Pre-medicate for activity, Reposition  · Pain Rating Post-Intervention 1: (remains throughout; increases at times)    Objective:     Communicated with: RN prior to session. Patient found supine with arterial line, bed alarm, blood pressure cuff, kraus catheter, NG tube, peripheral IV, pulse ox (continuous), SCD, telemetry upon OT entry to room.    General  Precautions: Standard, fall   Orthopedic Precautions:spinal precautions   Braces: TLSO     Occupational Performance:     Bed Mobility:    · Patient completed Rolling/Turning to Right with minimum assistance  · Patient completed Supine to Sit with minimum assistance     Functional Mobility/Transfers:  · Patient completed Sit <> Stand Transfer with minimum assistance with hand-held assist from EOB  · Functional Mobility: Few feet to bedside chair with Min A hand-held assist-- further mobility not attempted due to pt c/o dizziness and SOB-- O2 89-91% on room air, RN present to don supplemental O2 and sats increased to 94%    Activities of Daily Living:  · Upper Body Dressing: moderate assistance to don TLSO while sitting EOB  · Lower Body Dressing: maximal assistance to adjust socks      AMPAC 6 Click ADL: 14    Treatment & Education:  Pt able to sit EOB with close SBA; donned TLSO and completed functional mobility to bedside chair; pt c/o SOB and dizziness, RN present; discussed POC and reviewed seated therex and to call for assistance before getting up    Patient left up in chair with all lines intact, call button in reach and RN presentEducation:      GOALS:   Multidisciplinary Problems     Occupational Therapy Goals        Problem: Occupational Therapy Goal    Goal Priority Disciplines Outcome Interventions   Occupational Therapy Goal     OT, PT/OT Ongoing, Progressing    Description: Goals to be met by: 8/30/20     Patient will increase functional independence with ADLs by performing:    UE Dressing with Minimal Assistance.  LE Dressing with Maximum Assistance.  Grooming while EOB with Minimal Assistance.  Toileting from bedside commode with Maximum Assistance for hygiene and clothing management.   Sitting at edge of bed x10 minutes with Supervision.  Rolling to Bilateral with Moderate Assistance while maintaining precautions.  Supine to sit with Moderate Assistance while maintaining precautions.  Toilet transfer  to bedside commode with Maximum Assistance.                     Time Tracking:     OT Date of Treatment: 08/18/20  OT Start Time: 1425  OT Stop Time: 1448  OT Total Time (min): 23 min (co-treat with PT)    Billable Minutes:Self Care/Home Management 10  Therapeutic Activity 13    JAILENE Queen  8/18/2020

## 2020-08-18 NOTE — ANESTHESIA POST-OP PAIN MANAGEMENT
Acute Pain Service Progress Note    Oscar Bills is a 45 y.o., male, [id number].  Admitted for L1 burst fracture s/p mechanical fall from roof. OR 8/7 for L1 corpectomy w/cage insertion, T12-L2 lateral fusion, deferred posterior instrumentation at the time due to intraop bleeding, plan for take back 8/11.      Post op course complicated by inadequate analgesia and ileus.  Profound respiratory depression with hypercarbia 8/8, required narcan.      APS consulted for recommendations        Surgery:    08/07 - S/p L1 corpectomy with cage insertion and T12-L2 lateral fusion   08/14 - Posterior Spine Instrumentation      Post Op Day #: 4      Problem List:    Active Hospital Problems    Diagnosis  POA    *Traumatic compression fracture of L1 lumbar vertebra [S32.010A]  Yes    Hypocalcemia [E83.51]  Yes    Ileus [K56.7]  Unknown    Burst fracture of lumbar vertebra, open, initial encounter [S32.001B]  Yes    Spinal stenosis of lumbar region [M48.061]  Yes    Fall from building [W13.9XXA]  Yes    Acute urinary retention [R33.8]  Yes      Resolved Hospital Problems   No resolved problems to display.       Subjective:    Remains in pain, but improved.   Scheduled PO meds were held yesterday due to excessive drowsiness per nursing   Continues to be bothered by abdominal cramping, occasional nausea. No vomiting.   Worked with PT/OT yesterday, to bedside and standing. Tolerated well.      Pain with rest: 5    Numbers   Pain with movement: 7    Numbers      Objective:     Alert, active, oriented.   No respiratory distress.   NGT in place. Intermittently to suction for nausea per nursing   Abdomen soft, slightly distended   PCA - 8 demands/8hrs, total 1.6mg      Vitals   Vitals:    08/18/20 0602   BP: 127/81   Pulse: 83   Resp: 15   Temp:         Labs    No results displayed because visit has over 200 results.           Meds   Current Facility-Administered Medications   Medication Dose Route Frequency Provider Last Rate Last  Dose    0.9%  NaCl infusion (for blood administration)   Intravenous Q24H PRN Camilo Levy MD        0.9%  NaCl infusion (for blood administration)   Intravenous Q24H PRN Patricia Gill PA-C        0.9%  NaCl infusion   Intravenous Continuous Dimitry Alexandre  mL/hr at 08/18/20 0602      acetaminophen tablet 1,000 mg  1,000 mg Oral Q8H Rohith Cardenas MD   Stopped at 08/17/20 1400    calcium carbonate 200 mg calcium (500 mg) chewable tablet 500 mg  500 mg Oral Daily PRN Beckie Horta PA-C   500 mg at 08/12/20 2107    calcium gluconate 1g in dextrose 5% 100mL (ready to mix system)  1 g Intravenous PRN Jai Douglas MD        calcium gluconate 2 g in dextrose 5 % 100 mL IVPB  2 g Intravenous PRN Jai Douglas MD        calcium gluconate 3 g in dextrose 5 % 100 mL IVPB  3 g Intravenous PRN Jai Douglas MD        dextrose 50% injection 12.5 g  12.5 g Intravenous PRN Itzel Jimenez PA-C        dextrose 50% injection 25 g  25 g Intravenous PRN Itzel Jimenez PA-C        diazePAM injection 5 mg  5 mg Intravenous Q8H Jai Douglas MD   Stopped at 08/17/20 1400    DULoxetine DR capsule 20 mg  20 mg Oral Daily Rohith Cardenas MD   Stopped at 08/17/20 1300    gabapentin 250 mg/5 mL (5 mL) solution 500 mg  500 mg Per NG tube Q12H Jai Douglas MD   500 mg at 08/17/20 0902    glucagon (human recombinant) injection 1 mg  1 mg Intramuscular PRN Itzel Jimenez PA-C        heparin (porcine) injection 5,000 Units  5,000 Units Subcutaneous Q8H Zenaida Young NP   5,000 Units at 08/18/20 0540    HYDROmorphone PCA syringe 6 mg/30 mL (0.2 mg/mL) NS   Intravenous Continuous Rohith Cardenas MD        ketamine 500 mg/250 mL (2 mg/mL) in sodium chloride 0.9% infusion  2.5 mcg/kg/min Intravenous Continuous Rohith Cardenas MD 6.8 mL/hr at 08/18/20 0602 2.5 mcg/kg/min at 08/18/20 0602    magnesium sulfate 2g in water 50mL IVPB  (premix)  2 g Intravenous PRN Jai Douglas MD   2 g at 08/18/20 0430    magnesium sulfate 2g in water 50mL IVPB (premix)  4 g Intravenous PRN Jai Douglas MD        methocarbamol 1000 mg in dextrose 5 % 100 mL IVPB (ready to mix system)  1,000 mg Intravenous Q8H Ramona Messer MD   Stopped at 08/17/20 1400    methylnaltrexone injection 12 mg  12 mg Subcutaneous Once Patricia Perkins NP   Stopped at 08/15/20 1347    naloxone 0.4 mg/mL injection 0.02 mg  0.02 mg Intravenous PRN Sandy Katz MD        ondansetron injection 8 mg  8 mg Intravenous Q8H PRN Janeen Gan NP   8 mg at 08/10/20 1737    pantoprazole injection 40 mg  40 mg Intravenous Daily Alvin Maier MD   40 mg at 08/17/20 0902    polyethylene glycol packet 17 g  17 g Per NG tube Daily PRN Zenaida Young NP        polyethylene glycol packet 17 g  17 g Per NG tube Daily Jai Douglas MD   Stopped at 08/16/20 0900    potassium chloride 10 mEq in 100 mL IVPB  40 mEq Intravenous PRN Jai Douglas MD   Stopped at 08/18/20 0405    And    potassium chloride 10 mEq in 100 mL IVPB  60 mEq Intravenous PRN Jai Douglas  mL/hr at 08/18/20 0758 10 mEq at 08/18/20 0758    And    potassium chloride 10 mEq in 100 mL IVPB  80 mEq Intravenous PRN Jai Douglas MD        senna-docusate 8.6-50 mg per tablet 2 tablet  2 tablet Per NG tube Daily Mir Hou MD   Stopped at 08/16/20 0900    sodium chloride 0.9% flush 10 mL  10 mL Intravenous PRN Janeen Gan NP        sodium phosphate 15 mmol in dextrose 5 % 250 mL IVPB  15 mmol Intravenous PRN Jai Douglas MD        sodium phosphate 20.01 mmol in dextrose 5 % 250 mL IVPB  20.01 mmol Intravenous PRN Jai Douglas MD        sodium phosphate 30 mmol in dextrose 5 % 250 mL IVPB  30 mmol Intravenous PRN Jai Douglas MD             Anticoagulant dose heparin 5K TID    Assessment:     Pain control adequate. Scheduled multimodal PO meds were held yesterday due to patient being excessively drowsy and depressed respirations. Continues to use PCA - 8 demands/8 hrs for total of 1.6mg. Patient reports pain to be about the same as yesterday and PCA not helping. Tolerated work with PT/OT. 5 of 10 at rest, 7 of 10 with movement. Mostly bothered by abdominal cramping. Need to start transitioning off PCA and ketamine infusion to oral medications.    Plan:   1) Discontinue PCA pump   2) Decrease ketamine infusion from 2.5 to 1.0 mc/kg/min   3) Encourage use of scheduled po multimodals: tylenol, diazepam, gabapentin, robaxin, cymbalta   4) Oxycodone 5mg and 10mg q4h PRN for moderate and severe pain, respectively   5) Recommend discontinuation of NGT   6) Continue PT/OT    Will continue to follow, call for additional concerns about pain management.      Evaluator Rohith Cardenas MD (PGY-2)

## 2020-08-18 NOTE — ASSESSMENT & PLAN NOTE
Decrease NGT to low intermittent suction   KUB/Mag Citrate productive  Trial of methylnaltrexone - patient refused  8/18/2020: Having significant diarrhea this morning. Abd XR yesterday with questionable ileus.

## 2020-08-18 NOTE — PLAN OF CARE
POC reviewed with pt and family at 1800. Pt verbalized understanding. Questions and concerns addressed with pt. Ketamine and PCA pump d/c'ed. Pain controlled with scheduled medications. 1 unit blood administered. KUB completed. Pt with multiple BMs today. Up to chair with PT/OT. Afebrile. No acute events today. Pt progressing toward goals. Will continue to monitor. See flowsheets for full assessment and VS info.

## 2020-08-18 NOTE — PT/OT/SLP PROGRESS
Physical Therapy Treatment    Patient Name:  Oscar Bills   MRN:  [id number]  Admitting Diagnosis: Traumatic compression fracture of L1 lumbar vertebra  Recent Surgery: Procedure(s) (LRB):  FUSION, SPINE, LUMBAR T12-L2 Ziehm MIS  Spinewave Neuromonitoring   (N/A) 4 Days Post-Op    Recommendations:     Discharge Recommendations:  rehabilitation facility   Discharge Equipment Recommendations: (TBD based on progress with mobility)   Barriers to discharge: decreased functional mobility    Assessment:     Oscar Bills is a 45 y.o. male admitted with a medical diagnosis of Traumatic compression fracture of L1 lumbar vertebra.  He most limited today by pain and decreased endurance. Patient tolerated PT treatment well today. He was able to stand, transfer, and take a few steps with minimal assistance. Focus of treatment was progressing mobility. Pt is progressing well. See detailed treatment note below:    Problem List: weakness, impaired endurance, impaired self care skills, impaired functional mobilty, gait instability, impaired balance, decreased upper extremity function, decreased lower extremity function, pain, orthopedic precautions. weakness, impaired endurance, impaired self care skills, impaired functional mobilty, gait instability, impaired balance, decreased upper extremity function, decreased lower extremity function, pain, orthopedic precautions  Rehab Prognosis: Good     GOALS:   Multidisciplinary Problems     Physical Therapy Goals        Problem: Physical Therapy Goal    Goal Priority Disciplines Outcome Goal Variances Interventions   Physical Therapy Goal     PT, PT/OT Ongoing, Progressing     Description: Goals to be met by:      Patient will increase functional independence with mobility by performin. Supine to sit with Moderate Assistance maintaining precautions. - met          *Updated: Supine to sit with SBA maintaining precautions.  2. Sit to supine with Moderate Assistance maintaining  precautions.   3. Sit to stand transfer with Maximum Assistance with least restrictive device or no AD. - met 8/18         *Sit to stand with SBA with or without AD.  4. Bed to chair transfer with Maximum Assistance using squat pivot technique. - met 8/18         * Bed to chair transfer with SBA with or without AD.  5. Gait  x 5 feet with Moderate Assistance using least restrictive device or no AD.   6. Sitting at edge of bed x10 minutes with Supervision while performing dynamic reaching outside LUÍS in all planes to prepare for functional activities in sitting.   7. Lower extremity exercise program x15 reps per handout, with independence to improve strength and activity tolerance.                    Plan:     During this hospitalization, patient to be seen 5 x/week to address the listed problems via gait training, therapeutic activities, therapeutic exercises, neuromuscular re-education  · Plan of Care Expires:  09/15/20   Plan of Care Reviewed with: patient    Subjective     Communicated with RN prior to session.  Patient found supine upon PT entry to room.     Pain/Comfort:  · Pain Rating 1: 4/10  · Location - Orientation 1: generalized  · Location 1: back  · Pain Addressed 1: Reposition, Distraction  · Pain Rating Post-Intervention 1: 4/10    Objective:     Patient found with: arterial line, bed alarm, blood pressure cuff, kraus catheter, NG tube, peripheral IV, pulse ox (continuous), SCD, telemetry       General Precautions: Standard, Cardiac fall   Orthopedic Precautions:spinal precautions   Braces: TLSO      Oxygen Concentration (%):  [28] 28  Vital Signs (Most Recent):    Temp: 98.5 °F (36.9 °C) (08/18/20 1620)  Pulse: 91 (08/18/20 1620)  Resp: 12 (08/18/20 1620)  BP: (!) 131/91 (08/18/20 1601)  SpO2: 100 % (08/18/20 1620)    Functional Mobility:  Bed Mobility:   · Rolling/Turning to Right: minimum assistance  · Supine to Sit: minimum assistance  · Scooting anteriorly to EOB to have both feet planted on  floor: minimum assistance    Sitting Balance at Edge of Bed:  · Assistance Level Required: standy by assistance    Transfers:   · Sit <> Stand Transfer: minimum assistance with hand-held assist   · Bed <> Chair Transfer: Step Transfer technique with minimum assistance with hand-held assist      Gait:  · Patient ambulated: a few steps for transfer to chair   · Patient required: minimal assist  · Patient used:  hand held  · Gait Deviation(s): Pt took short, shuffling steps    Education:  Patient was educated on the following:   Progress of PT goals and plan of care   In room safety and use of call button   Importance of continued upright mobility and exercise   Log rolling and reverse log rolling   Spinal precautions    Patient left up in chair, with all lines intact, call button in reach and RN notified.    AM-PAC 6 CLICK MOBILITY  Turning over in bed (including adjusting bedclothes, sheets and blankets)?: 3  Sitting down on and standing up from a chair with arms (e.g., wheelchair, bedside commode, etc.): 3  Moving from lying on back to sitting on the side of the bed?: 3  Moving to and from a bed to a chair (including a wheelchair)?: 3  Need to walk in hospital room?: 3  Climbing 3-5 steps with a railing?: 2  Basic Mobility Total Score: 17       Time Tracking:     PT Received On: 08/18/20  PT Start Time: 1429     PT Stop Time: 1452  PT Total Time (min): 23 min     Billable Minutes:   · Gait Training 10 and Therapeutic Activity 13    Treatment Type: Treatment  PT/PTA: PT       Pricilla Stern PT, DPT  08/18/2020

## 2020-08-18 NOTE — SUBJECTIVE & OBJECTIVE
Interval History: 8/18: NAEON. AFVSS. Exam intact. Needs to be titrated off gtt for TTF.     Medications:  Continuous Infusions:   sodium chloride 0.9% 100 mL/hr at 08/18/20 0602    hydromorphone in 0.9 % NaCl 6 mg/30 ml      ketamine (KETALAR) infusion (titrating) 2.5 mcg/kg/min (08/18/20 0602)     Scheduled Meds:   acetaminophen  1,000 mg Oral Q8H    diazePAM  5 mg Intravenous Q8H    DULoxetine  20 mg Oral Daily    gabapentin  500 mg Per NG tube Q12H    heparin (porcine)  5,000 Units Subcutaneous Q8H    methocarbamol (ROBAXIN) IVPB  1,000 mg Intravenous Q8H    methylnaltrexone  12 mg Subcutaneous Once    pantoprazole  40 mg Intravenous Daily    polyethylene glycol  17 g Per NG tube Daily    senna-docusate 8.6-50 mg  2 tablet Per NG tube Daily     PRN Meds:sodium chloride, sodium chloride, calcium carbonate, calcium gluconate IVPB, calcium gluconate IVPB, calcium gluconate IVPB, dextrose 50%, dextrose 50%, glucagon (human recombinant), magnesium sulfate IVPB, magnesium sulfate IVPB, naloxone, ondansetron, polyethylene glycol, potassium chloride in water **AND** potassium chloride in water **AND** potassium chloride in water, sodium chloride 0.9%, sodium phosphate IVPB, sodium phosphate IVPB, sodium phosphate IVPB     Review of Systems  Objective:     Weight: 94.9 kg (209 lb 3.5 oz)  Body mass index is 30.02 kg/m².  Vital Signs (Most Recent):  Temp: 98 °F (36.7 °C) (08/18/20 0302)  Pulse: 83 (08/18/20 0602)  Resp: 15 (08/18/20 0602)  BP: 127/81 (08/18/20 0602)  SpO2: 99 % (08/18/20 0602) Vital Signs (24h Range):  Temp:  [98 °F (36.7 °C)-98.5 °F (36.9 °C)] 98 °F (36.7 °C)  Pulse:  [] 83  Resp:  [11-20] 15  SpO2:  [96 %-100 %] 99 %  BP: (117-140)/(68-94) 127/81  Arterial Line BP: (114-155)/(68-98) 125/77                Oxygen Concentration (%):  [28] 28         NG/OG Tube 08/08/20 1415 nasogastric Right nostril (Active)   Placement Check placement verified by distal tube length measurement;placement  "verified by x-ray 08/18/20 0302   Advancement advanced manually 08/12/20 1500   Tolerance no signs/symptoms of discomfort 08/18/20 0302   Securement secured to nostril center w/ adhesive device 08/18/20 0302   Clamp Status/Tolerance clamped 08/18/20 0302   Suction Setting/Drainage Method low;intermittent setting 08/18/20 0302   Insertion Site Appearance no redness, warmth, tenderness, skin breakdown, drainage 08/18/20 0302   Drainage Green;Brown 08/18/20 0302   Flush/Irrigation flushed w/;water 08/18/20 0302   Feeding Type other (see comments) 08/10/20 1500   Feeding Action feeding held 08/18/20 0302   Intake (mL) 75 mL 08/12/20 0900   Water Bolus (mL) 0 mL 08/12/20 0536   Tube Output(mL)(Include Discarded Residual) 150 mL 08/16/20 1805   Intake (mL) - Formula Tube Feeding 0 08/10/20 1800            Urethral Catheter 08/05/20 1133 Straight-tip 14 Fr. (Active)   Site Assessment Clean;Intact 08/18/20 0302   Collection Container Urimeter 08/18/20 0302   Securement Method secured to top of thigh w/ adhesive device 08/18/20 0302   Catheter Care Performed yes 08/18/20 0302   Reason for Continuing Urinary Catheterization Urinary retention 08/18/20 0302   CAUTI Prevention Bundle StatLock in place w 1" slack;Intact seal between catheter & drainage tubing;Drainage bag/urimeter off the floor;Green sheeting clip in use;No dependent loops or kinks;Drainage bag/urimeter not overfilled (<2/3 full);Drainage bag/urimeter below bladder 08/17/20 1902   Output (mL) 200 mL 08/18/20 0602       Neurosurgery Physical Exam     GCS 15  AAO x 3  CNi  PERRL, EOMi  fc x 4, FS throughout  SILT     TLSO in place     Incisions c/d/i     Significant Labs:  Recent Labs   Lab 08/16/20  1430 08/17/20  0204 08/18/20  0113   GLU  --  120* 110   NA  --  136 139   K  --  3.7 3.3*   CL  --  101 106   CO2  --  27 25   BUN  --  10 8   CREATININE  --  0.5 0.5   CALCIUM  --  8.1* 8.0*   MG 2.5 1.7 1.5*     Recent Labs   Lab 08/17/20  0204 08/17/20 2000 " 08/18/20  0113   WBC 9.59 11.83 8.58   HGB 7.6* 8.8* 8.3*   HCT 23.2* 27.0* 24.8*   * 565* 589*     Recent Labs   Lab 08/17/20  0204 08/18/20  0113   APTT 32.3* 30.5     Microbiology Results (last 7 days)     ** No results found for the last 168 hours. **        All pertinent labs from the last 24 hours have been reviewed.    Significant Diagnostics:  I have reviewed all pertinent imaging results/findings within the past 24 hours.   X-ray Abdomen Ap 1 View    Result Date: 8/17/2020  See above Electronically signed by: Vinayak Garcia MD Date:    08/17/2020 Time:    12:53

## 2020-08-18 NOTE — ASSESSMENT & PLAN NOTE
46 y/o male with no significant PMH with L1 burst fracture with retropulsion resulting in severe canal stenosis s/p fall from [employer] building     S/p L1 corpectomy with cage insertion and T12-L2 lateral fusion on 8/7. T12-L2 PSIF 8/14:     --Admitted to ICU  --All labs and diagnostics reviewed  --Post-op XRs pending   --MAPs stable off pressors, no goals  --PT/OT/OOB, Pain control, TLSO when OOB  --Close neurological monitoring, notify neurosurgery with any acute changes  --Ileus management per [facility]  --Victor/A-line discontinued  --Stable for TTF once sedation and pain medication drips weaned    Dispo: continue ICU care

## 2020-08-18 NOTE — ASSESSMENT & PLAN NOTE
S/P L1 CORPECTOMY, SPINE, LUMBAR  (Left)  FUSION, SPINE, LUMBAR, TLIF T12-L2 Interbody Fusion with Instrumentation - Lateral (Left)    hard copy

## 2020-08-18 NOTE — PLAN OF CARE
Problem: Physical Therapy Goal  Goal: Physical Therapy Goal  Description: Goals to be met by:      Patient will increase functional independence with mobility by performin. Supine to sit with Moderate Assistance maintaining precautions. - met          *Updated: Supine to sit with SBA maintaining precautions.  2. Sit to supine with Moderate Assistance maintaining precautions.   3. Sit to stand transfer with Maximum Assistance with least restrictive device or no AD. - met          *Sit to stand with SBA with or without AD.  4. Bed to chair transfer with Maximum Assistance using squat pivot technique. - met          * Bed to chair transfer with SBA with or without AD.  5. Gait  x 5 feet with Moderate Assistance using least restrictive device or no AD.   6. Sitting at edge of bed x10 minutes with Supervision while performing dynamic reaching outside LUÍS in all planes to prepare for functional activities in sitting.   7. Lower extremity exercise program x15 reps per handout, with independence to improve strength and activity tolerance.     Outcome: Ongoing, Progressing     Pt's goals have been updated and remain appropriate and pt will continue to benefit from skilled PT services to work towards improved functional mobility.    Pricilla Stern, PT, DPT  2020

## 2020-08-18 NOTE — ASSESSMENT & PLAN NOTE
45 y.o male with no significant PMHx was transferred from  to [facility] with CT CAP significant for L1 burst fracture with retropulsion into the spinal canal resulting in severe spinal stenosis s/p falling 10ft from roof. He will be admitted to the Neuro ICU s/p  L1 CORPECTOMY and TLIF T12-L2 Interbody Instrumentation.     -Neurosurgery Following  -Neuro checks q1hr   -Vital signs q1hr  -Daily labs  -Dilaudid PCA, stopped today  Continue prn Zofran   pain management following  Ketamine gtt managed by Anesthesia pain mgmt  PT/OT   Transfer to floor when able to wean ketamine

## 2020-08-18 NOTE — PLAN OF CARE
[facility] Care Plan    POC reviewed with Oscar Bills and family at 0200. Pt verbalized understanding. Questions and concerns addressed. Scheduled pain medication held overnight (see note). Pt slept better tonight. No acute events overnight. Pt progressing toward goals. Will continue to monitor. See below and flowsheets for full assessment and VS info.       Neuro:  Mateo Coma Scale  Best Eye Response: 4-->(E4) spontaneous  Best Motor Response: 6-->(M6) obeys commands  Best Verbal Response: 5-->(V5) oriented  Mateo Coma Scale Score: 15  Assessment Qualifiers: no eye obstruction present  Pupil PERRLA: yes     24hr Temp:  [98 °F (36.7 °C)-98.5 °F (36.9 °C)]     CV:   Rhythm: normal sinus rhythm  BP goals:   SBP < 180  MAP >  80    Resp:   O2 Device (Oxygen Therapy): nasal cannula  Oxygen Concentration (%): 28    Plan: N/A    GI/:  NEFTALI Total Score: 20  Diet/Nutrition Received: NPO  Last Bowel Movement: 08/18/20  Voiding Characteristics: urethral catheter (bladder)    Intake/Output Summary (Last 24 hours) at 8/18/2020 0434  Last data filed at 8/18/2020 0402  Gross per 24 hour   Intake 3323.07 ml   Output 990 ml   Net 2333.07 ml       Labs/Accuchecks:  Recent Labs   Lab 08/18/20  0113   WBC 8.58   RBC 2.70*   HGB 8.3*   HCT 24.8*   *      Recent Labs   Lab 08/18/20  0113      K 3.3*   CO2 25      BUN 8   CREATININE 0.5   ALKPHOS 112   ALT 55*   AST 78*   BILITOT 0.5      Recent Labs   Lab 08/15/20  0145  08/18/20  0113   INR 1.0  --   --    APTT 28.2   < > 30.5    < > = values in this interval not displayed.    No results for input(s): CPK, CPKMB, TROPONINI, MB in the last 168 hours.    Electrolytes: Replacing  Accuchecks: none    Gtts:   sodium chloride 0.9% 100 mL/hr at 08/18/20 0402    hydromorphone in 0.9 % NaCl 6 mg/30 ml      ketamine (KETALAR) infusion (titrating) 2.5 mcg/kg/min (08/18/20 0402)       LDA/Wounds:  Lines/Drains/Airways       Drain                   Urethral Catheter 08/05/20  1133 Straight-tip 14 Fr. 12 days         NG/OG Tube 08/08/20 1415 nasogastric Right nostril 9 days              Arterial Line                   Arterial Line 08/14/20 1355 Left Radial 3 days              Peripheral Intravenous Line                   Peripheral IV - Single Lumen 08/14/20 0615 20 G Left Antecubital 3 days         Peripheral IV - Single Lumen 08/16/20 0900 18 G Right Wrist 1 day         Peripheral IV - Single Lumen 08/17/20 0502 20 G Right Forearm less than 1 day                  Wounds: Yes (back staples)  Wound care consulted: No

## 2020-08-18 NOTE — PROGRESS NOTES
[facility]  Neurocritical Care  Progress Note    Admit Date: 8/5/2020  Service Date: 08/18/2020  Length of Stay: 13    Subjective:     Chief Complaint: Traumatic compression fracture of L1 lumbar vertebra    History of Present Illness: Patient is a 46 y/o male with no significant PMH who was transferred from  after falling from 10ft roof at work. Patient denies LOC, complained of excrutiating low back/sacral pain exacerbated with any movement of legs. CT CAP significant for L1 burst fracture with retropulsion into the spinal canal resulting in severe spinal stenosis. Patient states he could feel he had to urinate but could not start a stream, kraus placed and he reports he felt the kraus inserted and felt his bladder release once the kraus was placed. He reports he felt the OSMIN and per report from ED physician, patient had full rectal tone. He is being admitted to Neuro ICU for a higher level of neurological care s/p L1 CORPECTOMY and TLIF T12-L2 Interbody Instrumentation.    Hospital Course: 08/07/2020: Admit to Neuro ICU.  08/08/2020: Added long acting pain medications. Pain better controlled. Stable for stepdown.   08/09/2020: Acute pain management consulted. Ketamine gtt. Ilues, NGT. Suppository.    08/10/2020: Appreciate pain management reccs. Plans for OR tomorrow.  08/11/2020 ABG, KUB/CXR, Mag citrate, cont ketamine  08/12/2020  Surgery rescheduled 8/181L bolus continue IVF ketamine /PCA. Follow Na dc suction, start clear liquid  08/13/2020 wean ketamine gtt, heparin  08/14/2020 for surgery today; continue pain mgmt gtt  08/15/2020 continued post-op pain, APS following, increased ketamine infusion. Advancing diet, advancing bowel regimen, trial of methylnaltrexone.   8/16/2020: add regular diet  8/17/2020: concern for ileus, made NPO. Abd XR  8/18/2020: tolerating PO, decreased ketamine gtt, stopped PCA today.    Interval History: No acute events overnight. Patient remained afebrile  and hemodynamically stable. Reports intermittent bouts of pain but relatively well controlled. APS decreased ketamine gtt, stopped PCA this morning, transitioning to PO pain control.     Review of Systems   Constitutional: Negative for fever.   HENT: Negative for trouble swallowing and voice change.    Eyes: Negative for visual disturbance.   Respiratory: Negative for shortness of breath and wheezing.    Cardiovascular: Negative for palpitations.   Gastrointestinal: Positive for diarrhea.   Endocrine: Negative for polyuria.   Genitourinary: Negative for difficulty urinating.   Musculoskeletal: Positive for back pain.   Neurological: Positive for weakness. Negative for dizziness and headaches.   Psychiatric/Behavioral: Negative for agitation.      Objective:     Vitals:  Temp: 97.7 °F (36.5 °C)  Pulse: 83  Rhythm: normal sinus rhythm  BP: 124/87  MAP (mmHg): 101  Resp: (!) 7  ETCO2 (mmHg): 32 mmHg  SpO2: 99 %  Oxygen Concentration (%): 28  O2 Device (Oxygen Therapy): nasal cannula    Temp  Min: 97.7 °F (36.5 °C)  Max: 98.5 °F (36.9 °C)  Pulse  Min: 82  Max: 108  BP  Min: 117/88  Max: 140/93  MAP (mmHg)  Min: 88  Max: 111  Resp  Min: 7  Max: 20  ETCO2 (mmHg)  Min: 32 mmHg  Max: 33 mmHg  SpO2  Min: 98 %  Max: 100 %  Oxygen Concentration (%)  Min: 28  Max: 28    08/17 0701 - 08/18 0700  In: 3327.7 [I.V.:2877.7]  Out: 1140 [Urine:1140]   Unmeasured Output  Stool Occurrence: 2  Pad Count: 1     Physical Exam  Vitals signs and nursing note reviewed.   Constitutional:       General: He is not in acute distress.     Appearance: He is normal weight.   HENT:      Head: Normocephalic and atraumatic.      Nose: Nose normal.   Eyes:      General: No scleral icterus.     Extraocular Movements: Extraocular movements intact.      Conjunctiva/sclera: Conjunctivae normal.      Pupils: Pupils are equal, round, and reactive to light.   Neck:      Musculoskeletal: No neck rigidity.   Cardiovascular:      Rate and Rhythm: Normal rate and  regular rhythm.      Pulses: Normal pulses.      Heart sounds: Normal heart sounds.   Pulmonary:      Effort: Pulmonary effort is normal. No respiratory distress.      Breath sounds: Normal breath sounds.   Abdominal:      General: Abdomen is flat.      Palpations: Abdomen is soft.   Skin:     General: Skin is warm.      Capillary Refill: Capillary refill takes less than 2 seconds.   Neurological:      Mental Status: He is alert and oriented to person, place, and time.      GCS: GCS eye subscore is 4. GCS verbal subscore is 5. GCS motor subscore is 6.      Motor: Weakness present.      Comments: Awake, oriented  PERRLA  EOMI  Moving extremities spontaneously   Weakness in RLE   Psychiatric:         Mood and Affect: Mood normal.       Medications:  Continuous  sodium chloride 0.9%, Last Rate: 100 mL/hr at 08/18/20 0900  ketamine (KETALAR) infusion (titrating), Last Rate: 1 mcg/kg/min (08/18/20 0900)    Scheduled  acetaminophen, 1,000 mg, Q8H  diazePAM, 5 mg, Q8H  DULoxetine, 20 mg, Daily  gabapentin, 500 mg, Q12H  heparin (porcine), 5,000 Units, Q8H  methocarbamol (ROBAXIN) IVPB, 1,000 mg, Q8H  methylnaltrexone, 12 mg, Once  pantoprazole, 40 mg, Daily  polyethylene glycol, 17 g, Daily  senna-docusate 8.6-50 mg, 2 tablet, Daily    PRN  sodium chloride, Q24H PRN  calcium carbonate, 500 mg, Daily PRN  calcium gluconate IVPB, 3 g, PRN  dextrose 50%, 12.5 g, PRN  glucagon (human recombinant), 1 mg, PRN  magnesium sulfate IVPB, 2 g, PRN  naloxone, 0.02 mg, PRN  ondansetron, 8 mg, Q8H PRN  oxyCODONE, 10 mg, Q4H PRN  oxyCODONE, 5 mg, Q4H PRN  polyethylene glycol, 17 g, Daily PRN  potassium chloride in water, 40 mEq, PRN  sodium chloride 0.9%, 10 mL, PRN  sodium phosphate IVPB, 30 mmol, PRN    Today I personally reviewed pertinent medications, lines/drains/airways, imaging, and laboratory results.    Diet  Diet NPO    Assessment/Plan:     Neuro  * Traumatic compression fracture of L1 lumbar vertebra  45 y.o male with no  significant PMHx was transferred from  to [facility] [place] with [place] CAP significant for L1 burst fracture with retropulsion into the spinal canal resulting in severe spinal stenosis s/p falling 10ft from roof. He will be admitted to the Neuro ICU s/p  L1 CORPECTOMY and TLIF T12-L2 Interbody Instrumentation.     -Neurosurgery Following  -Neuro checks q1hr   -Vital signs q1hr  -Daily labs  -Dilaudid PCA, stopped today  Continue prn Zofran   pain management following  Ketamine gtt managed by Anesthesia pain mgmt  PT/OT   Transfer to floor when able to wean ketamine     Spinal stenosis of lumbar region  S/P L1 CORPECTOMY, SPINE, LUMBAR  (Left)  FUSION, SPINE, LUMBAR, TLIF T12-L2 Interbody Fusion with Instrumentation - Lateral (Left)     Renal/  Hypocalcemia  CMP Daily  Electrolyte Replacements PRN      Acute urinary retention  Victor placed, continue to monitor strict I's and O's    GI  Ileus  Decrease NGT to low intermittent suction   KUB/Mag Citrate productive  Trial of methylnaltrexone - patient refused  8/18/2020: Having significant diarrhea this morning. Abd XR yesterday with questionable ileus.     Orthopedic  Fall from building  See above    Other  Burst fracture of lumbar vertebra, open, initial encounter  -see traumatic compression fracture of L1 lumbar fracture         The patient is being Prophylaxed for:  Venous Thromboembolism with: Chemical  Stress Ulcer with: PPI  Ventilator Pneumonia with: not applicable    Activity Orders          Diet NPO: NPO starting at 08/17 1203    Commode at bedside starting at 08/07 0746        Full Code    Néstor Yao MD, PGY-1  Neurocritical Care  [facility]

## 2020-08-18 NOTE — NURSING
Notified provider MD Dewey of pt deep sleep and not complaining of pain. Discussed with provider about reasons to hold scheduled pain meds if pt was in deep sleep and no reported pain via patient. Nurse worried of possibility of having to use narcan due to end tital CO2 alarming due to deep sleep. Provider communicated okay to hold pain medications that are scheduled unless patient complains of pain and is within order parameters. Will continue to monitor.

## 2020-08-18 NOTE — SUBJECTIVE & OBJECTIVE
Interval History: No acute events overnight. Patient remained afebrile and hemodynamically stable. Reports intermittent bouts of pain but relatively well controlled. APS decreased ketamine gtt, stopped PCA this morning, transitioning to PO pain control.     Review of Systems   Constitutional: Negative for fever.   HENT: Negative for trouble swallowing and voice change.    Eyes: Negative for visual disturbance.   Respiratory: Negative for shortness of breath and wheezing.    Cardiovascular: Negative for palpitations.   Gastrointestinal: Positive for diarrhea.   Endocrine: Negative for polyuria.   Genitourinary: Negative for difficulty urinating.   Musculoskeletal: Positive for back pain.   Neurological: Positive for weakness. Negative for dizziness and headaches.   Psychiatric/Behavioral: Negative for agitation.      Objective:     Vitals:  Temp: 97.7 °F (36.5 °C)  Pulse: 83  Rhythm: normal sinus rhythm  BP: 124/87  MAP (mmHg): 101  Resp: (!) 7  ETCO2 (mmHg): 32 mmHg  SpO2: 99 %  Oxygen Concentration (%): 28  O2 Device (Oxygen Therapy): nasal cannula    Temp  Min: 97.7 °F (36.5 °C)  Max: 98.5 °F (36.9 °C)  Pulse  Min: 82  Max: 108  BP  Min: 117/88  Max: 140/93  MAP (mmHg)  Min: 88  Max: 111  Resp  Min: 7  Max: 20  ETCO2 (mmHg)  Min: 32 mmHg  Max: 33 mmHg  SpO2  Min: 98 %  Max: 100 %  Oxygen Concentration (%)  Min: 28  Max: 28    08/17 0701 - 08/18 0700  In: 3327.7 [I.V.:2877.7]  Out: 1140 [Urine:1140]   Unmeasured Output  Stool Occurrence: 2  Pad Count: 1     Physical Exam  Vitals signs and nursing note reviewed.   Constitutional:       General: He is not in acute distress.     Appearance: He is normal weight.   HENT:      Head: Normocephalic and atraumatic.      Nose: Nose normal.   Eyes:      General: No scleral icterus.     Extraocular Movements: Extraocular movements intact.      Conjunctiva/sclera: Conjunctivae normal.      Pupils: Pupils are equal, round, and reactive to light.   Neck:      Musculoskeletal: No  neck rigidity.   Cardiovascular:      Rate and Rhythm: Normal rate and regular rhythm.      Pulses: Normal pulses.      Heart sounds: Normal heart sounds.   Pulmonary:      Effort: Pulmonary effort is normal. No respiratory distress.      Breath sounds: Normal breath sounds.   Abdominal:      General: Abdomen is flat.      Palpations: Abdomen is soft.   Skin:     General: Skin is warm.      Capillary Refill: Capillary refill takes less than 2 seconds.   Neurological:      Mental Status: He is alert and oriented to person, place, and time.      GCS: GCS eye subscore is 4. GCS verbal subscore is 5. GCS motor subscore is 6.      Motor: Weakness present.      Comments: Awake, oriented  PERRLA  EOMI  Moving extremities spontaneously   Weakness in RLE   Psychiatric:         Mood and Affect: Mood normal.       Medications:  Continuous  sodium chloride 0.9%, Last Rate: 100 mL/hr at 08/18/20 0900  ketamine (KETALAR) infusion (titrating), Last Rate: 1 mcg/kg/min (08/18/20 0900)    Scheduled  acetaminophen, 1,000 mg, Q8H  diazePAM, 5 mg, Q8H  DULoxetine, 20 mg, Daily  gabapentin, 500 mg, Q12H  heparin (porcine), 5,000 Units, Q8H  methocarbamol (ROBAXIN) IVPB, 1,000 mg, Q8H  methylnaltrexone, 12 mg, Once  pantoprazole, 40 mg, Daily  polyethylene glycol, 17 g, Daily  senna-docusate 8.6-50 mg, 2 tablet, Daily    PRN  sodium chloride, Q24H PRN  calcium carbonate, 500 mg, Daily PRN  calcium gluconate IVPB, 3 g, PRN  dextrose 50%, 12.5 g, PRN  glucagon (human recombinant), 1 mg, PRN  magnesium sulfate IVPB, 2 g, PRN  naloxone, 0.02 mg, PRN  ondansetron, 8 mg, Q8H PRN  oxyCODONE, 10 mg, Q4H PRN  oxyCODONE, 5 mg, Q4H PRN  polyethylene glycol, 17 g, Daily PRN  potassium chloride in water, 40 mEq, PRN  sodium chloride 0.9%, 10 mL, PRN  sodium phosphate IVPB, 30 mmol, PRN    Today I personally reviewed pertinent medications, lines/drains/airways, imaging, and laboratory results.    Diet  Diet NPO

## 2020-08-18 NOTE — OP NOTE
[facility]  Neurosurgery  Operative Note    OP Note      Date of Procedure: 8/14/2020       Pre-Operative Diagnosis: Traumatic compression fracture of L1 lumbar vertebra [S32.010A]    Post-Operative Diagnosis: Post-Op Diagnosis Codes:     * Traumatic compression fracture of L1 lumbar vertebra [S32.010A]    Anesthesia: General    Procedures performed:  Posterior approach for T12-L1, L1-L2 posterolateral instrumented fusion 2.  Non segmented posterior instrumentation with minimally invasive technique and use of neuro navigation.      Surgeon: Jamison Perez MD    Assistant::  Ms. Chery Dominique MD no resident physician was available to assist the operation.    Indication for Procedure:  The this is a patient who had suffered a traumatic unstable L1 fracture that had anterior lateral approach for stabilization last week and now requires posterior approach for more in depth stabilization.    Operative Note:  Patient was anesthetized and intubated by anesthesia.  Placed onto the prone position.  The back was prepped and draped in sterile fashion.  We padded the lateral incision appropriately.  We used fluoroscopy to localize appropriate level then use the Siemens fluoroscopy and navigation to localized the pedicles at T12 L1 and L2.  We then made a paramedian skin incisions and then use fluoroscopy with Z navigation to navigate and cannulate the pedicles bilaterally at T12 and at L2.  With navigate around the lateral screws placed into the vertebral bodies from the lateral plate.  Once were happy with the positioning of the screws.  We stimulated the screws to ensure that it was stimulating at the appropriate level.  Then we used minimally invasive approach to rough up the facets and laid down morcellized allograft at T12-L1 for T12-L1 posterolateral fusion then L1-L2 for that level.  We since we skipped the fractured level we then placed a kenneth bilaterally at T12-L2 for completion of the non segmented  instrumentation.  We then irrigated closed the wound in layers we did a 3D navigation spin to be able to confirm appropriate placement of the pedicle screws.  Once were happy with this we then placed sterile dressing extubated patient take the patient to recovery room without any problems or complication.    EBL:min  Specimen Sent: none

## 2020-08-19 LAB
ALBUMIN SERPL BCP-MCNC: 2.3 G/DL (ref 3.5–5.2)
ALP SERPL-CCNC: 118 U/L (ref 55–135)
ALT SERPL W/O P-5'-P-CCNC: 58 U/L (ref 10–44)
ANION GAP SERPL CALC-SCNC: 9 MMOL/L (ref 8–16)
APTT BLDCRRT: 28.2 SEC (ref 21–32)
AST SERPL-CCNC: 64 U/L (ref 10–40)
BASOPHILS # BLD AUTO: 0.02 K/UL (ref 0–0.2)
BASOPHILS NFR BLD: 0.2 % (ref 0–1.9)
BILIRUB SERPL-MCNC: 0.9 MG/DL (ref 0.1–1)
BUN SERPL-MCNC: 6 MG/DL (ref 6–20)
CALCIUM SERPL-MCNC: 8.1 MG/DL (ref 8.7–10.5)
CHLORIDE SERPL-SCNC: 105 MMOL/L (ref 95–110)
CO2 SERPL-SCNC: 25 MMOL/L (ref 23–29)
CREAT SERPL-MCNC: 0.5 MG/DL (ref 0.5–1.4)
DIFFERENTIAL METHOD: ABNORMAL
EOSINOPHIL # BLD AUTO: 0.1 K/UL (ref 0–0.5)
EOSINOPHIL NFR BLD: 1.4 % (ref 0–8)
ERYTHROCYTE [DISTWIDTH] IN BLOOD BY AUTOMATED COUNT: 12.9 % (ref 11.5–14.5)
EST. GFR  (AFRICAN AMERICAN): >60 ML/MIN/1.73 M^2
EST. GFR  (NON AFRICAN AMERICAN): >60 ML/MIN/1.73 M^2
GLUCOSE SERPL-MCNC: 102 MG/DL (ref 70–110)
HCT VFR BLD AUTO: 28.7 % (ref 40–54)
HGB BLD-MCNC: 9.7 G/DL (ref 14–18)
IMM GRANULOCYTES # BLD AUTO: 0.17 K/UL (ref 0–0.04)
IMM GRANULOCYTES NFR BLD AUTO: 1.8 % (ref 0–0.5)
LYMPHOCYTES # BLD AUTO: 1.3 K/UL (ref 1–4.8)
LYMPHOCYTES NFR BLD: 12.9 % (ref 18–48)
MAGNESIUM SERPL-MCNC: 1.6 MG/DL (ref 1.6–2.6)
MCH RBC QN AUTO: 30.9 PG (ref 27–31)
MCHC RBC AUTO-ENTMCNC: 33.8 G/DL (ref 32–36)
MCV RBC AUTO: 91 FL (ref 82–98)
MONOCYTES # BLD AUTO: 0.6 K/UL (ref 0.3–1)
MONOCYTES NFR BLD: 5.8 % (ref 4–15)
NEUTROPHILS # BLD AUTO: 7.5 K/UL (ref 1.8–7.7)
NEUTROPHILS NFR BLD: 77.9 % (ref 38–73)
NRBC BLD-RTO: 0 /100 WBC
PHOSPHATE SERPL-MCNC: 3.9 MG/DL (ref 2.7–4.5)
PLATELET # BLD AUTO: 649 K/UL (ref 150–350)
PMV BLD AUTO: 8.7 FL (ref 9.2–12.9)
POTASSIUM SERPL-SCNC: 3.8 MMOL/L (ref 3.5–5.1)
PROT SERPL-MCNC: 5.6 G/DL (ref 6–8.4)
RBC # BLD AUTO: 3.14 M/UL (ref 4.6–6.2)
SODIUM SERPL-SCNC: 139 MMOL/L (ref 136–145)
WBC # BLD AUTO: 9.68 K/UL (ref 3.9–12.7)

## 2020-08-19 PROCEDURE — 99232 PR SUBSEQUENT HOSPITAL CARE,LEVL II: ICD-10-PCS | Mod: ,,, | Performed by: ANESTHESIOLOGY

## 2020-08-19 PROCEDURE — U0003 INFECTIOUS AGENT DETECTION BY NUCLEIC ACID (DNA OR RNA); SEVERE ACUTE RESPIRATORY SYNDROME CORONAVIRUS 2 (SARS-COV-2) (CORONAVIRUS DISEASE [COVID-19]), AMPLIFIED PROBE TECHNIQUE, MAKING USE OF HIGH THROUGHPUT TECHNOLOGIES AS DESCRIBED BY CMS-2020-01-R: HCPCS

## 2020-08-19 PROCEDURE — 80053 COMPREHEN METABOLIC PANEL: CPT

## 2020-08-19 PROCEDURE — 84100 ASSAY OF PHOSPHORUS: CPT

## 2020-08-19 PROCEDURE — 25000003 PHARM REV CODE 250: Performed by: STUDENT IN AN ORGANIZED HEALTH CARE EDUCATION/TRAINING PROGRAM

## 2020-08-19 PROCEDURE — C9113 INJ PANTOPRAZOLE SODIUM, VIA: HCPCS | Performed by: STUDENT IN AN ORGANIZED HEALTH CARE EDUCATION/TRAINING PROGRAM

## 2020-08-19 PROCEDURE — 97803 MED NUTRITION INDIV SUBSEQ: CPT

## 2020-08-19 PROCEDURE — 83735 ASSAY OF MAGNESIUM: CPT

## 2020-08-19 PROCEDURE — 99232 SBSQ HOSP IP/OBS MODERATE 35: CPT | Mod: ,,, | Performed by: ANESTHESIOLOGY

## 2020-08-19 PROCEDURE — 63600175 PHARM REV CODE 636 W HCPCS: Performed by: UROLOGY

## 2020-08-19 PROCEDURE — 63600175 PHARM REV CODE 636 W HCPCS: Performed by: STUDENT IN AN ORGANIZED HEALTH CARE EDUCATION/TRAINING PROGRAM

## 2020-08-19 PROCEDURE — 94761 N-INVAS EAR/PLS OXIMETRY MLT: CPT

## 2020-08-19 PROCEDURE — 85025 COMPLETE CBC W/AUTO DIFF WBC: CPT

## 2020-08-19 PROCEDURE — 63600175 PHARM REV CODE 636 W HCPCS: Performed by: NURSE PRACTITIONER

## 2020-08-19 PROCEDURE — 20000000 HC ICU ROOM

## 2020-08-19 PROCEDURE — 85730 THROMBOPLASTIN TIME PARTIAL: CPT

## 2020-08-19 RX ADMIN — PANTOPRAZOLE SODIUM 40 MG: 40 INJECTION, POWDER, LYOPHILIZED, FOR SOLUTION INTRAVENOUS at 08:08

## 2020-08-19 RX ADMIN — DULOXETINE HYDROCHLORIDE 20 MG: 20 CAPSULE, DELAYED RELEASE ORAL at 09:08

## 2020-08-19 RX ADMIN — DIAZEPAM 5 MG: 5 INJECTION, SOLUTION INTRAMUSCULAR; INTRAVENOUS at 05:08

## 2020-08-19 RX ADMIN — METHOCARBAMOL TABLETS 500 MG: 500 TABLET, COATED ORAL at 08:08

## 2020-08-19 RX ADMIN — METHOCARBAMOL TABLETS 500 MG: 500 TABLET, COATED ORAL at 05:08

## 2020-08-19 RX ADMIN — HEPARIN SODIUM 5000 UNITS: 5000 INJECTION INTRAVENOUS; SUBCUTANEOUS at 05:08

## 2020-08-19 RX ADMIN — DIAZEPAM 5 MG: 5 INJECTION, SOLUTION INTRAMUSCULAR; INTRAVENOUS at 02:08

## 2020-08-19 RX ADMIN — ACETAMINOPHEN 1000 MG: 500 TABLET ORAL at 05:08

## 2020-08-19 RX ADMIN — HEPARIN SODIUM 5000 UNITS: 5000 INJECTION INTRAVENOUS; SUBCUTANEOUS at 02:08

## 2020-08-19 RX ADMIN — METHOCARBAMOL TABLETS 500 MG: 500 TABLET, COATED ORAL at 01:08

## 2020-08-19 RX ADMIN — OXYCODONE HYDROCHLORIDE 5 MG: 5 TABLET ORAL at 06:08

## 2020-08-19 RX ADMIN — ACETAMINOPHEN 1000 MG: 500 TABLET ORAL at 02:08

## 2020-08-19 RX ADMIN — GABAPENTIN 300 MG: 300 CAPSULE ORAL at 02:08

## 2020-08-19 RX ADMIN — METHOCARBAMOL TABLETS 500 MG: 500 TABLET, COATED ORAL at 09:08

## 2020-08-19 RX ADMIN — ACETAMINOPHEN 1000 MG: 500 TABLET ORAL at 09:08

## 2020-08-19 RX ADMIN — GABAPENTIN 300 MG: 300 CAPSULE ORAL at 08:08

## 2020-08-19 RX ADMIN — MAGNESIUM SULFATE IN WATER 2 G: 40 INJECTION, SOLUTION INTRAVENOUS at 05:08

## 2020-08-19 RX ADMIN — GABAPENTIN 300 MG: 300 CAPSULE ORAL at 09:08

## 2020-08-19 RX ADMIN — DIAZEPAM 5 MG: 5 INJECTION, SOLUTION INTRAMUSCULAR; INTRAVENOUS at 09:08

## 2020-08-19 RX ADMIN — HEPARIN SODIUM 5000 UNITS: 5000 INJECTION INTRAVENOUS; SUBCUTANEOUS at 09:08

## 2020-08-19 NOTE — ASSESSMENT & PLAN NOTE
45 y.o male with no significant PMHx was transferred from  to [facility] with CT CAP significant for L1 burst fracture with retropulsion into the spinal canal resulting in severe spinal stenosis s/p falling 10ft from roof. He will be admitted to the Neuro ICU s/p  L1 CORPECTOMY and TLIF T12-L2 Interbody Instrumentation.     -Neurosurgery Following  -Neuro checks q1hr   -Vital signs q1hr  -Daily labs  -Dilaudid PCA stopped 8/18, transitioned to PO   -Continue prn Zofran   pain management following  Ketamine gtt managed by APS - stopped yesterday  PT/OT   Transfer to floor when able to wean ketamine

## 2020-08-19 NOTE — ANESTHESIA POST-OP PAIN MANAGEMENT
Acute Pain Service Progress Note    Oscar Bills is a 45 y.o., male, [id number].  Admitted for L1 burst fracture s/p mechanical fall from roof. OR 8/7 for L1 corpectomy w/cage insertion, T12-L2 lateral fusion, deferred posterior instrumentation at the time due to intraop bleeding, plan for take back 8/11.      Post op course complicated by inadequate analgesia and ileus.  Profound respiratory depression with hypercarbia 8/8, required narcan.      APS consulted for recommendations        Surgery:    08/07 - S/p L1 corpectomy with cage insertion and T12-L2 lateral fusion   08/14 - Posterior Spine Instrumentation       Post Op Day #: 5    Problem List:    Active Hospital Problems    Diagnosis  POA    *Traumatic compression fracture of L1 lumbar vertebra [S32.010A]  Yes    Hypocalcemia [E83.51]  Yes    Ileus [K56.7]  Unknown    Burst fracture of lumbar vertebra, open, initial encounter [S32.001B]  Yes    Spinal stenosis of lumbar region [M48.061]  Yes    Fall from building [W13.9XXA]  Yes    Acute urinary retention [R33.8]  Yes      Resolved Hospital Problems   No resolved problems to display.       Subjective:   Patient alert, oriented, appears comfortable   Reports minimal pain, has not required any oxy prns   Pain with rest: 4    Numbers   Pain with movement: 4    Numbers      Objective:   Alert, active, oriented   No respiratory distress   NGT in place. Intermittently to suction for nausea per nursing   Abdomen soft, slightly distended, improving   Discontinued PCA and ketamine gtt yesterday.        Vitals   Vitals:    08/19/20 1201   BP: 135/81   Pulse: 88   Resp: 19   Temp:         Labs    No results displayed because visit has over 200 results.           Meds   Current Facility-Administered Medications   Medication Dose Route Frequency Provider Last Rate Last Dose    0.9%  NaCl infusion (for blood administration)   Intravenous Q24H PRN Camilo Levy MD        0.9%  NaCl infusion (for blood  administration)   Intravenous Q24H PRN Patricia Gill PA-C        0.9%  NaCl infusion (for blood administration)   Intravenous Q24H PRN Patricia Perkins NP        0.9%  NaCl infusion   Intravenous Continuous Dimitry Alexandre  mL/hr at 08/19/20 1200      acetaminophen tablet 1,000 mg  1,000 mg Oral Q8H Rohith Cardenas MD   1,000 mg at 08/19/20 0509    calcium carbonate 200 mg calcium (500 mg) chewable tablet 500 mg  500 mg Oral Daily PRN Beckie Horta PA-C   500 mg at 08/12/20 2107    calcium gluconate 1g in dextrose 5% 100mL (ready to mix system)  1 g Intravenous PRN Jai Douglas MD        calcium gluconate 2 g in dextrose 5 % 100 mL IVPB  2 g Intravenous PRN Jai Douglas MD        calcium gluconate 3 g in dextrose 5 % 100 mL IVPB  3 g Intravenous PRN Jai Douglas MD        dextrose 50% injection 12.5 g  12.5 g Intravenous PRN Itzel Jimenez PA-C        dextrose 50% injection 25 g  25 g Intravenous PRN Itzel Jimenez PA-C        diazePAM injection 5 mg  5 mg Intravenous Q8H Jai Douglas MD   5 mg at 08/19/20 0509    DULoxetine DR capsule 20 mg  20 mg Oral Daily Rohith Cardenas MD   20 mg at 08/19/20 0900    gabapentin capsule 300 mg  300 mg Oral TID Rohith Cardenas MD   300 mg at 08/19/20 0824    glucagon (human recombinant) injection 1 mg  1 mg Intramuscular PRN Itzel Jimenez PA-C        heparin (porcine) injection 5,000 Units  5,000 Units Subcutaneous Q8H Zenaida Young NP   5,000 Units at 08/19/20 0509    magnesium sulfate 2g in water 50mL IVPB (premix)  2 g Intravenous PRN Jai Douglas MD   2 g at 08/19/20 0507    magnesium sulfate 2g in water 50mL IVPB (premix)  4 g Intravenous PRN Jai Douglas MD        methocarbamoL tablet 500 mg  500 mg Oral QID Rohith Cardenas MD   500 mg at 08/19/20 0824    methylnaltrexone injection 12 mg  12 mg Subcutaneous Once Patricia Perkins NP   Stopped at  08/15/20 1347    naloxone 0.4 mg/mL injection 0.02 mg  0.02 mg Intravenous PRN Sandy Katz MD        ondansetron injection 8 mg  8 mg Intravenous Q8H PRN Janeen Gan, GORDON   8 mg at 08/10/20 1737    oxyCODONE immediate release tablet 10 mg  10 mg Oral Q4H PRN Rohith Cardenas MD        oxyCODONE immediate release tablet 5 mg  5 mg Oral Q4H PRN Rohith Cardenas MD        pantoprazole injection 40 mg  40 mg Intravenous Daily Alvin Maier MD   40 mg at 08/19/20 0825    polyethylene glycol packet 17 g  17 g Per NG tube Daily PRN Zenaida Young NP        polyethylene glycol packet 17 g  17 g Per NG tube Daily Jai Douglas MD   Stopped at 08/16/20 0900    potassium chloride 10 mEq in 100 mL IVPB  40 mEq Intravenous PRN Jai Douglas MD   Stopped at 08/18/20 0405    And    potassium chloride 10 mEq in 100 mL IVPB  60 mEq Intravenous PRN Jai Douglas  mL/hr at 08/18/20 0758 10 mEq at 08/18/20 0758    And    potassium chloride 10 mEq in 100 mL IVPB  80 mEq Intravenous PRN Jai Douglas MD        senna-docusate 8.6-50 mg per tablet 2 tablet  2 tablet Per NG tube Daily Mir Hou MD   Stopped at 08/16/20 0900    sodium chloride 0.9% flush 10 mL  10 mL Intravenous PRN Janeen Gan, NP        sodium phosphate 15 mmol in dextrose 5 % 250 mL IVPB  15 mmol Intravenous PRN Jai Douglas MD        sodium phosphate 20.01 mmol in dextrose 5 % 250 mL IVPB  20.01 mmol Intravenous PRN Jai Douglas MD        sodium phosphate 30 mmol in dextrose 5 % 250 mL IVPB  30 mmol Intravenous PRN Jai Douglas MD            Anticoagulant dose heparin at 5K TID.    Assessment:     Pain control adequate on multimodal regimen. Discontinued PCA pump and ketamine gtt yesterday. Patient doing well. Reports pain 4 of 10. Not requiring any oxycodone prns.    Plan:      Patient doing well, continue  present treatment.    Discontinued PCA pump and ketamine gtt yesterday 8/18   Continue multimodal regimen   Oxycodone prns for moderate/severe pain   Continue PT/OT   Continue management of ileus and NGT per primary service    Will sign off at this time. Please call APS for any additional concerns/questions about pain management.    Evaluator Rohith Cardenas MD (PGY-2)

## 2020-08-19 NOTE — ASSESSMENT & PLAN NOTE
44 y/o male with no significant PMH with L1 burst fracture with retropulsion resulting in severe canal stenosis s/p fall from [employer] building     S/p L1 corpectomy with cage insertion and T12-L2 lateral fusion on 8/7. T12-L2 PSIF 8/14:     --Admitted to ICU  --All labs and diagnostics reviewed  --Post-op XRs pending   --MAPs stable off pressors, no goals  --PT/OT/OOB, Pain control, TLSO when OOB  --Close neurological monitoring, notify neurosurgery with any acute changes  --Ileus management per NCC  --Victor/A-line discontinued  --Stable for TTF once sedation and pain medication drips weaned    Dispo: continue ICU care

## 2020-08-19 NOTE — SUBJECTIVE & OBJECTIVE
Interval History: 8/19: ARELYON. AFVSS. Neuro intact. TTF once off ketamine. BM yesterday    Medications:  Continuous Infusions:   sodium chloride 0.9% 100 mL/hr at 08/19/20 0800     Scheduled Meds:   acetaminophen  1,000 mg Oral Q8H    diazePAM  5 mg Intravenous Q8H    DULoxetine  20 mg Oral Daily    gabapentin  300 mg Oral TID    heparin (porcine)  5,000 Units Subcutaneous Q8H    methocarbamoL  500 mg Oral QID    methylnaltrexone  12 mg Subcutaneous Once    pantoprazole  40 mg Intravenous Daily    polyethylene glycol  17 g Per NG tube Daily    senna-docusate 8.6-50 mg  2 tablet Per NG tube Daily     PRN Meds:sodium chloride, sodium chloride, sodium chloride, calcium carbonate, calcium gluconate IVPB, calcium gluconate IVPB, calcium gluconate IVPB, dextrose 50%, dextrose 50%, glucagon (human recombinant), magnesium sulfate IVPB, magnesium sulfate IVPB, naloxone, ondansetron, oxyCODONE, oxyCODONE, polyethylene glycol, potassium chloride in water **AND** potassium chloride in water **AND** potassium chloride in water, sodium chloride 0.9%, sodium phosphate IVPB, sodium phosphate IVPB, sodium phosphate IVPB     Review of Systems  Objective:     Weight: 94.9 kg (209 lb 3.5 oz)  Body mass index is 30.02 kg/m².  Vital Signs (Most Recent):  Temp: 98.4 °F (36.9 °C) (08/19/20 0701)  Pulse: 83 (08/19/20 0801)  Resp: 16 (08/19/20 0801)  BP: 122/86 (08/19/20 0801)  SpO2: 95 % (08/19/20 0801) Vital Signs (24h Range):  Temp:  [97.8 °F (36.6 °C)-98.6 °F (37 °C)] 98.4 °F (36.9 °C)  Pulse:  [] 83  Resp:  [7-22] 16  SpO2:  [92 %-100 %] 95 %  BP: (120-138)/(73-97) 122/86  Arterial Line BP: (120-148)/(69-88) 133/81     Date 08/19/20 0700 - 08/20/20 0659   Shift 5028-0221 2701-2221 3386-1403 24 Hour Total   INTAKE   I.V.(mL/kg) 210(2.2)   210(2.2)   Shift Total(mL/kg) 210(2.2)   210(2.2)   OUTPUT   Urine(mL/kg/hr) 605   605   Shift Total(mL/kg) 605(6.4)   605(6.4)   Weight (kg) 94.9 94.9 94.9 94.9              Oxygen  "Concentration (%):  [28] 28         NG/OG Tube 08/08/20 1415 nasogastric Right nostril (Active)   Placement Check placement verified by aspirate characteristics;placement verified by distal tube length measurement 08/19/20 0701   Advancement advanced manually 08/12/20 1500   Tolerance no signs/symptoms of discomfort 08/19/20 0701   Securement secured to nostril center w/ adhesive device 08/19/20 0701   Clamp Status/Tolerance clamped 08/19/20 0701   Suction Setting/Drainage Method suction at the bedside;low;intermittent setting 08/19/20 0701   Insertion Site Appearance no redness, warmth, tenderness, skin breakdown, drainage 08/19/20 0701   Drainage Bile 08/19/20 0701   Flush/Irrigation flushed w/;water;no resistance met 08/19/20 0701   Feeding Type other (see comments) 08/10/20 1500   Feeding Action feeding held 08/19/20 0305   Intake (mL) 75 mL 08/12/20 0900   Water Bolus (mL) 0 mL 08/12/20 0536   Tube Output(mL)(Include Discarded Residual) 150 mL 08/16/20 1805   Intake (mL) - Formula Tube Feeding 0 08/10/20 1800            Urethral Catheter 08/05/20 1133 Straight-tip 14 Fr. (Active)   Site Assessment Clean;Intact;Dry 08/19/20 0701   Collection Container Urimeter 08/19/20 0701   Securement Method secured to top of thigh w/ adhesive device 08/19/20 0701   Catheter Care Performed yes 08/19/20 0701   Reason for Continuing Urinary Catheterization Urinary retention 08/19/20 0701   CAUTI Prevention Bundle StatLock in place w 1" slack;Intact seal between catheter & drainage tubing;Drainage bag/urimeter off the floor;Green sheeting clip in use;No dependent loops or kinks;Drainage bag/urimeter not overfilled (<2/3 full);Drainage bag/urimeter below bladder 08/19/20 0701   Output (mL) 280 mL 08/19/20 0800       Neurosurgery Physical Exam     AAOx3, PERRL, EOMI, FS, TM, 5/5 throughout, SILT.    Abdomen soft      Significant Labs:  Recent Labs   Lab 08/18/20  0113 08/19/20  0151    102    139   K 3.3* 3.8    " 105   CO2 25 25   BUN 8 6   CREATININE 0.5 0.5   CALCIUM 8.0* 8.1*   MG 1.5* 1.6     Recent Labs   Lab 08/17/20 2000 08/18/20  0113 08/19/20  0151   WBC 11.83 8.58 9.68   HGB 8.8* 8.3* 9.7*   HCT 27.0* 24.8* 28.7*   * 589* 649*     Recent Labs   Lab 08/18/20  0113 08/19/20  0151   APTT 30.5 28.2     Microbiology Results (last 7 days)     ** No results found for the last 168 hours. **        All pertinent labs from the last 24 hours have been reviewed.    Significant Diagnostics:  I have reviewed all pertinent imaging results/findings within the past 24 hours.   X-ray Abdomen Ap 1 View    Result Date: 8/18/2020  Some decrease in the degree of small-bowel distention since 08/17/2020, with no significant detrimental interval change in the appearance of the abdomen since that time appreciated.  Cholelithiasis is again noted. Electronically signed by: Vinayak Beckman MD Date:    08/18/2020 Time:    14:09

## 2020-08-19 NOTE — PROGRESS NOTES
"[facility]  Adult Nutrition  Progress Note    SUMMARY       Recommendations    1.) ADAT to regular; texture per SLP.   2.) If nutrition support warranted and gut accessible: begin Impact Peptide @ 20mL/hr advancing 10mL q4h to goal rate 50mL/hr to provide 1800 kcals, 113 gm protein and 924mL of free water.   MD to manage fluid.    Hold if GRV >500mL.    TF to meet 97% EEN and 100% EPN.   3.) If gut inaccessible, begin custom TPN: AA 120gm/L; Dextrose 200gm/L; 20% lipids to provide 1660 kcals, 120gm protein; GIR 1.5.   4.) Add MVI.   5.) Daily weights    Goals:   1.) Pt to return/meet >75% EEN and EPN by follow up.   Nutrition Goal Status: progressing towards goal, goal not met  Communication of RD Recs: reviewed with RN    Reason for Assessment    Reason For Assessment: RD follow-up  Diagnosis: other (see comments)(Compression fracture of L1 lumbar vertebra)  Interdisciplinary Rounds: did not attend  General Information Comments: Pt remains NPO due to ileus. Noted regular diet ordered x2 days ago, however back to NPO for recurrent ileus. Pt laying in bed reports nausea is intermittent and typically only twice/day. Pt c/o diarrhea with miralax and senna held at this time. Noted coke and mints to bedside with some consumption. NGT remains in place to LIWS. No other GI distress stated. Wt hx: admit 90.7kg with no new wt x6 days. Updated wt at bedside: 95kg. Pt requested NFPE be postponed at this time. Will honor. Pt at risk for malnutrition.    Nutrition Discharge Planning: Unable to determine at this time    Nutrition Risk Screen    Nutrition Risk Screen: no indicators present    Nutrition/Diet History    Patient Reported Diet/Restrictions/Preferences: general  Spiritual, Cultural Beliefs, Amish Practices, Values that Affect Care: no  Factors Affecting Nutritional Intake: NPO    Anthropometrics    Temp: 98.4 °F (36.9 °C)  Height Method: Stated  Height: 5' 10" (177.8 cm)  Height (inches): 70 " in  Weight Method: Bed Scale  Weight: 95 kg (209 lb 7 oz)  Weight (lb): 209.44 lb  Ideal Body Weight (IBW), Male: 166 lb  % Ideal Body Weight, Male (lb): 120.46 %  BMI (Calculated): 30.1  BMI Grade: 30 - 34.9- obesity - grade I     Lab/Procedures/Meds    Pertinent Labs Reviewed: reviewed  Pertinent Labs Comments: Ca 8.1, albumin 2.3, AST 64, ALT 58  Pertinent Medications Reviewed: reviewed  Pertinent Medications Comments: protonix    Estimated/Assessed Needs    Weight Used For Calorie Calculations: 94.9 kg (209 lb 3.5 oz)  Energy Calorie Requirements (kcal): 1850  Energy Need Method: [place]-St Jeor(no AF)  Protein Requirements: 113-151(1.2-1.5 g/kg)  Weight Used For Protein Calculations: 75.5 kg (166 lb 7.2 oz)(1.5-2.0 g/kg of IBW)     Estimated Fluid Requirement Method: RDA Method(or per MD)  RDA Method (mL): 1850     Nutrition Prescription Ordered    Current Diet Order: NPO    Evaluation of Received Nutrient/Fluid Intake    IV Fluid (mL): 2400  I/O: +3.7L since admit  Energy Calories Required: not meeting needs  Protein Required: not meeting needs  Fluid Required: not meeting needs  Comments: LBM 8/18  Tolerance: tolerating  % Intake of Estimated Energy Needs: 0 - 25 %  % Meal Intake: NPO    Nutrition Risk    Level of Risk/Frequency of Follow-up: low(x1/week)     Assessment and Plan  Nutrition Problem  1.) Inadequate energy intake  2.) Increased nutrient needs     Related to (etiology):   1.) Decreased ability to consume sufficient energy  2.) Physiological causes related to healing     Signs and Symptoms (as evidenced by):   1.) <75% of nutritional needs being met; NPO  2.) POD8 s/p L1 corpectomy with cage insertion and T12-L2 lateral fusion. Pt currently in OR for FUSION, SPINE, LUMBAR, TLIF T12-L2 Interbody Fusion with Instrumentation - Lateral (Left).     Interventions(treatment strategy):  Collaboration of care with other providers  Referral of care     Nutrition Diagnosis Status:   Continues     Monitor and  Evaluation    Food and Nutrient Intake: energy intake, food and beverage intake  Food and Nutrient Adminstration: diet order  Physical Activity and Function: nutrition-related ADLs and IADLs  Anthropometric Measurements: weight, weight change  Biochemical Data, Medical Tests and Procedures: lipid profile, inflammatory profile, glucose/endocrine profile, gastrointestinal profile, electrolyte and renal panel  Nutrition-Focused Physical Findings: overall appearance       Nutrition Follow-Up    RD Follow-up?: Yes

## 2020-08-19 NOTE — PT/OT/SLP PROGRESS
Physical Therapy      Patient Name:  Oscar Bills   MRN:  [id number]    Patient not seen today secondary to Pain. Checked back later this afternoon, and he was on his way to xray. Will continue to follow as he is able to participate.    Pricilla Stern, PT, DPT  8/19/2020

## 2020-08-19 NOTE — PLAN OF CARE
SW received call from [facility].    Stated they can accept pt pending insurance auth and pt tolerating diet.     JULIETTE Carranza requested rehab orders for evaluation.     Rep requested orders to be faxed to .       2:55 PM    SW spoke with pt sister. Stated that [facility] was pt choice. SW informed sister that [facility] is following pt and submitted for authorization.     Advised sister projected d/c date is either Friday or Monday depending on how pt progresses.       Odette Aceves LMSW  Case Management Social Worker   [facility]

## 2020-08-19 NOTE — PROGRESS NOTES
Certification of Assistant at Surgery       Surgery Date: 8/14/2020     Participating Surgeons:  Surgeon(s) and Role:     * Jamison Perez MD - Primary     * Chery Dominique MD - assisting    Procedures:  Procedure(s) (LRB):  FUSION, SPINE, LUMBAR T12-L2 Ziehm MIS  Spinewave Neuromonitoring   (N/A)    Assistant Surgeon's Certification of Necessity:  I understand that section 1842 (b) (6) (d) of the Social Security Act generally prohibits Medicare Part B reasonable charge payment for the services of assistants at surgery in teaching hospitals when qualified residents are available to furnish such services. I certify that the services for which payment is claimed were medically necessary, and that no qualified resident was available to perform the services. I further understand that these services are subject to post-payment review by the Medicare carrier.      Chery Dominique MD    08/19/2020  2:53 PM

## 2020-08-19 NOTE — PLAN OF CARE
[phone],  the patient is accepted pending insurance auth,  tolerating a diet and a Covid Test.   Per Cheryl LEYVA, she will order the Covid test.    Tere Yang RN, CCRN-K, [facility]  Neuro-Critical Care   [phone]          Normal

## 2020-08-19 NOTE — SUBJECTIVE & OBJECTIVE
Interval History: Weaned off ketamine gtt yesterday. Tolerating PO pain medication with pain decently controlled. NGT still in place with high residuals. Pt complaining of nausea which resolves with NGT suctioning, but doesn't want to take PO anti-emetics. Also complaining of continued loose stools, although slightly better than yesterday.    Review of Systems   Constitutional: Negative for fever.   HENT: Negative for trouble swallowing and voice change.    Eyes: Negative for visual disturbance.   Respiratory: Negative for shortness of breath and wheezing.    Cardiovascular: Negative for palpitations.   Gastrointestinal: Positive for diarrhea.   Endocrine: Negative for polyuria.   Genitourinary: Negative for difficulty urinating.   Musculoskeletal: Positive for back pain.   Neurological: Positive for weakness. Negative for dizziness and headaches.   Psychiatric/Behavioral: Negative for agitation.     Objective:     Vitals:  Temp: 98.4 °F (36.9 °C)  Pulse: 91  Rhythm: normal sinus rhythm  BP: (!) 121/94  MAP (mmHg): 106  Resp: 19  SpO2: 95 %  O2 Device (Oxygen Therapy): room air    Temp  Min: 97.9 °F (36.6 °C)  Max: 98.6 °F (37 °C)  Pulse  Min: 80  Max: 124  BP  Min: 120/89  Max: 138/95  MAP (mmHg)  Min: 95  Max: 113  Resp  Min: 10  Max: 22  SpO2  Min: 92 %  Max: 100 %  Oxygen Concentration (%)  Min: 28  Max: 28    08/18 0701 - 08/19 0700  In: 2866.8 [I.V.:2610.5]  Out: 3490 [Urine:2990]   Unmeasured Output  Stool Occurrence: 0  Pad Count: 1       Physical Exam  Vitals signs and nursing note reviewed.   Constitutional:       General: He is not in acute distress.     Appearance: He is normal weight.   HENT:      Head: Normocephalic and atraumatic.      Nose: Nose normal.   Eyes:      General: No scleral icterus.     Extraocular Movements: Extraocular movements intact.      Conjunctiva/sclera: Conjunctivae normal.      Pupils: Pupils are equal, round, and reactive to light.   Neck:      Musculoskeletal: No neck  rigidity.   Cardiovascular:      Rate and Rhythm: Normal rate and regular rhythm.      Pulses: Normal pulses.      Heart sounds: Normal heart sounds.   Pulmonary:      Effort: Pulmonary effort is normal. No respiratory distress.      Breath sounds: Normal breath sounds.   Abdominal:      General: Abdomen is flat.      Palpations: Abdomen is soft.   Skin:     General: Skin is warm.      Capillary Refill: Capillary refill takes less than 2 seconds.   Neurological:      Mental Status: He is alert and oriented to person, place, and time.      GCS: GCS eye subscore is 4. GCS verbal subscore is 5. GCS motor subscore is 6.      Motor: Weakness present.      Comments: Awake, oriented  PERRLA  EOMI  Moving extremities spontaneously   Weakness in RLE   Psychiatric:         Mood and Affect: Mood normal.       Medications:  Continuous  sodium chloride 0.9%, Last Rate: 100 mL/hr at 08/19/20 1000    Scheduled  acetaminophen, 1,000 mg, Q8H  diazePAM, 5 mg, Q8H  DULoxetine, 20 mg, Daily  gabapentin, 300 mg, TID  heparin (porcine), 5,000 Units, Q8H  methocarbamoL, 500 mg, QID  methylnaltrexone, 12 mg, Once  pantoprazole, 40 mg, Daily  polyethylene glycol, 17 g, Daily  senna-docusate 8.6-50 mg, 2 tablet, Daily    PRN  sodium chloride, , Q24H PRN  calcium carbonate, 500 mg, Daily PRN  calcium gluconate IVPB, 1 g, PRN  dextrose 50%, 12.5 g, PRN  glucagon (human recombinant), 1 mg, PRN  magnesium sulfate IVPB, 2 g, PRN  naloxone, 0.02 mg, PRN  ondansetron, 8 mg, Q8H PRN  oxyCODONE, 10 mg, Q4H PRN  oxyCODONE, 5 mg, Q4H PRN  polyethylene glycol, 17 g, Daily PRN  potassium chloride in water, 40 mEq, PRN  sodium chloride 0.9%, 10 mL, PRN  sodium phosphate IVPB, 15 mmol, PRN    Today I personally reviewed pertinent medications, lines/drains/airways, imaging, laboratory results,    Diet  Diet NPO

## 2020-08-19 NOTE — PROGRESS NOTES
[facility]  Neurosurgery  Progress Note    Subjective:     History of Present Illness: Patient is a 44 y/o male with no significant PMH who was transferred from  after falling from 10ft roof at work. Patient denies LOC, complained of excrutiating low back/sacral pain exacerbated with any movement of legs. CT CAP significant for L1 burst fracture with retropulsion into the spinal canal resulting in severe spinal stenosis. Patient states he could feel he had to urinate but could not start a stream, kraus placed and he reports he felt the kraus inserted and felt his bladder release once the kraus was placed. He reports he felt the OSMIN and per report from ED physician, patient had full rectal tone. The patient declined repeat rectal exam. Denies saddle anesthesia. Endorses any movement of lower extremities causes excruciating pain in the sacram and lower back. Denies radicular pain into the legs. Reports slight numbness in left calf. Denies any prior medical conditions or daily medication.     Post-Op Info:  Procedure(s) (LRB):  FUSION, SPINE, LUMBAR T12-L2 [facility]  Spinewave Neuromonitoring   (N/A)   5 Days Post-Op     Interval History: 8/19: SAIMA. AFVSS. Neuro intact. TTF once off ketamine. BM yesterday    Medications:  Continuous Infusions:   sodium chloride 0.9% 100 mL/hr at 08/19/20 0800     Scheduled Meds:   acetaminophen  1,000 mg Oral Q8H    diazePAM  5 mg Intravenous Q8H    DULoxetine  20 mg Oral Daily    gabapentin  300 mg Oral TID    heparin (porcine)  5,000 Units Subcutaneous Q8H    methocarbamoL  500 mg Oral QID    methylnaltrexone  12 mg Subcutaneous Once    pantoprazole  40 mg Intravenous Daily    polyethylene glycol  17 g Per NG tube Daily    senna-docusate 8.6-50 mg  2 tablet Per NG tube Daily     PRN Meds:sodium chloride, sodium chloride, sodium chloride, calcium carbonate, calcium gluconate IVPB, calcium gluconate IVPB, calcium gluconate IVPB, dextrose 50%, dextrose  50%, glucagon (human recombinant), magnesium sulfate IVPB, magnesium sulfate IVPB, naloxone, ondansetron, oxyCODONE, oxyCODONE, polyethylene glycol, potassium chloride in water **AND** potassium chloride in water **AND** potassium chloride in water, sodium chloride 0.9%, sodium phosphate IVPB, sodium phosphate IVPB, sodium phosphate IVPB     Review of Systems  Objective:     Weight: 94.9 kg (209 lb 3.5 oz)  Body mass index is 30.02 kg/m².  Vital Signs (Most Recent):  Temp: 98.4 °F (36.9 °C) (08/19/20 0701)  Pulse: 83 (08/19/20 0801)  Resp: 16 (08/19/20 0801)  BP: 122/86 (08/19/20 0801)  SpO2: 95 % (08/19/20 0801) Vital Signs (24h Range):  Temp:  [97.8 °F (36.6 °C)-98.6 °F (37 °C)] 98.4 °F (36.9 °C)  Pulse:  [] 83  Resp:  [7-22] 16  SpO2:  [92 %-100 %] 95 %  BP: (120-138)/(73-97) 122/86  Arterial Line BP: (120-148)/(69-88) 133/81     Date 08/19/20 0700 - 08/20/20 0659   Shift 9633-7980 7267-3627 3003-3768 24 Hour Total   INTAKE   I.V.(mL/kg) 210(2.2)   210(2.2)   Shift Total(mL/kg) 210(2.2)   210(2.2)   OUTPUT   Urine(mL/kg/hr) 605   605   Shift Total(mL/kg) 605(6.4)   605(6.4)   Weight (kg) 94.9 94.9 94.9 94.9              Oxygen Concentration (%):  [28] 28         NG/OG Tube 08/08/20 1415 nasogastric Right nostril (Active)   Placement Check placement verified by aspirate characteristics;placement verified by distal tube length measurement 08/19/20 0701   Advancement advanced manually 08/12/20 1500   Tolerance no signs/symptoms of discomfort 08/19/20 0701   Securement secured to nostril center w/ adhesive device 08/19/20 0701   Clamp Status/Tolerance clamped 08/19/20 0701   Suction Setting/Drainage Method suction at the bedside;low;intermittent setting 08/19/20 0701   Insertion Site Appearance no redness, warmth, tenderness, skin breakdown, drainage 08/19/20 0701   Drainage Bile 08/19/20 0701   Flush/Irrigation flushed w/;water;no resistance met 08/19/20 0701   Feeding Type other (see comments) 08/10/20 1500  "  Feeding Action feeding held 08/19/20 0305   Intake (mL) 75 mL 08/12/20 0900   Water Bolus (mL) 0 mL 08/12/20 0536   Tube Output(mL)(Include Discarded Residual) 150 mL 08/16/20 1805   Intake (mL) - Formula Tube Feeding 0 08/10/20 1800            Urethral Catheter 08/05/20 1133 Straight-tip 14 Fr. (Active)   Site Assessment Clean;Intact;Dry 08/19/20 0701   Collection Container Urimeter 08/19/20 0701   Securement Method secured to top of thigh w/ adhesive device 08/19/20 0701   Catheter Care Performed yes 08/19/20 0701   Reason for Continuing Urinary Catheterization Urinary retention 08/19/20 0701   CAUTI Prevention Bundle StatLock in place w 1" slack;Intact seal between catheter & drainage tubing;Drainage bag/urimeter off the floor;Green sheeting clip in use;No dependent loops or kinks;Drainage bag/urimeter not overfilled (<2/3 full);Drainage bag/urimeter below bladder 08/19/20 0701   Output (mL) 280 mL 08/19/20 0800       Neurosurgery Physical Exam     AAOx3, PERRL, EOMI, FS, TM, 5/5 throughout, SILT.    Abdomen soft      Significant Labs:  Recent Labs   Lab 08/18/20 0113 08/19/20  0151    102    139   K 3.3* 3.8    105   CO2 25 25   BUN 8 6   CREATININE 0.5 0.5   CALCIUM 8.0* 8.1*   MG 1.5* 1.6     Recent Labs   Lab 08/17/20 2000 08/18/20  0113 08/19/20  0151   WBC 11.83 8.58 9.68   HGB 8.8* 8.3* 9.7*   HCT 27.0* 24.8* 28.7*   * 589* 649*     Recent Labs   Lab 08/18/20 0113 08/19/20  0151   APTT 30.5 28.2     Microbiology Results (last 7 days)     ** No results found for the last 168 hours. **        All pertinent labs from the last 24 hours have been reviewed.    Significant Diagnostics:  I have reviewed all pertinent imaging results/findings within the past 24 hours.   X-ray Abdomen Ap 1 View    Result Date: 8/18/2020  Some decrease in the degree of small-bowel distention since 08/17/2020, with no significant detrimental interval change in the appearance of the abdomen since that " time appreciated.  Cholelithiasis is again noted. Electronically signed by: Vinayak Beckman MD Date:    08/18/2020 Time:    14:09      Assessment/Plan:     * Traumatic compression fracture of L1 lumbar vertebra  46 y/o male with no significant PMH with L1 burst fracture with retropulsion resulting in severe canal stenosis s/p fall from 10ft building     S/p L1 corpectomy with cage insertion and T12-L2 lateral fusion on 8/7. T12-L2 PSIF 8/14:     --Admitted to ICU  --All labs and diagnostics reviewed  --Post-op XRs pending   --MAPs stable off pressors, no goals  --PT/OT/OOB, Pain control, TLSO when OOB  --Close neurological monitoring, notify neurosurgery with any acute changes  --Ileus management per NCC  --Victor/A-line discontinued  --Stable for TTF once sedation and pain medication drips weaned    Dispo: continue ICU care        Camilo Levy MD  Neurosurgery  [facility]

## 2020-08-19 NOTE — PLAN OF CARE
Per MD, patient S/p L1 corpectomy with cage insertion and T12-L2 lateral fusion on 8/7. T12-L2 PSIF 8/14.   NGT/NPO.  Not medically ready for discharge.        08/19/20 1137   Discharge Reassessment   Assessment Type Discharge Planning Reassessment   Provided patient/caregiver education on the expected discharge date and the discharge plan Yes   Do you have any problems affording any of your prescribed medications? No   Discharge Plan A Rehab   DME Needed Upon Discharge  other (see comments)  (tbd)   Anticipated Discharge Disposition Rehab   Can the patient/caregiver answer the patient profile reliably? Yes, cognitively intact   Describe the patient's ability to walk at the present time. Major restrictions/daily assistance from another person   How often would a person be available to care for the patient? Often       Tere Yang RN, CCRN-K, [facility]  Neuro-Critical Care   [phone]

## 2020-08-19 NOTE — ASSESSMENT & PLAN NOTE
Decrease NGT to low intermittent suction   KUB/Mag Citrate productive  Trial of methylnaltrexone - patient refused  8/19/2020: Still complaining of diarrhea. Interval KUB with slight improvement in bowel distension

## 2020-08-19 NOTE — PLAN OF CARE
POC reviewed with pt and family at 1700. Pt verbalized understanding. Questions and concerns addressed. Thoracic and lumbar x-rays completed. Sandra d/c'ed. JARED d/c'ed. Valente d/c'ed. Pt with multiple loose BMs today. Per MD, ok for patient to have small sips of water and crackers as tolerated. Condom catheter placed. No acute events today. Pt progressing toward goals. Will continue to monitor. See flowsheets for full assessment and VS info.

## 2020-08-19 NOTE — PT/OT/SLP PROGRESS
Occupational Therapy      Patient Name:  Oscar Bills   MRN:  [id number]    Patient not seen today secondary to pt declined participating due to pain. Will follow-up as scheduled and appropriate.    JAILENE Queen  8/19/2020

## 2020-08-19 NOTE — PROGRESS NOTES
[facility]  Neurocritical Care  Progress Note    Admit Date: 8/5/2020  Service Date: 08/19/2020  Length of Stay: 14    Subjective:     Chief Complaint: Traumatic compression fracture of L1 lumbar vertebra    History of Present Illness: Patient is a 46 y/o male with no significant PMH who was transferred from  after falling from 10ft roof at work. Patient denies LOC, complained of excrutiating low back/sacral pain exacerbated with any movement of legs. CT CAP significant for L1 burst fracture with retropulsion into the spinal canal resulting in severe spinal stenosis. Patient states he could feel he had to urinate but could not start a stream, kraus placed and he reports he felt the kraus inserted and felt his bladder release once the kraus was placed. He reports he felt the OSMIN and per report from ED physician, patient had full rectal tone. He is being admitted to Neuro ICU for a higher level of neurological care s/p L1 CORPECTOMY and TLIF T12-L2 Interbody Instrumentation.    Hospital Course: 08/07/2020: Admit to Neuro ICU.  08/08/2020: Added long acting pain medications. Pain better controlled. Stable for stepdown.   08/09/2020: Acute pain management consulted. Ketamine gtt. Illuisa, NGT. Suppository.    08/10/2020: Appreciate pain management reccs. Plans for OR tomorrow.  08/11/2020 ABG, KUB/CXR, Mag citrate, cont ketamine  08/12/2020  Surgery rescheduled 8/181L bolus continue IVF ketamine /PCA. Follow Na dc suction, start clear liquid  08/13/2020 wean ketamine gtt, heparin  08/14/2020 for surgery today; continue pain mgmt gtt  08/15/2020 continued post-op pain, APS following, increased ketamine infusion. Advancing diet, advancing bowel regimen, trial of methylnaltrexone.   8/16/2020: add regular diet  8/17/2020: tolerating PO, decreased ketamine gtt, stopped PCA today.  8/18/2020: f/u neurosurgery canceled, stool improved to more solid    Interval History: Weaned off ketamine gtt yesterday.  Tolerating PO pain medication with pain decently controlled. NGT still in place with high residuals. Pt complaining of nausea which resolves with NGT suctioning, but doesn't want to take PO anti-emetics. Also complaining of continued loose stools, although slightly better than yesterday.    Review of Systems   Constitutional: Negative for fever.   HENT: Negative for trouble swallowing and voice change.    Eyes: Negative for visual disturbance.   Respiratory: Negative for shortness of breath and wheezing.    Cardiovascular: Negative for palpitations.   Gastrointestinal: Positive for diarrhea.   Endocrine: Negative for polyuria.   Genitourinary: Negative for difficulty urinating.   Musculoskeletal: Positive for back pain.   Neurological: Positive for weakness. Negative for dizziness and headaches.   Psychiatric/Behavioral: Negative for agitation.     Objective:     Vitals:  Temp: 98.4 °F (36.9 °C)  Pulse: 91  Rhythm: normal sinus rhythm  BP: (!) 121/94  MAP (mmHg): 106  Resp: 19  SpO2: 95 %  O2 Device (Oxygen Therapy): room air    Temp  Min: 97.9 °F (36.6 °C)  Max: 98.6 °F (37 °C)  Pulse  Min: 80  Max: 124  BP  Min: 120/89  Max: 138/95  MAP (mmHg)  Min: 95  Max: 113  Resp  Min: 10  Max: 22  SpO2  Min: 92 %  Max: 100 %  Oxygen Concentration (%)  Min: 28  Max: 28    08/18 0701 - 08/19 0700  In: 2866.8 [I.V.:2610.5]  Out: 3490 [Urine:2990]   Unmeasured Output  Stool Occurrence: 0  Pad Count: 1       Physical Exam  Vitals signs and nursing note reviewed.   Constitutional:       General: He is not in acute distress.     Appearance: He is normal weight.   HENT:      Head: Normocephalic and atraumatic.      Nose: Nose normal.   Eyes:      General: No scleral icterus.     Extraocular Movements: Extraocular movements intact.      Conjunctiva/sclera: Conjunctivae normal.      Pupils: Pupils are equal, round, and reactive to light.   Neck:      Musculoskeletal: No neck rigidity.   Cardiovascular:      Rate and Rhythm: Normal  rate and regular rhythm.      Pulses: Normal pulses.      Heart sounds: Normal heart sounds.   Pulmonary:      Effort: Pulmonary effort is normal. No respiratory distress.      Breath sounds: Normal breath sounds.   Abdominal:      General: Abdomen is flat.      Palpations: Abdomen is soft.   Skin:     General: Skin is warm.      Capillary Refill: Capillary refill takes less than 2 seconds.   Neurological:      Mental Status: He is alert and oriented to person, place, and time.      GCS: GCS eye subscore is 4. GCS verbal subscore is 5. GCS motor subscore is 6.      Motor: Weakness present.      Comments: Awake, oriented  PERRLA  EOMI  Moving extremities spontaneously   Weakness in RLE   Psychiatric:         Mood and Affect: Mood normal.       Medications:  Continuous  sodium chloride 0.9%, Last Rate: 100 mL/hr at 08/19/20 1000    Scheduled  acetaminophen, 1,000 mg, Q8H  diazePAM, 5 mg, Q8H  DULoxetine, 20 mg, Daily  gabapentin, 300 mg, TID  heparin (porcine), 5,000 Units, Q8H  methocarbamoL, 500 mg, QID  methylnaltrexone, 12 mg, Once  pantoprazole, 40 mg, Daily  polyethylene glycol, 17 g, Daily  senna-docusate 8.6-50 mg, 2 tablet, Daily    PRN  sodium chloride, , Q24H PRN  calcium carbonate, 500 mg, Daily PRN  calcium gluconate IVPB, 1 g, PRN  dextrose 50%, 12.5 g, PRN  glucagon (human recombinant), 1 mg, PRN  magnesium sulfate IVPB, 2 g, PRN  naloxone, 0.02 mg, PRN  ondansetron, 8 mg, Q8H PRN  oxyCODONE, 10 mg, Q4H PRN  oxyCODONE, 5 mg, Q4H PRN  polyethylene glycol, 17 g, Daily PRN  potassium chloride in water, 40 mEq, PRN  sodium chloride 0.9%, 10 mL, PRN  sodium phosphate IVPB, 15 mmol, PRN    Today I personally reviewed pertinent medications, lines/drains/airways, imaging, laboratory results,    Diet  Diet NPO    Assessment/Plan:     Neuro  * Traumatic compression fracture of L1 lumbar vertebra  45 y.o male with no significant PMHx was transferred from  to [facility] with [facility] CAP significant for L1 burst fracture with  retropulsion into the spinal canal resulting in severe spinal stenosis s/p falling 10ft from roof. He will be admitted to the Neuro ICU s/p  L1 CORPECTOMY and TLIF T12-L2 Interbody Instrumentation.     -Neurosurgery Following  -Neuro checks q1hr   -Vital signs q1hr  -Daily labs  -Dilaudid PCA stopped 8/18, transitioned to PO   -Continue prn Zofran   pain management following  Ketamine gtt managed by APS - stopped yesterday  PT/OT   Transfer to floor when able to wean ketamine     Spinal stenosis of lumbar region  S/P L1 CORPECTOMY, SPINE, LUMBAR  (Left)  FUSION, SPINE, LUMBAR, TLIF T12-L2 Interbody Fusion with Instrumentation - Lateral (Left)     Renal/  Hypocalcemia  CMP Daily  Electrolyte Replacements PRN      Acute urinary retention  Victor placed, continue to monitor strict I's and O's    GI  Ileus  Decrease NGT to low intermittent suction   KUB/Mag Citrate productive  Trial of methylnaltrexone - patient refused  8/19/2020: Still complaining of diarrhea. Interval KUB with slight improvement in bowel distension    Orthopedic  Fall from building  See above    Other  Burst fracture of lumbar vertebra, open, initial encounter  -see traumatic compression fracture of L1 lumbar fracture         The patient is being Prophylaxed for:  Venous Thromboembolism with: Chemical  Stress Ulcer with: PPI  Ventilator Pneumonia with: not applicable    Activity Orders          Diet NPO: NPO starting at 08/17 1203    Commode at bedside starting at 08/07 0746        Full Code    Néstor Yao MD, PGY-1  Neurocritical Care  [facility]

## 2020-08-20 LAB
ALBUMIN SERPL BCP-MCNC: 2.5 G/DL (ref 3.5–5.2)
ALP SERPL-CCNC: 124 U/L (ref 55–135)
ALT SERPL W/O P-5'-P-CCNC: 58 U/L (ref 10–44)
ANION GAP SERPL CALC-SCNC: 7 MMOL/L (ref 8–16)
APTT BLDCRRT: 28.2 SEC (ref 21–32)
AST SERPL-CCNC: 46 U/L (ref 10–40)
BASOPHILS # BLD AUTO: 0.03 K/UL (ref 0–0.2)
BASOPHILS NFR BLD: 0.4 % (ref 0–1.9)
BILIRUB SERPL-MCNC: 0.5 MG/DL (ref 0.1–1)
BUN SERPL-MCNC: 6 MG/DL (ref 6–20)
CALCIUM SERPL-MCNC: 8.2 MG/DL (ref 8.7–10.5)
CHLORIDE SERPL-SCNC: 102 MMOL/L (ref 95–110)
CO2 SERPL-SCNC: 26 MMOL/L (ref 23–29)
CREAT SERPL-MCNC: 0.6 MG/DL (ref 0.5–1.4)
DIFFERENTIAL METHOD: ABNORMAL
EOSINOPHIL # BLD AUTO: 0.2 K/UL (ref 0–0.5)
EOSINOPHIL NFR BLD: 2.4 % (ref 0–8)
ERYTHROCYTE [DISTWIDTH] IN BLOOD BY AUTOMATED COUNT: 13 % (ref 11.5–14.5)
EST. GFR  (AFRICAN AMERICAN): >60 ML/MIN/1.73 M^2
EST. GFR  (NON AFRICAN AMERICAN): >60 ML/MIN/1.73 M^2
GLUCOSE SERPL-MCNC: 103 MG/DL (ref 70–110)
HCT VFR BLD AUTO: 30.6 % (ref 40–54)
HGB BLD-MCNC: 10.1 G/DL (ref 14–18)
IMM GRANULOCYTES # BLD AUTO: 0.21 K/UL (ref 0–0.04)
IMM GRANULOCYTES NFR BLD AUTO: 2.8 % (ref 0–0.5)
LYMPHOCYTES # BLD AUTO: 1.4 K/UL (ref 1–4.8)
LYMPHOCYTES NFR BLD: 17.9 % (ref 18–48)
MAGNESIUM SERPL-MCNC: 1.7 MG/DL (ref 1.6–2.6)
MAGNESIUM SERPL-MCNC: 1.9 MG/DL (ref 1.6–2.6)
MCH RBC QN AUTO: 30.3 PG (ref 27–31)
MCHC RBC AUTO-ENTMCNC: 33 G/DL (ref 32–36)
MCV RBC AUTO: 92 FL (ref 82–98)
MONOCYTES # BLD AUTO: 0.5 K/UL (ref 0.3–1)
MONOCYTES NFR BLD: 7.2 % (ref 4–15)
NEUTROPHILS # BLD AUTO: 5.2 K/UL (ref 1.8–7.7)
NEUTROPHILS NFR BLD: 69.3 % (ref 38–73)
NRBC BLD-RTO: 0 /100 WBC
PHOSPHATE SERPL-MCNC: 4.3 MG/DL (ref 2.7–4.5)
PLATELET # BLD AUTO: 681 K/UL (ref 150–350)
PMV BLD AUTO: 8.6 FL (ref 9.2–12.9)
POTASSIUM SERPL-SCNC: 3.5 MMOL/L (ref 3.5–5.1)
POTASSIUM SERPL-SCNC: 3.9 MMOL/L (ref 3.5–5.1)
PROT SERPL-MCNC: 6 G/DL (ref 6–8.4)
RBC # BLD AUTO: 3.33 M/UL (ref 4.6–6.2)
SARS-COV-2 RNA RESP QL NAA+PROBE: NOT DETECTED
SODIUM SERPL-SCNC: 135 MMOL/L (ref 136–145)
WBC # BLD AUTO: 7.53 K/UL (ref 3.9–12.7)

## 2020-08-20 PROCEDURE — 99232 SBSQ HOSP IP/OBS MODERATE 35: CPT | Mod: ,,, | Performed by: ANESTHESIOLOGY

## 2020-08-20 PROCEDURE — 99232 PR SUBSEQUENT HOSPITAL CARE,LEVL II: ICD-10-PCS | Mod: ,,, | Performed by: ANESTHESIOLOGY

## 2020-08-20 PROCEDURE — 85025 COMPLETE CBC W/AUTO DIFF WBC: CPT

## 2020-08-20 PROCEDURE — 99222 1ST HOSP IP/OBS MODERATE 55: CPT | Mod: ,,, | Performed by: NURSE PRACTITIONER

## 2020-08-20 PROCEDURE — 99222 PR INITIAL HOSPITAL CARE,LEVL II: ICD-10-PCS | Mod: ,,, | Performed by: NURSE PRACTITIONER

## 2020-08-20 PROCEDURE — 20000000 HC ICU ROOM

## 2020-08-20 PROCEDURE — 83735 ASSAY OF MAGNESIUM: CPT

## 2020-08-20 PROCEDURE — 63600175 PHARM REV CODE 636 W HCPCS: Performed by: STUDENT IN AN ORGANIZED HEALTH CARE EDUCATION/TRAINING PROGRAM

## 2020-08-20 PROCEDURE — 94761 N-INVAS EAR/PLS OXIMETRY MLT: CPT

## 2020-08-20 PROCEDURE — 84100 ASSAY OF PHOSPHORUS: CPT

## 2020-08-20 PROCEDURE — 25000003 PHARM REV CODE 250: Performed by: PHYSICIAN ASSISTANT

## 2020-08-20 PROCEDURE — 84132 ASSAY OF SERUM POTASSIUM: CPT

## 2020-08-20 PROCEDURE — 97116 GAIT TRAINING THERAPY: CPT

## 2020-08-20 PROCEDURE — 97535 SELF CARE MNGMENT TRAINING: CPT

## 2020-08-20 PROCEDURE — 80053 COMPREHEN METABOLIC PANEL: CPT

## 2020-08-20 PROCEDURE — 63600175 PHARM REV CODE 636 W HCPCS: Performed by: UROLOGY

## 2020-08-20 PROCEDURE — 25000003 PHARM REV CODE 250: Performed by: STUDENT IN AN ORGANIZED HEALTH CARE EDUCATION/TRAINING PROGRAM

## 2020-08-20 PROCEDURE — 85730 THROMBOPLASTIN TIME PARTIAL: CPT

## 2020-08-20 PROCEDURE — C9113 INJ PANTOPRAZOLE SODIUM, VIA: HCPCS | Performed by: STUDENT IN AN ORGANIZED HEALTH CARE EDUCATION/TRAINING PROGRAM

## 2020-08-20 PROCEDURE — 63600175 PHARM REV CODE 636 W HCPCS: Performed by: NURSE PRACTITIONER

## 2020-08-20 PROCEDURE — 25000003 PHARM REV CODE 250: Performed by: PSYCHIATRY & NEUROLOGY

## 2020-08-20 PROCEDURE — 83735 ASSAY OF MAGNESIUM: CPT | Mod: 91

## 2020-08-20 RX ORDER — SODIUM,POTASSIUM PHOSPHATES 280-250MG
2 POWDER IN PACKET (EA) ORAL
Status: DISCONTINUED | OUTPATIENT
Start: 2020-08-20 | End: 2020-08-21 | Stop reason: HOSPADM

## 2020-08-20 RX ORDER — POTASSIUM CHLORIDE 1.5 G/1.58G
40 POWDER, FOR SOLUTION ORAL
Status: DISCONTINUED | OUTPATIENT
Start: 2020-08-20 | End: 2020-08-21 | Stop reason: HOSPADM

## 2020-08-20 RX ORDER — LANOLIN ALCOHOL/MO/W.PET/CERES
800 CREAM (GRAM) TOPICAL
Status: DISCONTINUED | OUTPATIENT
Start: 2020-08-20 | End: 2020-08-21 | Stop reason: HOSPADM

## 2020-08-20 RX ORDER — POTASSIUM CHLORIDE 1.5 G/1.58G
60 POWDER, FOR SOLUTION ORAL
Status: DISCONTINUED | OUTPATIENT
Start: 2020-08-20 | End: 2020-08-21 | Stop reason: HOSPADM

## 2020-08-20 RX ADMIN — GABAPENTIN 300 MG: 300 CAPSULE ORAL at 08:08

## 2020-08-20 RX ADMIN — HEPARIN SODIUM 5000 UNITS: 5000 INJECTION INTRAVENOUS; SUBCUTANEOUS at 09:08

## 2020-08-20 RX ADMIN — OXYCODONE HYDROCHLORIDE 10 MG: 5 TABLET ORAL at 08:08

## 2020-08-20 RX ADMIN — METHOCARBAMOL TABLETS 500 MG: 500 TABLET, COATED ORAL at 09:08

## 2020-08-20 RX ADMIN — DULOXETINE HYDROCHLORIDE 20 MG: 20 CAPSULE, DELAYED RELEASE ORAL at 08:08

## 2020-08-20 RX ADMIN — DIAZEPAM 5 MG: 5 INJECTION, SOLUTION INTRAMUSCULAR; INTRAVENOUS at 09:08

## 2020-08-20 RX ADMIN — MAGNESIUM SULFATE IN WATER 2 G: 40 INJECTION, SOLUTION INTRAVENOUS at 04:08

## 2020-08-20 RX ADMIN — GABAPENTIN 300 MG: 300 CAPSULE ORAL at 02:08

## 2020-08-20 RX ADMIN — DIAZEPAM 5 MG: 5 INJECTION, SOLUTION INTRAMUSCULAR; INTRAVENOUS at 02:08

## 2020-08-20 RX ADMIN — POTASSIUM CHLORIDE 40 MEQ: 1.5 POWDER, FOR SOLUTION ORAL at 05:08

## 2020-08-20 RX ADMIN — DOCUSATE SODIUM 50MG AND SENNOSIDES 8.6MG 2 TABLET: 8.6; 5 TABLET, FILM COATED ORAL at 08:08

## 2020-08-20 RX ADMIN — ACETAMINOPHEN 1000 MG: 500 TABLET ORAL at 05:08

## 2020-08-20 RX ADMIN — METHOCARBAMOL TABLETS 500 MG: 500 TABLET, COATED ORAL at 08:08

## 2020-08-20 RX ADMIN — METHOCARBAMOL TABLETS 500 MG: 500 TABLET, COATED ORAL at 04:08

## 2020-08-20 RX ADMIN — ACETAMINOPHEN 1000 MG: 500 TABLET ORAL at 09:08

## 2020-08-20 RX ADMIN — DIAZEPAM 5 MG: 5 INJECTION, SOLUTION INTRAMUSCULAR; INTRAVENOUS at 05:08

## 2020-08-20 RX ADMIN — OXYCODONE HYDROCHLORIDE 10 MG: 5 TABLET ORAL at 09:08

## 2020-08-20 RX ADMIN — HEPARIN SODIUM 5000 UNITS: 5000 INJECTION INTRAVENOUS; SUBCUTANEOUS at 05:08

## 2020-08-20 RX ADMIN — METHOCARBAMOL TABLETS 500 MG: 500 TABLET, COATED ORAL at 12:08

## 2020-08-20 RX ADMIN — PANTOPRAZOLE SODIUM 40 MG: 40 INJECTION, POWDER, LYOPHILIZED, FOR SOLUTION INTRAVENOUS at 08:08

## 2020-08-20 RX ADMIN — OXYCODONE HYDROCHLORIDE 10 MG: 5 TABLET ORAL at 12:08

## 2020-08-20 RX ADMIN — ACETAMINOPHEN 1000 MG: 500 TABLET ORAL at 02:08

## 2020-08-20 RX ADMIN — OXYCODONE HYDROCHLORIDE 10 MG: 5 TABLET ORAL at 04:08

## 2020-08-20 RX ADMIN — GABAPENTIN 300 MG: 300 CAPSULE ORAL at 09:08

## 2020-08-20 RX ADMIN — OXYCODONE HYDROCHLORIDE 10 MG: 5 TABLET ORAL at 03:08

## 2020-08-20 NOTE — SUBJECTIVE & OBJECTIVE
No medications prior to admission.       Review of patient's allergies indicates:  Not on File    History reviewed. No pertinent past medical history.  Past Surgical History:   Procedure Laterality Date    LUMBAR FUSION N/A 8/14/2020    Procedure: FUSION, SPINE, LUMBAR T12-L2 [facility]  Spinewave Neuromonitoring  ;  Surgeon: Jamison Perez MD;  Location: [facility] OR [address] [place] [address];  Service: Neurosurgery;  Laterality: N/A;    SURGICAL REMOVAL OF VERTEBRAL BODY OF LUMBAR SPINE Left 8/6/2020    Procedure: L1 CORPECTOMY, SPINE, LUMBAR ;  Surgeon: Leonid Snider MD;  Location: [facility] OR [address] [place] [address];  Service: Neurosurgery;  Laterality: Left;  Globus, Please obtain neuromonitoring.     TRANSFORAMINAL LUMBAR INTERBODY FUSION Left 8/6/2020    Procedure: FUSION, SPINE, LUMBAR, TLIF T12-L2 Interbody Fusion with Instrumentation - Lateral;  Surgeon: Leonid Snider MD;  Location: [facility] OR [address] [place] [address];  Service: Neurosurgery;  Laterality: Left;     Family History     None        Tobacco Use    Smoking status: Never Smoker    Smokeless tobacco: Never Used   Substance and Sexual Activity    Alcohol use: Yes    Drug use: Never    Sexual activity: Yes     Partners: Female     Birth control/protection: Condom     Review of Systems  Objective:     Weight: 95 kg (209 lb 7 oz)  Body mass index is 30.05 kg/m².  Vital Signs (Most Recent):  Temp: 97.9 °F (36.6 °C) (08/20/20 0302)  Pulse: 79 (08/20/20 0602)  Resp: 14 (08/20/20 0602)  BP: 126/83 (08/20/20 0602)  SpO2: 95 % (08/20/20 0602) Vital Signs (24h Range):  Temp:  [97.9 °F (36.6 °C)-98.6 °F (37 °C)] 97.9 °F (36.6 °C)  Pulse:  [] 79  Resp:  [11-21] 14  SpO2:  [91 %-97 %] 95 %  BP: (102-135)/(70-99) 126/83  Arterial Line BP: (106-133)/(78-98) 106/85                     Male External Urinary Catheter 08/19/20 1700 (Active)   Collection Container Urimeter 08/20/20 0302   Securement Method secured to top of thigh w/ adhesive device 08/20/20 0302   Skin no redness;no breakdown 08/20/20 0302    Tolerance no signs/symptoms of discomfort 08/20/20 0302   Output (mL) 150 mL 08/20/20 0602   Catheter Change Date 08/19/20 08/20/20 0302   Catheter Change Time 1600 08/20/20 0302       Neurosurgery Physical Exam   AOX3  5/5 in BUE/BLE  SILT  Abdomen soft    Significant Labs:  Recent Labs   Lab 08/19/20  0151 08/20/20  0324    103    135*   K 3.8 3.5    102   CO2 25 26   BUN 6 6   CREATININE 0.5 0.6   CALCIUM 8.1* 8.2*   MG 1.6 1.7     Recent Labs   Lab 08/19/20  0151 08/20/20  0324   WBC 9.68 7.53   HGB 9.7* 10.1*   HCT 28.7* 30.6*   * 681*     Recent Labs   Lab 08/19/20  0151 08/20/20  0324   APTT 28.2 28.2     Microbiology Results (last 7 days)     ** No results found for the last 168 hours. **            Significant Diagnostics:

## 2020-08-20 NOTE — ASSESSMENT & PLAN NOTE
46 y/o male with no significant PMH with L1 burst fracture with retropulsion resulting in severe canal stenosis s/p fall from [employer] building     S/p L1 corpectomy with cage insertion and T12-L2 lateral fusion on 8/7. T12-L2 PSIF 8/14:       --All labs and diagnostics reviewed  --Post-op XRs pending   --MAPs stable off pressors, no goals  --PT/OT/OOB, Pain control, TLSO when OOB  --Close neurological monitoring, notify neurosurgery with any acute changes  --Ileus management per NCC  --Victor/A-line discontinued  --Stable for TTF once sedation and pain medication drips weaned    Dispo: Step down to floor today and advance diet as tolerated

## 2020-08-20 NOTE — PLAN OF CARE
POC reviewed with pt and ex wife at 1400. Pt verbalized understanding. Questions and concerns addressed. No acute events today. NS gtt D/C'd, oxycodone given x3 for pain, placed on regular diet, cleared by surgery, and plan to d/c to rehab.  Pt progressing toward goals. Will continue to monitor. See flowsheets for full assessment and VS info.

## 2020-08-20 NOTE — PROGRESS NOTES
[facility]  Neurosurgery  Progress Note    Subjective:     History of Present Illness: Patient is a 46 y/o male with no significant PMH who was transferred from  after falling from 10ft roof at work. Patient denies LOC, complained of excrutiating low back/sacral pain exacerbated with any movement of legs. CT CAP significant for L1 burst fracture with retropulsion into the spinal canal resulting in severe spinal stenosis. Patient states he could feel he had to urinate but could not start a stream, kraus placed and he reports he felt the kraus inserted and felt his bladder release once the kraus was placed. He reports he felt the OSMIN and per report from ED physician, patient had full rectal tone. The patient declined repeat rectal exam. Denies saddle anesthesia. Endorses any movement of lower extremities causes excruciating pain in the sacram and lower back. Denies radicular pain into the legs. Reports slight numbness in left calf. Denies any prior medical conditions or daily medication.     Post-Op Info:  Procedure(s) (LRB):  FUSION, SPINE, LUMBAR T12-L2 Ziehm MIS  Spinewave Neuromonitoring   (N/A)   6 Days Post-Op     No medications prior to admission.       Review of patient's allergies indicates:  Not on File    History reviewed. No pertinent past medical history.  Past Surgical History:   Procedure Laterality Date    LUMBAR FUSION N/A 8/14/2020    Procedure: FUSION, SPINE, LUMBAR T12-L2 Ziehm MIS  Spinewave Neuromonitoring  ;  Surgeon: Jamison Perez MD;  Location: [facility] OR [address] [place] [address];  Service: Neurosurgery;  Laterality: N/A;    SURGICAL REMOVAL OF VERTEBRAL BODY OF LUMBAR SPINE Left 8/6/2020    Procedure: L1 CORPECTOMY, SPINE, LUMBAR ;  Surgeon: Leonid Snider MD;  Location: [facility] OR [address] [place] [address];  Service: Neurosurgery;  Laterality: Left;  Globus, Please obtain neuromonitoring.     TRANSFORAMINAL LUMBAR INTERBODY FUSION Left 8/6/2020    Procedure: FUSION, SPINE, LUMBAR, TLIF T12-L2 Interbody  Fusion with Instrumentation - Lateral;  Surgeon: Leonid Snider MD;  Location: [facility] OR [address] [place] [address];  Service: Neurosurgery;  Laterality: Left;     Family History     None        Tobacco Use    Smoking status: Never Smoker    Smokeless tobacco: Never Used   Substance and Sexual Activity    Alcohol use: Yes    Drug use: Never    Sexual activity: Yes     Partners: Female     Birth control/protection: Condom     Review of Systems  Objective:     Weight: 95 kg (209 lb 7 oz)  Body mass index is 30.05 kg/m².  Vital Signs (Most Recent):  Temp: 97.9 °F (36.6 °C) (08/20/20 0302)  Pulse: 79 (08/20/20 0602)  Resp: 14 (08/20/20 0602)  BP: 126/83 (08/20/20 0602)  SpO2: 95 % (08/20/20 0602) Vital Signs (24h Range):  Temp:  [97.9 °F (36.6 °C)-98.6 °F (37 °C)] 97.9 °F (36.6 °C)  Pulse:  [] 79  Resp:  [11-21] 14  SpO2:  [91 %-97 %] 95 %  BP: (102-135)/(70-99) 126/83  Arterial Line BP: (106-133)/(78-98) 106/85                     Male External Urinary Catheter 08/19/20 1700 (Active)   Collection Container Urimeter 08/20/20 0302   Securement Method secured to top of thigh w/ adhesive device 08/20/20 0302   Skin no redness;no breakdown 08/20/20 0302   Tolerance no signs/symptoms of discomfort 08/20/20 0302   Output (mL) 150 mL 08/20/20 0602   Catheter Change Date 08/19/20 08/20/20 0302   Catheter Change Time 1600 08/20/20 0302       Neurosurgery Physical Exam   AOX3  5/5 in BUE/BLE  SILT  Abdomen soft    Significant Labs:  Recent Labs   Lab 08/19/20  0151 08/20/20  0324    103    135*   K 3.8 3.5    102   CO2 25 26   BUN 6 6   CREATININE 0.5 0.6   CALCIUM 8.1* 8.2*   MG 1.6 1.7     Recent Labs   Lab 08/19/20  0151 08/20/20  0324   WBC 9.68 7.53   HGB 9.7* 10.1*   HCT 28.7* 30.6*   * 681*     Recent Labs   Lab 08/19/20  0151 08/20/20  0324   APTT 28.2 28.2     Microbiology Results (last 7 days)     ** No results found for the last 168 hours. **            Significant Diagnostics:      Assessment/Plan:      * Traumatic compression fracture of L1 lumbar vertebra  44 y/o male with no significant PMH with L1 burst fracture with retropulsion resulting in severe canal stenosis s/p fall from 10ft building     S/p L1 corpectomy with cage insertion and T12-L2 lateral fusion on 8/7. T12-L2 PSIF 8/14:       --All labs and diagnostics reviewed  --Post-op XRs pending   --MAPs stable off pressors, no goals  --PT/OT/OOB, Pain control, TLSO when OOB  --Close neurological monitoring, notify neurosurgery with any acute changes  --Ileus management per NCC  --Victor/A-line discontinued  --Stable for TTF once sedation and pain medication drips weaned    Dispo: Step down to floor today and advance diet as tolerated        Jay Prieto, DO  Neurosurgery  [facility]

## 2020-08-20 NOTE — PLAN OF CARE
[facility] Care Plan    POC reviewed with Oscar Bills and sister at 0200. Both verbalized understanding.  requests to be called by an MD or called during rounds. Questions and concerns addressed. No acute events overnight.     Neurostatus unchanged.   SBP<180, MAPs >80 maintained without any PRNs.   10mg PRN oxycodone administered x1 for pain 7/10.   Mg and K electrolytes replaced.     Pt progressing toward goals. Will continue to monitor. See below and flowsheets for full assessment and VS info.       Neuro:  [place] Coma Scale  Best Eye Response: 4-->(E4) spontaneous  Best Motor Response: 6-->(M6) obeys commands  Best Verbal Response: 5-->(V5) oriented  [place] Coma Scale Score: 15  Assessment Qualifiers: patient not sedated/intubated, no eye obstruction present  Pupil PERRLA: yes     24hr Temp:  [97.9 °F (36.6 °C)-98.6 °F (37 °C)]     CV:   Rhythm: normal sinus rhythm  BP goals:   SBP < 180  MAP >  80    Resp:   O2 Device (Oxygen Therapy): room air  Oxygen Concentration (%): 28    Plan: N/A    GI/:  NEFTALI Total Score: 20  Diet/Nutrition Received: sips of water  Last Bowel Movement: 08/19/20  Voiding Characteristics: external catheter    Intake/Output Summary (Last 24 hours) at 8/20/2020 0634  Last data filed at 8/20/2020 0602  Gross per 24 hour   Intake 2793.33 ml   Output 3050 ml   Net -256.67 ml       Labs/Accuchecks:  Recent Labs   Lab 08/20/20  0324   WBC 7.53   RBC 3.33*   HGB 10.1*   HCT 30.6*   *      Recent Labs   Lab 08/20/20  0324   *   K 3.5   CO2 26      BUN 6   CREATININE 0.6   ALKPHOS 124   ALT 58*   AST 46*   BILITOT 0.5      Recent Labs   Lab 08/15/20  0145  08/19/20  0151   INR 1.0  --   --    APTT 28.2   < > 28.2    < > = values in this interval not displayed.    No results for input(s): CPK, CPKMB, TROPONINI, MB in the last 168 hours.    Electrolytes: Mg and K Electrolytes replaced  Accuchecks: none    Gtts:   sodium chloride 0.9% 100 mL/hr at 08/20/20 0602        LDA/Wounds:  Lines/Drains/Airways       Drain              Male External Urinary Catheter 08/19/20 1700 less than 1 day              Peripheral Intravenous Line                   Peripheral IV - Single Lumen 08/16/20 0900 18 G Right Wrist 3 days         Peripheral IV - Single Lumen 08/17/20 0502 20 G Right Forearm 3 days                  Wounds: Surgical incisions, bruising  Wound care consulted: No

## 2020-08-20 NOTE — ASSESSMENT & PLAN NOTE
Decrease NGT to low intermittent suction   KUB/Mag Citrate productive  Trial of methylnaltrexone - patient refused  8/20/2020: NG tube pulled yesterday, abdominal pain improved

## 2020-08-20 NOTE — CONSULTS
Inpatient consult to Physical Medicine Rehab  Consult performed by: Beckie Parkinson NP  Consult ordered by: Elver Greene MD  Reason for consult: Assess rehab needs      Reviewed patient history and current admission.  Rehab team following.  Full consult to follow.    MEENA Stern, FNP-C  Physical Medicine & Rehabilitation   08/20/2020  Spectralink: [id number]

## 2020-08-20 NOTE — SUBJECTIVE & OBJECTIVE
Interval History:  Patient seen and examined in room this morning. Patient remained afebrile and hemodynamically stable. Reports less abdominal pain and diarrhea this morning. Had NG tube pulled yesterday, minimal nausea overnight with no vomiting.    Review of Systems   Constitutional: Negative for fever.   HENT: Negative for trouble swallowing and voice change.    Eyes: Negative for visual disturbance.   Respiratory: Negative for shortness of breath and wheezing.    Cardiovascular: Negative for palpitations.   Gastrointestinal: Negative for abdominal pain and constipation.   Endocrine: Negative for polyuria.   Genitourinary: Negative for difficulty urinating.   Musculoskeletal: Positive for back pain.   Neurological: Positive for weakness. Negative for dizziness and headaches.   Psychiatric/Behavioral: Negative for agitation.     Objective:     Vitals:  Temp: 98.4 °F (36.9 °C)  Pulse: 89  Rhythm: normal sinus rhythm  BP: 120/74  MAP (mmHg): 89  Resp: (!) 23  SpO2: 95 %  O2 Device (Oxygen Therapy): room air    Temp  Min: 97.9 °F (36.6 °C)  Max: 98.6 °F (37 °C)  Pulse  Min: 77  Max: 103  BP  Min: 102/70  Max: 146/70  MAP (mmHg)  Min: 82  Max: 112  Resp  Min: 11  Max: 23  SpO2  Min: 91 %  Max: 97 %    08/19 0701 - 08/20 0700  In: 2793.3 [P.O.:230; I.V.:2563.3]  Out: 3050 [Urine:3050]   Unmeasured Output  Stool Occurrence: 1  Pad Count: 1       Physical Exam  Vitals signs and nursing note reviewed.   Constitutional:       General: He is not in acute distress.     Appearance: He is normal weight.   HENT:      Head: Normocephalic and atraumatic.      Nose: Nose normal.   Eyes:      General: No scleral icterus.     Extraocular Movements: Extraocular movements intact.      Conjunctiva/sclera: Conjunctivae normal.      Pupils: Pupils are equal, round, and reactive to light.   Neck:      Musculoskeletal: No neck rigidity.   Cardiovascular:      Rate and Rhythm: Normal rate and regular rhythm.      Pulses: Normal pulses.       Heart sounds: Normal heart sounds.   Pulmonary:      Effort: Pulmonary effort is normal. No respiratory distress.      Breath sounds: Normal breath sounds.   Abdominal:      General: Abdomen is flat.      Palpations: Abdomen is soft.   Skin:     General: Skin is warm.      Capillary Refill: Capillary refill takes less than 2 seconds.   Neurological:      Mental Status: He is alert and oriented to person, place, and time.      GCS: GCS eye subscore is 4. GCS verbal subscore is 5. GCS motor subscore is 6.      Motor: Weakness present.      Comments: Awake, oriented  PERRLA  EOMI  Moving extremities spontaneously   Weakness in RLE   Psychiatric:         Mood and Affect: Mood normal.       Medications:  Continuoussodium chloride 0.9%, Last Rate: 100 mL/hr at 08/20/20 1002    Scheduledacetaminophen, 1,000 mg, Q8H  diazePAM, 5 mg, Q8H  DULoxetine, 20 mg, Daily  gabapentin, 300 mg, TID  heparin (porcine), 5,000 Units, Q8H  methocarbamoL, 500 mg, QID  pantoprazole, 40 mg, Daily  polyethylene glycol, 17 g, Daily  senna-docusate 8.6-50 mg, 2 tablet, Daily    PRNcalcium carbonate, 500 mg, Daily PRN  dextrose 50%, 12.5 g, PRN  dextrose 50%, 25 g, PRN  glucagon (human recombinant), 1 mg, PRN  magnesium oxide, 800 mg, PRN  magnesium oxide, 800 mg, PRN  naloxone, 0.02 mg, PRN  ondansetron, 8 mg, Q8H PRN  oxyCODONE, 10 mg, Q4H PRN  oxyCODONE, 5 mg, Q4H PRN  polyethylene glycol, 17 g, Daily PRN  potassium chloride, 40 mEq, PRN  potassium chloride, 40 mEq, PRN  potassium chloride, 60 mEq, PRN  potassium, sodium phosphates, 2 packet, PRN  potassium, sodium phosphates, 2 packet, PRN  potassium, sodium phosphates, 2 packet, PRN  sodium chloride 0.9%, 10 mL, PRN      Today I personally reviewed pertinent medications, lines/drains/airways, imaging, laboratory results, microbiology results,     Diet  Diet NPO

## 2020-08-20 NOTE — HPI
Oscar Bills is a 45-year-old male with no PMHx. Patient trasnferred to [facility] on 8/7 after falling from 10ft roof at work. CT CAP significant for L1 burst fracture with retropulsion into the spinal canal resulting in severe spinal stenosis. S/p L1 corpectomy with cage insertion and T12-L2 lateral fusion on 8/7. T12-L2 PSIF 8/14. NSGY rec TLSO when OOB. Hospital course complicated by Ileus (now tolerating diet).     Functional History: Patient lives with his sister in a [facility] with 0 DAMASO.  Prior to admission, I. DME: none.

## 2020-08-20 NOTE — CONSULTS
[facility]  Physical Medicine & Rehab  Consult Note    Patient Name: Oscar Bills  MRN: [id number]  Admission Date: 8/5/2020  Hospital Length of Stay: 15 days  Attending Physician: Elver Greene MD     Inpatient consult to Physical Medicine & Rehabilitation  Consult performed by: Beckie Parkinson NP  Consult requested by:  Elver Greene MD    Collaborating Physician: Kusum Valdovinos MD  Reason for Consult:  Assess rehabilitation needs     Consults  Subjective:     Principal Problem: Traumatic compression fracture of L1 lumbar vertebra    HPI: Oscar Bills is a 45-year-old male with no PMHx. Patient trasnferred to [facility] on 8/7 after falling from 10ft roof at work. CT CAP significant for L1 burst fracture with retropulsion into the spinal canal resulting in severe spinal stenosis. S/p L1 corpectomy with cage insertion and T12-L2 lateral fusion on 8/7. T12-L2 PSIF 8/14. NSGY rec TLSO when OOB. Hospital course complicated by Ileus (now tolerating diet).     Functional History: Patient lives with his sister in a [facility] with 0 DAMASO.  Prior to admission, I. DME: none.    Hospital Course: 8/18/20; participated w/ PT. Bed mobility Eran. Sit tos tand Eran. Ambulated a few steps Eran. UBD modA. LBD maxA.     History reviewed. No pertinent past medical history.  Past Surgical History:   Procedure Laterality Date    LUMBAR FUSION N/A 8/14/2020    Procedure: FUSION, SPINE, LUMBAR T12-L2 Ziehm MIS  Spinewave Neuromonitoring  ;  Surgeon: Jamison Perez MD;  Location: [facility] OR [address] [place] [address];  Service: Neurosurgery;  Laterality: N/A;    SURGICAL REMOVAL OF VERTEBRAL BODY OF LUMBAR SPINE Left 8/6/2020    Procedure: L1 CORPECTOMY, SPINE, LUMBAR ;  Surgeon: Leonid Snider MD;  Location: [facility] OR [address] [place] [address];  Service: Neurosurgery;  Laterality: Left;  Globus, Please obtain neuromonitoring.     TRANSFORAMINAL LUMBAR INTERBODY FUSION Left 8/6/2020    Procedure: FUSION, SPINE, LUMBAR, TLIF T12-L2 Interbody Fusion with  Instrumentation - Lateral;  Surgeon: Leonid Snider MD;  Location: [facility] OR [address] [place] [address];  Service: Neurosurgery;  Laterality: Left;     Review of patient's allergies indicates:  Not on File    Scheduled Medications:    acetaminophen  1,000 mg Oral Q8H    diazePAM  5 mg Intravenous Q8H    DULoxetine  20 mg Oral Daily    gabapentin  300 mg Oral TID    heparin (porcine)  5,000 Units Subcutaneous Q8H    methocarbamoL  500 mg Oral QID    pantoprazole  40 mg Intravenous Daily    polyethylene glycol  17 g Per NG tube Daily    senna-docusate 8.6-50 mg  2 tablet Per NG tube Daily       PRN Medications: calcium carbonate, dextrose 50%, dextrose 50%, glucagon (human recombinant), magnesium oxide, magnesium oxide, naloxone, ondansetron, oxyCODONE, oxyCODONE, polyethylene glycol, potassium chloride, potassium chloride, potassium chloride, potassium, sodium phosphates, potassium, sodium phosphates, potassium, sodium phosphates, sodium chloride 0.9%    Family History     None        Tobacco Use    Smoking status: Never Smoker    Smokeless tobacco: Never Used   Substance and Sexual Activity    Alcohol use: Yes    Drug use: Never    Sexual activity: Yes     Partners: Female     Birth control/protection: Condom     Review of Systems   Constitutional: Positive for activity change. Negative for fatigue and fever.   HENT: Negative for trouble swallowing and voice change.    Respiratory: Negative for cough and shortness of breath.    Cardiovascular: Negative for chest pain and leg swelling.   Gastrointestinal: Negative for abdominal distention and abdominal pain.   Genitourinary: Negative for difficulty urinating and flank pain.   Musculoskeletal: Positive for gait problem. Negative for back pain.   Skin: Negative for color change and rash.   Neurological: Positive for weakness. Negative for speech difficulty and numbness.   Psychiatric/Behavioral: Negative for agitation, behavioral problems and confusion.     Objective:      Vital Signs (Most Recent):  Temp: 98.4 °F (36.9 °C) (08/20/20 0702)  Pulse: 82 (08/20/20 0902)  Resp: 18 (08/20/20 0902)  BP: (!) 146/70 (08/20/20 0902)  SpO2: 95 % (08/20/20 0902)    Vital Signs (24h Range):  Temp:  [97.9 °F (36.6 °C)-98.6 °F (37 °C)] 98.4 °F (36.9 °C)  Pulse:  [] 82  Resp:  [11-21] 18  SpO2:  [91 %-97 %] 95 %  BP: (102-146)/(70-99) 146/70  Arterial Line BP: (106-126)/(79-89) 106/85     Body mass index is 30.05 kg/m².    Physical Exam  Vitals signs and nursing note reviewed.   Constitutional:       Appearance: Normal appearance. He is well-developed.   HENT:      Head: Normocephalic and atraumatic.      Nose: Nose normal.      Mouth/Throat:      Mouth: Mucous membranes are moist.   Eyes:      General:         Right eye: No discharge.         Left eye: No discharge.      Extraocular Movements: Extraocular movements intact.      Pupils: Pupils are equal, round, and reactive to light.   Neck:      Musculoskeletal: Neck supple.   Cardiovascular:      Rate and Rhythm: Normal rate and regular rhythm.   Pulmonary:      Effort: Pulmonary effort is normal. No respiratory distress.   Abdominal:      General: There is no distension.      Palpations: Abdomen is soft.      Tenderness: There is no abdominal tenderness.   Musculoskeletal:      Comments: BLE weakness present   Skin:     General: Skin is warm and dry.   Neurological:      General: No focal deficit present.      Mental Status: He is alert and oriented to person, place, and time.      Sensory: No sensory deficit.      Motor: No abnormal muscle tone.   Psychiatric:         Mood and Affect: Mood normal.         Behavior: Behavior normal.         Thought Content: Thought content normal.         Diagnostic Results: Labs: Reviewed  ECG: Reviewed  CT: Reviewed    Assessment/Plan:     * Traumatic compression fracture of L1 lumbar vertebra  - S/p L1 corpectomy with cage insertion and T12-L2 lateral fusion on 8/7.   - T12-L2 PSIF 8/14.   - NSGY rec  TLSO when OOB.     Ileus  - now tolerating diet    Spinal stenosis of lumbar region  - Related to prolonged/acute hospital course.     Recommendations  -  Encourage mobility, OOB in chair at least 3 hours per day, and early ambulation as appropriate  -  PT/OT evaluate and treat  -  Pain management  -  Monitor for and prevent skin breakdown and pressure ulcers  · Early mobility, repositioning/weight shifting every 20-30 minutes when sitting, turn patient every 2 hours, proper mattress/overlay and chair cushioning, pressure relief/heel protector boots  -  DVT prophylaxis    -  Reviewed discharge options (IP rehab, SNF, HH therapy, and OP therapy)    Recommend Inpatient Rehab.      Thank you for your consult.     Beckie Parkinson NP  Department of Physical Medicine & Rehab  [facility]

## 2020-08-20 NOTE — SUBJECTIVE & OBJECTIVE
History reviewed. No pertinent past medical history.  Past Surgical History:   Procedure Laterality Date    LUMBAR FUSION N/A 8/14/2020    Procedure: FUSION, SPINE, LUMBAR T12-L2 Ziehm MIS  Spinewave Neuromonitoring  ;  Surgeon: Jamison Perez MD;  Location: [facility] OR [address] [place] [address];  Service: Neurosurgery;  Laterality: N/A;    SURGICAL REMOVAL OF VERTEBRAL BODY OF LUMBAR SPINE Left 8/6/2020    Procedure: L1 CORPECTOMY, SPINE, LUMBAR ;  Surgeon: Leonid Snider MD;  Location: [facility] OR [address] [place] [address];  Service: Neurosurgery;  Laterality: Left;  Globus, Please obtain neuromonitoring.     TRANSFORAMINAL LUMBAR INTERBODY FUSION Left 8/6/2020    Procedure: FUSION, SPINE, LUMBAR, TLIF T12-L2 Interbody Fusion with Instrumentation - Lateral;  Surgeon: Leonid Snider MD;  Location: [facility] OR [address] [place] [address];  Service: Neurosurgery;  Laterality: Left;     Review of patient's allergies indicates:  Not on File    Scheduled Medications:    acetaminophen  1,000 mg Oral Q8H    diazePAM  5 mg Intravenous Q8H    DULoxetine  20 mg Oral Daily    gabapentin  300 mg Oral TID    heparin (porcine)  5,000 Units Subcutaneous Q8H    methocarbamoL  500 mg Oral QID    pantoprazole  40 mg Intravenous Daily    polyethylene glycol  17 g Per NG tube Daily    senna-docusate 8.6-50 mg  2 tablet Per NG tube Daily       PRN Medications: calcium carbonate, dextrose 50%, dextrose 50%, glucagon (human recombinant), magnesium oxide, magnesium oxide, naloxone, ondansetron, oxyCODONE, oxyCODONE, polyethylene glycol, potassium chloride, potassium chloride, potassium chloride, potassium, sodium phosphates, potassium, sodium phosphates, potassium, sodium phosphates, sodium chloride 0.9%    Family History     None        Tobacco Use    Smoking status: Never Smoker    Smokeless tobacco: Never Used   Substance and Sexual Activity    Alcohol use: Yes    Drug use: Never    Sexual activity: Yes     Partners: Female     Birth control/protection: Condom     Review of Systems    Constitutional: Positive for activity change. Negative for fatigue and fever.   HENT: Negative for trouble swallowing and voice change.    Respiratory: Negative for cough and shortness of breath.    Cardiovascular: Negative for chest pain and leg swelling.   Gastrointestinal: Negative for abdominal distention and abdominal pain.   Genitourinary: Negative for difficulty urinating and flank pain.   Musculoskeletal: Positive for gait problem. Negative for back pain.   Skin: Negative for color change and rash.   Neurological: Positive for weakness. Negative for speech difficulty and numbness.   Psychiatric/Behavioral: Negative for agitation, behavioral problems and confusion.     Objective:     Vital Signs (Most Recent):  Temp: 98.4 °F (36.9 °C) (08/20/20 0702)  Pulse: 82 (08/20/20 0902)  Resp: 18 (08/20/20 0902)  BP: (!) 146/70 (08/20/20 0902)  SpO2: 95 % (08/20/20 0902)    Vital Signs (24h Range):  Temp:  [97.9 °F (36.6 °C)-98.6 °F (37 °C)] 98.4 °F (36.9 °C)  Pulse:  [] 82  Resp:  [11-21] 18  SpO2:  [91 %-97 %] 95 %  BP: (102-146)/(70-99) 146/70  Arterial Line BP: (106-126)/(79-89) 106/85     Body mass index is 30.05 kg/m².    Physical Exam  Vitals signs and nursing note reviewed.   Constitutional:       Appearance: Normal appearance. He is well-developed.   HENT:      Head: Normocephalic and atraumatic.      Nose: Nose normal.      Mouth/Throat:      Mouth: Mucous membranes are moist.   Eyes:      General:         Right eye: No discharge.         Left eye: No discharge.      Extraocular Movements: Extraocular movements intact.      Pupils: Pupils are equal, round, and reactive to light.   Neck:      Musculoskeletal: Neck supple.   Cardiovascular:      Rate and Rhythm: Normal rate and regular rhythm.   Pulmonary:      Effort: Pulmonary effort is normal. No respiratory distress.   Abdominal:      General: There is no distension.      Palpations: Abdomen is soft.      Tenderness: There is no abdominal  tenderness.   Musculoskeletal:      Comments: BLE weakness present   Skin:     General: Skin is warm and dry.   Neurological:      General: No focal deficit present.      Mental Status: He is alert and oriented to person, place, and time.      Sensory: No sensory deficit.      Motor: No abnormal muscle tone.   Psychiatric:         Mood and Affect: Mood normal.         Behavior: Behavior normal.         Thought Content: Thought content normal.       NEUROLOGICAL EXAMINATION:     MENTAL STATUS   Oriented to person, place, and time.     CRANIAL NERVES     CN III, IV, VI   Pupils are equal, round, and reactive to light.      Diagnostic Results: Labs: Reviewed  ECG: Reviewed  CT: Reviewed

## 2020-08-20 NOTE — PT/OT/SLP PROGRESS
Occupational Therapy   Treatment    Name: Oscar Bills  MRN: [id number]  Admitting Diagnosis:  Traumatic compression fracture of L1 lumbar vertebra  6 Days Post-Op  FUSION, SPINE, LUMBAR T12-L2 Ziehm MIS  Spinewave Neuromonitoring     Recommendations:     Discharge Recommendations: rehabilitation facility  Discharge Equipment Recommendations:  (TBD next level of care)  Barriers to discharge:  (increased assistance needed)    Assessment:     Oscar Bills is a 45 y.o. male with a medical diagnosis of Traumatic compression fracture of L1 lumbar vertebra. He presents with performance deficits including weakness, impaired endurance, impaired self care skills, impaired functional mobilty, gait instability, impaired balance, pain, decreased lower extremity function, decreased upper extremity function, decreased coordination, impaired cardiopulmonary response to activity. Pt progressing toward goals and would continue to benefit from OT to increase functional independence and safety. Recommend rehab upon D/C to return to [facility].    Rehab Prognosis:  Good; patient would benefit from acute skilled OT services to address these deficits and reach maximum level of function.       Plan:     Patient to be seen 5 x/week to address the above listed problems via self-care/home management, therapeutic activities, therapeutic exercises, neuromuscular re-education  · Plan of Care Expires: 09/15/20  · Plan of Care Reviewed with: patient    Subjective     Pain/Comfort:  · Pain Rating 1: 3/10  · Location 1: back  · Pain Addressed 1: Pre-medicate for activity, Reposition  · Pain Rating Post-Intervention 1: 3/10    Objective:     Communicated with: RN prior to session. Patient found supine with bed alarm, peripheral IV, telemetry, blood pressure cuff, kraus catheter upon OT entry to room.    General Precautions: Standard, fall   Orthopedic Precautions:spinal precautions   Braces: TLSO     Occupational Performance:     Bed Mobility:    · Patient  completed Rolling/Turning to Right with stand by assistance and with side rail  · Patient completed Supine to Sit with stand by assistance     Functional Mobility/Transfers:  · Patient completed Sit <> Stand Transfer with contact guard assistance with hand-held assist from EOB  · Functional Mobility: Within room household distance to bathroom with Min A hand-held assist and chair follow for safety-- one major LOB requiring assist to remain upright    Activities of Daily Living:  · Grooming: contact guard assistance standing at sink ~5 min to complete oral hygiene, wash face and hands  · Upper Body Dressing: minimum assistance to don gown around back and TLSO while seated EOB      [facility] 6 Click ADL: 15    Treatment & Education:  Pt able to sit EOB with supervision; completed UB dressing and functional mobility to bathroom for standing ADLs-- unsteady gait with LOB while ambulating; O2 88-91% on room air after activity; discussed POC and D/C recs    Patient left up in chair with all lines intact, call button in reach and RN presentEducation:      GOALS:   Multidisciplinary Problems     Occupational Therapy Goals        Problem: Occupational Therapy Goal    Goal Priority Disciplines Outcome Interventions   Occupational Therapy Goal     OT, PT/OT Ongoing, Progressing    Description: Goals to be met by: 8/30/20     Patient will increase functional independence with ADLs by performing:    UE Dressing with Minimal Assistance.  LE Dressing with Maximum Assistance.  Grooming while EOB with Minimal Assistance.  Toileting from bedside commode with Maximum Assistance for hygiene and clothing management.   Sitting at edge of bed x10 minutes with Supervision.  Rolling to Bilateral with Moderate Assistance while maintaining precautions.  Supine to sit with Moderate Assistance while maintaining precautions.  Toilet transfer to bedside commode with Maximum Assistance.                     Time Tracking:     OT Date of Treatment:  08/20/20  OT Start Time: 1032  OT Stop Time: 1048  OT Total Time (min): 16 min (co-treat with PT)    Billable Minutes:Self Care/Home Management 16 minutes    JAILENE Queen  8/20/2020

## 2020-08-20 NOTE — PROGRESS NOTES
[facility]  Neurocritical Care  Progress Note    Admit Date: 8/5/2020  Service Date: 08/20/2020  Length of Stay: 15    Subjective:     Chief Complaint: Traumatic compression fracture of L1 lumbar vertebra    History of Present Illness: Patient is a 44 y/o male with no significant PMH who was transferred from  after falling from 10ft roof at work. Patient denies LOC, complained of excrutiating low back/sacral pain exacerbated with any movement of legs. CT CAP significant for L1 burst fracture with retropulsion into the spinal canal resulting in severe spinal stenosis. Patient states he could feel he had to urinate but could not start a stream, kraus placed and he reports he felt the kraus inserted and felt his bladder release once the kraus was placed. He reports he felt the OSMIN and per report from ED physician, patient had full rectal tone. He is being admitted to Neuro ICU for a higher level of neurological care s/p L1 CORPECTOMY and TLIF T12-L2 Interbody Instrumentation.    Hospital Course: 08/07/2020: Admit to Neuro ICU.  08/08/2020: Added long acting pain medications. Pain better controlled. Stable for stepdown.   08/09/2020: Acute pain management consulted. Ketamine gtt. Ilues, NGT. Suppository.    08/10/2020: Appreciate pain management reccs. Plans for OR tomorrow.  08/11/2020 ABG, KUB/CXR, Mag citrate, cont ketamine  08/12/2020  Surgery rescheduled 8/181L bolus continue IVF ketamine /PCA. Follow Na dc suction, start clear liquid  08/13/2020 wean ketamine gtt, heparin  08/14/2020 for surgery today; continue pain mgmt gtt  08/15/2020 continued post-op pain, APS following, increased ketamine infusion. Advancing diet, advancing bowel regimen, trial of methylnaltrexone.   8/16/2020: add regular diet  8/17/2020: tolerating PO, decreased ketamine gtt, stopped PCA today.  8/18/2020: f/u neurosurgery canceled, stool improved to more solid  8/19/2020: abdominal pain improved, NGT  pulled    Interval History:  Patient seen and examined in room this morning. Patient remained afebrile and hemodynamically stable. Reports less abdominal pain and diarrhea this morning. Had NG tube pulled yesterday, minimal nausea overnight with no vomiting.    Review of Systems   Constitutional: Negative for fever.   HENT: Negative for trouble swallowing and voice change.    Eyes: Negative for visual disturbance.   Respiratory: Negative for shortness of breath and wheezing.    Cardiovascular: Negative for palpitations.   Gastrointestinal: Negative for abdominal pain and constipation.   Endocrine: Negative for polyuria.   Genitourinary: Negative for difficulty urinating.   Musculoskeletal: Positive for back pain.   Neurological: Positive for weakness. Negative for dizziness and headaches.   Psychiatric/Behavioral: Negative for agitation.     Objective:     Vitals:  Temp: 98.4 °F (36.9 °C)  Pulse: 89  Rhythm: normal sinus rhythm  BP: 120/74  MAP (mmHg): 89  Resp: (!) 23  SpO2: 95 %  O2 Device (Oxygen Therapy): room air    Temp  Min: 97.9 °F (36.6 °C)  Max: 98.6 °F (37 °C)  Pulse  Min: 77  Max: 103  BP  Min: 102/70  Max: 146/70  MAP (mmHg)  Min: 82  Max: 112  Resp  Min: 11  Max: 23  SpO2  Min: 91 %  Max: 97 %    08/19 0701 - 08/20 0700  In: 2793.3 [P.O.:230; I.V.:2563.3]  Out: 3050 [Urine:3050]   Unmeasured Output  Stool Occurrence: 1  Pad Count: 1       Physical Exam  Vitals signs and nursing note reviewed.   Constitutional:       General: He is not in acute distress.     Appearance: He is normal weight.   HENT:      Head: Normocephalic and atraumatic.      Nose: Nose normal.   Eyes:      General: No scleral icterus.     Extraocular Movements: Extraocular movements intact.      Conjunctiva/sclera: Conjunctivae normal.      Pupils: Pupils are equal, round, and reactive to light.   Neck:      Musculoskeletal: No neck rigidity.   Cardiovascular:      Rate and Rhythm: Normal rate and regular rhythm.      Pulses: Normal  pulses.      Heart sounds: Normal heart sounds.   Pulmonary:      Effort: Pulmonary effort is normal. No respiratory distress.      Breath sounds: Normal breath sounds.   Abdominal:      General: Abdomen is flat.      Palpations: Abdomen is soft.   Skin:     General: Skin is warm.      Capillary Refill: Capillary refill takes less than 2 seconds.   Neurological:      Mental Status: He is alert and oriented to person, place, and time.      GCS: GCS eye subscore is 4. GCS verbal subscore is 5. GCS motor subscore is 6.      Motor: Weakness present.      Comments: Awake, oriented  PERRLA  EOMI  Moving extremities spontaneously   Weakness in RLE   Psychiatric:         Mood and Affect: Mood normal.       Medications:  Continuoussodium chloride 0.9%, Last Rate: 100 mL/hr at 08/20/20 1002    Scheduledacetaminophen, 1,000 mg, Q8H  diazePAM, 5 mg, Q8H  DULoxetine, 20 mg, Daily  gabapentin, 300 mg, TID  heparin (porcine), 5,000 Units, Q8H  methocarbamoL, 500 mg, QID  pantoprazole, 40 mg, Daily  polyethylene glycol, 17 g, Daily  senna-docusate 8.6-50 mg, 2 tablet, Daily    PRNcalcium carbonate, 500 mg, Daily PRN  dextrose 50%, 12.5 g, PRN  dextrose 50%, 25 g, PRN  glucagon (human recombinant), 1 mg, PRN  magnesium oxide, 800 mg, PRN  magnesium oxide, 800 mg, PRN  naloxone, 0.02 mg, PRN  ondansetron, 8 mg, Q8H PRN  oxyCODONE, 10 mg, Q4H PRN  oxyCODONE, 5 mg, Q4H PRN  polyethylene glycol, 17 g, Daily PRN  potassium chloride, 40 mEq, PRN  potassium chloride, 40 mEq, PRN  potassium chloride, 60 mEq, PRN  potassium, sodium phosphates, 2 packet, PRN  potassium, sodium phosphates, 2 packet, PRN  potassium, sodium phosphates, 2 packet, PRN  sodium chloride 0.9%, 10 mL, PRN      Today I personally reviewed pertinent medications, lines/drains/airways, imaging, laboratory results, microbiology results,     Diet  Diet NPO    Assessment/Plan:     Neuro  * Traumatic compression fracture of L1 lumbar vertebra  45 y.o male with no significant  PMHx was transferred from  to [facility] with [place] CAP significant for L1 burst fracture with retropulsion into the spinal canal resulting in severe spinal stenosis s/p falling 10ft from roof. He will be admitted to the Neuro ICU s/p  L1 CORPECTOMY and TLIF T12-L2 Interbody Instrumentation.     -Neurosurgery Following  -Neuro checks q1hr   -Vital signs q1hr  -Daily labs  -Dilaudid PCA stopped 8/18, transitioned to PO   -Continue prn Zofran   pain management following  Ketamine gtt managed by APS - stopped yesterday  PT/OT    - Ketamine stopped, can step down to floor today if rehab not ready.     Spinal stenosis of lumbar region  S/P L1 CORPECTOMY, SPINE, LUMBAR  (Left)  FUSION, SPINE, LUMBAR, TLIF T12-L2 Interbody Fusion with Instrumentation - Lateral (Left)     GI  Ileus  Decrease NGT to low intermittent suction   KUB/Mag Citrate productive  Trial of methylnaltrexone - patient refused  8/20/2020: NG tube pulled yesterday, abdominal pain improved    Orthopedic  Fall from building  See above    Other  Burst fracture of lumbar vertebra, open, initial encounter  -see traumatic compression fracture of L1 lumbar fracture           The patient is being Prophylaxed for:  Venous Thromboembolism with: Chemical  Stress Ulcer with: PPI  Ventilator Pneumonia with: not applicable    Activity Orders          Diet Adult Regular (IDDSI Level 7): Regular starting at 08/20 1041    Commode at bedside starting at 08/07 0746        Full Code    Néstor Yao MD, PGY-1  Neurocritical Care  [facility]

## 2020-08-20 NOTE — PT/OT/SLP PROGRESS
Physical Therapy Treatment    Patient Name:  Oscar Bills   MRN:  [id number]  Admitting Diagnosis: Traumatic compression fracture of L1 lumbar vertebra  Recent Surgery: Procedure(s) (LRB):  FUSION, SPINE, LUMBAR T12-L2 Ziehm MIS  Spinewave Neuromonitoring   (N/A) 6 Days Post-Op    Recommendations:     Discharge Recommendations:  rehabilitation facility   Discharge Equipment Recommendations: (TBD based on progress with mobility)   Barriers to discharge: decreased functional mobility    Assessment:     Oscar Bills is a 45 y.o. male admitted with a medical diagnosis of Traumatic compression fracture of L1 lumbar vertebra. He is now post op day 6 s/p spinal fusion  He most limited today by dizziness and decreased endurance. Patient tolerated PT treatment well today. He was able to ambulate to the bathroom and stand to brush his teeth with minimal assistance. He was unsteady throughout, but did well overall. Focus of treatment was improving functional mobility. Pt is progressing well. See detailed treatment note below:    Problem List: weakness, impaired endurance, impaired self care skills, impaired functional mobilty, gait instability, impaired balance, decreased upper extremity function, decreased lower extremity function, pain, orthopedic precautions. weakness, impaired endurance, impaired self care skills, impaired functional mobilty, gait instability, impaired balance, decreased upper extremity function, decreased lower extremity function, pain, orthopedic precautions  Rehab Prognosis: Good     GOALS:   Multidisciplinary Problems     Physical Therapy Goals        Problem: Physical Therapy Goal    Goal Priority Disciplines Outcome Goal Variances Interventions   Physical Therapy Goal     PT, PT/OT Ongoing, Progressing     Description: Goals to be met by:      Patient will increase functional independence with mobility by performin. Supine to sit with Moderate Assistance maintaining precautions. - met     "      *Updated: Supine to sit with SBA maintaining precautions. - met 8/20  2. Sit to supine with Moderate Assistance maintaining precautions.   3. Sit to stand transfer with Maximum Assistance with least restrictive device or no AD. - met 8/18         *Sit to stand with SBA with or without AD.  4. Bed to chair transfer with Maximum Assistance using squat pivot technique. - met 8/18         * Bed to chair transfer with SBA with or without AD.  5. Gait  x 5 feet with Moderate Assistance using least restrictive device or no AD. - met 8/20          *Updated: Pt will ambulate 80 feet with CGA and no AD.  6. Sitting at edge of bed x10 minutes with Supervision while performing dynamic reaching outside LUÍS in all planes to prepare for functional activities in sitting.   7. Lower extremity exercise program x15 reps per handout, with independence to improve strength and activity tolerance.                    Plan:     During this hospitalization, patient to be seen 5 x/week to address the listed problems via gait training, therapeutic activities, therapeutic exercises, neuromuscular re-education  · Plan of Care Expires:  09/15/20   Plan of Care Reviewed with: patient    Subjective     Communicated with RN prior to session.  Patient found supine upon PT entry to room.   "I feel better today"    Pain/Comfort:  · Pain Rating 1: 2/10  · Location - Orientation 1: generalized  · Location 1: back  · Pain Addressed 1: Reposition, Distraction  · Pain Rating Post-Intervention 1: 0/10    Objective:     Patient found with: peripheral IV, blood pressure cuff, pulse ox (continuous), telemetry, kraus catheter     General Precautions: Standard, Cardiac fall   Orthopedic Precautions:spinal precautions   Braces: TLSO   Vital Signs (Most Recent):    Temp: 97.7 °F (36.5 °C) (08/20/20 1102)  Pulse: 95 (08/20/20 1302)  Resp: 18 (08/20/20 1302)  BP: 109/75 (08/20/20 1302)  SpO2: (!) 94 % (08/20/20 1302)    Functional Mobility:  Bed Mobility: "   · Rolling/Turning to Right: stand by assistance  · Supine to Sit: stand by assistance  · Scooting anteriorly to EOB to have both feet planted on floor: minimum assistance    Sitting Balance at Edge of Bed:  · Assistance Level Required: supervision    Transfers:   · Sit <> Stand Transfer: contact guard assistance with no assistive device   · Bed <> Chair Transfer: Step Transfer technique with minimum assistance with hand-held assist      Gait:  · Patient ambulated: ~20 feet    · Patient required: minimal assist  · Patient used:  No Assistive Device  · Gait Deviation(s): Pt took slow, shuffling steps and demonstrated a narrow LUÍS. He had a few losses of balance and needed assistance to prevent falling over.     Therapeutic Exercises:   Pt was able to stand up at the sink to brush his teeth with min A.    Education:  Patient was educated on the following:   Progress of PT goals and plan of care   In room safety and use of call button   Importance of continued upright mobility and exercise   Spinal precautions   Log roll and reverse log roll techniques    Patient left up in chair, with all lines intact, call button in reach and RN notified.    AM-PAC 6 CLICK MOBILITY  Turning over in bed (including adjusting bedclothes, sheets and blankets)?: 4  Sitting down on and standing up from a chair with arms (e.g., wheelchair, bedside commode, etc.): 3  Moving from lying on back to sitting on the side of the bed?: 4  Moving to and from a bed to a chair (including a wheelchair)?: 3  Need to walk in hospital room?: 3  Climbing 3-5 steps with a railing?: 3  Basic Mobility Total Score: 20       Time Tracking:     PT Received On: 08/20/20  PT Start Time: 1033     PT Stop Time: 1048  PT Total Time (min): 15 min     Billable Minutes:   · Gait Training 15    Treatment Type: Treatment  PT/PTA: PT       Pricilla Stern, PT, DPT  08/20/2020

## 2020-08-20 NOTE — ASSESSMENT & PLAN NOTE
- S/p L1 corpectomy with cage insertion and T12-L2 lateral fusion on 8/7.   - T12-L2 PSIF 8/14.   - NSGY rec TLSO when OOB.

## 2020-08-20 NOTE — PLAN OF CARE
08/20/20 1114   Post-Acute Status   Post-Acute Authorization Placement   Post-Acute Placement Status Pending Payor Review  (auth for [facility])     MARISOL spoke with Marybeth with [facility] ([phone]). Sent requested docs and reported Pt is ready as of today. Per Marybeth, they are interested in accepting and will submit for auth today. Once they have the auth, they will take the Pt.     MARISOL contacted Pt sister to address questions.     Josefina Woo LCSW  Neurocritical Care   [facility]  [phone]

## 2020-08-20 NOTE — HOSPITAL COURSE
8/18/20; participated w/ PT. Bed mobility Eran. Sit tos tand Eran. Ambulated a few steps Eran. UBD modA. LBD maxA.

## 2020-08-20 NOTE — ASSESSMENT & PLAN NOTE
45 y.o male with no significant PMHx was transferred from  [place] [facility] [place] CAP significant for L1 burst fracture with retropulsion into the spinal canal resulting in severe spinal stenosis s/p falling 10ft from roof. He will be admitted to the Neuro ICU s/p  L1 CORPECTOMY and TLIF T12-L2 Interbody Instrumentation.     -Neurosurgery Following  -Neuro checks q1hr   -Vital signs q1hr  -Daily labs  -Dilaudid PCA stopped 8/18, transitioned to PO   -Continue prn Zofran   pain management following  Ketamine gtt managed by APS - stopped yesterday  PT/OT    - Ketamine stopped, can step down to floor today if rehab not ready.

## 2020-08-20 NOTE — PLAN OF CARE
Covid test of 08/19/2020 and updated notes sent to [facility] via [employer].    Tere Yang RN, CCRN-K, [facility]  Neuro-Critical Care   [phone]

## 2020-08-20 NOTE — PLAN OF CARE
Problem: Physical Therapy Goal  Goal: Physical Therapy Goal  Description: Goals to be met by:      Patient will increase functional independence with mobility by performin. Supine to sit with Moderate Assistance maintaining precautions. - met          *Updated: Supine to sit with SBA maintaining precautions. - met   2. Sit to supine with Moderate Assistance maintaining precautions.   3. Sit to stand transfer with Maximum Assistance with least restrictive device or no AD. - met          *Sit to stand with SBA with or without AD.  4. Bed to chair transfer with Maximum Assistance using squat pivot technique. - met          * Bed to chair transfer with SBA with or without AD.  5. Gait  x 5 feet with Moderate Assistance using least restrictive device or no AD. - met           *Updated: Pt will ambulate 80 feet with CGA and no AD.  6. Sitting at edge of bed x10 minutes with Supervision while performing dynamic reaching outside LUÍS in all planes to prepare for functional activities in sitting.   7. Lower extremity exercise program x15 reps per handout, with independence to improve strength and activity tolerance.     Outcome: Ongoing, Progressing       Pt's goals have been updated and remain appropriate and pt will continue to benefit from skilled PT services to work towards improved functional mobility.    Pricilla Stern, PT, DPT  2020

## 2020-08-21 VITALS
WEIGHT: 209.44 LBS | RESPIRATION RATE: 24 BRPM | TEMPERATURE: 98 F | SYSTOLIC BLOOD PRESSURE: 100 MMHG | HEART RATE: 99 BPM | HEIGHT: 70 IN | BODY MASS INDEX: 29.98 KG/M2 | OXYGEN SATURATION: 96 % | DIASTOLIC BLOOD PRESSURE: 71 MMHG

## 2020-08-21 LAB
ALBUMIN SERPL BCP-MCNC: 2.7 G/DL (ref 3.5–5.2)
ALP SERPL-CCNC: 131 U/L (ref 55–135)
ALT SERPL W/O P-5'-P-CCNC: 52 U/L (ref 10–44)
ANION GAP SERPL CALC-SCNC: 10 MMOL/L (ref 8–16)
APTT BLDCRRT: 27.4 SEC (ref 21–32)
AST SERPL-CCNC: 36 U/L (ref 10–40)
BASOPHILS # BLD AUTO: 0.04 K/UL (ref 0–0.2)
BASOPHILS NFR BLD: 0.5 % (ref 0–1.9)
BILIRUB SERPL-MCNC: 0.3 MG/DL (ref 0.1–1)
BUN SERPL-MCNC: 8 MG/DL (ref 6–20)
CALCIUM SERPL-MCNC: 9 MG/DL (ref 8.7–10.5)
CHLORIDE SERPL-SCNC: 103 MMOL/L (ref 95–110)
CO2 SERPL-SCNC: 27 MMOL/L (ref 23–29)
CREAT SERPL-MCNC: 0.6 MG/DL (ref 0.5–1.4)
DIFFERENTIAL METHOD: ABNORMAL
EOSINOPHIL # BLD AUTO: 0.1 K/UL (ref 0–0.5)
EOSINOPHIL NFR BLD: 1.8 % (ref 0–8)
ERYTHROCYTE [DISTWIDTH] IN BLOOD BY AUTOMATED COUNT: 13.3 % (ref 11.5–14.5)
EST. GFR  (AFRICAN AMERICAN): >60 ML/MIN/1.73 M^2
EST. GFR  (NON AFRICAN AMERICAN): >60 ML/MIN/1.73 M^2
GLUCOSE SERPL-MCNC: 99 MG/DL (ref 70–110)
HCT VFR BLD AUTO: 32.3 % (ref 40–54)
HGB BLD-MCNC: 10.5 G/DL (ref 14–18)
IMM GRANULOCYTES # BLD AUTO: 0.29 K/UL (ref 0–0.04)
IMM GRANULOCYTES NFR BLD AUTO: 3.8 % (ref 0–0.5)
LYMPHOCYTES # BLD AUTO: 1.9 K/UL (ref 1–4.8)
LYMPHOCYTES NFR BLD: 23.9 % (ref 18–48)
MAGNESIUM SERPL-MCNC: 1.8 MG/DL (ref 1.6–2.6)
MCH RBC QN AUTO: 30.5 PG (ref 27–31)
MCHC RBC AUTO-ENTMCNC: 32.5 G/DL (ref 32–36)
MCV RBC AUTO: 94 FL (ref 82–98)
MONOCYTES # BLD AUTO: 0.6 K/UL (ref 0.3–1)
MONOCYTES NFR BLD: 7.1 % (ref 4–15)
NEUTROPHILS # BLD AUTO: 4.9 K/UL (ref 1.8–7.7)
NEUTROPHILS NFR BLD: 62.9 % (ref 38–73)
NRBC BLD-RTO: 0 /100 WBC
PHOSPHATE SERPL-MCNC: 5.2 MG/DL (ref 2.7–4.5)
PLATELET # BLD AUTO: 709 K/UL (ref 150–350)
PMV BLD AUTO: 8.5 FL (ref 9.2–12.9)
POTASSIUM SERPL-SCNC: 3.9 MMOL/L (ref 3.5–5.1)
PROT SERPL-MCNC: 6.3 G/DL (ref 6–8.4)
RBC # BLD AUTO: 3.44 M/UL (ref 4.6–6.2)
SODIUM SERPL-SCNC: 140 MMOL/L (ref 136–145)
WBC # BLD AUTO: 7.73 K/UL (ref 3.9–12.7)

## 2020-08-21 PROCEDURE — 84100 ASSAY OF PHOSPHORUS: CPT

## 2020-08-21 PROCEDURE — 25000003 PHARM REV CODE 250: Performed by: PSYCHIATRY & NEUROLOGY

## 2020-08-21 PROCEDURE — 83735 ASSAY OF MAGNESIUM: CPT

## 2020-08-21 PROCEDURE — 85730 THROMBOPLASTIN TIME PARTIAL: CPT

## 2020-08-21 PROCEDURE — 85025 COMPLETE CBC W/AUTO DIFF WBC: CPT

## 2020-08-21 PROCEDURE — 80053 COMPREHEN METABOLIC PANEL: CPT

## 2020-08-21 PROCEDURE — 63600175 PHARM REV CODE 636 W HCPCS: Performed by: UROLOGY

## 2020-08-21 PROCEDURE — 94761 N-INVAS EAR/PLS OXIMETRY MLT: CPT

## 2020-08-21 PROCEDURE — 25000003 PHARM REV CODE 250: Performed by: STUDENT IN AN ORGANIZED HEALTH CARE EDUCATION/TRAINING PROGRAM

## 2020-08-21 PROCEDURE — 63600175 PHARM REV CODE 636 W HCPCS: Performed by: NURSE PRACTITIONER

## 2020-08-21 RX ORDER — CALCIUM CARBONATE 200(500)MG
500 TABLET,CHEWABLE ORAL DAILY PRN
Start: 2020-08-21 | End: 2021-08-21

## 2020-08-21 RX ORDER — OXYCODONE HYDROCHLORIDE 10 MG/1
10 TABLET ORAL EVERY 4 HOURS PRN
Refills: 0
Start: 2020-08-21 | End: 2020-09-02

## 2020-08-21 RX ORDER — METHOCARBAMOL 500 MG/1
500 TABLET, FILM COATED ORAL 4 TIMES DAILY
Qty: 40 TABLET | Refills: 0
Start: 2020-08-21 | End: 2020-08-31

## 2020-08-21 RX ORDER — ACETAMINOPHEN 500 MG
1000 TABLET ORAL EVERY 8 HOURS
Refills: 0
Start: 2020-08-21 | End: 2020-09-02

## 2020-08-21 RX ORDER — ONDANSETRON 2 MG/ML
8 INJECTION INTRAMUSCULAR; INTRAVENOUS EVERY 8 HOURS PRN
Start: 2020-08-21 | End: 2022-10-06 | Stop reason: ALTCHOICE

## 2020-08-21 RX ORDER — OXYCODONE HYDROCHLORIDE 5 MG/1
5 TABLET ORAL EVERY 4 HOURS PRN
Refills: 0
Start: 2020-08-21 | End: 2020-09-02

## 2020-08-21 RX ORDER — HEPARIN SODIUM 5000 [USP'U]/ML
5000 INJECTION, SOLUTION INTRAVENOUS; SUBCUTANEOUS EVERY 8 HOURS
Start: 2020-08-21

## 2020-08-21 RX ORDER — AMOXICILLIN 250 MG
2 CAPSULE ORAL DAILY
Start: 2020-08-22

## 2020-08-21 RX ORDER — POLYETHYLENE GLYCOL 3350 17 G/17G
17 POWDER, FOR SOLUTION ORAL DAILY PRN
Refills: 0
Start: 2020-08-21

## 2020-08-21 RX ORDER — GABAPENTIN 300 MG/1
300 CAPSULE ORAL 3 TIMES DAILY
Qty: 90 CAPSULE | Refills: 11 | Status: SHIPPED | OUTPATIENT
Start: 2020-08-21 | End: 2021-08-21

## 2020-08-21 RX ORDER — DULOXETIN HYDROCHLORIDE 20 MG/1
20 CAPSULE, DELAYED RELEASE ORAL DAILY
Qty: 30 CAPSULE | Refills: 11
Start: 2020-08-22 | End: 2021-08-22

## 2020-08-21 RX ADMIN — DIAZEPAM 5 MG: 5 INJECTION, SOLUTION INTRAMUSCULAR; INTRAVENOUS at 06:08

## 2020-08-21 RX ADMIN — DOCUSATE SODIUM 50MG AND SENNOSIDES 8.6MG 2 TABLET: 8.6; 5 TABLET, FILM COATED ORAL at 08:08

## 2020-08-21 RX ADMIN — ACETAMINOPHEN 1000 MG: 500 TABLET ORAL at 06:08

## 2020-08-21 RX ADMIN — DULOXETINE HYDROCHLORIDE 20 MG: 20 CAPSULE, DELAYED RELEASE ORAL at 08:08

## 2020-08-21 RX ADMIN — METHOCARBAMOL TABLETS 500 MG: 500 TABLET, COATED ORAL at 08:08

## 2020-08-21 RX ADMIN — OXYCODONE HYDROCHLORIDE 10 MG: 5 TABLET ORAL at 11:08

## 2020-08-21 RX ADMIN — GABAPENTIN 300 MG: 300 CAPSULE ORAL at 08:08

## 2020-08-21 RX ADMIN — OXYCODONE HYDROCHLORIDE 10 MG: 5 TABLET ORAL at 07:08

## 2020-08-21 RX ADMIN — METHOCARBAMOL TABLETS 500 MG: 500 TABLET, COATED ORAL at 12:08

## 2020-08-21 RX ADMIN — OXYCODONE HYDROCHLORIDE 10 MG: 5 TABLET ORAL at 03:08

## 2020-08-21 RX ADMIN — HEPARIN SODIUM 5000 UNITS: 5000 INJECTION INTRAVENOUS; SUBCUTANEOUS at 06:08

## 2020-08-21 NOTE — PLAN OF CARE
[facility]    FACILITY TRANSFER ORDERS      Patient Name: Oscar Bills  YOB: 1975    PCP: Primary Doctor No   PCP Address: None  PCP Phone Number: None  PCP Fax: None    Encounter Date: 08/21/2020    [place] to: [place]    Vital Signs:  Routine    Diagnoses:   Active Hospital Problems    Diagnosis  POA    *Traumatic compression fracture of L1 lumbar vertebra [S32.010A]  Yes    Hypocalcemia [E83.51]  Yes    Ileus [K56.7]  Unknown    Burst fracture of lumbar vertebra, open, initial encounter [S32.001B]  Yes    Spinal stenosis of lumbar region [M48.061]  Yes    Fall from building [W13.9XXA]  Yes    Acute urinary retention [R33.8]  Yes      Resolved Hospital Problems   No resolved problems to display.       Allergies:Review of patient's allergies indicates:  Not on File    Diet: regular diet    Activities: Activity as tolerated - Needs TSLO brace when OOB    Nursing: Routine per facility     Labs: Per facility    CONSULTS:    Physical Therapy to evaluate and treat. , Occupational Therapy to evaluate and treat. and  to evaluate for community resources/long-range planning.    WOUND CARE ORDERS  Yes: Surgical Wound:  Location: Back  Consult ET nurse    Medications: Review discharge medications with patient and family and provide education.      Current Discharge Medication List      START taking these medications    Details   acetaminophen (TYLENOL) 500 MG tablet Take 2 tablets (1,000 mg total) by mouth every 8 (eight) hours.  Refills: 0      calcium carbonate (TUMS) 200 mg calcium (500 mg) chewable tablet Take 1 tablet (500 mg total) by mouth daily as needed.  Qty:        DULoxetine (CYMBALTA) 20 MG capsule Take 1 capsule (20 mg total) by mouth once daily.  Qty: 30 capsule, Refills: 11      gabapentin (NEURONTIN) 300 MG capsule Take 1 capsule (300 mg total) by mouth 3 (three) times daily.  Qty: 90 capsule, Refills: 11      heparin sodium,porcine (HEPARIN, PORCINE,)  5,000 unit/mL injection Inject 1 mL (5,000 Units total) into the skin every 8 (eight) hours.  Qty:        methocarbamoL (ROBAXIN) 500 MG Tab Take 1 tablet (500 mg total) by mouth 4 (four) times daily. for 10 days  Qty: 40 tablet, Refills: 0      ondansetron 4 mg/2 mL Soln Inject 8 mg into the vein every 8 (eight) hours as needed.  Qty:        !! oxyCODONE (ROXICODONE) 10 mg Tab immediate release tablet Take 1 tablet (10 mg total) by mouth every 4 (four) hours as needed.  Qty:  , Refills: 0      !! oxyCODONE (ROXICODONE) 5 MG immediate release tablet Take 1 tablet (5 mg total) by mouth every 4 (four) hours as needed.  Qty:  , Refills: 0      polyethylene glycol (GLYCOLAX) 17 gram PwPk 17 g by Per NG tube route daily as needed.  Qty:  , Refills: 0      senna-docusate 8.6-50 mg (PERICOLACE) 8.6-50 mg per tablet Take 2 tablets by mouth once daily.  Qty:         !! - Potential duplicate medications found. Please discuss with provider.               _________________________________  Néstor Yao MD  08/21/2020

## 2020-08-21 NOTE — PLAN OF CARE
08/21/20 1054   Post-Acute Status   Post-Acute Authorization Placement   Post-Acute Placement Status Authorization Obtained  ([facility])     SW received call from Marybeth with [facility] ([phone]) who reported they have auth and can take the Pt today. Provided Marybeth with RN station number so that she can check a few things with RN. Advised MD team of need for orders.    Josefina Woo LCSW  Neurocritical Care   [facility]  [id number]

## 2020-08-21 NOTE — ASSESSMENT & PLAN NOTE
45 y.o male with no significant PMHx was [place] [facility] significant for L1 burst fracture with retropulsion into the spinal canal resulting in severe spinal stenosis s/p falling 10ft from roof. He will be admitted to the Neuro ICU s/p  L1 CORPECTOMY and TLIF T12-L2 Interbody Instrumentation.      - Neurosurgery following   - Dilaudid PCA stopped 8/18, transitioned to PO    - Pain management following   - Ketamine gtt managed by anesthesia/acute pain service - stopped on 8/19   - Pain controlled with PO meds

## 2020-08-21 NOTE — ASSESSMENT & PLAN NOTE
NG tube placed on Decrease NGT to low intermittent suction   KUB/Mag Citrate productive  Trial of methylnaltrexone - patient refused

## 2020-08-21 NOTE — PLAN OF CARE
08/21/20 1226   Final Note   Assessment Type Final Discharge Note   Anticipated Discharge Disposition Rehab   Right Care Referral Info   Post Acute Recommendation IRF   Facility Name [facility]   Post-Acute Status   Post-Acute Authorization Placement

## 2020-08-21 NOTE — PLAN OF CARE
08/21/20 1218   Post-Acute Status   Post-Acute Authorization Placement   Post-Acute Placement Status Set-up Complete  ([facility])     SW advised by Marybeth with [facility] that Pt will go to room 1718 and needs to be there no later then 2pm. RN can call report to: [phone]. Set up [facility] orders for wheelchair van transport approx  time is 12:30pm. Advised Pt and RN at bedside. Contacted pt sister via phone to report the time of dc. Sent orders/MAR/and facility transfer order via RC.     Josefina Woo LCSW  Neurocritical Care   [facility]  [id number]

## 2020-08-21 NOTE — NURSING
Called report to [facility]. Report given to Clint KNAPP. Call back number is [phone]. Pt going to room 1718.     Personal belongings sent. Phone and , blanket, drinks in cooler, snacks, blue shorts, white shirt, glasess case, picture, disposable wipes, toothpaste, hair brush, black slides, white socks, deoderant, contacts and solution, red underwear, sun screen and flashlight.

## 2020-08-21 NOTE — DISCHARGE SUMMARY
[facility]  Neurocritical Care  Discharge Summary    Admit Date: 8/5/2020    Service Date: 08/21/2020    Discharge Date: 8/21/2020    Length of Stay: 16    Final Active Diagnoses:    Diagnosis Date Noted POA    PRINCIPAL PROBLEM:  Traumatic compression fracture of L1 lumbar vertebra [S32.010A] 08/05/2020 Yes    Hypocalcemia [E83.51] 08/14/2020 Yes    Ileus [K56.7] 08/10/2020 Unknown    Burst fracture of lumbar vertebra, open, initial encounter [S32.001B] 08/10/2020 Yes    Spinal stenosis of lumbar region [M48.061] 08/05/2020 Yes    Fall from building [W13.9XXA] 08/05/2020 Yes    Acute urinary retention [R33.8] 08/05/2020 Yes      Problems Resolved During this Admission:      History of Present Illness: Patient is a 46 y/o male with no significant PMH who was transferred from  after falling from 10ft roof at work. Patient denies LOC, complained of excrutiating low back/sacral pain exacerbated with any movement of legs. CT CAP significant for L1 burst fracture with retropulsion into the spinal canal resulting in severe spinal stenosis. Patient states he could feel he had to urinate but could not start a stream, kraus placed and he reports he felt the kraus inserted and felt his bladder release once the kraus was placed. He reports he felt the OSMIN and per report from ED physician, patient had full rectal tone. He is being admitted to Neuro ICU for a higher level of neurological care s/p L1 CORPECTOMY and TLIF T12-L2 Interbody Instrumentation.    Hospital Course by Event: 08/07/2020: Admit to Neuro ICU.  08/08/2020: Added long acting pain medications. Pain better controlled. Stable for stepdown.   08/09/2020: Acute pain management consulted. Ketamine gtt. BRITTNEE ManleyT. Suppository.    08/10/2020: Appreciate pain management reccs. Plans for OR tomorrow.  08/11/2020 ABG, KUB/CXR, Mag citrate, cont ketamine  08/12/2020  Surgery rescheduled 8/181L bolus continue IVF ketamine /PCA. Follow Na dc  suction, start clear liquid  08/13/2020 wean ketamine gtt, heparin  08/14/2020 for surgery today; continue pain mgmt gtt  08/15/2020 continued post-op pain, APS following, increased ketamine infusion. Advancing diet, advancing bowel regimen, trial of methylnaltrexone.   8/16/2020: add regular diet  8/17/2020: tolerating PO, decreased ketamine gtt, stopped PCA today.  8/18/2020: f/u neurosurgery canceled, stool improved to more solid  8/19/2020: abdominal pain improved, NGT pulled  8/20/2020: pain controlled with PO meds, tolerating PO diet with abd pain improved, no more diarrhea  8/21/2020: remained stable, discharged to [facility] Course by Problem:   * Traumatic compression fracture of L1 lumbar vertebra  45 y.o male with no significant PMHx was transferred from  to [facility] with [place] CAP significant for L1 burst fracture with retropulsion into the spinal canal resulting in severe spinal stenosis s/p falling 10ft from roof. He will be admitted to the Neuro ICU s/p  L1 CORPECTOMY and TLIF T12-L2 Interbody Instrumentation.      - Neurosurgery following   - Dilaudid PCA stopped 8/18, transitioned to PO    - Pain management following   - Ketamine gtt managed by anesthesia/acute pain service - stopped on 8/19   - Pain controlled with PO meds    Hypocalcemia  CMP Daily  Electrolyte Replacements PRN  8/21 Ca 9.0, WNL    Burst fracture of lumbar vertebra, open, initial encounter  -see traumatic compression fracture of L1 lumbar fracture     Ileus  NG tube placed on Decrease NGT to low intermittent suction   KUB/Mag Citrate productive  Trial of methylnaltrexone - patient refused    Acute urinary retention  Victor placed, continue to monitor strict I's and O's   - Removed 8/19, voiding spontaneously    Fall from building  See above    Spinal stenosis of lumbar region  S/P L1 CORPECTOMY, SPINE, LUMBAR  (Left)  FUSION, SPINE, LUMBAR, TLIF T12-L2 Interbody Fusion with Instrumentation - Lateral (Left)        Consultations:  IP CONSULT TO PHYSICAL MEDICINE REHAB  IP CONSULT TO REGISTERED DIETITIAN/NUTRITIONIST  IP CONSULT TO PHYSICAL MEDICINE REHAB  IP CONSULT TO PHYSICAL MEDICINE REHAB    Procedures:   Procedure(s) (LRB):  FUSION, SPINE, LUMBAR T12-L2 [facility]  Spinewave Neuromonitoring   (N/A) by Jamison Perez MD.    Medications:    Oscar Bills   [place] Medication Instructions STEVEN:[id number]    Printed on:08/21/20 8978   Medication Information                      acetaminophen (TYLENOL) 500 MG tablet  Take 2 tablets (1,000 mg total) by mouth every 8 (eight) hours.             calcium carbonate (TUMS) 200 mg calcium (500 mg) chewable tablet  Take 1 tablet (500 mg total) by mouth daily as needed.             DULoxetine (CYMBALTA) 20 MG capsule  Take 1 capsule (20 mg total) by mouth once daily.             gabapentin (NEURONTIN) 300 MG capsule  Take 1 capsule (300 mg total) by mouth 3 (three) times daily.             heparin sodium,porcine (HEPARIN, PORCINE,) 5,000 unit/mL injection  Inject 1 mL (5,000 Units total) into the skin every 8 (eight) hours.             methocarbamoL (ROBAXIN) 500 MG Tab  Take 1 tablet (500 mg total) by mouth 4 (four) times daily. for 10 days             ondansetron 4 mg/2 mL Soln  Inject 8 mg into the vein every 8 (eight) hours as needed.             oxyCODONE (ROXICODONE) 10 mg Tab immediate release tablet  Take 1 tablet (10 mg total) by mouth every 4 (four) hours as needed.             oxyCODONE (ROXICODONE) 5 MG immediate release tablet  Take 1 tablet (5 mg total) by mouth every 4 (four) hours as needed.             polyethylene glycol (GLYCOLAX) 17 gram PwPk  17 g by Per NG tube route daily as needed.             senna-docusate 8.6-50 mg (PERICOLACE) 8.6-50 mg per tablet  Take 2 tablets by mouth once daily.               Diet: Full diet as tolerated    Activity: As tolerated. Needs to be in TSLO brace when OOB.     Disposition: Discharged to [facility] in stable  condition.    This discharge took more than 30 minutes to complete.    Néstor Yao MD, PGY-1  Neurocritical Care  [facility]

## 2020-08-21 NOTE — PROGRESS NOTES
[facility]  Neurosurgery  Progress Note    Subjective:     History of Present Illness: Patient is a 46 y/o male with no significant PMH who was transferred from  after falling from 10ft roof at work. Patient denies LOC, complained of excrutiating low back/sacral pain exacerbated with any movement of legs. CT CAP significant for L1 burst fracture with retropulsion into the spinal canal resulting in severe spinal stenosis. Patient states he could feel he had to urinate but could not start a stream, kraus placed and he reports he felt the kraus inserted and felt his bladder release once the kraus was placed. He reports he felt the OSMIN and per report from ED physician, patient had full rectal tone. The patient declined repeat rectal exam. Denies saddle anesthesia. Endorses any movement of lower extremities causes excruciating pain in the sacram and lower back. Denies radicular pain into the legs. Reports slight numbness in left calf. Denies any prior medical conditions or daily medication.     Post-Op Info:  Procedure(s) (LRB):  FUSION, SPINE, LUMBAR T12-L2 [facility]  Spinewave Neuromonitoring   (N/A)   7 Days Post-Op     Interval History: naeon, tolerating diet.    Medications:  Continuous Infusions:  Scheduled Meds:   acetaminophen  1,000 mg Oral Q8H    diazePAM  5 mg Intravenous Q8H    DULoxetine  20 mg Oral Daily    gabapentin  300 mg Oral TID    heparin (porcine)  5,000 Units Subcutaneous Q8H    methocarbamoL  500 mg Oral QID    polyethylene glycol  17 g Per NG tube Daily    senna-docusate 8.6-50 mg  2 tablet Per NG tube Daily     PRN Meds:calcium carbonate, dextrose 50%, dextrose 50%, glucagon (human recombinant), magnesium oxide, magnesium oxide, naloxone, ondansetron, oxyCODONE, oxyCODONE, polyethylene glycol, potassium chloride, potassium chloride, potassium chloride, potassium, sodium phosphates, potassium, sodium phosphates, potassium, sodium phosphates, sodium chloride 0.9%      Review of Systems  Objective:     Weight: 95 kg (209 lb 7 oz)  Body mass index is 30.05 kg/m².  Vital Signs (Most Recent):  Temp: 98.2 °F (36.8 °C) (08/21/20 0702)  Pulse: 91 (08/21/20 0902)  Resp: 16 (08/21/20 0902)  BP: 110/76 (08/21/20 0902)  SpO2: (!) 93 % (08/21/20 0902) Vital Signs (24h Range):  Temp:  [97.7 °F (36.5 °C)-98.4 °F (36.9 °C)] 98.2 °F (36.8 °C)  Pulse:  [80-97] 91  Resp:  [12-27] 16  SpO2:  [92 %-96 %] 93 %  BP: ()/(65-86) 110/76     Date 08/21/20 0700 - 08/22/20 0659   Shift 8977-9822 7745-1037 2732-6931 24 Hour Total   INTAKE   P.O. 200   200   Shift Total(mL/kg) 200(2.1)   200(2.1)   OUTPUT   Urine(mL/kg/hr) 300   300   Shift Total(mL/kg) 300(3.2)   300(3.2)   Weight (kg) 95 95 95 95                        Neurosurgery Physical Exam   AOX3  PERRL  5/5 in BUE/BLE  SILT  Abdomen soft, nontender.    Significant Labs:  Recent Labs   Lab 08/20/20  0324 08/20/20  1200 08/21/20  0256     --  99   *  --  140   K 3.5 3.9 3.9     --  103   CO2 26  --  27   BUN 6  --  8   CREATININE 0.6  --  0.6   CALCIUM 8.2*  --  9.0   MG 1.7 1.9 1.8     Recent Labs   Lab 08/20/20  0324 08/21/20  0256   WBC 7.53 7.73   HGB 10.1* 10.5*   HCT 30.6* 32.3*   * 709*     Recent Labs   Lab 08/20/20  0324 08/21/20  0256   APTT 28.2 27.4     Microbiology Results (last 7 days)     ** No results found for the last 168 hours. **            Significant Diagnostics:      Assessment/Plan:     * Traumatic compression fracture of L1 lumbar vertebra  44 y/o male with no significant PMH with L1 burst fracture with retropulsion resulting in severe canal stenosis s/p fall from 10ft building     S/p L1 corpectomy with cage insertion and T12-L2 lateral fusion on 8/7. T12-L2 PSIF 8/14:       --All labs and diagnostics reviewed  --Post-op XRs pending   --MAPs stable off pressors, no goals  --PT/OT/OOB, Pain control, TLSO when OOB  --Close neurological monitoring, notify neurosurgery with any acute  changes  --Ileus management per NCC  --Victor/A-line discontinued  --Stable for TTF  --PT/OT as tolerated.  --[facility]            Jay Prieto,   Neurosurgery  [facility]

## 2020-08-21 NOTE — SUBJECTIVE & OBJECTIVE
Interval History: naeon, tolerating diet.    Medications:  Continuous Infusions:  Scheduled Meds:   acetaminophen  1,000 mg Oral Q8H    diazePAM  5 mg Intravenous Q8H    DULoxetine  20 mg Oral Daily    gabapentin  300 mg Oral TID    heparin (porcine)  5,000 Units Subcutaneous Q8H    methocarbamoL  500 mg Oral QID    polyethylene glycol  17 g Per NG tube Daily    senna-docusate 8.6-50 mg  2 tablet Per NG tube Daily     PRN Meds:calcium carbonate, dextrose 50%, dextrose 50%, glucagon (human recombinant), magnesium oxide, magnesium oxide, naloxone, ondansetron, oxyCODONE, oxyCODONE, polyethylene glycol, potassium chloride, potassium chloride, potassium chloride, potassium, sodium phosphates, potassium, sodium phosphates, potassium, sodium phosphates, sodium chloride 0.9%     Review of Systems  Objective:     Weight: 95 kg (209 lb 7 oz)  Body mass index is 30.05 kg/m².  Vital Signs (Most Recent):  Temp: 98.2 °F (36.8 °C) (08/21/20 0702)  Pulse: 91 (08/21/20 0902)  Resp: 16 (08/21/20 0902)  BP: 110/76 (08/21/20 0902)  SpO2: (!) 93 % (08/21/20 0902) Vital Signs (24h Range):  Temp:  [97.7 °F (36.5 °C)-98.4 °F (36.9 °C)] 98.2 °F (36.8 °C)  Pulse:  [80-97] 91  Resp:  [12-27] 16  SpO2:  [92 %-96 %] 93 %  BP: ()/(65-86) 110/76     Date 08/21/20 0700 - 08/22/20 0659   Shift 2674-3613 3788-5478 5852-7578 24 Hour Total   INTAKE   P.O. 200   200   Shift Total(mL/kg) 200(2.1)   200(2.1)   OUTPUT   Urine(mL/kg/hr) 300   300   Shift Total(mL/kg) 300(3.2)   300(3.2)   Weight (kg) 95 95 95 95                        Neurosurgery Physical Exam   AOX3  PERRL  5/5 in BUE/BLE  SILT  Abdomen soft, nontender.    Significant Labs:  Recent Labs   Lab 08/20/20  0324 08/20/20  1200 08/21/20  0256     --  99   *  --  140   K 3.5 3.9 3.9     --  103   CO2 26  --  27   BUN 6  --  8   CREATININE 0.6  --  0.6   CALCIUM 8.2*  --  9.0   MG 1.7 1.9 1.8     Recent Labs   Lab 08/20/20  0324 08/21/20  0256   WBC 7.53 7.73    HGB 10.1* 10.5*   HCT 30.6* 32.3*   * 709*     Recent Labs   Lab 08/20/20  0324 08/21/20  0256   APTT 28.2 27.4     Microbiology Results (last 7 days)     ** No results found for the last 168 hours. **            Significant Diagnostics:

## 2020-08-26 ENCOUNTER — TELEPHONE (OUTPATIENT)
Dept: NEUROSURGERY | Facility: CLINIC | Age: 45
End: 2020-08-26

## 2020-08-26 NOTE — TELEPHONE ENCOUNTER
----- Message from Pricilla Trevizo MA sent at 8/26/2020  1:44 PM CDT -----  Contact: Shira     [facility]     [phone]    ----- Message -----  From: Randa Bennett  Sent: 8/26/2020  11:18 AM CDT  To: Chris GUEVARA Staff    Calling to schedule PO appt from surgery on 8-14-20.  Pls call.

## 2020-08-26 NOTE — TELEPHONE ENCOUNTER
Lm for  to inform of patient post-op appointment, advised if patient was unable to make to please let us know.

## 2020-09-02 ENCOUNTER — OFFICE VISIT (OUTPATIENT)
Dept: NEUROSURGERY | Facility: CLINIC | Age: 45
End: 2020-09-02
Payer: MEDICAID

## 2020-09-02 VITALS
TEMPERATURE: 98 F | WEIGHT: 165.81 LBS | SYSTOLIC BLOOD PRESSURE: 122 MMHG | BODY MASS INDEX: 23.74 KG/M2 | DIASTOLIC BLOOD PRESSURE: 89 MMHG | HEIGHT: 70 IN | HEART RATE: 85 BPM | OXYGEN SATURATION: 99 %

## 2020-09-02 DIAGNOSIS — S32.010A TRAUMATIC COMPRESSION FRACTURE OF L1 LUMBAR VERTEBRA, CLOSED, INITIAL ENCOUNTER: Primary | ICD-10-CM

## 2020-09-02 DIAGNOSIS — G89.18 ACUTE POST-OPERATIVE PAIN: ICD-10-CM

## 2020-09-02 DIAGNOSIS — M43.25 FUSION OF SPINE, THORACOLUMBAR REGION: ICD-10-CM

## 2020-09-02 PROCEDURE — 99024 POSTOP FOLLOW-UP VISIT: CPT | Mod: ,,, | Performed by: PHYSICIAN ASSISTANT

## 2020-09-02 PROCEDURE — 99024 PR POST-OP FOLLOW-UP VISIT: ICD-10-PCS | Mod: ,,, | Performed by: PHYSICIAN ASSISTANT

## 2020-09-02 PROCEDURE — 99999 PR PBB SHADOW E&M-EST. PATIENT-LVL IV: CPT | Mod: PBBFAC,,, | Performed by: PHYSICIAN ASSISTANT

## 2020-09-02 PROCEDURE — 99214 OFFICE O/P EST MOD 30 MIN: CPT | Mod: PBBFAC | Performed by: PHYSICIAN ASSISTANT

## 2020-09-02 PROCEDURE — 99999 PR PBB SHADOW E&M-EST. PATIENT-LVL IV: ICD-10-PCS | Mod: PBBFAC,,, | Performed by: PHYSICIAN ASSISTANT

## 2020-09-02 RX ORDER — OXYCODONE AND ACETAMINOPHEN 10; 325 MG/1; MG/1
1 TABLET ORAL EVERY 4 HOURS PRN
Qty: 42 TABLET | Refills: 0 | Status: SHIPPED | OUTPATIENT
Start: 2020-09-02 | End: 2020-09-11 | Stop reason: SDUPTHER

## 2020-09-02 NOTE — Clinical Note
I saw this patient in clinic today for his 2 week post-op. Can you please schedule him with Dr. Perez in 4 weeks for his 6 week post-op, with updated xray.     Thanks,   Sania

## 2020-09-02 NOTE — Clinical Note
Deepak Perez,     I saw your post-op today for his wound check. Incisions look great, but pain is poorly controlled. I switched him from roxicodone to percocet to see if he gets more consistent relief. He requested MS contin with roxicodone for breakthrough at the recommendation of his rehab doctor. I historically don't manage MS contin, so I told him I'd message you about it. Let me know your thoughts, and I can manage until his 6 week post-op with you.     Thanks,   Sania

## 2020-09-02 NOTE — PROGRESS NOTES
Wound Check   Neurosurgery     Oscar Bills is a 45 y.o. male who presents to clinic today for wound check, s/p L1 corpectomy with cage insertion and T12-L2 lateral fusion on 8/7 and T12-L2 PSIF 8/14 for traumatic, unstable L1 burst fracture.  Denies fevers, chills, night sweats or N/V. Further denies wound drainage or swelling. Pt has been taking roxicodone 10mg 6x per day with poor relief. He also takes robaxin 4x/day, unsure of dose (750mg vs 500mg). He continues to have significant pain in his back and has trouble getting comfortable at night. Numbness is present in left hip/pelvis. Skin over left abdomen has become irritated since surgery as well. Denies weakness, falls since discharge. Has been wearing his TLSO brace as directed.     He presents with his wife who has helped manage his care since his accident. She inquires about MS contin with roxidocone for breakthrough pain as recommended by his rehab physician.       Physical Exam:   General: well developed, well nourished, no distress  Neurologic: Alert and oriented. Thought content appropriate.   GCS: Motor: 6/Verbal: 5/Eyes: 4 GCS Total: 15   Mental Status: Awake, Alert, Oriented x3   Cranial nerves: face symmetric, tongue midline, pupils equal, round, reactive to light with accomodation, EOMI.   Motor Strength: moves all extremities with good strength and tone   Sensation: response to light touch throughout  No gait disturbances   TLSO brace in place    Incisions are clean, dry and intact with no signs of erythema, swelling or purulent drainage. All skin edges are completely approximated.       Vitals:    09/02/20 0945   BP: 122/89   Pulse: 85   Temp: 97.6 °F (36.4 °C)             Assessment/Plan:   Oscar Bills is a 45 y.o. male who presents for 2 week wound check, s/p  L1 corpectomy with cage insertion and T12-L2 lateral fusion on 8/7 and T12-L2 PSIF 8/14 for traumatic, unstable L1 burst fracture sustained from falling off a 10ft building.     -Keep  incision open to air   -Continue TLSO brace at all times except when lying flat in bed   -Refill for percocet printed . Discontinue roxicodone. I will message Dr. Perez regarding MS Contin   -Stop tylenol   -Call when you need a refill of robaxin. Make sure to have the dose/frequency available when you call  -Can shower and get incision wet, just pat dry and no vigorous scrubbing. Do not submerge incision for another 4 weeks.   -No lifting more than 10 lbs or excessive bending/twisting.   -No driving  -No overhead lifting    -Follow up with Dr. Perez 4 weeks with new xrays. His office will call you to schedule this appointment   -Encouraged patient to call if they have any questions or concerns prior to next follow up appt        Sania Martin PA-C  [facility]  Department of Neurosurgery  [phone]

## 2020-09-02 NOTE — PATIENT INSTRUCTIONS
-Keep incision open to air   -Continue TLSO brace at all times except when lying flat in bed   -Refill for percocet printed . Discontinue roxicodone. I will message Dr. Perez regarding MS Contin   -Stop tylenol   -Call when you need a refill of robaxin. Make sure to have the dose/frequency available when you call  -Can shower and get incision wet, just pat dry and no vigorous scrubbing. Do not submerge incision for another 4 weeks.   -No lifting more than 10 lbs or excessive bending/twisting.   -No driving  -No overhead lifting    -Follow up with Dr. Perez 4 weeks with new xrays. His office will call you to schedule this appointment   -Please call with any questions or concerns prior to your next appointment.

## 2020-09-03 ENCOUNTER — TELEPHONE (OUTPATIENT)
Dept: NEUROSURGERY | Facility: HOSPITAL | Age: 45
End: 2020-09-03

## 2020-09-03 NOTE — TELEPHONE ENCOUNTER
Spoke with patient's sister. He was seen yesterday in clinic. I prescribed percocet as a replacement for roxicodone, hoping to achieve longer lasting relief. His sister states [facility] will not fill percocet until Saturday.     I offered to call [facility] to clarify the change in medication. However, during our conversation, she disclosed that the patient planned to take both percocet and roxicodone due to his increased pain. I explained that this would be inappropriate. I instead will have the patient finish his roxicodone prescription and switch to percocet on Saturday when [facility] is willing to fill it.     Message sent to Dr. Perez yesterday regarding the patient's specific request for MS Contin bid with roxicodone for breakthrough. If Dr. Perez approves this regimen, we can move forward with a new prescription.       Sania Martin PA-C  [facility]  Department of Neurosurgery  [phone]

## 2020-09-03 NOTE — TELEPHONE ENCOUNTER
----- Message from Prince Mills MA sent at 9/3/2020  3:28 PM CDT -----  Contact: Ara (sister) @ [phone]    ----- Message -----  From: Antony Akhtar  Sent: 9/3/2020   2:51 PM CDT  To: Veronica Lui Staff    Ara steele pt is requesting ROXICODONE 15 MG instead of MS CONTIN. Ara steele message was sent regarding this yesterday and is asking this be completed asap today.

## 2020-09-08 ENCOUNTER — TELEPHONE (OUTPATIENT)
Dept: NEUROSURGERY | Facility: CLINIC | Age: 45
End: 2020-09-08

## 2020-09-08 NOTE — TELEPHONE ENCOUNTER
----- Message from Pricilla Trevizo MA sent at 9/2/2020  4:41 PM CDT -----    ----- Message -----  From: Sania Martin PA-C  Sent: 9/2/2020   4:14 PM CDT  To: Chris GUEVARA Staff    I saw this patient in clinic today for his 2 week post-op. Can you please schedule him with Dr. Perez in 4 weeks for his 6 week post-op, with updated xray.     Thanks,   Sania

## 2020-09-08 NOTE — TELEPHONE ENCOUNTER
Left message for pt to return our call. Needs post op follow up with hCris or Camilo with X rays prior

## 2020-09-09 DIAGNOSIS — G89.18 ACUTE POST-OPERATIVE PAIN: ICD-10-CM

## 2020-09-09 DIAGNOSIS — M43.25 FUSION OF SPINE, THORACOLUMBAR REGION: ICD-10-CM

## 2020-09-09 DIAGNOSIS — S32.010A TRAUMATIC COMPRESSION FRACTURE OF L1 LUMBAR VERTEBRA, CLOSED, INITIAL ENCOUNTER: ICD-10-CM

## 2020-09-09 RX ORDER — OXYCODONE AND ACETAMINOPHEN 10; 325 MG/1; MG/1
1 TABLET ORAL EVERY 4 HOURS PRN
Qty: 42 TABLET | Refills: 0 | OUTPATIENT
Start: 2020-09-09

## 2020-09-11 ENCOUNTER — TELEPHONE (OUTPATIENT)
Dept: NEUROSURGERY | Facility: CLINIC | Age: 45
End: 2020-09-11

## 2020-09-11 DIAGNOSIS — S32.010A TRAUMATIC COMPRESSION FRACTURE OF L1 LUMBAR VERTEBRA, CLOSED, INITIAL ENCOUNTER: ICD-10-CM

## 2020-09-11 DIAGNOSIS — M43.25 FUSION OF SPINE, THORACOLUMBAR REGION: ICD-10-CM

## 2020-09-11 DIAGNOSIS — G89.18 ACUTE POST-OPERATIVE PAIN: ICD-10-CM

## 2020-09-11 RX ORDER — OXYCODONE AND ACETAMINOPHEN 10; 325 MG/1; MG/1
1 TABLET ORAL EVERY 4 HOURS PRN
Qty: 42 TABLET | Refills: 0 | Status: SHIPPED | OUTPATIENT
Start: 2020-09-11 | End: 2020-09-11 | Stop reason: SDUPTHER

## 2020-09-11 RX ORDER — OXYCODONE AND ACETAMINOPHEN 10; 325 MG/1; MG/1
1 TABLET ORAL EVERY 4 HOURS PRN
Qty: 60 TABLET | Refills: 0 | Status: SHIPPED | OUTPATIENT
Start: 2020-09-11 | End: 2020-09-29 | Stop reason: DRUGHIGH

## 2020-09-11 NOTE — TELEPHONE ENCOUNTER
----- Message from Dashawn Arauz sent at 9/11/2020  9:47 AM CDT -----  Contact: Kasandra (Sister) [phone]  oxyCODONE-acetaminophen (PERCOCET)  mg per tablet refill request     [facility]  [place], [place]

## 2020-09-11 NOTE — TELEPHONE ENCOUNTER
Sent to Sania Martin.              ----- Message from Sandy Desai sent at 9/11/2020 10:34 AM CDT -----  Regarding: reynaldo/ / [phone]  Type: RX Refill Request    Who Called:  Sister      Refill or New Rx:  Refill    RX Name and Strength:  oxyCODONE-acetaminophen (PERCOCET)  mg per tablet    Preferred Pharmacy with phone number:       Local or Mail Order:  Local    Ordering Provider:   DWAYNE Martin    Would the patient rather a call back or a response via [facility]?  Call back    Best Call Back Number:  [phone]      Patients  is driving from [place] to  patients prescription.  She would like a call as soon as prescription is ready for  please.  Thank you

## 2020-09-11 NOTE — TELEPHONE ENCOUNTER
Spoke with patient and his sister. I messaged Dr. Perez on 9/9 regarding pain management for this patient. Given large amount of narcotics taken in the past 12 days and specific requests for a new pain regimen, I recommend they contact [phone] to discuss refills with Dr. Perez. They verbalized understanding.       Sania Martin PA-C  [facility]  Department of Neurosurgery  [phone]

## 2020-09-18 DIAGNOSIS — S32.010A TRAUMATIC COMPRESSION FRACTURE OF L1 LUMBAR VERTEBRA, CLOSED, INITIAL ENCOUNTER: ICD-10-CM

## 2020-09-18 DIAGNOSIS — M43.25 FUSION OF SPINE, THORACOLUMBAR REGION: ICD-10-CM

## 2020-09-18 DIAGNOSIS — G89.18 ACUTE POST-OPERATIVE PAIN: ICD-10-CM

## 2020-09-18 RX ORDER — OXYCODONE AND ACETAMINOPHEN 7.5; 325 MG/1; MG/1
1 TABLET ORAL EVERY 4 HOURS PRN
Qty: 60 TABLET | Refills: 0 | Status: SHIPPED | OUTPATIENT
Start: 2020-09-18 | End: 2020-10-13

## 2020-09-18 RX ORDER — OXYCODONE AND ACETAMINOPHEN 10; 325 MG/1; MG/1
1 TABLET ORAL EVERY 4 HOURS PRN
Qty: 60 TABLET | Refills: 0 | Status: CANCELLED | OUTPATIENT
Start: 2020-09-18

## 2020-09-18 NOTE — TELEPHONE ENCOUNTER
Refill of pain medication requested. Percocet decreased to Percocet 7.5 mg tablets. Patient is 1 month post-op and we recommend to start weaning narcotic pain medication.     Morena Benson PA-C  Neurosurgery

## 2020-09-18 NOTE — TELEPHONE ENCOUNTER
----- Message from Dedra Scherer sent at 9/18/2020 11:09 AM CDT -----    PLEASE SEND TODAY BEFORE LEAVING - thanks :)    Patient needs Percocet script resent to another pharmacy    [facility] doesn't have - was called in on 9/11     Please send today to [facility] in [place] @# [phone]   Patient is currently out    Wife can be reached @# [phone] or  [phone]

## 2020-09-18 NOTE — TELEPHONE ENCOUNTER
Rx was sent to wrong pharmacy. Pt requesting paper copy to . Rx has been cancelled by pt at [facility]

## 2020-09-29 ENCOUNTER — OFFICE VISIT (OUTPATIENT)
Dept: NEUROSURGERY | Facility: CLINIC | Age: 45
End: 2020-09-29
Payer: MEDICAID

## 2020-09-29 ENCOUNTER — HOSPITAL ENCOUNTER (OUTPATIENT)
Dept: RADIOLOGY | Facility: HOSPITAL | Age: 45
Discharge: HOME | End: 2020-09-29
Attending: PHYSICIAN ASSISTANT
Payer: MEDICAID

## 2020-09-29 VITALS — SYSTOLIC BLOOD PRESSURE: 116 MMHG | HEART RATE: 102 BPM | DIASTOLIC BLOOD PRESSURE: 87 MMHG

## 2020-09-29 DIAGNOSIS — S32.010A TRAUMATIC COMPRESSION FRACTURE OF L1 LUMBAR VERTEBRA, CLOSED, INITIAL ENCOUNTER: Primary | ICD-10-CM

## 2020-09-29 DIAGNOSIS — M43.25 FUSION OF SPINE, THORACOLUMBAR REGION: ICD-10-CM

## 2020-09-29 DIAGNOSIS — S32.010A TRAUMATIC COMPRESSION FRACTURE OF L1 LUMBAR VERTEBRA, CLOSED, INITIAL ENCOUNTER: ICD-10-CM

## 2020-09-29 DIAGNOSIS — Z98.1 ARTHRODESIS STATUS: ICD-10-CM

## 2020-09-29 PROCEDURE — 72080 XR THORACOLUMBAR SPINE AP LATERAL: ICD-10-PCS | Mod: 26,,, | Performed by: RADIOLOGY

## 2020-09-29 PROCEDURE — 99024 PR POST-OP FOLLOW-UP VISIT: ICD-10-PCS | Mod: ,,, | Performed by: NEUROLOGICAL SURGERY

## 2020-09-29 PROCEDURE — 99024 POSTOP FOLLOW-UP VISIT: CPT | Mod: ,,, | Performed by: NEUROLOGICAL SURGERY

## 2020-09-29 PROCEDURE — 99213 OFFICE O/P EST LOW 20 MIN: CPT | Mod: PBBFAC,25 | Performed by: NEUROLOGICAL SURGERY

## 2020-09-29 PROCEDURE — 72080 X-RAY EXAM THORACOLMB 2/> VW: CPT | Mod: 26,,, | Performed by: RADIOLOGY

## 2020-09-29 PROCEDURE — 99999 PR PBB SHADOW E&M-EST. PATIENT-LVL III: CPT | Mod: PBBFAC,,, | Performed by: NEUROLOGICAL SURGERY

## 2020-09-29 PROCEDURE — 99999 PR PBB SHADOW E&M-EST. PATIENT-LVL III: ICD-10-PCS | Mod: PBBFAC,,, | Performed by: NEUROLOGICAL SURGERY

## 2020-09-29 PROCEDURE — 72080 X-RAY EXAM THORACOLMB 2/> VW: CPT | Mod: TC

## 2020-09-29 RX ORDER — DIAZEPAM 5 MG/1
5 TABLET ORAL EVERY 12 HOURS PRN
Qty: 60 TABLET | Refills: 0 | Status: SHIPPED | OUTPATIENT
Start: 2020-09-29 | End: 2020-10-29 | Stop reason: SDUPTHER

## 2020-09-29 RX ORDER — GABAPENTIN 300 MG/1
300 CAPSULE ORAL 3 TIMES DAILY
Qty: 60 CAPSULE | Refills: 2 | Status: SHIPPED | OUTPATIENT
Start: 2020-09-29 | End: 2021-09-29

## 2020-09-29 RX ORDER — DULOXETIN HYDROCHLORIDE 20 MG/1
20 CAPSULE, DELAYED RELEASE ORAL DAILY
Qty: 60 CAPSULE | Refills: 11 | Status: SHIPPED | OUTPATIENT
Start: 2020-09-29 | End: 2021-09-29

## 2020-09-29 RX ORDER — OXYCODONE AND ACETAMINOPHEN 7.5; 325 MG/1; MG/1
1 TABLET ORAL EVERY 4 HOURS PRN
Qty: 90 TABLET | Refills: 0 | Status: SHIPPED | OUTPATIENT
Start: 2020-09-29 | End: 2020-10-13

## 2020-09-29 RX ORDER — FAMOTIDINE 40 MG/1
40 TABLET, FILM COATED ORAL DAILY
COMMUNITY

## 2020-09-30 NOTE — PROGRESS NOTES
Patient back to see me for follow-up after having undergone a lateral L1 corpectomy and a posterior lateral T12 the L to instrumented fusion with minimally invasive screws.  Patient has been relatively stable.  His back pain is gradually improving but he still has some back pain he does not have any bowel bladder issues that any weakness or numbness.  On exam his lateral incision is well-healed back incision well-healed he is tolerating the brace well.  X-ray shows hardware in good position is some slight subsidence at the into view year endplate but the construct looks like it is holding up well.  This stage I think we will continue to advance his activity level he still getting outpatient therapy he using a bone stimulator will follow-up imaging in 6 months.

## 2020-10-12 ENCOUNTER — TELEPHONE (OUTPATIENT)
Dept: NEUROSURGERY | Facility: CLINIC | Age: 45
End: 2020-10-12

## 2020-10-12 DIAGNOSIS — M43.25 FUSION OF SPINE, THORACOLUMBAR REGION: ICD-10-CM

## 2020-10-12 DIAGNOSIS — G89.18 ACUTE POST-OPERATIVE PAIN: ICD-10-CM

## 2020-10-12 DIAGNOSIS — S32.010A TRAUMATIC COMPRESSION FRACTURE OF L1 LUMBAR VERTEBRA, CLOSED, INITIAL ENCOUNTER: Primary | ICD-10-CM

## 2020-10-12 NOTE — TELEPHONE ENCOUNTER
----- Message from Beckie Tavera sent at 10/12/2020 10:45 AM CDT -----  Regarding: refill  Type:  RX Refill Request    Who Called: Patient's sister Ara Bills  Refill or New Rx:refill  RX Name and Strength:pain medicine, did not know the name  How is the patient currently taking it? (ex. 1XDay):as needed  Is this a 30 day or 90 day Rx:2 weeks  Preferred Pharmacy with phone number:[facility]  Local or Mail Order:local  Ordering Provider:above provider  Would the patient rather a call back or a response via [facility]? callback  Best Call Back Number:[phone]  Additional Information: Wants a referral to pain management

## 2020-10-13 ENCOUNTER — TELEPHONE (OUTPATIENT)
Dept: PAIN MEDICINE | Facility: CLINIC | Age: 45
End: 2020-10-13

## 2020-10-13 ENCOUNTER — TELEPHONE (OUTPATIENT)
Dept: NEUROSURGERY | Facility: CLINIC | Age: 45
End: 2020-10-13

## 2020-10-13 DIAGNOSIS — G89.18 ACUTE POST-OPERATIVE PAIN: ICD-10-CM

## 2020-10-13 DIAGNOSIS — M43.25 FUSION OF SPINE, THORACOLUMBAR REGION: Primary | ICD-10-CM

## 2020-10-13 RX ORDER — OXYCODONE AND ACETAMINOPHEN 7.5; 325 MG/1; MG/1
1 TABLET ORAL EVERY 4 HOURS PRN
Qty: 42 TABLET | Refills: 0 | Status: SHIPPED | OUTPATIENT
Start: 2020-10-14 | End: 2020-10-22 | Stop reason: SDUPTHER

## 2020-10-13 NOTE — TELEPHONE ENCOUNTER
----- Message from Clarita Reyez PA-C sent at 10/13/2020 11:37 AM CDT -----  Regarding: RE: Refill request please  Hello,    I have provided an Rx for 1 week supply of pain medication, it will be at the  for him to . Please advise patient we will be unable to supply further refills but this should get him through until he can be evaluated by Pain Management.    Thank you,  Vianca  ----- Message -----  From: Pricilla Trevizo MA  Sent: 10/13/2020   9:28 AM CDT  To: Clarita Reyez PA-C  Subject: FW: Refill request please                        Spoke with pt states he is hardly sleeping and indeed taking medication every 4 hours. Advised pain management referral was in and that I would ask about Rx. Please advise on refill. V/U that it will need to be picked up if it is approved.  ----- Message -----  From: Sofia Hernandez  Sent: 10/13/2020   8:47 AM CDT  To: Chris GUEVARA Staff  Subject: Refill request please                            Pt sister Ara calling to speak with the nurse to discuss and request refill for when it is due for refills for oxyCODONE-acetaminophen (PERCOCET) 7.5-325 mg per tablet    Pt will be out of pill for Wednesday, tomorrow    Please be so kind to send to [facility] - [place], [place] - [address] [address]      Ara can be reached at [phone]    Thank you!

## 2020-10-13 NOTE — TELEPHONE ENCOUNTER
Spoke with pt and informed rx was ready for  and this was his last fill. V/U and will call pain management for a sooner appointment

## 2020-10-13 NOTE — TELEPHONE ENCOUNTER
----- Message from Lilian Ochoa sent at 10/13/2020 12:45 PM CDT -----  Name of Who is Calling: NITZA DEMPSEY What is the request in detail: patient is calling to speak to the nurse in regards to getting an new patient an appointment with an pain management doctor, the patient do have an referral on file from Dr. Perez. Patient is requesting to get an appointment ASAP.  Please advise Can the clinic reply by [facility]: No What Number to Call Back [facility] not [facility]: [phone]

## 2020-10-19 ENCOUNTER — TELEPHONE (OUTPATIENT)
Dept: NEUROSURGERY | Facility: CLINIC | Age: 45
End: 2020-10-19

## 2020-10-19 NOTE — TELEPHONE ENCOUNTER
Spoke with pt who hung up and would not give more information after stating pain management is not taking new patients.   Advised this was due to his insurance card and he can call them and ask for a list of providers in network and we could fax a referral.  Requesting to only speak with Dr. Perez

## 2020-10-19 NOTE — TELEPHONE ENCOUNTER
----- Message from Dakota Reddy sent at 10/19/2020  4:21 PM CDT -----  ATTN: CAROLA  PT SISTER ONLY WANTS TO SPEAK WITH THE PHYSICIAN    Pt would like a call back regarding medical concerns.    Pt can be reached at [phone]

## 2020-10-22 DIAGNOSIS — M43.25 FUSION OF SPINE, THORACOLUMBAR REGION: ICD-10-CM

## 2020-10-22 DIAGNOSIS — G89.18 ACUTE POST-OPERATIVE PAIN: ICD-10-CM

## 2020-10-22 RX ORDER — OXYCODONE AND ACETAMINOPHEN 7.5; 325 MG/1; MG/1
1 TABLET ORAL EVERY 4 HOURS PRN
Qty: 42 TABLET | Refills: 0 | Status: SHIPPED | OUTPATIENT
Start: 2020-10-22 | End: 2020-10-29 | Stop reason: SDUPTHER

## 2020-10-22 RX ORDER — OXYCODONE AND ACETAMINOPHEN 7.5; 325 MG/1; MG/1
1 TABLET ORAL EVERY 4 HOURS PRN
Qty: 42 TABLET | Refills: 0 | Status: CANCELLED | OUTPATIENT
Start: 2020-10-22

## 2020-10-26 ENCOUNTER — TELEPHONE (OUTPATIENT)
Dept: PAIN MEDICINE | Facility: CLINIC | Age: 45
End: 2020-10-26

## 2020-10-26 NOTE — TELEPHONE ENCOUNTER
Left message for Mr. Bills, informing him that Dr. Shine response is pending at this time. As pain process is that referring speak to pain provider to discuss what treatment options are available for the patient.

## 2020-10-26 NOTE — TELEPHONE ENCOUNTER
----- Message from Rahel Siegel, Patient Care Assistant sent at 10/26/2020  4:15 PM CDT -----  Name of Who is Calling: Gertrudis (mother)    What is the request in detail: Requesting a call back in regards of getting scheduled. Please contact to further discuss and advise      Can the clinic reply by [facility]: No    What [facility] Back [facility]   [phone]

## 2020-10-28 ENCOUNTER — TELEPHONE (OUTPATIENT)
Dept: PAIN MEDICINE | Facility: CLINIC | Age: 45
End: 2020-10-28

## 2020-10-29 ENCOUNTER — TELEPHONE (OUTPATIENT)
Dept: NEUROSURGERY | Facility: CLINIC | Age: 45
End: 2020-10-29

## 2020-10-29 ENCOUNTER — DOCUMENTATION ONLY (OUTPATIENT)
Dept: NEUROSURGERY | Facility: CLINIC | Age: 45
End: 2020-10-29

## 2020-10-29 ENCOUNTER — PATIENT MESSAGE (OUTPATIENT)
Dept: NEUROSURGERY | Facility: CLINIC | Age: 45
End: 2020-10-29

## 2020-10-29 DIAGNOSIS — M43.25 FUSION OF SPINE, THORACOLUMBAR REGION: ICD-10-CM

## 2020-10-29 DIAGNOSIS — G89.18 ACUTE POST-OPERATIVE PAIN: ICD-10-CM

## 2020-10-29 RX ORDER — OXYCODONE AND ACETAMINOPHEN 7.5; 325 MG/1; MG/1
1 TABLET ORAL EVERY 4 HOURS PRN
Qty: 42 TABLET | Refills: 0 | Status: SHIPPED | OUTPATIENT
Start: 2020-10-29 | End: 2022-10-06 | Stop reason: ALTCHOICE

## 2020-10-29 RX ORDER — OXYCODONE AND ACETAMINOPHEN 7.5; 325 MG/1; MG/1
1 TABLET ORAL EVERY 4 HOURS PRN
Qty: 42 TABLET | Refills: 0 | Status: CANCELLED | OUTPATIENT
Start: 2020-10-29

## 2020-10-29 RX ORDER — DIAZEPAM 5 MG/1
5 TABLET ORAL EVERY 12 HOURS PRN
Qty: 60 TABLET | Refills: 0 | Status: CANCELLED | OUTPATIENT
Start: 2020-10-29 | End: 2020-11-08

## 2020-10-29 RX ORDER — DIAZEPAM 5 MG/1
5 TABLET ORAL EVERY 12 HOURS PRN
Qty: 60 TABLET | Refills: 0 | Status: SHIPPED | OUTPATIENT
Start: 2020-10-29 | End: 2020-11-08

## 2020-10-29 NOTE — PROGRESS NOTES
Had in-depth discussion with the patient today about his pain medication prescriptions and his long-term pain management care.  This is a 45-year-old male who was involved in a fall from a height resulting in L1 fracture.  Patient was originally operated on by Dr. Jaquez the knee with a corpectomy I came in did posterior instrumentation.  This was back in August.  Patient has had more challenges with pain control then we would expected someone this age this far out from surgery.  Unfortunately patient is limited in his pain management option due to his insurance.  His insurance also does not allow was to have prescription for longer than a week.  Last prescription was supposed the last 1 from our service to a patient able to get in with pain management.  Patient continues to call back with complaints about lack of access to the pain medication.  In speaking with the patient out the bone what he describes to me does not sound like uncontrollable pain but that he has somehow very nervous about withdrawal for some reason.  Had a in-depth discussion with the patient explained that we are not the service to manage long-term pain he will have to seek care pain management for long-term pain management.  I a.m. willing to extend his prescription 1 last week this will be the last time.  This should be no real reason for him to be in this much pain this far out from a successful of a stabilization procedure.  The vast majority of my patient are well of pain medication by this.  Time for a comparable procedure despite being rodriguez and more frail.

## 2020-10-29 NOTE — TELEPHONE ENCOUNTER
DR Perez referred Mr Bills to pain management 2 weeks ago. Last week we told the patient that rx was the last rx and he needs to get in to see PCP  Or pain management . The patient has medicaid. Only 1 pain management place accepts medicaid that is [facility] . Patient says he has an appt with PCP 11/3 but there is no guarantee they will rx the medication either. I advised that we told him last rx that was the last one . The patient didn't like that and asked to speak with DR Perez. I told him I would notify Dr Perez and my manager of his requests. I did notify both

## 2020-10-29 NOTE — TELEPHONE ENCOUNTER
----- Message from Sri Marks sent at 10/28/2020 10:20 AM CDT -----  Pt  sister Kasandra would like to be called back regarding  appt with pain management cant afford cost for visit  can be reached at [phone]

## 2020-10-29 NOTE — TELEPHONE ENCOUNTER
----- Message from Rahel Siegel, Patient Care Assistant sent at 10/29/2020  8:40 AM CDT -----  Name of Who is Calling: NITZA DEMPSEY [[id number]]    What is the request in detail: Requesting a call back in regards of refills. Patient is requesting for the nurse to call back.  Please contact to further discuss and advise      Can the clinic reply by [facility]: No    What [facility] Back [facility]   [phone]

## 2020-11-05 ENCOUNTER — TELEPHONE (OUTPATIENT)
Dept: NEUROSURGERY | Facility: CLINIC | Age: 45
End: 2020-11-05

## 2021-05-06 ENCOUNTER — PATIENT MESSAGE (OUTPATIENT)
Dept: RESEARCH | Facility: HOSPITAL | Age: 46
End: 2021-05-06

## 2022-04-19 ENCOUNTER — HOSPITAL ENCOUNTER (EMERGENCY)
Facility: HOSPITAL | Age: 47
Discharge: HOME | End: 2022-04-20
Attending: EMERGENCY MEDICINE
Payer: MEDICAID

## 2022-04-19 DIAGNOSIS — R51.9 ACUTE NONINTRACTABLE HEADACHE, UNSPECIFIED HEADACHE TYPE: Primary | ICD-10-CM

## 2022-04-19 DIAGNOSIS — I10 HYPERTENSION: ICD-10-CM

## 2022-04-19 DIAGNOSIS — R03.0 ELEVATED BLOOD PRESSURE READING: ICD-10-CM

## 2022-04-19 DIAGNOSIS — R06.02 SHORTNESS OF BREATH: ICD-10-CM

## 2022-04-19 LAB
BASOPHILS # BLD AUTO: 0.02 K/UL (ref 0–0.2)
BASOPHILS NFR BLD: 0.3 % (ref 0–1.9)
DIFFERENTIAL METHOD: ABNORMAL
EOSINOPHIL # BLD AUTO: 0.2 K/UL (ref 0–0.5)
EOSINOPHIL NFR BLD: 3.4 % (ref 0–8)
ERYTHROCYTE [DISTWIDTH] IN BLOOD BY AUTOMATED COUNT: 12.6 % (ref 11.5–14.5)
HCT VFR BLD AUTO: 36.7 % (ref 40–54)
HGB BLD-MCNC: 12.8 G/DL (ref 14–18)
IMM GRANULOCYTES # BLD AUTO: 0.01 K/UL (ref 0–0.04)
IMM GRANULOCYTES NFR BLD AUTO: 0.1 % (ref 0–0.5)
LYMPHOCYTES # BLD AUTO: 2.5 K/UL (ref 1–4.8)
LYMPHOCYTES NFR BLD: 37 % (ref 18–48)
MCH RBC QN AUTO: 30.6 PG (ref 27–31)
MCHC RBC AUTO-ENTMCNC: 34.9 G/DL (ref 32–36)
MCV RBC AUTO: 88 FL (ref 82–98)
MONOCYTES # BLD AUTO: 0.5 K/UL (ref 0.3–1)
MONOCYTES NFR BLD: 7.6 % (ref 4–15)
NEUTROPHILS # BLD AUTO: 3.4 K/UL (ref 1.8–7.7)
NEUTROPHILS NFR BLD: 51.6 % (ref 38–73)
NRBC BLD-RTO: 0 /100 WBC
PLATELET # BLD AUTO: 278 K/UL (ref 150–450)
PMV BLD AUTO: 9.2 FL (ref 9.2–12.9)
RBC # BLD AUTO: 4.18 M/UL (ref 4.6–6.2)
WBC # BLD AUTO: 6.68 K/UL (ref 3.9–12.7)

## 2022-04-19 PROCEDURE — 80053 COMPREHEN METABOLIC PANEL: CPT | Performed by: NURSE PRACTITIONER

## 2022-04-19 PROCEDURE — 93005 ELECTROCARDIOGRAM TRACING: CPT

## 2022-04-19 PROCEDURE — 93010 EKG 12-LEAD: ICD-10-PCS | Mod: ,,, | Performed by: INTERNAL MEDICINE

## 2022-04-19 PROCEDURE — 93010 ELECTROCARDIOGRAM REPORT: CPT | Mod: ,,, | Performed by: INTERNAL MEDICINE

## 2022-04-19 PROCEDURE — 85025 COMPLETE CBC W/AUTO DIFF WBC: CPT | Performed by: NURSE PRACTITIONER

## 2022-04-19 PROCEDURE — 84484 ASSAY OF TROPONIN QUANT: CPT | Performed by: NURSE PRACTITIONER

## 2022-04-19 PROCEDURE — 99285 EMERGENCY DEPT VISIT HI MDM: CPT | Mod: 25

## 2022-04-20 VITALS
OXYGEN SATURATION: 98 % | DIASTOLIC BLOOD PRESSURE: 93 MMHG | BODY MASS INDEX: 27.09 KG/M2 | TEMPERATURE: 98 F | HEIGHT: 70 IN | SYSTOLIC BLOOD PRESSURE: 133 MMHG | HEART RATE: 66 BPM | RESPIRATION RATE: 20 BRPM | WEIGHT: 189.25 LBS

## 2022-04-20 LAB
ALBUMIN SERPL BCP-MCNC: 3.8 G/DL (ref 3.5–5.2)
ALP SERPL-CCNC: 108 U/L (ref 55–135)
ALT SERPL W/O P-5'-P-CCNC: 13 U/L (ref 10–44)
ANION GAP SERPL CALC-SCNC: 14 MMOL/L (ref 8–16)
AST SERPL-CCNC: 23 U/L (ref 10–40)
BILIRUB SERPL-MCNC: 0.3 MG/DL (ref 0.1–1)
BUN SERPL-MCNC: 11 MG/DL (ref 6–20)
CALCIUM SERPL-MCNC: 8.8 MG/DL (ref 8.7–10.5)
CHLORIDE SERPL-SCNC: 105 MMOL/L (ref 95–110)
CO2 SERPL-SCNC: 24 MMOL/L (ref 23–29)
CREAT SERPL-MCNC: 0.8 MG/DL (ref 0.5–1.4)
EST. GFR  (AFRICAN AMERICAN): >60 ML/MIN/1.73 M^2
EST. GFR  (NON AFRICAN AMERICAN): >60 ML/MIN/1.73 M^2
GLUCOSE SERPL-MCNC: 91 MG/DL (ref 70–110)
POTASSIUM SERPL-SCNC: 3.8 MMOL/L (ref 3.5–5.1)
PROT SERPL-MCNC: 7.2 G/DL (ref 6–8.4)
SODIUM SERPL-SCNC: 143 MMOL/L (ref 136–145)
TROPONIN I SERPL DL<=0.01 NG/ML-MCNC: <0.006 NG/ML (ref 0–0.03)

## 2022-04-20 RX ORDER — BUTALBITAL, ACETAMINOPHEN AND CAFFEINE 50; 325; 40 MG/1; MG/1; MG/1
1 TABLET ORAL EVERY 4 HOURS PRN
Qty: 20 TABLET | Refills: 0 | Status: SHIPPED | OUTPATIENT
Start: 2022-04-20 | End: 2022-05-20

## 2022-04-20 NOTE — ED PROVIDER NOTES
"Encounter Date: 4/19/2022       History     Chief Complaint   Patient presents with    Headache     "Feels like head is going to explode"  "not a headache"; BP elevated at home, denies hx     Patient is a 47-year-old male presents with complaints of pressure to his head stating it feels like it is going to explode.  Reports take his blood pressure at home and states that it was in the 190s systolic.  Denies any history of hypertension but states that it runs in the family.  Reports history of migraines but states this does not feel like a normal migraine.  Patient denies taking any medications for relief of symptoms.        Review of patient's allergies indicates:   Allergen Reactions    Iodine Hives     No past medical history on file.  Past Surgical History:   Procedure Laterality Date    LUMBAR FUSION N/A 8/14/2020    Procedure: FUSION, SPINE, LUMBAR T12-L2 Ziehm MIS  Spinewave Neuromonitoring  ;  Surgeon: Jamison Perez MD;  Location: [facility] OR [address] [place] [address];  Service: Neurosurgery;  Laterality: N/A;    SURGICAL REMOVAL OF VERTEBRAL BODY OF LUMBAR SPINE Left 8/6/2020    Procedure: L1 CORPECTOMY, SPINE, LUMBAR ;  Surgeon: Leonid Snider MD;  Location: [facility] OR [address] [place] [address];  Service: Neurosurgery;  Laterality: Left;  Globus, Please obtain neuromonitoring.     TRANSFORAMINAL LUMBAR INTERBODY FUSION Left 8/6/2020    Procedure: FUSION, SPINE, LUMBAR, TLIF T12-L2 Interbody Fusion with Instrumentation - Lateral;  Surgeon: Leonid Snider MD;  Location: [facility] OR [address] [place] [address];  Service: Neurosurgery;  Laterality: Left;     No family history on file.  Social History     Tobacco Use    Smoking status: Never Smoker    Smokeless tobacco: Never Used   Substance Use Topics    Alcohol use: Yes    Drug use: Never     Review of Systems   Constitutional: Negative for fever.   HENT: Negative for sore throat.    Respiratory: Negative for shortness of breath.    Cardiovascular: Negative for chest pain.   Gastrointestinal: Negative for " nausea.   Genitourinary: Negative for dysuria.   Musculoskeletal: Negative for back pain.   Skin: Negative for rash.   Neurological: Positive for headaches. Negative for weakness.   Hematological: Does not bruise/bleed easily.       Physical Exam     Initial Vitals [04/19/22 2214]   BP Pulse Resp Temp SpO2   (!) 163/110 82 17 98 °F (36.7 °C) 99 %      MAP       --         Physical Exam    Nursing note and vitals reviewed.  Constitutional: He appears well-developed and well-nourished.   HENT:   Head: Normocephalic and atraumatic.   Eyes: Conjunctivae and EOM are normal. Pupils are equal, round, and reactive to light.   Neck: Neck supple.   Normal range of motion.  Cardiovascular: Normal rate, regular rhythm, normal heart sounds and intact distal pulses.   Pulmonary/Chest: Breath sounds normal.   Abdominal: Abdomen is soft. Bowel sounds are normal.   Musculoskeletal:         General: Normal range of motion.      Cervical back: Normal range of motion and neck supple.     Neurological: He is alert and oriented to person, place, and time. He has normal strength and normal reflexes.   Skin: Skin is warm and dry. Capillary refill takes less than 2 seconds.   Psychiatric: He has a normal mood and affect. His behavior is normal. Judgment and thought content normal.         ED Course   Procedures  Labs Reviewed   CBC W/ AUTO DIFFERENTIAL - Abnormal; Notable for the following components:       Result Value    RBC 4.18 (*)     Hemoglobin 12.8 (*)     Hematocrit 36.7 (*)     All other components within normal limits   COMPREHENSIVE METABOLIC PANEL   TROPONIN I        ECG Results          EKG 12-lead (Final result)  Result time 04/20/22 22:21:32    Final result by Interface, Lab In [facility] (04/20/22 22:21:32)                 Narrative:    Test Reason : I10,    Vent. Rate : 083 BPM     Atrial Rate : 083 BPM     P-R Int : 140 ms          QRS Dur : 084 ms      QT Int : 356 ms       P-R-T Axes : 038 083 015 degrees     QTc Int : 418  ms    Normal sinus rhythm  Normal ECG  No previous ECGs available  Confirmed by LEI YATES MD ([id number]) on 4/20/2022 10:21:17 PM    Referred By: [email or web address]   SELF           Confirmed By:LEI YATES MD                            Imaging Results          CT Head Without Contrast (Final result)  Result time 04/19/22 23:02:40    Final result by Camilo Madrigal MD (04/19/22 23:02:40)                 Impression:      No acute intracranial CT abnormality.    All CT scans at this facility are performed  using dose modulation techniques as appropriate to performed exam including the following:  automated exposure control; adjustment of mA and/or kV according to the patients size (this includes techniques or standardized protocols for targeted exams where dose is matched to indication/reason for exam: i.e. extremities or head);  iterative reconstruction technique.      Electronically signed by: Camilo Madrigal  Date:    04/19/2022  Time:    23:02             Narrative:    EXAMINATION:  CT HEAD WITHOUT CONTRAST    CLINICAL HISTORY:  Headache, sudden, severe;    TECHNIQUE:  Low dose axial CT images obtained throughout the head without intravenous contrast. Sagittal and coronal reconstructions were performed.    COMPARISON:  08/05/2020.    FINDINGS:  Intracranial compartment:    Ventricles and sulci are normal in size for age without evidence of hydrocephalus. No extra-axial blood or fluid collections.    The brain parenchyma appears normal. No parenchymal mass, hemorrhage, edema or major vascular distribution infarct.    Skull/extracranial contents (limited evaluation): No fracture. Mastoid air cells and paranasal sinuses are essentially clear.                               X-Ray Chest PA And Lateral (Final result)  Result time 04/19/22 22:47:02    Final result by Camilo Madrigal MD (04/19/22 22:47:02)                 Impression:      No acute abnormality.      Electronically signed by: Camilo  Kaitlin  Date:    04/19/2022  Time:    22:47             Narrative:    EXAMINATION:  XR CHEST PA AND LATERAL    CLINICAL HISTORY:  Shortness of breath    TECHNIQUE:  PA and lateral views of the chest were performed.    COMPARISON:  Multiple priors.    FINDINGS:  The lungs are clear, with normal appearance of pulmonary vasculature and no pleural effusion or pneumothorax.    The cardiac silhouette is normal in size. The hilar and mediastinal contours are unremarkable.    Bones are intact. Lower spinal fusion.                                 Medications - No data to display  Medical Decision Making:   ED Management:  I discussed with patient and/or family/caretaker that evaluation in the ED does not suggest any emergent or life threatening medical conditions requiring immediate intervention beyond what was provided in the ED, and I believe patient is safe for discharge. Regardless, an unremarkable evaluation in the ED does not preclude the development or presence of a serious of life threatening condition. As such, patient was instructed to return immediately for any worsening or change in current symptoms.                        Clinical Impression:   Final diagnoses:  [I10] Hypertension  [R06.02] Shortness of breath  [R51.9] Acute nonintractable headache, unspecified headache type (Primary)  [R03.0] Elevated blood pressure reading          ED Disposition Condition    Discharge Stable        ED Prescriptions     Medication Sig Dispense Start Date End Date Auth. Provider    butalbital-acetaminophen-caffeine -40 mg (FIORICET, ESGIC) -40 mg per tablet Take 1 tablet by mouth every 4 (four) hours as needed for Pain. 20 tablet 4/20/2022 5/20/2022 Adis Wise NP        Follow-up Information    None          Adis Wise NP  04/23/22 [id number]

## 2022-10-05 ENCOUNTER — HOSPITAL ENCOUNTER (EMERGENCY)
Facility: HOSPITAL | Age: 47
Discharge: HOME | End: 2022-10-06
Attending: EMERGENCY MEDICINE
Payer: MEDICAID

## 2022-10-05 DIAGNOSIS — M43.25 FUSION OF SPINE, THORACOLUMBAR REGION: ICD-10-CM

## 2022-10-05 DIAGNOSIS — N20.0 KIDNEY STONE: Primary | ICD-10-CM

## 2022-10-05 DIAGNOSIS — G89.18 ACUTE POST-OPERATIVE PAIN: ICD-10-CM

## 2022-10-05 DIAGNOSIS — N13.2 HYDRONEPHROSIS WITH URINARY OBSTRUCTION DUE TO URETERAL CALCULUS: ICD-10-CM

## 2022-10-05 LAB
ALBUMIN SERPL BCP-MCNC: 4.1 G/DL (ref 3.5–5.2)
ALP SERPL-CCNC: 114 U/L (ref 55–135)
ALT SERPL W/O P-5'-P-CCNC: 11 U/L (ref 10–44)
ANION GAP SERPL CALC-SCNC: 11 MMOL/L (ref 8–16)
AST SERPL-CCNC: 18 U/L (ref 10–40)
BASOPHILS # BLD AUTO: 0.03 K/UL (ref 0–0.2)
BASOPHILS NFR BLD: 0.3 % (ref 0–1.9)
BILIRUB SERPL-MCNC: 0.5 MG/DL (ref 0.1–1)
BILIRUB UR QL STRIP: NEGATIVE
BUN SERPL-MCNC: 11 MG/DL (ref 6–20)
CALCIUM SERPL-MCNC: 9.4 MG/DL (ref 8.7–10.5)
CHLORIDE SERPL-SCNC: 104 MMOL/L (ref 95–110)
CLARITY UR REFRACT.AUTO: CLEAR
CO2 SERPL-SCNC: 27 MMOL/L (ref 23–29)
COLOR UR AUTO: YELLOW
CREAT SERPL-MCNC: 1.2 MG/DL (ref 0.5–1.4)
DIFFERENTIAL METHOD: ABNORMAL
EOSINOPHIL # BLD AUTO: 0.2 K/UL (ref 0–0.5)
EOSINOPHIL NFR BLD: 1.9 % (ref 0–8)
ERYTHROCYTE [DISTWIDTH] IN BLOOD BY AUTOMATED COUNT: 12.1 % (ref 11.5–14.5)
EST. GFR  (NO RACE VARIABLE): >60 ML/MIN/1.73 M^2
GLUCOSE SERPL-MCNC: 111 MG/DL (ref 70–110)
GLUCOSE UR QL STRIP: NEGATIVE
HCT VFR BLD AUTO: 41.6 % (ref 40–54)
HCV AB SERPL QL IA: NORMAL
HGB BLD-MCNC: 14.1 G/DL (ref 14–18)
HGB UR QL STRIP: NEGATIVE
HIV 1+2 AB+HIV1 P24 AG SERPL QL IA: NORMAL
IMM GRANULOCYTES # BLD AUTO: 0.03 K/UL (ref 0–0.04)
IMM GRANULOCYTES NFR BLD AUTO: 0.3 % (ref 0–0.5)
KETONES UR QL STRIP: NEGATIVE
LEUKOCYTE ESTERASE UR QL STRIP: NEGATIVE
LIPASE SERPL-CCNC: 18 U/L (ref 4–60)
LYMPHOCYTES # BLD AUTO: 1.2 K/UL (ref 1–4.8)
LYMPHOCYTES NFR BLD: 11.1 % (ref 18–48)
MCH RBC QN AUTO: 31.1 PG (ref 27–31)
MCHC RBC AUTO-ENTMCNC: 33.9 G/DL (ref 32–36)
MCV RBC AUTO: 92 FL (ref 82–98)
MONOCYTES # BLD AUTO: 0.4 K/UL (ref 0.3–1)
MONOCYTES NFR BLD: 3.8 % (ref 4–15)
NEUTROPHILS # BLD AUTO: 8.5 K/UL (ref 1.8–7.7)
NEUTROPHILS NFR BLD: 82.6 % (ref 38–73)
NITRITE UR QL STRIP: NEGATIVE
NRBC BLD-RTO: 0 /100 WBC
PH UR STRIP: 7 [PH] (ref 5–8)
PLATELET # BLD AUTO: 359 K/UL (ref 150–450)
PMV BLD AUTO: 9 FL (ref 9.2–12.9)
POTASSIUM SERPL-SCNC: 4.1 MMOL/L (ref 3.5–5.1)
PROT SERPL-MCNC: 7.8 G/DL (ref 6–8.4)
PROT UR QL STRIP: ABNORMAL
RBC # BLD AUTO: 4.53 M/UL (ref 4.6–6.2)
SODIUM SERPL-SCNC: 142 MMOL/L (ref 136–145)
SP GR UR STRIP: 1.02 (ref 1–1.03)
URN SPEC COLLECT METH UR: ABNORMAL
WBC # BLD AUTO: 10.34 K/UL (ref 3.9–12.7)

## 2022-10-05 PROCEDURE — 83690 ASSAY OF LIPASE: CPT | Performed by: EMERGENCY MEDICINE

## 2022-10-05 PROCEDURE — 99284 EMERGENCY DEPT VISIT MOD MDM: CPT | Mod: ,,, | Performed by: EMERGENCY MEDICINE

## 2022-10-05 PROCEDURE — 99285 EMERGENCY DEPT VISIT HI MDM: CPT | Mod: 25

## 2022-10-05 PROCEDURE — 63600175 PHARM REV CODE 636 W HCPCS: Performed by: EMERGENCY MEDICINE

## 2022-10-05 PROCEDURE — 87389 HIV-1 AG W/HIV-1&-2 AB AG IA: CPT | Performed by: EMERGENCY MEDICINE

## 2022-10-05 PROCEDURE — 99284 PR EMERGENCY DEPT VISIT,LEVEL IV: ICD-10-PCS | Mod: ,,, | Performed by: EMERGENCY MEDICINE

## 2022-10-05 PROCEDURE — 81003 URINALYSIS AUTO W/O SCOPE: CPT | Performed by: EMERGENCY MEDICINE

## 2022-10-05 PROCEDURE — 85025 COMPLETE CBC W/AUTO DIFF WBC: CPT | Performed by: EMERGENCY MEDICINE

## 2022-10-05 PROCEDURE — 96375 TX/PRO/DX INJ NEW DRUG ADDON: CPT

## 2022-10-05 PROCEDURE — 96374 THER/PROPH/DIAG INJ IV PUSH: CPT

## 2022-10-05 PROCEDURE — 80053 COMPREHEN METABOLIC PANEL: CPT | Performed by: EMERGENCY MEDICINE

## 2022-10-05 PROCEDURE — 86803 HEPATITIS C AB TEST: CPT | Performed by: EMERGENCY MEDICINE

## 2022-10-05 RX ORDER — ONDANSETRON 2 MG/ML
4 INJECTION INTRAMUSCULAR; INTRAVENOUS
Status: COMPLETED | OUTPATIENT
Start: 2022-10-05 | End: 2022-10-05

## 2022-10-05 RX ORDER — MORPHINE SULFATE 4 MG/ML
4 INJECTION, SOLUTION INTRAMUSCULAR; INTRAVENOUS
Status: COMPLETED | OUTPATIENT
Start: 2022-10-05 | End: 2022-10-05

## 2022-10-05 RX ADMIN — ONDANSETRON 4 MG: 2 INJECTION INTRAMUSCULAR; INTRAVENOUS at 11:10

## 2022-10-05 RX ADMIN — MORPHINE SULFATE 4 MG: 4 INJECTION INTRAVENOUS at 11:10

## 2022-10-06 VITALS
BODY MASS INDEX: 26.48 KG/M2 | OXYGEN SATURATION: 99 % | RESPIRATION RATE: 18 BRPM | HEART RATE: 68 BPM | WEIGHT: 185 LBS | HEIGHT: 70 IN | TEMPERATURE: 98 F | DIASTOLIC BLOOD PRESSURE: 87 MMHG | SYSTOLIC BLOOD PRESSURE: 136 MMHG

## 2022-10-06 PROCEDURE — 63600175 PHARM REV CODE 636 W HCPCS: Performed by: EMERGENCY MEDICINE

## 2022-10-06 PROCEDURE — 96361 HYDRATE IV INFUSION ADD-ON: CPT

## 2022-10-06 PROCEDURE — 96375 TX/PRO/DX INJ NEW DRUG ADDON: CPT

## 2022-10-06 PROCEDURE — 25000003 PHARM REV CODE 250: Performed by: EMERGENCY MEDICINE

## 2022-10-06 RX ORDER — TAMSULOSIN HYDROCHLORIDE 0.4 MG/1
0.4 CAPSULE ORAL DAILY
Qty: 7 CAPSULE | Refills: 0 | Status: SHIPPED | OUTPATIENT
Start: 2022-10-06 | End: 2022-10-06 | Stop reason: SDUPTHER

## 2022-10-06 RX ORDER — KETOROLAC TROMETHAMINE 30 MG/ML
10 INJECTION, SOLUTION INTRAMUSCULAR; INTRAVENOUS
Status: COMPLETED | OUTPATIENT
Start: 2022-10-06 | End: 2022-10-06

## 2022-10-06 RX ORDER — IBUPROFEN 600 MG/1
600 TABLET ORAL EVERY 6 HOURS PRN
Qty: 20 TABLET | Refills: 0 | Status: SHIPPED | OUTPATIENT
Start: 2022-10-06

## 2022-10-06 RX ORDER — ONDANSETRON 4 MG/1
4 TABLET, ORALLY DISINTEGRATING ORAL EVERY 8 HOURS PRN
Qty: 20 TABLET | Refills: 1 | Status: SHIPPED | OUTPATIENT
Start: 2022-10-06

## 2022-10-06 RX ORDER — OXYCODONE HYDROCHLORIDE 5 MG/1
5 TABLET ORAL EVERY 4 HOURS PRN
Qty: 15 TABLET | Refills: 0 | Status: SHIPPED | OUTPATIENT
Start: 2022-10-06

## 2022-10-06 RX ORDER — TAMSULOSIN HYDROCHLORIDE 0.4 MG/1
0.4 CAPSULE ORAL
Status: COMPLETED | OUTPATIENT
Start: 2022-10-06 | End: 2022-10-06

## 2022-10-06 RX ORDER — TAMSULOSIN HYDROCHLORIDE 0.4 MG/1
0.4 CAPSULE ORAL DAILY
Qty: 7 CAPSULE | Refills: 0 | Status: SHIPPED | OUTPATIENT
Start: 2022-10-06 | End: 2022-10-13

## 2022-10-06 RX ORDER — IBUPROFEN 600 MG/1
600 TABLET ORAL EVERY 6 HOURS PRN
Qty: 20 TABLET | Refills: 0 | Status: SHIPPED | OUTPATIENT
Start: 2022-10-06 | End: 2022-10-06 | Stop reason: SDUPTHER

## 2022-10-06 RX ORDER — OXYCODONE HYDROCHLORIDE 5 MG/1
5 TABLET ORAL
Status: COMPLETED | OUTPATIENT
Start: 2022-10-06 | End: 2022-10-06

## 2022-10-06 RX ADMIN — KETOROLAC TROMETHAMINE 10 MG: 30 INJECTION, SOLUTION INTRAMUSCULAR; INTRAVENOUS at 01:10

## 2022-10-06 RX ADMIN — OXYCODONE 5 MG: 5 TABLET ORAL at 01:10

## 2022-10-06 RX ADMIN — TAMSULOSIN HYDROCHLORIDE 0.4 MG: 0.4 CAPSULE ORAL at 01:10

## 2022-10-06 RX ADMIN — SODIUM CHLORIDE 1000 ML: 0.9 INJECTION, SOLUTION INTRAVENOUS at 01:10

## 2022-10-06 NOTE — ED TRIAGE NOTES
"Cholecystectomy 3 weeks ago, here with c/o abd pain radiating around left side to back. Pt states abd is swollen, causing bowel and bladder pain. States stool is "sometimes soft and sometimes hard." Normal urination.  "

## 2022-10-06 NOTE — ED PROVIDER NOTES
"Encounter Date: 10/5/2022    SCRIBE #1 NOTE: I, Gracie Alexander, am scribing for, and in the presence of,  Kojo Arciniega MD. I have scribed the following portions of the note - Other sections scribed: HIPOLITO CORREA.     History     Chief Complaint   Patient presents with    Abdominal Pain     And L sided flank pain, pain described as burning. Gallbladder removal 3 weeks ago. Pain stated 45 minutes ago     Time patient was seen by the provider: 10:25 PM      The patient is a 47 y.o. male with past medical history of HTN and past surgical history of a gallbladder removal who presents to the ED with a complaint of abdominal pain with onset 5:30 pm. Associated symptoms include abdominal distension, overall weakness, L sided back pain, nausea, and chills. States pain occasionally radiates to anterior L thigh.  He denies vomiting, dizziness, diarrhea, fever, diaphoresis, irregular bowel movements, numbness in his legs, or former drug and tobacco use. He describes his pain as a "burning" sensation. He also notes that he feels pressure on his bladder.  He says that this pain is much different than when he got his gallbladder removed.       The history is provided by the patient and medical records. No  was used.   Review of patient's allergies indicates:   Allergen Reactions    Iodine Hives    Shellfish containing products Hives     History reviewed. No pertinent past medical history.  Past Surgical History:   Procedure Laterality Date    CHOLECYSTECTOMY      LUMBAR FUSION N/A 08/14/2020    Procedure: FUSION, SPINE, LUMBAR T12-L2 Ziehm MIS  Spinewave Neuromonitoring  ;  Surgeon: Jamison Perez MD;  Location: [facility] OR [address] [place] [address];  Service: Neurosurgery;  Laterality: N/A;    SURGICAL REMOVAL OF VERTEBRAL BODY OF LUMBAR SPINE Left 08/06/2020    Procedure: L1 CORPECTOMY, SPINE, LUMBAR ;  Surgeon: Leonid Snider MD;  Location: [facility] OR [address] [place] [address];  Service: Neurosurgery;  Laterality: Left;  Globus, Please obtain " neuromonitoring.     TRANSFORAMINAL LUMBAR INTERBODY FUSION Left 08/06/2020    Procedure: FUSION, SPINE, LUMBAR, TLIF T12-L2 Interbody Fusion with Instrumentation - Lateral;  Surgeon: Leonid Snider MD;  Location: [facility] OR [address] [place] [address];  Service: Neurosurgery;  Laterality: Left;     History reviewed. No pertinent family history.  Social History     Tobacco Use    Smoking status: Never    Smokeless tobacco: Never   Substance Use Topics    Alcohol use: Yes    Drug use: Never     Review of Systems   Constitutional:  Positive for chills. Negative for diaphoresis and fever.   Eyes:  Negative for pain.   Respiratory:  Negative for shortness of breath.    Gastrointestinal:  Positive for abdominal distention, abdominal pain and nausea. Negative for diarrhea and vomiting.   Genitourinary:  Positive for flank pain.   Musculoskeletal:  Positive for back pain.   Neurological:  Positive for weakness. Negative for dizziness and numbness.   Psychiatric/Behavioral:  Negative for confusion.      Physical Exam     Initial Vitals   BP Pulse Resp Temp SpO2   10/05/22 1930 10/05/22 1930 10/05/22 1930 10/05/22 1931 10/05/22 1930   (!) 147/103 82 18 97.3 °F (36.3 °C) 99 %      MAP       --                Physical Exam  General: Awake and alert, well-nourished  HENT: moist mucous membranes  Eyes: No conjunctival injection  Pulm: CTAB, no increased work of breathing  CV: Regular rate and rhythm, no murmur noted  Abdomen: mildly distended, mild TTP in LLQ.  L CVA tenderness noted.    MSK: No LE edema  Skin: No rash noted  Neuro: No facial asymmetry, grossly normal movements of arms and legs  Psychiatric: Cooperative    ED Course   Procedures  Labs Reviewed   CBC W/ AUTO DIFFERENTIAL - Abnormal; Notable for the following components:       Result Value    RBC 4.53 (*)     MCH 31.1 (*)     MPV 9.0 (*)     Gran # (ANC) 8.5 (*)     Gran % 82.6 (*)     Lymph % 11.1 (*)     Mono % 3.8 (*)     All other components within normal limits    Narrative:      Release to patient->Immediate   COMPREHENSIVE METABOLIC PANEL - Abnormal; Notable for the following components:    Glucose 111 (*)     All other components within normal limits    Narrative:     Release to patient->Immediate   URINALYSIS, REFLEX TO URINE CULTURE - Abnormal; Notable for the following components:    Protein, UA Trace (*)     All other components within normal limits    Narrative:     Specimen Source->Urine   LIPASE    Narrative:     Release to patient->Immediate   HIV 1 / 2 ANTIBODY    Narrative:     Release to patient->Immediate   HEPATITIS C ANTIBODY    Narrative:     Release to patient->Immediate          Imaging Results               CT Abdomen Pelvis  Without Contrast (Final result)  Result time 10/05/22 23:49:29   Procedure changed from CT Abdomen Pelvis With Contrast     Final result by Karla Calderon MD (10/05/22 23:49:29)                   Impression:      1. Mild/moderate left hydroureteronephrosis secondary to a 3 mm calculus in the distal left ureter, near the level of the ureterovesicular junction.  Significant left periureteral and perinephric inflammatory change with ill-defined left perinephric fluid is present which can be seen with obstruction and/or infection.  Correlation with urinalysis for underlying infectious process is advised.  2. Wall thickening involving the majority of the colon, presumably on the basis of nondistention although correlation for colitis advised.  Submucosal colonic fat deposition present which is nonspecific but can be seen due to prior inflammatory change or in the setting of obesity.  3. Prominent periaortic lymph nodes, possibly reactive in etiology although clinical correlation advised.  4. Cholecystectomy, postoperative change of the lumbar spine and additional findings as above.  This report was flagged in Epic as abnormal.      Electronically signed by: Karla Calderon MD  Date:    10/05/2022  Time:    23:49               Narrative:     EXAMINATION:  CT ABDOMEN PELVIS WITHOUT CONTRAST    CLINICAL HISTORY:  Abdominal pain, acute, nonlocalized;    TECHNIQUE:  Low dose axial images, sagittal and coronal reformations were obtained from the lung bases to the pubic symphysis.  No oral or IV contrast.    COMPARISON:  CT chest, abdomen and pelvis 08/05/2020, lumbar spine CT 08/07/2020    FINDINGS:  The visualized lung bases are free of pleural fluid or focal consolidation.  There is dependent bibasilar the atelectasis.  The visualized portions of the heart and pericardium are within normal limits.    Please note evaluation of solid organ parenchyma is limited due to lack of IV contrast as well as streak artifact from spinal fixation hardware.  Allowing for this, the liver, spleen, pancreas and adrenal glands demonstrate an unremarkable noncontrast CT appearance.  The gallbladder is surgically absent.  Presumed surgical clip projects along the posterior aspect of the right hepatic lobe.  No significant intra or extrahepatic biliary ductal dilatation.    There is/moderate left hydroureteronephrosis secondary to a 3 mm calculus in the distal left ureter, near the level of the ureterovesicular junction.  There is significant left periureteral and perinephric inflammatory change with ill-defined left perinephric fluid present.  These findings can be seen in the setting of obstruction and/or infection.  Correlation with urinalysis for underlying infectious process is advised.  There is no right-sided hydroureteronephrosis.  There is a 1.4 cm right renal cortical hypodensity noting characterization is limited due to streak artifact and noncontrast technique noting this is unchanged from prior exam.  Incidentally noted retroaortic accessory left renal vein.  The urinary bladder appears to be within normal limits for its given degree of distention.  The prostate is slightly prominent in size.    The abdominal aorta is nonaneurysmal with scattered atherosclerosis.   Prominent periaortic lymph nodes present, possibly reactive due to aforementioned renal findings although clinical correlation advised.    There is wall thickening of the distal ascending colon, transverse, descending and sigmoid colon presumably on the basis of nondistention although correlation for symptoms of colitis advised.  Submucosal colonic fat deposition present which can be seen with obesity the visualized loops of small bowel demonstrate no evidence of obstruction or inflammatory change.  No free intraperitoneal air, portal venous gas or ascites.    There is postoperative change of the lumbar spine in keeping with L1 corpectomy and T12-L2 interbody cage infusion.  Osseous structures appear intact.                                       Medications   morphine injection 4 mg (4 mg Intravenous Given 10/5/22 2301)   ondansetron injection 4 mg (4 mg Intravenous Given 10/5/22 2301)   ketorolac injection 9.999 mg (9.999 mg Intravenous Given 10/6/22 0105)   sodium chloride 0.9% bolus 1,000 mL (0 mLs Intravenous Stopped 10/6/22 0217)   tamsulosin 24 hr capsule 0.4 mg (0.4 mg Oral Given 10/6/22 0130)   oxyCODONE immediate release tablet 5 mg (5 mg Oral Given 10/6/22 0130)     Medical Decision Making:   History:   Old Medical Records: I decided to obtain old medical records.  Initial Assessment:   Pt appears in discomfort.  Vitals with htn but otherwise reassuring.  Symptoms acute in onset, seem most suggestive of renal stone vs bowel obstruction.  Differential Diagnosis:   Appendicitis, IBD, diverticulitis, mesenteric ischemia, bowel obstruction, hernia, colitis, gastritis, nephrolithiasis, UTI, biliary pathology,   aortic pathology (AAA vs dissection), herniated disc  ED Management:  Labs overall reassuring.  CT abd/pelvis with evidence of 3mm obstructing L ureteral stone which likely explains symptoms.  Moderate hydronephrosis and stranding so discussed with urology resident who felt no need for immediate  intervention and should do trial of passage.  Pt feeling somewhat better after initial meds.  Will try oral pain meds and see if symptoms are controlled.  No UTI on UA.  Signed out to oncoming physician to re-eval for symptom control and if ok can discharge on outpatient renal stone meds and follow up with urology.        Scribe Attestation:   Scribe #1: I performed the above scribed service and the documentation accurately describes the services I performed. I attest to the accuracy of the note.                   Clinical Impression:   Final diagnoses:  [N20.0] Kidney stone (Primary)  [N13.2] Hydronephrosis with urinary obstruction due to ureteral calculus      ED Disposition Condition    Discharge Stable          ED Prescriptions       Medication Sig Dispense Start Date End Date Auth. Provider    ibuprofen (ADVIL,MOTRIN) 600 MG tablet  (Status: Discontinued) Take 1 tablet (600 mg total) by mouth every 6 (six) hours as needed for Pain. 20 tablet 10/6/2022 10/6/2022 Kojo Arciniega MD    tamsulosin (FLOMAX) 0.4 mg Cap  (Status: Discontinued) Take 1 capsule (0.4 mg total) by mouth once daily. for 7 days 7 capsule 10/6/2022 10/6/2022 Kojo Arciniega MD    oxyCODONE (ROXICODONE) 5 MG immediate release tablet Take 1 tablet (5 mg total) by mouth every 4 (four) hours as needed for Pain. 15 tablet 10/6/2022 -- Kojo Arciniega MD    ondansetron (ZOFRAN-ODT) 4 MG TbDL Take 1 tablet (4 mg total) by mouth every 8 (eight) hours as needed (nausea). 20 tablet 10/6/2022 -- Kojo Arciniega MD    ibuprofen (ADVIL,MOTRIN) 600 MG tablet Take 1 tablet (600 mg total) by mouth every 6 (six) hours as needed for Pain. 20 tablet 10/6/2022 -- Kojo Arciniega MD    tamsulosin (FLOMAX) 0.4 mg Cap Take 1 capsule (0.4 mg total) by mouth once daily. for 7 days 7 capsule 10/6/2022 10/13/2022 Kojo Arciniega MD          Follow-up Information       Follow up With Specialties Details Why Contact Info Additional Information    [address] [facility] Urology Schedule an appointment as soon as possible for a visit   [address]  [facility] [address] [phone] Main Building, 4th Floor Please park in [facility] and take [facility] elevator             Kojo Arciniega MD  10/07/22 2234

## 2023-01-13 ENCOUNTER — HOSPITAL ENCOUNTER (EMERGENCY)
Facility: HOSPITAL | Age: 48
Discharge: HOME | End: 2023-01-14
Attending: EMERGENCY MEDICINE
Payer: MEDICAID

## 2023-01-13 DIAGNOSIS — S39.012A LUMBAR STRAIN, INITIAL ENCOUNTER: Primary | ICD-10-CM

## 2023-01-13 RX ORDER — ORPHENADRINE CITRATE 30 MG/ML
60 INJECTION INTRAMUSCULAR; INTRAVENOUS
Status: COMPLETED | OUTPATIENT
Start: 2023-01-14 | End: 2023-01-14

## 2023-01-13 RX ORDER — KETOROLAC TROMETHAMINE 30 MG/ML
15 INJECTION, SOLUTION INTRAMUSCULAR; INTRAVENOUS
Status: COMPLETED | OUTPATIENT
Start: 2023-01-14 | End: 2023-01-14

## 2023-01-14 VITALS
OXYGEN SATURATION: 98 % | HEART RATE: 88 BPM | HEIGHT: 70 IN | TEMPERATURE: 97 F | WEIGHT: 180 LBS | DIASTOLIC BLOOD PRESSURE: 95 MMHG | BODY MASS INDEX: 25.77 KG/M2 | SYSTOLIC BLOOD PRESSURE: 143 MMHG | RESPIRATION RATE: 18 BRPM

## 2023-01-14 PROCEDURE — 99284 EMERGENCY DEPT VISIT MOD MDM: CPT

## 2023-01-14 PROCEDURE — 63600175 PHARM REV CODE 636 W HCPCS: Performed by: EMERGENCY MEDICINE

## 2023-01-14 PROCEDURE — 96372 THER/PROPH/DIAG INJ SC/IM: CPT | Performed by: EMERGENCY MEDICINE

## 2023-01-14 RX ORDER — METHOCARBAMOL 500 MG/1
500 TABLET, FILM COATED ORAL 2 TIMES DAILY PRN
Qty: 15 TABLET | Refills: 0 | Status: SHIPPED | OUTPATIENT
Start: 2023-01-14 | End: 2023-01-19

## 2023-01-14 RX ORDER — NAPROXEN 500 MG/1
500 TABLET ORAL 2 TIMES DAILY WITH MEALS
Qty: 20 TABLET | Refills: 0 | Status: SHIPPED | OUTPATIENT
Start: 2023-01-14

## 2023-01-14 RX ADMIN — ORPHENADRINE CITRATE 60 MG: 30 INJECTION INTRAMUSCULAR; INTRAVENOUS at 12:01

## 2023-01-14 RX ADMIN — KETOROLAC TROMETHAMINE 15 MG: 30 INJECTION, SOLUTION INTRAMUSCULAR; INTRAVENOUS at 12:01

## 2023-01-14 NOTE — ED PROVIDER NOTES
Encounter Date: 1/13/2023    SCRIBE #1 NOTE: I, Brian Roche, am scribing for, and in the presence of,  Jorge Odell MD.     History     Chief Complaint   Patient presents with    Back Pain     Mid back pain     Time seen by provider: 11:44 PM on 01/13/2023    Oscar Bills is a 47 y.o. male who presents to the ED with back pain. The patient reports a hx of back surgery and having rods placed in his back. He states that his back feels swollen and feels as if something is pulling him down whenever he bends over. The patient also notes experiencing pain when he turns side to side. Per the patient, the pain started over this past weekend, and he decided to come in to the ER tonight because the pain has worsened since. The patient denies any other symptoms at this time. No pertinent PMHx. PSHx of surgical removal of left vertebral body of lumbar spine, left transforaminal lumbar interbody fusion, and lumbar fusion.    The history is provided by the patient.   Review of patient's allergies indicates:   Allergen Reactions    Iodine Hives    Shellfish containing products Hives     No past medical history on file.  Past Surgical History:   Procedure Laterality Date    CHOLECYSTECTOMY      LUMBAR FUSION N/A 08/14/2020    Procedure: FUSION, SPINE, LUMBAR T12-L2 Ziehm MIS  Spinewave Neuromonitoring  ;  Surgeon: Jamison Perez MD;  Location: [facility] OR [address] [place] [address];  Service: Neurosurgery;  Laterality: N/A;    SURGICAL REMOVAL OF VERTEBRAL BODY OF LUMBAR SPINE Left 08/06/2020    Procedure: L1 CORPECTOMY, SPINE, LUMBAR ;  Surgeon: Leonid Snider MD;  Location: [facility] OR [address] [place] [address];  Service: Neurosurgery;  Laterality: Left;  Globus, Please obtain neuromonitoring.     TRANSFORAMINAL LUMBAR INTERBODY FUSION Left 08/06/2020    Procedure: FUSION, SPINE, LUMBAR, TLIF T12-L2 Interbody Fusion with Instrumentation - Lateral;  Surgeon: Leonid Snider MD;  Location: [facility] OR [address] [place] [address];  Service: Neurosurgery;  Laterality: Left;     No family  history on file.  Social History     Tobacco Use    Smoking status: Never    Smokeless tobacco: Never   Substance Use Topics    Alcohol use: Yes    Drug use: Never     Review of Systems   Musculoskeletal:  Positive for back pain.   All other systems reviewed and are negative.    Physical Exam     Initial Vitals [01/13/23 2229]   BP Pulse Resp Temp SpO2   (!) 141/93 95 18 98.2 °F (36.8 °C) 95 %      MAP       --         Physical Exam    Nursing note and vitals reviewed.  Constitutional: He appears well-developed and well-nourished. He is not diaphoretic.   HENT:   Head: Normocephalic and atraumatic.   Eyes: EOM are normal. Pupils are equal, round, and reactive to light.   Neck: Neck supple.   Normal range of motion.  Cardiovascular:  Normal rate, regular rhythm, normal heart sounds and intact distal pulses.     Exam reveals no gallop and no friction rub.       No murmur heard.  Musculoskeletal:         General: No tenderness or edema. Normal range of motion.      Cervical back: Normal range of motion and neck supple.      Lumbar back: Spasms present.      Comments: Bilateral perispinal muscle spasms noted to lumbar region.     Neurological: He is alert and oriented to person, place, and time. He has normal strength.   Skin: Skin is warm and dry. No rash noted. No pallor.   Psychiatric: He has a normal mood and affect.       ED Course   Procedures  Labs Reviewed - No data to display       Imaging Results              X-Ray Lumbar Spine Ap And Lateral (Final result)  Result time 01/13/23 23:59:21      Final result by Isidro Beltran DO (01/13/23 23:59:21)                   Impression:      No acute osseous abnormality.      Electronically signed by: Isidro Beltran  Date:    01/13/2023  Time:    23:59               Narrative:    EXAMINATION:  XR LUMBAR SPINE AP AND LATERAL    CLINICAL HISTORY:  Back pain;    TECHNIQUE:  AP, lateral and spot images were performed of the lumbar  spine.    COMPARISON:  None    FINDINGS:  There are postoperative changes of L1 corpectomy and T12 through L2 spinal fusion loss.  Hardware appears intact and well aligned.  There is no evidence of acute fracture or dislocation of the lumbar spine.  The vertebral body heights are maintained at the non operative levels.  Remaining visualized osseous structures are intact.  There are right upper quadrant surgical clips.                                       Medications   ketorolac injection 15 mg (15 mg Intramuscular Given 1/14/23 0036)   orphenadrine injection 60 mg (60 mg Intramuscular Given 1/14/23 0036)     Medical Decision Making:   History:   Old Medical Records: I decided to obtain old medical records.  Clinical Tests:   Radiological Study: Ordered and Reviewed        Scribe Attestation:   Scribe #1: I performed the above scribed service and the documentation accurately describes the services I performed. I attest to the accuracy of the note.                 This document was produced by a scribe under my direction and in my presence. I agree with the content of the note and have made any necessary edits.     Jorge Odell MD    01/16/2023 5:30 AM    Clinical Impression:   Final diagnoses:  [S39.012A] Lumbar strain, initial encounter (Primary)        ED Disposition Condition    Discharge Stable          ED Prescriptions       Medication Sig Dispense Start Date End Date Auth. Provider    naproxen (NAPROSYN) 500 MG tablet Take 1 tablet (500 mg total) by mouth 2 (two) times daily with meals. 20 tablet 1/14/2023 -- Jorge Odell MD    methocarbamoL (ROBAXIN) 500 MG Tab Take 1 tablet (500 mg total) by mouth 2 (two) times daily as needed. 15 tablet 1/14/2023 1/19/2023 Jorge Odell MD          Follow-up Information       Follow up With Specialties Details Why Contact Info    Your orthopedist  Schedule an appointment as soon as possible for a visit in 3 days               Jorge Odell  MD  01/16/23 0530

## 2024-08-05 NOTE — PLAN OF CARE
[facility] Care Plan    POC reviewed with Oscar Bills and family at 1600. Pt verbalized understanding. Questions and concerns addressed. No acute events today. Pt progressing toward goals. Will continue to monitor. See below and flowsheets for full assessment and VS info.       Neuro:  [place] Coma Scale  Best Eye Response: 4-->(E4) spontaneous  Best Motor Response: 6-->(M6) obeys commands  Best Verbal Response: 5-->(V5) oriented  [place] Coma Scale Score: 15  Assessment Qualifiers: patient not sedated/intubated  Pupil PERRLA: yes     24 hr Temp:  [97.9 °F (36.6 °C)-99 °F (37.2 °C)]     CV:   Rhythm: sinus tachycardia  BP goals:   SBP < 180  MAP > 80    Resp:   O2 Device (Oxygen Therapy): nasal cannula  Oxygen Concentration (%): 28    Plan: wean pain meds, PT/OT, sit up in bed, OOB as tolerated, advance diet    GI/:  NEFTALI Total Score: 20  Diet/Nutrition Received: regular  Last Bowel Movement: 08/16/20  Voiding Characteristics: urethral catheter (bladder)    Intake/Output Summary (Last 24 hours) at 8/16/2020 1752  Last data filed at 8/16/2020 1705  Gross per 24 hour   Intake 2893.48 ml   Output 3780 ml   Net -886.52 ml       Labs/Accuchecks:  Recent Labs   Lab 08/16/20  0139   WBC 10.56   RBC 2.63*   HGB 8.1*   HCT 24.2*   *      Recent Labs   Lab 08/16/20  0139   *   K 3.9   CO2 27   CL 95   BUN 7   CREATININE 0.6   ALKPHOS 87   ALT 23   AST 29   BILITOT 0.6      Recent Labs   Lab 08/15/20  0145 08/16/20  0139   INR 1.0  --    APTT 28.2 30.9    No results for input(s): CPK, CPKMB, TROPONINI, MB in the last 168 hours.    Electrolytes: magnesium replaced  Accuchecks: n/a    Gtts:   sodium chloride 0.9% 100 mL/hr at 08/16/20 1605    hydromorphone in 0.9 % NaCl 6 mg/30 ml      ketamine (KETALAR) infusion (titrating) 3.5 mcg/kg/min (08/16/20 1705)       LDA/Wounds:  Lines/Drains/Airways       Drain                   Urethral Catheter 08/05/20 1133 Straight-tip 14 Fr. 11 days         NG/OG Tube 08/08/20 1419  This medication not managed by vascular. Previously ordered by medicine team.   Follow up with PCP/cardiology   nasogastric Right nostril 8 days              Arterial Line                   Arterial Line 08/14/20 1355 Left Radial 2 days              Peripheral Intravenous Line                   Peripheral IV - Single Lumen 08/12/20 0200 18 G Anterior;Right Forearm 4 days         Peripheral IV - Single Lumen 08/14/20 0615 20 G Left Antecubital 2 days         Peripheral IV - Single Lumen 08/16/20 0900 18 G Right Wrist less than 1 day                  Wounds: surgical incisions x2 on back  Wound care consulted: No

## (undated) DEVICE — DRAPE STERI-DRAPE 1000 17X11IN

## (undated) DEVICE — SYS DURASEAL SPINE

## (undated) DEVICE — SHEET XLGE DRAPE

## (undated) DEVICE — DURAPREP SURG SCRUB 26ML

## (undated) DEVICE — DRESSING TRANS 4X4 TEGADERM

## (undated) DEVICE — TOWEL OR NONABSORB ADH 17X26

## (undated) DEVICE — BLADE ELECTRO EDGE INSULATED

## (undated) DEVICE — SUT VICRYL PLUS 2-0 CT1 18

## (undated) DEVICE — GAUZE SPONGE 4X4 12PLY

## (undated) DEVICE — APPLICATOR MICROMYST

## (undated) DEVICE — DRESSING TELFA STRL 4X3 LF

## (undated) DEVICE — SEE MEDLINE ITEM 157150

## (undated) DEVICE — SUT VICRYL PLUS 3-0 SH 18IN

## (undated) DEVICE — DRESSING MEPILEX BORDER 4 X 4

## (undated) DEVICE — DRESSING SURGICAL 1X3

## (undated) DEVICE — Device

## (undated) DEVICE — DRESSING AQUACEL AG FOAM 4X4

## (undated) DEVICE — ELECTRODE BLADE INSULATED 1 IN

## (undated) DEVICE — ELECTRODE REM PLYHSV RETURN 9

## (undated) DEVICE — KIT ACCESS DILATOR SET PROBE

## (undated) DEVICE — CATH SUCTION 10FR

## (undated) DEVICE — SPONGE GAUZE 16PLY 4X4

## (undated) DEVICE — DRESSING AQUACEL SACRAL 8 X 7

## (undated) DEVICE — CLOSURE SKIN STERI STRIP 1/2X4

## (undated) DEVICE — SUT MONOCRYL 4-0 PS-1 UND

## (undated) DEVICE — CHLORAPREP W TINT 26ML APPL

## (undated) DEVICE — DRESSING SURGICAL 3/4X3/4

## (undated) DEVICE — DRAPE LAP TIBURON 77X122IN

## (undated) DEVICE — TUBE FRAZIER 5MM 2FT SOFT TIP

## (undated) DEVICE — DRESSING TELFA N ADH 3X8

## (undated) DEVICE — CARTRIDGE OIL

## (undated) DEVICE — KIT SURGIFLO HEMOSTATIC MATRIX

## (undated) DEVICE — SUT MCRYL PLUS 4-0 PS2 27IN

## (undated) DEVICE — KNIFE ANNULOTOMY BAYONETTED

## (undated) DEVICE — SEE MEDLINE ITEM 157117

## (undated) DEVICE — SYS ILLUMINATION MARS3V SPINE

## (undated) DEVICE — SEE MEDLINE ITEM 157148

## (undated) DEVICE — KIT EVACUATOR FLAT DRAIN 100CC

## (undated) DEVICE — TRAY FOLEY 16FR INFECTION CONT

## (undated) DEVICE — SUT D SPECIAL VICRYL 2-0

## (undated) DEVICE — CLOSURE SKIN STERI STRIP 1/4X3

## (undated) DEVICE — DRAPE STERI INSTRUMENT 1018

## (undated) DEVICE — BUR BONE CUT MICRO TPS 3X3.8MM

## (undated) DEVICE — DIFFUSER

## (undated) DEVICE — COVER SNAP KAP 30

## (undated) DEVICE — DRAPE C-ARMOR EQUIPMENT COVER

## (undated) DEVICE — PACK SET UP CONVERTORS

## (undated) DEVICE — DRAPE INCISE IOBAN 2 23X17IN

## (undated) DEVICE — CORD BIPOLAR 12 FOOT

## (undated) DEVICE — STAPLER SKIN PROXIMATE WIDE

## (undated) DEVICE — BURR MIS CURVED 3.0MM

## (undated) DEVICE — NDL HYPO REG 25G X 1 1/2

## (undated) DEVICE — SEE MEDLINE ITEM 156905

## (undated) DEVICE — SUT VICRYL PLUS 0 CT1 18IN

## (undated) DEVICE — DRAPE C ARM 42 X 120 10/BX

## (undated) DEVICE — DRESSING AQUACEL FOAM 5 X 5

## (undated) DEVICE — DRESSING ABSRBNT ISLAND 3.6X8

## (undated) DEVICE — SEE MEDLINE ITEM 157131

## (undated) DEVICE — MARKER SKIN STND TIP BLUE BARR

## (undated) DEVICE — FRAZIER 18FR

## (undated) DEVICE — GWIRE SPINAL BLNT TIP 1.6X19
Type: IMPLANTABLE DEVICE | Site: SPINE LUMBAR | Status: NON-FUNCTIONAL
Removed: 2020-08-14

## (undated) DEVICE — DRAPE ABDOMINAL TIBURON 14X11

## (undated) DEVICE — ELECTRODE MEGADYNE RETURN DUAL

## (undated) DEVICE — PENCIL ROCKER SWITCH 10FT CORD

## (undated) DEVICE — ADHESIVE MASTISOL VIAL 48/BX

## (undated) DEVICE — KIT SPINAL PATIENT CARE JACK